# Patient Record
Sex: MALE | Race: WHITE | NOT HISPANIC OR LATINO
[De-identification: names, ages, dates, MRNs, and addresses within clinical notes are randomized per-mention and may not be internally consistent; named-entity substitution may affect disease eponyms.]

---

## 2016-12-02 RX ORDER — INSULIN ASPART 100 [IU]/ML
0 INJECTION, SOLUTION SUBCUTANEOUS
Qty: 30 | Refills: 0 | COMMUNITY
Start: 2016-12-02

## 2017-01-13 ENCOUNTER — TRANSCRIPTION ENCOUNTER (OUTPATIENT)
Age: 77
End: 2017-01-13

## 2017-01-13 ENCOUNTER — INPATIENT (INPATIENT)
Facility: HOSPITAL | Age: 77
LOS: 1 days | Discharge: HOME | DRG: 193 | End: 2017-01-15
Attending: HOSPITALIST | Admitting: HOSPITALIST
Payer: MEDICARE

## 2017-01-13 VITALS
RESPIRATION RATE: 18 BRPM | OXYGEN SATURATION: 96 % | SYSTOLIC BLOOD PRESSURE: 118 MMHG | DIASTOLIC BLOOD PRESSURE: 78 MMHG | HEART RATE: 130 BPM

## 2017-01-13 DIAGNOSIS — I10 ESSENTIAL (PRIMARY) HYPERTENSION: ICD-10-CM

## 2017-01-13 DIAGNOSIS — I48.91 UNSPECIFIED ATRIAL FIBRILLATION: ICD-10-CM

## 2017-01-13 DIAGNOSIS — E11.9 TYPE 2 DIABETES MELLITUS WITHOUT COMPLICATIONS: ICD-10-CM

## 2017-01-13 DIAGNOSIS — J18.9 PNEUMONIA, UNSPECIFIED ORGANISM: ICD-10-CM

## 2017-01-13 DIAGNOSIS — Z41.8 ENCOUNTER FOR OTHER PROCEDURES FOR PURPOSES OTHER THAN REMEDYING HEALTH STATE: ICD-10-CM

## 2017-01-13 DIAGNOSIS — R56.9 UNSPECIFIED CONVULSIONS: ICD-10-CM

## 2017-01-13 LAB
ALBUMIN SERPL ELPH-MCNC: 4 G/DL — SIGNIFICANT CHANGE UP (ref 3.3–5)
ALP SERPL-CCNC: 65 U/L — SIGNIFICANT CHANGE UP (ref 40–120)
ALT FLD-CCNC: 15 U/L RC — SIGNIFICANT CHANGE UP (ref 10–45)
ANION GAP SERPL CALC-SCNC: 16 MMOL/L — SIGNIFICANT CHANGE UP (ref 5–17)
APPEARANCE UR: CLEAR — SIGNIFICANT CHANGE UP
APTT BLD: 26.4 SEC — LOW (ref 27.5–37.4)
AST SERPL-CCNC: 18 U/L — SIGNIFICANT CHANGE UP (ref 10–40)
BASOPHILS # BLD AUTO: 0 K/UL — SIGNIFICANT CHANGE UP (ref 0–0.2)
BASOPHILS NFR BLD AUTO: 0.1 % — SIGNIFICANT CHANGE UP (ref 0–2)
BILIRUB SERPL-MCNC: 1.9 MG/DL — HIGH (ref 0.2–1.2)
BILIRUB UR-MCNC: NEGATIVE — SIGNIFICANT CHANGE UP
BUN SERPL-MCNC: 12 MG/DL — SIGNIFICANT CHANGE UP (ref 7–23)
CALCIUM SERPL-MCNC: 9.4 MG/DL — SIGNIFICANT CHANGE UP (ref 8.4–10.5)
CHLORIDE SERPL-SCNC: 102 MMOL/L — SIGNIFICANT CHANGE UP (ref 96–108)
CO2 SERPL-SCNC: 23 MMOL/L — SIGNIFICANT CHANGE UP (ref 22–31)
COLOR SPEC: YELLOW — SIGNIFICANT CHANGE UP
CREAT SERPL-MCNC: 0.83 MG/DL — SIGNIFICANT CHANGE UP (ref 0.5–1.3)
DIFF PNL FLD: ABNORMAL
EOSINOPHIL # BLD AUTO: 0.1 K/UL — SIGNIFICANT CHANGE UP (ref 0–0.5)
EOSINOPHIL NFR BLD AUTO: 0.4 % — SIGNIFICANT CHANGE UP (ref 0–6)
GAS PNL BLDV: SIGNIFICANT CHANGE UP
GLUCOSE SERPL-MCNC: 121 MG/DL — HIGH (ref 70–99)
GLUCOSE UR QL: NEGATIVE — SIGNIFICANT CHANGE UP
HCT VFR BLD CALC: 46.1 % — SIGNIFICANT CHANGE UP (ref 39–50)
HGB BLD-MCNC: 16.4 G/DL — SIGNIFICANT CHANGE UP (ref 13–17)
INR BLD: 1.19 RATIO — HIGH (ref 0.88–1.16)
KETONES UR-MCNC: NEGATIVE — SIGNIFICANT CHANGE UP
LEUKOCYTE ESTERASE UR-ACNC: NEGATIVE — SIGNIFICANT CHANGE UP
LYMPHOCYTES # BLD AUTO: 1.9 K/UL — SIGNIFICANT CHANGE UP (ref 1–3.3)
LYMPHOCYTES # BLD AUTO: 11.4 % — LOW (ref 13–44)
MCHC RBC-ENTMCNC: 32.4 PG — SIGNIFICANT CHANGE UP (ref 27–34)
MCHC RBC-ENTMCNC: 35.6 GM/DL — SIGNIFICANT CHANGE UP (ref 32–36)
MCV RBC AUTO: 91.2 FL — SIGNIFICANT CHANGE UP (ref 80–100)
MONOCYTES # BLD AUTO: 1.4 K/UL — HIGH (ref 0–0.9)
MONOCYTES NFR BLD AUTO: 8.4 % — SIGNIFICANT CHANGE UP (ref 2–14)
NEUTROPHILS # BLD AUTO: 13.2 K/UL — HIGH (ref 1.8–7.4)
NEUTROPHILS NFR BLD AUTO: 79.8 % — HIGH (ref 43–77)
NITRITE UR-MCNC: NEGATIVE — SIGNIFICANT CHANGE UP
NT-PROBNP SERPL-SCNC: 468 PG/ML — HIGH (ref 0–300)
PH UR: 6 — SIGNIFICANT CHANGE UP (ref 4.8–8)
PLATELET # BLD AUTO: 188 K/UL — SIGNIFICANT CHANGE UP (ref 150–400)
POTASSIUM SERPL-MCNC: 4.3 MMOL/L — SIGNIFICANT CHANGE UP (ref 3.5–5.3)
POTASSIUM SERPL-SCNC: 4.3 MMOL/L — SIGNIFICANT CHANGE UP (ref 3.5–5.3)
PROT SERPL-MCNC: 7.3 G/DL — SIGNIFICANT CHANGE UP (ref 6–8.3)
PROT UR-MCNC: 300 MG/DL
PROTHROM AB SERPL-ACNC: 12.9 SEC — SIGNIFICANT CHANGE UP (ref 10–13.1)
RAPID RVP RESULT: SIGNIFICANT CHANGE UP
RBC # BLD: 5.06 M/UL — SIGNIFICANT CHANGE UP (ref 4.2–5.8)
RBC # FLD: 13.2 % — SIGNIFICANT CHANGE UP (ref 10.3–14.5)
RBC CASTS # UR COMP ASSIST: SIGNIFICANT CHANGE UP /HPF (ref 0–2)
SODIUM SERPL-SCNC: 141 MMOL/L — SIGNIFICANT CHANGE UP (ref 135–145)
SP GR SPEC: 1.02 — SIGNIFICANT CHANGE UP (ref 1.01–1.02)
TROPONIN T SERPL-MCNC: <0.01 NG/ML — SIGNIFICANT CHANGE UP (ref 0–0.06)
UROBILINOGEN FLD QL: NEGATIVE — SIGNIFICANT CHANGE UP
WBC # BLD: 16.6 K/UL — HIGH (ref 3.8–10.5)
WBC # FLD AUTO: 16.6 K/UL — HIGH (ref 3.8–10.5)
WBC UR QL: SIGNIFICANT CHANGE UP /HPF (ref 0–5)

## 2017-01-13 PROCEDURE — 71010: CPT | Mod: 26

## 2017-01-13 PROCEDURE — 99291 CRITICAL CARE FIRST HOUR: CPT | Mod: GC

## 2017-01-13 PROCEDURE — 99223 1ST HOSP IP/OBS HIGH 75: CPT | Mod: GC

## 2017-01-13 RX ORDER — TAMSULOSIN HYDROCHLORIDE 0.4 MG/1
0.4 CAPSULE ORAL AT BEDTIME
Qty: 0 | Refills: 0 | Status: DISCONTINUED | OUTPATIENT
Start: 2017-01-13 | End: 2017-01-15

## 2017-01-13 RX ORDER — INSULIN LISPRO 100/ML
VIAL (ML) SUBCUTANEOUS AT BEDTIME
Qty: 0 | Refills: 0 | Status: DISCONTINUED | OUTPATIENT
Start: 2017-01-13 | End: 2017-01-15

## 2017-01-13 RX ORDER — METOPROLOL TARTRATE 50 MG
25 TABLET ORAL
Qty: 0 | Refills: 0 | Status: DISCONTINUED | OUTPATIENT
Start: 2017-01-13 | End: 2017-01-14

## 2017-01-13 RX ORDER — INSULIN LISPRO 100/ML
VIAL (ML) SUBCUTANEOUS
Qty: 0 | Refills: 0 | Status: DISCONTINUED | OUTPATIENT
Start: 2017-01-13 | End: 2017-01-15

## 2017-01-13 RX ORDER — LEVETIRACETAM 250 MG/1
750 TABLET, FILM COATED ORAL
Qty: 0 | Refills: 0 | Status: DISCONTINUED | OUTPATIENT
Start: 2017-01-13 | End: 2017-01-15

## 2017-01-13 RX ORDER — INSULIN GLARGINE 100 [IU]/ML
20 INJECTION, SOLUTION SUBCUTANEOUS AT BEDTIME
Qty: 0 | Refills: 0 | Status: DISCONTINUED | OUTPATIENT
Start: 2017-01-13 | End: 2017-01-15

## 2017-01-13 RX ORDER — RIVAROXABAN 15 MG-20MG
20 KIT ORAL DAILY
Qty: 0 | Refills: 0 | Status: DISCONTINUED | OUTPATIENT
Start: 2017-01-13 | End: 2017-01-15

## 2017-01-13 RX ORDER — DEXTROSE 50 % IN WATER 50 %
1 SYRINGE (ML) INTRAVENOUS ONCE
Qty: 0 | Refills: 0 | Status: DISCONTINUED | OUTPATIENT
Start: 2017-01-13 | End: 2017-01-15

## 2017-01-13 RX ORDER — SODIUM CHLORIDE 9 MG/ML
500 INJECTION INTRAMUSCULAR; INTRAVENOUS; SUBCUTANEOUS ONCE
Qty: 0 | Refills: 0 | Status: COMPLETED | OUTPATIENT
Start: 2017-01-13 | End: 2017-01-13

## 2017-01-13 RX ORDER — DEXTROSE 50 % IN WATER 50 %
12.5 SYRINGE (ML) INTRAVENOUS ONCE
Qty: 0 | Refills: 0 | Status: DISCONTINUED | OUTPATIENT
Start: 2017-01-13 | End: 2017-01-15

## 2017-01-13 RX ORDER — AMLODIPINE BESYLATE 2.5 MG/1
2.5 TABLET ORAL DAILY
Qty: 0 | Refills: 0 | Status: DISCONTINUED | OUTPATIENT
Start: 2017-01-13 | End: 2017-01-15

## 2017-01-13 RX ORDER — GLUCAGON INJECTION, SOLUTION 0.5 MG/.1ML
1 INJECTION, SOLUTION SUBCUTANEOUS ONCE
Qty: 0 | Refills: 0 | Status: DISCONTINUED | OUTPATIENT
Start: 2017-01-13 | End: 2017-01-15

## 2017-01-13 RX ORDER — CEFTRIAXONE 500 MG/1
1 INJECTION, POWDER, FOR SOLUTION INTRAMUSCULAR; INTRAVENOUS EVERY 24 HOURS
Qty: 0 | Refills: 0 | Status: COMPLETED | OUTPATIENT
Start: 2017-01-13 | End: 2017-01-13

## 2017-01-13 RX ORDER — SODIUM CHLORIDE 9 MG/ML
1000 INJECTION, SOLUTION INTRAVENOUS
Qty: 0 | Refills: 0 | Status: DISCONTINUED | OUTPATIENT
Start: 2017-01-13 | End: 2017-01-15

## 2017-01-13 RX ORDER — METOPROLOL TARTRATE 50 MG
5 TABLET ORAL ONCE
Qty: 0 | Refills: 0 | Status: COMPLETED | OUTPATIENT
Start: 2017-01-13 | End: 2017-01-13

## 2017-01-13 RX ORDER — DEXTROSE 50 % IN WATER 50 %
25 SYRINGE (ML) INTRAVENOUS ONCE
Qty: 0 | Refills: 0 | Status: DISCONTINUED | OUTPATIENT
Start: 2017-01-13 | End: 2017-01-15

## 2017-01-13 RX ORDER — ACETAMINOPHEN 500 MG
1000 TABLET ORAL ONCE
Qty: 0 | Refills: 0 | Status: COMPLETED | OUTPATIENT
Start: 2017-01-13 | End: 2017-01-13

## 2017-01-13 RX ORDER — AZITHROMYCIN 500 MG/1
500 TABLET, FILM COATED ORAL EVERY 24 HOURS
Qty: 0 | Refills: 0 | Status: COMPLETED | OUTPATIENT
Start: 2017-01-13 | End: 2017-01-13

## 2017-01-13 RX ORDER — AZITHROMYCIN 500 MG/1
500 TABLET, FILM COATED ORAL ONCE
Qty: 0 | Refills: 0 | Status: COMPLETED | OUTPATIENT
Start: 2017-01-13 | End: 2017-01-13

## 2017-01-13 RX ORDER — CEFTRIAXONE 500 MG/1
1 INJECTION, POWDER, FOR SOLUTION INTRAMUSCULAR; INTRAVENOUS ONCE
Qty: 0 | Refills: 0 | Status: COMPLETED | OUTPATIENT
Start: 2017-01-13 | End: 2017-01-13

## 2017-01-13 RX ADMIN — Medication 400 MILLIGRAM(S): at 15:25

## 2017-01-13 RX ADMIN — SODIUM CHLORIDE 1000 MILLILITER(S): 9 INJECTION INTRAMUSCULAR; INTRAVENOUS; SUBCUTANEOUS at 15:22

## 2017-01-13 RX ADMIN — Medication 5 MILLIGRAM(S): at 15:22

## 2017-01-13 RX ADMIN — INSULIN GLARGINE 20 UNIT(S): 100 INJECTION, SOLUTION SUBCUTANEOUS at 23:34

## 2017-01-13 RX ADMIN — CEFTRIAXONE 100 GRAM(S): 500 INJECTION, POWDER, FOR SOLUTION INTRAMUSCULAR; INTRAVENOUS at 18:55

## 2017-01-13 RX ADMIN — TAMSULOSIN HYDROCHLORIDE 0.4 MILLIGRAM(S): 0.4 CAPSULE ORAL at 22:19

## 2017-01-13 RX ADMIN — CEFTRIAXONE 100 GRAM(S): 500 INJECTION, POWDER, FOR SOLUTION INTRAMUSCULAR; INTRAVENOUS at 22:53

## 2017-01-13 RX ADMIN — AZITHROMYCIN 250 MILLIGRAM(S): 500 TABLET, FILM COATED ORAL at 23:34

## 2017-01-13 RX ADMIN — AZITHROMYCIN 250 MILLIGRAM(S): 500 TABLET, FILM COATED ORAL at 17:49

## 2017-01-13 RX ADMIN — Medication 25 MILLIGRAM(S): at 22:19

## 2017-01-13 NOTE — ED PROVIDER NOTE - CRITICAL CARE PROVIDED
consult w/ pt's family directly relating to pts condition/interpretation of diagnostic studies/consultation with other physicians/additional history taking/documentation/direct patient care (not related to procedure)

## 2017-01-13 NOTE — ED PROVIDER NOTE - OBJECTIVE STATEMENT
75 y/o M hx of afib on xarelto, ich s/p craniotomy on keprra p/w nonproductive cough x few days & sob. As per wife she noticed that he seemed more lethargic today and went to urgent care. At urgent care, pt found to be in afib with rvr and sent to ed. pt deneis any fever, cp, ab pain, n/v, urinary sx. Wife states she noticed blood in stool in the past but not recently. 75 y/o M hx of afib on xarelto, ich s/p craniotomy on keprra p/w nonproductive cough x few days & sob. As per wife she noticed that he seemed more lethargic today and went to urgent care. At urgent care, pt found to be in afib with rvr and sent to ed. pt deneis any fever, cp, ab pain, n/v, urinary sx. Wife states she noticed blood in stool in the past but not recently.Rest of ros is otherwise neg 75 y/o M hx of afib on xarelto, ich s/p craniotomy on keprra p/w nonproductive cough x few days & sob. As per wife she noticed that he seemed more lethargic today and went to urgent care. At urgent care, pt found to be in afib with rvr and sent to ed. pt deneis any fever, cp, ab pain, n/v, urinary sx. Wife states she noticed blood in stool in the past but not recently. Rest of ros is otherwise neg

## 2017-01-13 NOTE — ED ADULT NURSE NOTE - OBJECTIVE STATEMENT
Pt is an ambulatory 76 yr old male a/oX 3 sent from urgent care for SOb worse on exertion and an abnormal EKG.  As per patient he has had 2 loose stools with blood.  PERRL wnl, stark with equal strength.  Sinus tachycardia on cm at 125 bpm.  No Recent travel, fevers or chills, pt has productive cough.  No recent stay in hospital.  Abdomen NT ND with BS+4Q.  Ptt is on lopressor and missed dose today.  Skin wdi.  Periphera pulses +2bl

## 2017-01-13 NOTE — H&P ADULT. - PROBLEM SELECTOR PLAN 4
Patient unsure about nightly lantus dose - confirm with family in AM  - fingersticks pre-meal and at bedtime + SSI for now c/w lantus 20u (home dose 30u) and pre-meal SSI  - fingersticks pre-meal and at bedtime  - check A1c in AM

## 2017-01-13 NOTE — ED PROVIDER NOTE - MEDICAL DECISION MAKING DETAILS
77 y/o M hx of afib on xarelto, ich s/p craniotomy on Keppra p/w nonproductive cough x few days & sob p/w afib with rvr . PT  febrile on arrival with hr 130s-140s . Pt given metoprolol 5mg iv with response now hr 80s. Plan to obtain labs to r.o infectious etiology  and admit

## 2017-01-13 NOTE — ED PROVIDER NOTE - CARE PLAN
Principal Discharge DX:	Atrial fibrillation with RVR Principal Discharge DX:	Atrial fibrillation with RVR  Secondary Diagnosis:	CAP (community acquired pneumonia)

## 2017-01-13 NOTE — ED PROVIDER NOTE - ATTENDING CONTRIBUTION TO CARE
Dr. Ryan (Attending Physician)  Pt. with ho AFib on xarelto pw gen weakness, fatigue and cough, tachycardic to 130s in AFib with RVR. Lung with faint crackles at bases.  Likely pna vs. viral syndrome. Will give IV fluids. Giving metoprolol 5 mg IVP. Likely admission. check RVP.

## 2017-01-13 NOTE — H&P ADULT. - PMH
Atrial fibrillation    BPH (benign prostatic hyperplasia)    Diabetes mellitus    Hypertension    Seizure

## 2017-01-13 NOTE — ED PROVIDER NOTE - PROGRESS NOTE DETAILS
Attending MD Roman: received pt in s/o from Dr. Ryan, CXR by my read with possible right lower lung field opacity, likely CAP. Given CAP in setting of presenting afib w/RVR, will admit for IV abx, ongoing rate control

## 2017-01-13 NOTE — H&P ADULT. - PROBLEM SELECTOR PLAN 2
Initial AFib with RVR likely prompted by pneumonia, currently rate-controlled  - c/w metoprolol for rate control  - c/w xarelto for anticoagulation

## 2017-01-13 NOTE — H&P ADULT. - HISTORY OF PRESENT ILLNESS
76M w/ PMH Afib (on Xarelto), CAD, HTN, ICH s/p craniotomy, presenting with productive cough and atrial fibrillation with RVR.  Patient states that he has been coughing for about 2 weeks, occasionally productive of brown sputum.  States that yesterday his coughing became worse, he was having multiple coughing fits, and "couldn't even walk."  Denies any dyspnea or chest pain.  He said that he took his pulse and noticed that it was "fast."  He then went to urgent care for evaluation and was found to be in AFib with RVR and was sent to the ED.  Denies any fevers, chills, nausea, vomiting, abdominal pain, dysuria.     In the ED, Tmax 100.2, , /78, RR 20, 100% on NC.   He was found to be in AFib with RVR and was given metoprolol 5mg IV x1 with good response.   CXR was done concerning for PNA. He also received azithromycin 500mg IV x1, ceftriaxone 1g IV x1, normal saline 500cc. 76M w/ PMH Afib (on Xarelto), CAD, HTN, h/o seizures 2/2 SDH (2015), presenting with productive cough and atrial fibrillation with RVR.  Patient states that he has been coughing for about 2 weeks, occasionally productive of brown sputum.  States that yesterday his coughing became worse, he was having multiple coughing fits, and "couldn't even walk."  Denies any dyspnea or chest pain.  He said that he took his pulse and noticed that it was "fast."  He then went to urgent care for evaluation and was found to be in AFib with RVR and was sent to the ED.  Denies any fevers, chills, nausea, vomiting, abdominal pain, dysuria.     In the ED, Tmax 100.2, , /78, RR 20, 100% on NC.   He was found to be in AFib with RVR and was given metoprolol 5mg IV x1 with good response.   CXR was done concerning for PNA. He also received azithromycin 500mg IV x1, ceftriaxone 1g IV x1, normal saline 500cc. 76M w/ PMH Afib (on Xarelto), CAD, HTN, T2DM, h/o seizures 2/2 SDH (2015), presenting with productive cough and atrial fibrillation with RVR.  Patient states that he has been coughing for about 2 weeks, occasionally productive of brown sputum.  States that yesterday his coughing became worse, he was having multiple coughing fits, and "couldn't even walk."  Denies any dyspnea or chest pain.  He said that he took his pulse and noticed that it was "fast."  He then went to urgent care for evaluation and was found to be in AFib with RVR and was sent to the ED.  Denies any fevers, chills, nausea, vomiting, abdominal pain, dysuria.     In the ED, Tmax 100.2, , /78, RR 20, 100% on NC.   He was found to be in AFib with RVR and was given metoprolol 5mg IV x1 with good response.   CXR was done concerning for PNA. He also received azithromycin 500mg IV x1, ceftriaxone 1g IV x1, normal saline 500cc.

## 2017-01-13 NOTE — H&P ADULT. - PROBLEM SELECTOR PLAN 1
Productive cough, low-grade fever, haziness apparent on CXR, likely CAP given no recent hospitalizations.   - check urine legionella, if negative can d/c azithromycin  - c/w ceftriaxone  - f/u blood cultures x 2  - breathing well on room air, supplemental oxygen if needed Productive cough, leukocytosis, low-grade fever, haziness apparent on CXR, likely CAP given no recent hospitalizations.   - check urine legionella, if negative can d/c azithromycin  - c/w ceftriaxone  - f/u blood cultures x 2  - breathing well on room air, supplemental oxygen if needed

## 2017-01-13 NOTE — H&P ADULT. - NEGATIVE CARDIOVASCULAR SYMPTOMS
no orthopnea/no peripheral edema/no chest pain/no dyspnea on exertion/no palpitations/no claudication/no paroxysmal nocturnal dyspnea

## 2017-01-13 NOTE — H&P ADULT. - ASSESSMENT
76M w/ PMH Afib (on Xarelto), CAD, HTN, h/o seizures 2/2 SDH (2015), presenting with productive cough, found to be in Afib with RVR, likely due to pneumonia. 76M w/ PMH Afib (on Xarelto), CAD, HTN, T2DM, h/o seizures 2/2 SDH (2015), presenting with productive cough, found to be in Afib with RVR, likely due to pneumonia.

## 2017-01-13 NOTE — H&P ADULT. - RS GEN PE MLT RESP DETAILS PC
clear to auscultation bilaterally/mild crackles in lower lobes bilaterally/no wheezes/respirations non-labored

## 2017-01-14 LAB
ALBUMIN SERPL ELPH-MCNC: 3.6 G/DL — SIGNIFICANT CHANGE UP (ref 3.3–5)
ALP SERPL-CCNC: 74 U/L — SIGNIFICANT CHANGE UP (ref 40–120)
ALT FLD-CCNC: 15 U/L — SIGNIFICANT CHANGE UP (ref 10–45)
ANION GAP SERPL CALC-SCNC: 14 MMOL/L — SIGNIFICANT CHANGE UP (ref 5–17)
AST SERPL-CCNC: 23 U/L — SIGNIFICANT CHANGE UP (ref 10–40)
BASOPHILS # BLD AUTO: 0.02 K/UL — SIGNIFICANT CHANGE UP (ref 0–0.2)
BASOPHILS NFR BLD AUTO: 0.1 % — SIGNIFICANT CHANGE UP (ref 0–2)
BILIRUB SERPL-MCNC: 1.5 MG/DL — HIGH (ref 0.2–1.2)
BUN SERPL-MCNC: 12 MG/DL — SIGNIFICANT CHANGE UP (ref 7–23)
CALCIUM SERPL-MCNC: 9.2 MG/DL — SIGNIFICANT CHANGE UP (ref 8.4–10.5)
CHLORIDE SERPL-SCNC: 100 MMOL/L — SIGNIFICANT CHANGE UP (ref 96–108)
CO2 SERPL-SCNC: 24 MMOL/L — SIGNIFICANT CHANGE UP (ref 22–31)
CREAT SERPL-MCNC: 0.77 MG/DL — SIGNIFICANT CHANGE UP (ref 0.5–1.3)
CULTURE RESULTS: NO GROWTH — SIGNIFICANT CHANGE UP
EOSINOPHIL # BLD AUTO: 0.23 K/UL — SIGNIFICANT CHANGE UP (ref 0–0.5)
EOSINOPHIL NFR BLD AUTO: 1.7 % — SIGNIFICANT CHANGE UP (ref 0–6)
GLUCOSE SERPL-MCNC: 183 MG/DL — HIGH (ref 70–99)
HBA1C BLD-MCNC: 7.4 % — HIGH (ref 4–5.6)
HCT VFR BLD CALC: 44.2 % — SIGNIFICANT CHANGE UP (ref 39–50)
HGB BLD-MCNC: 15.3 G/DL — SIGNIFICANT CHANGE UP (ref 13–17)
IMM GRANULOCYTES NFR BLD AUTO: 0.1 % — SIGNIFICANT CHANGE UP (ref 0–1.5)
LEGIONELLA AG UR QL: NEGATIVE — SIGNIFICANT CHANGE UP
LYMPHOCYTES # BLD AUTO: 1.9 K/UL — SIGNIFICANT CHANGE UP (ref 1–3.3)
LYMPHOCYTES # BLD AUTO: 14.2 % — SIGNIFICANT CHANGE UP (ref 13–44)
MAGNESIUM SERPL-MCNC: 1.7 MG/DL — SIGNIFICANT CHANGE UP (ref 1.6–2.6)
MCHC RBC-ENTMCNC: 31 PG — SIGNIFICANT CHANGE UP (ref 27–34)
MCHC RBC-ENTMCNC: 34.6 GM/DL — SIGNIFICANT CHANGE UP (ref 32–36)
MCV RBC AUTO: 89.7 FL — SIGNIFICANT CHANGE UP (ref 80–100)
MONOCYTES # BLD AUTO: 1.3 K/UL — HIGH (ref 0–0.9)
MONOCYTES NFR BLD AUTO: 9.7 % — SIGNIFICANT CHANGE UP (ref 2–14)
NEUTROPHILS # BLD AUTO: 9.87 K/UL — HIGH (ref 1.8–7.4)
NEUTROPHILS NFR BLD AUTO: 74.2 % — SIGNIFICANT CHANGE UP (ref 43–77)
PHOSPHATE SERPL-MCNC: 1.9 MG/DL — LOW (ref 2.5–4.5)
PLATELET # BLD AUTO: 174 K/UL — SIGNIFICANT CHANGE UP (ref 150–400)
POTASSIUM SERPL-MCNC: 4.1 MMOL/L — SIGNIFICANT CHANGE UP (ref 3.5–5.3)
POTASSIUM SERPL-SCNC: 4.1 MMOL/L — SIGNIFICANT CHANGE UP (ref 3.5–5.3)
PROT SERPL-MCNC: 6.8 G/DL — SIGNIFICANT CHANGE UP (ref 6–8.3)
RBC # BLD: 4.93 M/UL — SIGNIFICANT CHANGE UP (ref 4.2–5.8)
RBC # FLD: 13.8 % — SIGNIFICANT CHANGE UP (ref 10.3–14.5)
SODIUM SERPL-SCNC: 138 MMOL/L — SIGNIFICANT CHANGE UP (ref 135–145)
SPECIMEN SOURCE: SIGNIFICANT CHANGE UP
WBC # BLD: 13.34 K/UL — HIGH (ref 3.8–10.5)
WBC # FLD AUTO: 13.34 K/UL — HIGH (ref 3.8–10.5)

## 2017-01-14 PROCEDURE — 99233 SBSQ HOSP IP/OBS HIGH 50: CPT

## 2017-01-14 PROCEDURE — 70450 CT HEAD/BRAIN W/O DYE: CPT | Mod: 26

## 2017-01-14 PROCEDURE — 71250 CT THORAX DX C-: CPT | Mod: 26

## 2017-01-14 RX ORDER — METOPROLOL TARTRATE 50 MG
50 TABLET ORAL
Qty: 0 | Refills: 0 | Status: DISCONTINUED | OUTPATIENT
Start: 2017-01-14 | End: 2017-01-15

## 2017-01-14 RX ORDER — SODIUM,POTASSIUM PHOSPHATES 278-250MG
1 POWDER IN PACKET (EA) ORAL
Qty: 0 | Refills: 0 | Status: COMPLETED | OUTPATIENT
Start: 2017-01-14 | End: 2017-01-15

## 2017-01-14 RX ORDER — HALOPERIDOL DECANOATE 100 MG/ML
1 INJECTION INTRAMUSCULAR ONCE
Qty: 0 | Refills: 0 | Status: COMPLETED | OUTPATIENT
Start: 2017-01-14 | End: 2017-01-14

## 2017-01-14 RX ADMIN — LEVETIRACETAM 750 MILLIGRAM(S): 250 TABLET, FILM COATED ORAL at 17:07

## 2017-01-14 RX ADMIN — HALOPERIDOL DECANOATE 1 MILLIGRAM(S): 100 INJECTION INTRAMUSCULAR at 22:11

## 2017-01-14 RX ADMIN — Medication 25 MILLIGRAM(S): at 05:03

## 2017-01-14 RX ADMIN — Medication 50 MILLIGRAM(S): at 17:07

## 2017-01-14 RX ADMIN — RIVAROXABAN 20 MILLIGRAM(S): KIT at 12:43

## 2017-01-14 RX ADMIN — INSULIN GLARGINE 20 UNIT(S): 100 INJECTION, SOLUTION SUBCUTANEOUS at 22:14

## 2017-01-14 RX ADMIN — AMLODIPINE BESYLATE 2.5 MILLIGRAM(S): 2.5 TABLET ORAL at 05:03

## 2017-01-14 RX ADMIN — Medication 3: at 12:42

## 2017-01-14 RX ADMIN — Medication 1: at 09:04

## 2017-01-14 RX ADMIN — Medication 1 TABLET(S): at 17:06

## 2017-01-14 RX ADMIN — Medication 2: at 17:06

## 2017-01-14 RX ADMIN — LEVETIRACETAM 750 MILLIGRAM(S): 250 TABLET, FILM COATED ORAL at 05:03

## 2017-01-14 NOTE — PROVIDER CONTACT NOTE (OTHER) - ASSESSMENT
pt restless, attempting to get out of bed, anxious; on enhanced supervision pt restless, attempting to get out of bed, anxious, VSS - BP elevated (see flowsheets), pt denies chest pain, denies palpitations, asymptomatic; pt on enhanced supervision

## 2017-01-15 ENCOUNTER — TRANSCRIPTION ENCOUNTER (OUTPATIENT)
Age: 77
End: 2017-01-15

## 2017-01-15 VITALS
TEMPERATURE: 98 F | SYSTOLIC BLOOD PRESSURE: 141 MMHG | HEART RATE: 64 BPM | RESPIRATION RATE: 18 BRPM | DIASTOLIC BLOOD PRESSURE: 70 MMHG | OXYGEN SATURATION: 93 %

## 2017-01-15 LAB
ANION GAP SERPL CALC-SCNC: 12 MMOL/L — SIGNIFICANT CHANGE UP (ref 5–17)
BUN SERPL-MCNC: 18 MG/DL — SIGNIFICANT CHANGE UP (ref 7–23)
CALCIUM SERPL-MCNC: 9.3 MG/DL — SIGNIFICANT CHANGE UP (ref 8.4–10.5)
CHLORIDE SERPL-SCNC: 102 MMOL/L — SIGNIFICANT CHANGE UP (ref 96–108)
CO2 SERPL-SCNC: 27 MMOL/L — SIGNIFICANT CHANGE UP (ref 22–31)
CREAT SERPL-MCNC: 0.85 MG/DL — SIGNIFICANT CHANGE UP (ref 0.5–1.3)
GLUCOSE SERPL-MCNC: 209 MG/DL — HIGH (ref 70–99)
HCT VFR BLD CALC: 48.2 % — SIGNIFICANT CHANGE UP (ref 39–50)
HGB BLD-MCNC: 16.9 G/DL — SIGNIFICANT CHANGE UP (ref 13–17)
MAGNESIUM SERPL-MCNC: 2 MG/DL — SIGNIFICANT CHANGE UP (ref 1.6–2.6)
MCHC RBC-ENTMCNC: 32.2 PG — SIGNIFICANT CHANGE UP (ref 27–34)
MCHC RBC-ENTMCNC: 35.1 GM/DL — SIGNIFICANT CHANGE UP (ref 32–36)
MCV RBC AUTO: 91.8 FL — SIGNIFICANT CHANGE UP (ref 80–100)
PHOSPHATE SERPL-MCNC: 3.2 MG/DL — SIGNIFICANT CHANGE UP (ref 2.5–4.5)
PLATELET # BLD AUTO: 187 K/UL — SIGNIFICANT CHANGE UP (ref 150–400)
POTASSIUM SERPL-MCNC: 4 MMOL/L — SIGNIFICANT CHANGE UP (ref 3.5–5.3)
POTASSIUM SERPL-SCNC: 4 MMOL/L — SIGNIFICANT CHANGE UP (ref 3.5–5.3)
RBC # BLD: 5.25 M/UL — SIGNIFICANT CHANGE UP (ref 4.2–5.8)
RBC # FLD: 13.1 % — SIGNIFICANT CHANGE UP (ref 10.3–14.5)
SODIUM SERPL-SCNC: 141 MMOL/L — SIGNIFICANT CHANGE UP (ref 135–145)
WBC # BLD: 12.4 K/UL — HIGH (ref 3.8–10.5)
WBC # FLD AUTO: 12.4 K/UL — HIGH (ref 3.8–10.5)

## 2017-01-15 PROCEDURE — 80048 BASIC METABOLIC PNL TOTAL CA: CPT

## 2017-01-15 PROCEDURE — 87581 M.PNEUMON DNA AMP PROBE: CPT

## 2017-01-15 PROCEDURE — 71250 CT THORAX DX C-: CPT

## 2017-01-15 PROCEDURE — 85027 COMPLETE CBC AUTOMATED: CPT

## 2017-01-15 PROCEDURE — 96375 TX/PRO/DX INJ NEW DRUG ADDON: CPT

## 2017-01-15 PROCEDURE — 87486 CHLMYD PNEUM DNA AMP PROBE: CPT

## 2017-01-15 PROCEDURE — 82803 BLOOD GASES ANY COMBINATION: CPT

## 2017-01-15 PROCEDURE — 85730 THROMBOPLASTIN TIME PARTIAL: CPT

## 2017-01-15 PROCEDURE — 96374 THER/PROPH/DIAG INJ IV PUSH: CPT

## 2017-01-15 PROCEDURE — 87040 BLOOD CULTURE FOR BACTERIA: CPT

## 2017-01-15 PROCEDURE — 99239 HOSP IP/OBS DSCHRG MGMT >30: CPT

## 2017-01-15 PROCEDURE — 82330 ASSAY OF CALCIUM: CPT

## 2017-01-15 PROCEDURE — 99291 CRITICAL CARE FIRST HOUR: CPT | Mod: 25

## 2017-01-15 PROCEDURE — 80053 COMPREHEN METABOLIC PANEL: CPT

## 2017-01-15 PROCEDURE — 82435 ASSAY OF BLOOD CHLORIDE: CPT

## 2017-01-15 PROCEDURE — 87449 NOS EACH ORGANISM AG IA: CPT

## 2017-01-15 PROCEDURE — 85610 PROTHROMBIN TIME: CPT

## 2017-01-15 PROCEDURE — 83880 ASSAY OF NATRIURETIC PEPTIDE: CPT

## 2017-01-15 PROCEDURE — 84295 ASSAY OF SERUM SODIUM: CPT

## 2017-01-15 PROCEDURE — 84484 ASSAY OF TROPONIN QUANT: CPT

## 2017-01-15 PROCEDURE — 83605 ASSAY OF LACTIC ACID: CPT

## 2017-01-15 PROCEDURE — 81001 URINALYSIS AUTO W/SCOPE: CPT

## 2017-01-15 PROCEDURE — 83735 ASSAY OF MAGNESIUM: CPT

## 2017-01-15 PROCEDURE — 87798 DETECT AGENT NOS DNA AMP: CPT

## 2017-01-15 PROCEDURE — 84100 ASSAY OF PHOSPHORUS: CPT

## 2017-01-15 PROCEDURE — 87086 URINE CULTURE/COLONY COUNT: CPT

## 2017-01-15 PROCEDURE — 84132 ASSAY OF SERUM POTASSIUM: CPT

## 2017-01-15 PROCEDURE — 70450 CT HEAD/BRAIN W/O DYE: CPT

## 2017-01-15 PROCEDURE — 83036 HEMOGLOBIN GLYCOSYLATED A1C: CPT

## 2017-01-15 PROCEDURE — 85014 HEMATOCRIT: CPT

## 2017-01-15 PROCEDURE — 71045 X-RAY EXAM CHEST 1 VIEW: CPT

## 2017-01-15 PROCEDURE — 87633 RESP VIRUS 12-25 TARGETS: CPT

## 2017-01-15 PROCEDURE — 82947 ASSAY GLUCOSE BLOOD QUANT: CPT

## 2017-01-15 RX ORDER — METOPROLOL TARTRATE 50 MG
1 TABLET ORAL
Qty: 0 | Refills: 0 | COMMUNITY
Start: 2017-01-15

## 2017-01-15 RX ORDER — FLUTICASONE PROPIONATE AND SALMETEROL 50; 250 UG/1; UG/1
1 POWDER ORAL; RESPIRATORY (INHALATION)
Qty: 0 | Refills: 0 | Status: DISCONTINUED | OUTPATIENT
Start: 2017-01-15 | End: 2017-01-15

## 2017-01-15 RX ORDER — FLUTICASONE PROPIONATE AND SALMETEROL 50; 250 UG/1; UG/1
1 POWDER ORAL; RESPIRATORY (INHALATION)
Qty: 1 | Refills: 0 | OUTPATIENT
Start: 2017-01-15 | End: 2017-02-14

## 2017-01-15 RX ORDER — FLUTICASONE PROPIONATE AND SALMETEROL 50; 250 UG/1; UG/1
1 POWDER ORAL; RESPIRATORY (INHALATION)
Qty: 1 | Refills: 0 | OUTPATIENT
Start: 2017-01-15

## 2017-01-15 RX ADMIN — Medication 1 TABLET(S): at 12:13

## 2017-01-15 RX ADMIN — Medication 50 MILLIGRAM(S): at 05:41

## 2017-01-15 RX ADMIN — LEVETIRACETAM 750 MILLIGRAM(S): 250 TABLET, FILM COATED ORAL at 05:39

## 2017-01-15 RX ADMIN — RIVAROXABAN 20 MILLIGRAM(S): KIT at 12:13

## 2017-01-15 RX ADMIN — Medication 3: at 12:13

## 2017-01-15 RX ADMIN — Medication 1 TABLET(S): at 05:39

## 2017-01-15 RX ADMIN — AMLODIPINE BESYLATE 2.5 MILLIGRAM(S): 2.5 TABLET ORAL at 05:39

## 2017-01-15 NOTE — DISCHARGE NOTE ADULT - HOSPITAL COURSE
76yoM hx afib on xarelto, HTN, dementia secondary to SDH a/w afib with RVR secondary to CAP overnight with delirium secondary to dementia  1. CAP - continue with z pack and advair, CT chest no acute findings  2. afib with RVR now rate controlled with increased dose of metoprolol 50mg bid and xarelto  3. dementia with metabolic encephalopathy due to hospitalization - wife at bedside - would like to take him home, CT head no acute findings Addendum 1/18/17  76yoM hx afib on xarelto, HTN, dementia secondary to SDH a/w afib with RVR secondary to viral pneumonia overnight with delirium secondary to dementia  1. Viral pneumonia- continue with z pack and advair, CT chest no acute findings  2. afib with RVR now rate controlled with increased dose of metoprolol 50mg bid and xarelto  3. dementia with metabolic encephalopathy due to hospitalization and viral pneumonia - wife at bedside - would like to take him home, CT head no acute findings

## 2017-01-15 NOTE — DISCHARGE NOTE ADULT - PATIENT PORTAL LINK FT
“You can access the Follow[employer] Patient Portal, offered by [facility], by registering with the following website: [email or web address]”

## 2017-01-15 NOTE — DISCHARGE NOTE ADULT - MEDICATION SUMMARY - MEDICATIONS TO TAKE
I will START or STAY ON the medications listed below when I get home from the hospital:    Flomax 0.4 mg oral capsule  -- 1 cap(s) by mouth once a day  -- Indication: For enlarged prostate    Xarelto 20 mg oral tablet  -- 1 tab(s) by mouth once a day (in the evening)  -- Indication: For Atrial fibrillation    Keppra 750 mg oral tablet  -- 1 tab(s) by mouth 2 times a day  -- Indication: For Seizure    NovoLOG 100 units/mL subcutaneous solution  --  subcutaneous SSI premeal  -- Indication: For diabetes    Lantus 100 units/mL subcutaneous solution  -- 30 unit(s) subcutaneous once a day (at bedtime)  -- Indication: For diabetes    metoprolol tartrate 50 mg oral tablet  -- 1 tab(s) by mouth 2 times a day  -- Indication: For Hypertension    amLODIPine 2.5 mg oral tablet  -- 1 tab(s) by mouth once a day  -- Indication: For Hypertension    Zithromax Z-Migel 250 mg oral tablet  -- 1 tab(s) by mouth once a day x 5 days  -- Indication: For bronchitis

## 2017-01-15 NOTE — DISCHARGE NOTE ADULT - CARE PROVIDER_API CALL
Gadiel Christine), Cardiovascular Disease; Internal Medicine  [address] [place] [address]  Phone: [phone]  Fax: [phone]

## 2017-01-15 NOTE — DISCHARGE NOTE ADULT - CARE PLAN
Principal Discharge DX:	Atrial fibrillation with RVR  Goal:	increase your dose of metoprolol to 50mg bid  Instructions for follow-up, activity and diet:	follow up with your cardiologist next week  Secondary Diagnosis:	Hypertension  Secondary Diagnosis:	CAP (community acquired pneumonia)  Goal:	complete a zpack  Secondary Diagnosis:	Seizure

## 2017-01-15 NOTE — DISCHARGE NOTE ADULT - PLAN OF CARE
increase your dose of metoprolol to 50mg bid follow up with your cardiologist next week complete a zpack

## 2017-01-15 NOTE — DISCHARGE NOTE ADULT - MEDICATION SUMMARY - MEDICATIONS TO CHANGE
I will SWITCH the dose or number of times a day I take the medications listed below when I get home from the hospital:    Lopressor  -- 25 milligram(s) by mouth once a day

## 2017-01-15 NOTE — DISCHARGE NOTE ADULT - CARE PROVIDERS DIRECT ADDRESSES
[email or web address]

## 2017-01-16 ENCOUNTER — APPOINTMENT (OUTPATIENT)
Dept: CARDIOLOGY | Facility: CLINIC | Age: 77
End: 2017-01-16

## 2017-01-16 VITALS
WEIGHT: 192 LBS | SYSTOLIC BLOOD PRESSURE: 124 MMHG | BODY MASS INDEX: 31.99 KG/M2 | OXYGEN SATURATION: 95 % | HEIGHT: 65 IN | DIASTOLIC BLOOD PRESSURE: 76 MMHG | HEART RATE: 66 BPM

## 2017-01-18 LAB
CULTURE RESULTS: SIGNIFICANT CHANGE UP
CULTURE RESULTS: SIGNIFICANT CHANGE UP
SPECIMEN SOURCE: SIGNIFICANT CHANGE UP
SPECIMEN SOURCE: SIGNIFICANT CHANGE UP

## 2017-02-03 ENCOUNTER — APPOINTMENT (OUTPATIENT)
Dept: GASTROENTEROLOGY | Facility: CLINIC | Age: 77
End: 2017-02-03

## 2017-02-03 VITALS
SYSTOLIC BLOOD PRESSURE: 120 MMHG | DIASTOLIC BLOOD PRESSURE: 70 MMHG | OXYGEN SATURATION: 98 % | HEART RATE: 68 BPM | TEMPERATURE: 98 F | HEIGHT: 65 IN | WEIGHT: 193 LBS | BODY MASS INDEX: 32.15 KG/M2

## 2017-02-03 DIAGNOSIS — I48.0 PAROXYSMAL ATRIAL FIBRILLATION: ICD-10-CM

## 2017-02-03 DIAGNOSIS — Z79.01 LONG TERM (CURRENT) USE OF ANTICOAGULANTS: ICD-10-CM

## 2017-02-03 DIAGNOSIS — I62.01 NONTRAUMATIC ACUTE SUBDURAL HEMORRHAGE: ICD-10-CM

## 2017-02-03 DIAGNOSIS — K58.9 IRRITABLE BOWEL SYNDROME W/OUT DIARRHEA: ICD-10-CM

## 2017-02-03 DIAGNOSIS — Z78.9 OTHER SPECIFIED HEALTH STATUS: ICD-10-CM

## 2017-02-03 DIAGNOSIS — Z80.8 FAMILY HISTORY OF MALIGNANT NEOPLASM OF OTHER ORGANS OR SYSTEMS: ICD-10-CM

## 2017-02-03 DIAGNOSIS — G40.909 EPILEPSY, UNSPECIFIED, NOT INTRACTABLE, W/OUT STATUS EPILEPTICUS: ICD-10-CM

## 2017-02-03 RX ORDER — BIFIDOBACTERIUM LONGUM 10MM CELL
4 CAPSULE ORAL
Qty: 30 | Refills: 3 | Status: ACTIVE | OUTPATIENT
Start: 2017-02-03

## 2017-02-14 ENCOUNTER — APPOINTMENT (OUTPATIENT)
Dept: CARDIOLOGY | Facility: CLINIC | Age: 77
End: 2017-02-14

## 2017-02-14 ENCOUNTER — NON-APPOINTMENT (OUTPATIENT)
Age: 77
End: 2017-02-14

## 2017-02-14 VITALS — SYSTOLIC BLOOD PRESSURE: 126 MMHG | DIASTOLIC BLOOD PRESSURE: 60 MMHG

## 2017-02-14 VITALS
HEART RATE: 62 BPM | BODY MASS INDEX: 32.82 KG/M2 | WEIGHT: 197 LBS | HEIGHT: 65 IN | DIASTOLIC BLOOD PRESSURE: 106 MMHG | OXYGEN SATURATION: 96 % | SYSTOLIC BLOOD PRESSURE: 154 MMHG

## 2017-03-10 ENCOUNTER — APPOINTMENT (OUTPATIENT)
Dept: NEUROLOGY | Facility: CLINIC | Age: 77
End: 2017-03-10

## 2017-03-28 ENCOUNTER — MEDICATION RENEWAL (OUTPATIENT)
Age: 77
End: 2017-03-28

## 2017-03-30 ENCOUNTER — APPOINTMENT (OUTPATIENT)
Dept: UROLOGY | Facility: CLINIC | Age: 77
End: 2017-03-30

## 2017-03-31 LAB
APPEARANCE: CLEAR
BACTERIA: NEGATIVE
BILIRUBIN URINE: NEGATIVE
BLOOD URINE: ABNORMAL
COLOR: YELLOW
GLUCOSE QUALITATIVE U: 250 MG/DL
HYALINE CASTS: 2 /LPF
KETONES URINE: NEGATIVE
LEUKOCYTE ESTERASE URINE: NEGATIVE
MICROSCOPIC-UA: NORMAL
NITRITE URINE: NEGATIVE
PH URINE: 5.5
PROTEIN URINE: 300 MG/DL
RED BLOOD CELLS URINE: 4 /HPF
SPECIFIC GRAVITY URINE: 1.03
SQUAMOUS EPITHELIAL CELLS: 0 /HPF
UROBILINOGEN URINE: NORMAL MG/DL
WHITE BLOOD CELLS URINE: 1 /HPF

## 2017-05-19 ENCOUNTER — NON-APPOINTMENT (OUTPATIENT)
Age: 77
End: 2017-05-19

## 2017-05-19 ENCOUNTER — APPOINTMENT (OUTPATIENT)
Dept: CARDIOLOGY | Facility: CLINIC | Age: 77
End: 2017-05-19

## 2017-05-19 VITALS
OXYGEN SATURATION: 95 % | HEIGHT: 65 IN | WEIGHT: 199 LBS | DIASTOLIC BLOOD PRESSURE: 73 MMHG | HEART RATE: 65 BPM | TEMPERATURE: 98.1 F | SYSTOLIC BLOOD PRESSURE: 113 MMHG | BODY MASS INDEX: 33.15 KG/M2

## 2017-05-19 DIAGNOSIS — R05 COUGH: ICD-10-CM

## 2017-05-19 DIAGNOSIS — R19.7 DIARRHEA, UNSPECIFIED: ICD-10-CM

## 2017-05-19 DIAGNOSIS — N40.0 BENIGN PROSTATIC HYPERPLASIA WITHOUT LOWER URINARY TRACT SYMPMS: ICD-10-CM

## 2017-05-22 PROBLEM — R19.7 DIARRHEA, UNSPECIFIED TYPE: Status: RESOLVED | Noted: 2017-02-03 | Resolved: 2017-05-22

## 2017-06-12 DIAGNOSIS — S42.202D UNSPECIFIED FRACTURE OF UPPER END OF LEFT HUMERUS, SUBSEQUENT ENCOUNTER FOR FRACTURE WITH ROUTINE HEALING: ICD-10-CM

## 2017-07-05 ENCOUNTER — APPOINTMENT (OUTPATIENT)
Dept: INTERNAL MEDICINE | Facility: CLINIC | Age: 77
End: 2017-07-05

## 2017-07-05 VITALS — WEIGHT: 200 LBS | BODY MASS INDEX: 32.14 KG/M2 | HEIGHT: 66 IN

## 2017-07-05 VITALS — HEART RATE: 66 BPM | DIASTOLIC BLOOD PRESSURE: 70 MMHG | SYSTOLIC BLOOD PRESSURE: 120 MMHG | RESPIRATION RATE: 14 BRPM

## 2017-07-05 DIAGNOSIS — Z56.0 UNEMPLOYMENT, UNSPECIFIED: ICD-10-CM

## 2017-07-05 RX ORDER — CHOLECALCIFEROL (VITAMIN D3) 50 MCG
50 MCG TABLET ORAL
Qty: 90 | Refills: 0 | Status: ACTIVE | COMMUNITY
Start: 2017-07-05

## 2017-07-05 RX ORDER — ASCORBIC ACID 500 MG
500 TABLET ORAL DAILY
Refills: 0 | Status: ACTIVE | COMMUNITY
Start: 2017-07-05

## 2017-07-05 SDOH — ECONOMIC STABILITY - INCOME SECURITY: UNEMPLOYMENT, UNSPECIFIED: Z56.0

## 2017-07-22 LAB
ALBUMIN SERPL ELPH-MCNC: 4 G/DL
ALP BLD-CCNC: 58 U/L
ALT SERPL-CCNC: 14 U/L
ANION GAP SERPL CALC-SCNC: 16 MMOL/L
AST SERPL-CCNC: 25 U/L
BILIRUB SERPL-MCNC: 1.1 MG/DL
BUN SERPL-MCNC: 15 MG/DL
CALCIUM SERPL-MCNC: 9.5 MG/DL
CHLORIDE SERPL-SCNC: 102 MMOL/L
CHOLEST SERPL-MCNC: 141 MG/DL
CHOLEST/HDLC SERPL: 5 RATIO
CO2 SERPL-SCNC: 21 MMOL/L
CREAT SERPL-MCNC: 0.83 MG/DL
GLUCOSE SERPL-MCNC: 99 MG/DL
HBA1C MFR BLD HPLC: 7.3 %
HDLC SERPL-MCNC: 28 MG/DL
LDLC SERPL CALC-MCNC: 90 MG/DL
POTASSIUM SERPL-SCNC: 4.6 MMOL/L
PROT SERPL-MCNC: 7.5 G/DL
SODIUM SERPL-SCNC: 139 MMOL/L
TRIGL SERPL-MCNC: 115 MG/DL
TSH SERPL-ACNC: 2.9 UIU/ML

## 2017-08-23 ENCOUNTER — APPOINTMENT (OUTPATIENT)
Dept: CARDIOLOGY | Facility: CLINIC | Age: 77
End: 2017-08-23
Payer: MEDICARE

## 2017-08-23 ENCOUNTER — NON-APPOINTMENT (OUTPATIENT)
Age: 77
End: 2017-08-23

## 2017-08-23 VITALS
HEART RATE: 60 BPM | BODY MASS INDEX: 32.14 KG/M2 | SYSTOLIC BLOOD PRESSURE: 137 MMHG | TEMPERATURE: 97.9 F | WEIGHT: 200 LBS | DIASTOLIC BLOOD PRESSURE: 76 MMHG | OXYGEN SATURATION: 96 % | HEIGHT: 66 IN

## 2017-08-23 VITALS — SYSTOLIC BLOOD PRESSURE: 124 MMHG | HEART RATE: 60 BPM | DIASTOLIC BLOOD PRESSURE: 70 MMHG

## 2017-08-23 DIAGNOSIS — E10.9 TYPE 1 DIABETES MELLITUS W/OUT COMPLICATIONS: ICD-10-CM

## 2017-08-23 PROCEDURE — 99214 OFFICE O/P EST MOD 30 MIN: CPT

## 2017-08-23 PROCEDURE — 93000 ELECTROCARDIOGRAM COMPLETE: CPT

## 2017-09-22 ENCOUNTER — APPOINTMENT (OUTPATIENT)
Dept: RADIOLOGY | Facility: HOSPITAL | Age: 77
End: 2017-09-22
Payer: MEDICARE

## 2017-09-22 ENCOUNTER — APPOINTMENT (OUTPATIENT)
Dept: SPEECH THERAPY | Facility: HOSPITAL | Age: 77
End: 2017-09-22
Payer: MEDICARE

## 2017-09-22 ENCOUNTER — OUTPATIENT (OUTPATIENT)
Dept: OUTPATIENT SERVICES | Facility: HOSPITAL | Age: 77
LOS: 1 days | Discharge: HOME | End: 2017-09-22

## 2017-09-22 ENCOUNTER — OUTPATIENT (OUTPATIENT)
Dept: OUTPATIENT SERVICES | Facility: HOSPITAL | Age: 77
LOS: 1 days | End: 2017-09-22

## 2017-09-22 DIAGNOSIS — R05 COUGH: ICD-10-CM

## 2017-09-22 DIAGNOSIS — R13.10 DYSPHAGIA, UNSPECIFIED: ICD-10-CM

## 2017-09-22 DIAGNOSIS — J34.2 DEVIATED NASAL SEPTUM: ICD-10-CM

## 2017-09-22 PROCEDURE — 74230 X-RAY XM SWLNG FUNCJ C+: CPT | Mod: 26

## 2017-09-22 PROCEDURE — 74220 X-RAY XM ESOPHAGUS 1CNTRST: CPT | Mod: 26

## 2017-11-03 ENCOUNTER — APPOINTMENT (OUTPATIENT)
Dept: SPEECH THERAPY | Facility: CLINIC | Age: 77
End: 2017-11-03

## 2017-11-07 ENCOUNTER — LABORATORY RESULT (OUTPATIENT)
Age: 77
End: 2017-11-07

## 2017-11-08 ENCOUNTER — APPOINTMENT (OUTPATIENT)
Dept: INTERNAL MEDICINE | Facility: CLINIC | Age: 77
End: 2017-11-08
Payer: MEDICARE

## 2017-11-08 VITALS — WEIGHT: 196 LBS | BODY MASS INDEX: 31.5 KG/M2 | HEIGHT: 66 IN

## 2017-11-08 DIAGNOSIS — I48.91 UNSPECIFIED ATRIAL FIBRILLATION: ICD-10-CM

## 2017-11-08 PROCEDURE — 36415 COLL VENOUS BLD VENIPUNCTURE: CPT

## 2017-11-08 PROCEDURE — 99214 OFFICE O/P EST MOD 30 MIN: CPT | Mod: 25

## 2017-11-08 PROCEDURE — G0439: CPT

## 2017-11-08 PROCEDURE — 99497 ADVNCD CARE PLAN 30 MIN: CPT | Mod: 33

## 2017-11-10 DIAGNOSIS — R13.12 DYSPHAGIA, OROPHARYNGEAL PHASE: ICD-10-CM

## 2017-11-13 ENCOUNTER — APPOINTMENT (OUTPATIENT)
Dept: SPEECH THERAPY | Facility: CLINIC | Age: 77
End: 2017-11-13

## 2017-11-15 DIAGNOSIS — R49.0 DYSPHONIA: ICD-10-CM

## 2017-11-20 ENCOUNTER — APPOINTMENT (OUTPATIENT)
Dept: SPEECH THERAPY | Facility: CLINIC | Age: 77
End: 2017-11-20

## 2017-11-27 ENCOUNTER — APPOINTMENT (OUTPATIENT)
Dept: SPEECH THERAPY | Facility: CLINIC | Age: 77
End: 2017-11-27

## 2017-12-04 ENCOUNTER — APPOINTMENT (OUTPATIENT)
Dept: SPEECH THERAPY | Facility: CLINIC | Age: 77
End: 2017-12-04

## 2017-12-11 ENCOUNTER — APPOINTMENT (OUTPATIENT)
Dept: SPEECH THERAPY | Facility: CLINIC | Age: 77
End: 2017-12-11

## 2017-12-18 ENCOUNTER — APPOINTMENT (OUTPATIENT)
Dept: SPEECH THERAPY | Facility: CLINIC | Age: 77
End: 2017-12-18

## 2017-12-20 ENCOUNTER — NON-APPOINTMENT (OUTPATIENT)
Age: 77
End: 2017-12-20

## 2017-12-20 ENCOUNTER — APPOINTMENT (OUTPATIENT)
Dept: CARDIOLOGY | Facility: CLINIC | Age: 77
End: 2017-12-20
Payer: MEDICARE

## 2017-12-20 VITALS
HEART RATE: 61 BPM | WEIGHT: 196 LBS | TEMPERATURE: 98.1 F | SYSTOLIC BLOOD PRESSURE: 133 MMHG | DIASTOLIC BLOOD PRESSURE: 77 MMHG | HEIGHT: 66 IN | BODY MASS INDEX: 31.5 KG/M2 | OXYGEN SATURATION: 95 %

## 2017-12-20 PROCEDURE — 99214 OFFICE O/P EST MOD 30 MIN: CPT

## 2017-12-20 PROCEDURE — 93000 ELECTROCARDIOGRAM COMPLETE: CPT

## 2017-12-21 LAB
25(OH)D3 SERPL-MCNC: 42.6 NG/ML
ALBUMIN SERPL ELPH-MCNC: 4.3 G/DL
ALP BLD-CCNC: 66 U/L
ALT SERPL-CCNC: 17 U/L
ANION GAP SERPL CALC-SCNC: 15 MMOL/L
APPEARANCE: CLEAR
AST SERPL-CCNC: 21 U/L
BASOPHILS # BLD AUTO: 0.02 K/UL
BASOPHILS NFR BLD AUTO: 0.2 %
BILIRUB SERPL-MCNC: 1 MG/DL
BILIRUBIN URINE: NEGATIVE
BLOOD URINE: NEGATIVE
BUN SERPL-MCNC: 20 MG/DL
CALCIUM SERPL-MCNC: 9.6 MG/DL
CHLORIDE SERPL-SCNC: 104 MMOL/L
CHOLEST SERPL-MCNC: 140 MG/DL
CHOLEST/HDLC SERPL: 4.4 RATIO
CO2 SERPL-SCNC: 24 MMOL/L
COLOR: YELLOW
CREAT SERPL-MCNC: 0.99 MG/DL
EOSINOPHIL # BLD AUTO: 0.22 K/UL
EOSINOPHIL NFR BLD AUTO: 2.3 %
FOLATE SERPL-MCNC: 7.6 NG/ML
GLUCOSE QUALITATIVE U: NEGATIVE MG/DL
GLUCOSE SERPL-MCNC: 83 MG/DL
HBA1C MFR BLD HPLC: 6.9 %
HCT VFR BLD CALC: 46.5 %
HDLC SERPL-MCNC: 32 MG/DL
HGB BLD-MCNC: 16.2 G/DL
IMM GRANULOCYTES NFR BLD AUTO: 0.3 %
KETONES URINE: NEGATIVE
LDLC SERPL CALC-MCNC: 93 MG/DL
LEUKOCYTE ESTERASE URINE: NEGATIVE
LYMPHOCYTES # BLD AUTO: 2.62 K/UL
LYMPHOCYTES NFR BLD AUTO: 27.2 %
MAGNESIUM SERPL-MCNC: 2.1 MG/DL
MAN DIFF?: NORMAL
MCHC RBC-ENTMCNC: 30.9 PG
MCHC RBC-ENTMCNC: 34.8 GM/DL
MCV RBC AUTO: 88.7 FL
MONOCYTES # BLD AUTO: 0.92 K/UL
MONOCYTES NFR BLD AUTO: 9.6 %
NEUTROPHILS # BLD AUTO: 5.81 K/UL
NEUTROPHILS NFR BLD AUTO: 60.4 %
NITRITE URINE: NEGATIVE
PH URINE: 5
PLATELET # BLD AUTO: 252 K/UL
POTASSIUM SERPL-SCNC: 4.7 MMOL/L
PROT SERPL-MCNC: 7.8 G/DL
PROTEIN URINE: 300 MG/DL
PSA SERPL-MCNC: 11.7 NG/ML
RBC # BLD: 5.24 M/UL
RBC # FLD: 14.4 %
SODIUM SERPL-SCNC: 143 MMOL/L
SPECIFIC GRAVITY URINE: 1.02
T4 FREE SERPL-MCNC: 1.5 NG/DL
T4 SERPL-MCNC: 7.6 UG/DL
TRIGL SERPL-MCNC: 76 MG/DL
TSH SERPL-ACNC: 3.04 UIU/ML
UROBILINOGEN URINE: NEGATIVE MG/DL
VIT B12 SERPL-MCNC: 968 PG/ML
WBC # FLD AUTO: 9.62 K/UL

## 2017-12-29 ENCOUNTER — APPOINTMENT (OUTPATIENT)
Dept: SPEECH THERAPY | Facility: CLINIC | Age: 77
End: 2017-12-29

## 2018-01-03 ENCOUNTER — TRANSCRIPTION ENCOUNTER (OUTPATIENT)
Age: 78
End: 2018-01-03

## 2018-01-05 ENCOUNTER — APPOINTMENT (OUTPATIENT)
Dept: SPEECH THERAPY | Facility: CLINIC | Age: 78
End: 2018-01-05

## 2018-01-10 ENCOUNTER — APPOINTMENT (OUTPATIENT)
Dept: SPEECH THERAPY | Facility: CLINIC | Age: 78
End: 2018-01-10

## 2018-01-18 ENCOUNTER — APPOINTMENT (OUTPATIENT)
Dept: SPEECH THERAPY | Facility: CLINIC | Age: 78
End: 2018-01-18

## 2018-02-01 ENCOUNTER — APPOINTMENT (OUTPATIENT)
Dept: SPEECH THERAPY | Facility: CLINIC | Age: 78
End: 2018-02-01

## 2018-02-09 ENCOUNTER — APPOINTMENT (OUTPATIENT)
Dept: SPEECH THERAPY | Facility: CLINIC | Age: 78
End: 2018-02-09

## 2018-02-15 ENCOUNTER — APPOINTMENT (OUTPATIENT)
Dept: SPEECH THERAPY | Facility: CLINIC | Age: 78
End: 2018-02-15

## 2018-02-22 ENCOUNTER — APPOINTMENT (OUTPATIENT)
Dept: SPEECH THERAPY | Facility: CLINIC | Age: 78
End: 2018-02-22

## 2018-03-08 ENCOUNTER — APPOINTMENT (OUTPATIENT)
Dept: SPEECH THERAPY | Facility: CLINIC | Age: 78
End: 2018-03-08

## 2018-03-09 ENCOUNTER — APPOINTMENT (OUTPATIENT)
Dept: RADIOLOGY | Facility: HOSPITAL | Age: 78
End: 2018-03-09
Payer: MEDICARE

## 2018-03-09 ENCOUNTER — OUTPATIENT (OUTPATIENT)
Dept: OUTPATIENT SERVICES | Facility: HOSPITAL | Age: 78
LOS: 1 days | End: 2018-03-09

## 2018-03-09 ENCOUNTER — APPOINTMENT (OUTPATIENT)
Dept: SPEECH THERAPY | Facility: HOSPITAL | Age: 78
End: 2018-03-09
Payer: MEDICARE

## 2018-03-09 DIAGNOSIS — R13.10 DYSPHAGIA, UNSPECIFIED: ICD-10-CM

## 2018-03-09 PROCEDURE — 74230 X-RAY XM SWLNG FUNCJ C+: CPT | Mod: 26

## 2018-03-22 ENCOUNTER — APPOINTMENT (OUTPATIENT)
Dept: SPEECH THERAPY | Facility: CLINIC | Age: 78
End: 2018-03-22

## 2018-03-27 ENCOUNTER — APPOINTMENT (OUTPATIENT)
Dept: UROLOGY | Facility: CLINIC | Age: 78
End: 2018-03-27
Payer: MEDICARE

## 2018-03-27 DIAGNOSIS — R35.0 FREQUENCY OF MICTURITION: ICD-10-CM

## 2018-03-27 DIAGNOSIS — R97.20 ELEVATED PROSTATE, SPECIFIC ANTIGEN [PSA]: ICD-10-CM

## 2018-03-27 DIAGNOSIS — Z00.00 ENCOUNTER FOR GENERAL ADULT MEDICAL EXAMINATION W/OUT ABNORMAL FINDINGS: ICD-10-CM

## 2018-03-27 PROCEDURE — 99214 OFFICE O/P EST MOD 30 MIN: CPT

## 2018-03-28 LAB
APPEARANCE: ABNORMAL
BACTERIA: NEGATIVE
BILIRUBIN URINE: NEGATIVE
BLOOD URINE: NEGATIVE
COLOR: YELLOW
GLUCOSE QUALITATIVE U: NEGATIVE MG/DL
HYALINE CASTS: 1 /LPF
KETONES URINE: NEGATIVE
LEUKOCYTE ESTERASE URINE: NEGATIVE
MICROSCOPIC-UA: NORMAL
NITRITE URINE: NEGATIVE
PH URINE: 5.5
PROTEIN URINE: 300 MG/DL
RED BLOOD CELLS URINE: 2 /HPF
SPECIFIC GRAVITY URINE: 1.02
SQUAMOUS EPITHELIAL CELLS: 0 /HPF
URINE COMMENTS: NORMAL
UROBILINOGEN URINE: NEGATIVE MG/DL
WHITE BLOOD CELLS URINE: 1 /HPF

## 2018-03-29 LAB — CORE LAB FLUID CYTOLOGY: NORMAL

## 2018-04-04 ENCOUNTER — RX RENEWAL (OUTPATIENT)
Age: 78
End: 2018-04-04

## 2018-04-05 ENCOUNTER — APPOINTMENT (OUTPATIENT)
Dept: SPEECH THERAPY | Facility: CLINIC | Age: 78
End: 2018-04-05

## 2018-04-08 RX ORDER — TAMSULOSIN HYDROCHLORIDE 0.4 MG/1
0.4 CAPSULE ORAL
Qty: 180 | Refills: 3 | Status: ACTIVE | COMMUNITY
Start: 2018-04-08 | End: 1900-01-01

## 2018-04-12 ENCOUNTER — OUTPATIENT (OUTPATIENT)
Dept: OUTPATIENT SERVICES | Facility: HOSPITAL | Age: 78
LOS: 1 days | Discharge: HOME | End: 2018-04-12

## 2018-04-12 ENCOUNTER — APPOINTMENT (OUTPATIENT)
Dept: SPEECH THERAPY | Facility: CLINIC | Age: 78
End: 2018-04-12

## 2018-04-20 ENCOUNTER — NON-APPOINTMENT (OUTPATIENT)
Age: 78
End: 2018-04-20

## 2018-04-20 ENCOUNTER — APPOINTMENT (OUTPATIENT)
Dept: CARDIOLOGY | Facility: CLINIC | Age: 78
End: 2018-04-20
Payer: MEDICARE

## 2018-04-20 ENCOUNTER — APPOINTMENT (OUTPATIENT)
Dept: SPEECH THERAPY | Facility: CLINIC | Age: 78
End: 2018-04-20

## 2018-04-20 VITALS
SYSTOLIC BLOOD PRESSURE: 142 MMHG | OXYGEN SATURATION: 94 % | WEIGHT: 191 LBS | DIASTOLIC BLOOD PRESSURE: 73 MMHG | HEART RATE: 61 BPM | BODY MASS INDEX: 30.83 KG/M2

## 2018-04-20 VITALS — HEART RATE: 60 BPM | SYSTOLIC BLOOD PRESSURE: 138 MMHG | DIASTOLIC BLOOD PRESSURE: 80 MMHG

## 2018-04-20 PROCEDURE — 99214 OFFICE O/P EST MOD 30 MIN: CPT

## 2018-04-20 PROCEDURE — 36415 COLL VENOUS BLD VENIPUNCTURE: CPT

## 2018-04-20 PROCEDURE — 93000 ELECTROCARDIOGRAM COMPLETE: CPT

## 2018-04-24 LAB
25(OH)D3 SERPL-MCNC: 26.4 NG/ML
ALBUMIN SERPL ELPH-MCNC: 3.4 G/DL
ALP BLD-CCNC: 60 U/L
ALT SERPL-CCNC: 18 U/L
ANION GAP SERPL CALC-SCNC: 18 MMOL/L
AST SERPL-CCNC: 25 U/L
BASOPHILS # BLD AUTO: 0.01 K/UL
BASOPHILS NFR BLD AUTO: 0.1 %
BILIRUB SERPL-MCNC: 0.6 MG/DL
BUN SERPL-MCNC: 15 MG/DL
CALCIUM SERPL-MCNC: 9.5 MG/DL
CHLORIDE SERPL-SCNC: 101 MMOL/L
CHOLEST SERPL-MCNC: 117 MG/DL
CHOLEST/HDLC SERPL: 4.2 RATIO
CO2 SERPL-SCNC: 22 MMOL/L
CREAT SERPL-MCNC: 0.73 MG/DL
EOSINOPHIL # BLD AUTO: 0.15 K/UL
EOSINOPHIL NFR BLD AUTO: 1.6 %
GLUCOSE SERPL-MCNC: 182 MG/DL
HBA1C MFR BLD HPLC: 7.7 %
HCT VFR BLD CALC: 45.3 %
HDLC SERPL-MCNC: 28 MG/DL
HGB BLD-MCNC: 16 G/DL
IMM GRANULOCYTES NFR BLD AUTO: 0.1 %
LDLC SERPL CALC-MCNC: 67 MG/DL
LYMPHOCYTES # BLD AUTO: 2.21 K/UL
LYMPHOCYTES NFR BLD AUTO: 23.8 %
MAN DIFF?: NORMAL
MCHC RBC-ENTMCNC: 31.3 PG
MCHC RBC-ENTMCNC: 35.3 GM/DL
MCV RBC AUTO: 88.5 FL
MONOCYTES # BLD AUTO: 0.81 K/UL
MONOCYTES NFR BLD AUTO: 8.7 %
NEUTROPHILS # BLD AUTO: 6.11 K/UL
NEUTROPHILS NFR BLD AUTO: 65.7 %
PLATELET # BLD AUTO: 202 K/UL
POTASSIUM SERPL-SCNC: 4.6 MMOL/L
PROT SERPL-MCNC: 7.1 G/DL
RBC # BLD: 5.12 M/UL
RBC # FLD: 13.7 %
SODIUM SERPL-SCNC: 141 MMOL/L
TRIGL SERPL-MCNC: 111 MG/DL
VIT B12 SERPL-MCNC: 758 PG/ML
WBC # FLD AUTO: 9.3 K/UL

## 2018-04-25 ENCOUNTER — APPOINTMENT (OUTPATIENT)
Dept: SPEECH THERAPY | Facility: CLINIC | Age: 78
End: 2018-04-25

## 2018-05-02 ENCOUNTER — APPOINTMENT (OUTPATIENT)
Dept: SPEECH THERAPY | Facility: CLINIC | Age: 78
End: 2018-05-02

## 2018-05-07 DIAGNOSIS — R13.12 DYSPHAGIA, OROPHARYNGEAL PHASE: ICD-10-CM

## 2018-05-21 ENCOUNTER — APPOINTMENT (OUTPATIENT)
Dept: INTERNAL MEDICINE | Facility: CLINIC | Age: 78
End: 2018-05-21
Payer: MEDICARE

## 2018-05-21 VITALS — HEART RATE: 66 BPM | SYSTOLIC BLOOD PRESSURE: 128 MMHG | RESPIRATION RATE: 14 BRPM | DIASTOLIC BLOOD PRESSURE: 76 MMHG

## 2018-05-21 DIAGNOSIS — E55.9 VITAMIN D DEFICIENCY, UNSPECIFIED: ICD-10-CM

## 2018-05-21 DIAGNOSIS — F03.90 UNSPECIFIED DEMENTIA W/OUT BEHAVIORAL DISTURBANCE: ICD-10-CM

## 2018-05-21 DIAGNOSIS — N40.1 BENIGN PROSTATIC HYPERPLASIA WITH LOWER URINARY TRACT SYMPMS: ICD-10-CM

## 2018-05-21 DIAGNOSIS — N13.8 BENIGN PROSTATIC HYPERPLASIA WITH LOWER URINARY TRACT SYMPMS: ICD-10-CM

## 2018-05-21 PROCEDURE — 99214 OFFICE O/P EST MOD 30 MIN: CPT

## 2018-08-20 ENCOUNTER — APPOINTMENT (OUTPATIENT)
Dept: NEUROLOGY | Facility: CLINIC | Age: 78
End: 2018-08-20
Payer: MEDICARE

## 2018-08-20 PROCEDURE — 95819 EEG AWAKE AND ASLEEP: CPT

## 2018-08-24 ENCOUNTER — APPOINTMENT (OUTPATIENT)
Dept: CARDIOLOGY | Facility: CLINIC | Age: 78
End: 2018-08-24
Payer: MEDICARE

## 2018-08-24 ENCOUNTER — NON-APPOINTMENT (OUTPATIENT)
Age: 78
End: 2018-08-24

## 2018-08-24 VITALS
WEIGHT: 191 LBS | BODY MASS INDEX: 30.83 KG/M2 | DIASTOLIC BLOOD PRESSURE: 72 MMHG | HEART RATE: 61 BPM | OXYGEN SATURATION: 97 % | TEMPERATURE: 97.7 F | SYSTOLIC BLOOD PRESSURE: 118 MMHG

## 2018-08-24 DIAGNOSIS — E78.5 HYPERLIPIDEMIA, UNSPECIFIED: ICD-10-CM

## 2018-08-24 DIAGNOSIS — I10 ESSENTIAL (PRIMARY) HYPERTENSION: ICD-10-CM

## 2018-08-24 DIAGNOSIS — I25.10 ATHEROSCLEROTIC HEART DISEASE OF NATIVE CORONARY ARTERY W/OUT ANGINA PECTORIS: ICD-10-CM

## 2018-08-24 DIAGNOSIS — I48.92 UNSPECIFIED ATRIAL FLUTTER: ICD-10-CM

## 2018-08-24 PROCEDURE — 93000 ELECTROCARDIOGRAM COMPLETE: CPT

## 2018-08-24 PROCEDURE — 99214 OFFICE O/P EST MOD 30 MIN: CPT

## 2018-10-10 ENCOUNTER — TRANSCRIPTION ENCOUNTER (OUTPATIENT)
Age: 78
End: 2018-10-10

## 2018-10-10 ENCOUNTER — INPATIENT (INPATIENT)
Facility: HOSPITAL | Age: 78
LOS: 12 days | Discharge: NURSING, REHAB OR LONG-TERM CARE | DRG: 987 | End: 2018-10-23
Attending: SURGERY | Admitting: SURGERY
Payer: MEDICARE

## 2018-10-10 VITALS — SYSTOLIC BLOOD PRESSURE: 78 MMHG | DIASTOLIC BLOOD PRESSURE: 55 MMHG | HEART RATE: 89 BPM

## 2018-10-10 DIAGNOSIS — Z98.890 OTHER SPECIFIED POSTPROCEDURAL STATES: Chronic | ICD-10-CM

## 2018-10-10 LAB
ALBUMIN SERPL ELPH-MCNC: 3 G/DL — LOW (ref 3.3–5)
ALP SERPL-CCNC: 50 U/L — SIGNIFICANT CHANGE UP (ref 40–120)
ALT FLD-CCNC: 11 U/L — SIGNIFICANT CHANGE UP (ref 10–45)
ANION GAP SERPL CALC-SCNC: 13 MMOL/L — SIGNIFICANT CHANGE UP (ref 5–17)
APTT BLD: 32.4 SEC — SIGNIFICANT CHANGE UP (ref 27.5–37.4)
AST SERPL-CCNC: 21 U/L — SIGNIFICANT CHANGE UP (ref 10–40)
BASOPHILS # BLD AUTO: 0 K/UL — SIGNIFICANT CHANGE UP (ref 0–0.2)
BASOPHILS # BLD AUTO: 0.1 K/UL — SIGNIFICANT CHANGE UP (ref 0–0.2)
BASOPHILS NFR BLD AUTO: 0.2 % — SIGNIFICANT CHANGE UP (ref 0–2)
BASOPHILS NFR BLD AUTO: 0.5 % — SIGNIFICANT CHANGE UP (ref 0–2)
BILIRUB SERPL-MCNC: 0.9 MG/DL — SIGNIFICANT CHANGE UP (ref 0.2–1.2)
BLD GP AB SCN SERPL QL: NEGATIVE — SIGNIFICANT CHANGE UP
BUN SERPL-MCNC: 21 MG/DL — SIGNIFICANT CHANGE UP (ref 7–23)
CALCIUM SERPL-MCNC: 8.4 MG/DL — SIGNIFICANT CHANGE UP (ref 8.4–10.5)
CHLORIDE SERPL-SCNC: 107 MMOL/L — SIGNIFICANT CHANGE UP (ref 96–108)
CO2 SERPL-SCNC: 19 MMOL/L — LOW (ref 22–31)
CREAT SERPL-MCNC: 1.02 MG/DL — SIGNIFICANT CHANGE UP (ref 0.5–1.3)
EOSINOPHIL # BLD AUTO: 0.1 K/UL — SIGNIFICANT CHANGE UP (ref 0–0.5)
EOSINOPHIL # BLD AUTO: 0.1 K/UL — SIGNIFICANT CHANGE UP (ref 0–0.5)
EOSINOPHIL NFR BLD AUTO: 0.4 % — SIGNIFICANT CHANGE UP (ref 0–6)
EOSINOPHIL NFR BLD AUTO: 0.7 % — SIGNIFICANT CHANGE UP (ref 0–6)
GAS PNL BLDV: SIGNIFICANT CHANGE UP
GLUCOSE SERPL-MCNC: 242 MG/DL — HIGH (ref 70–99)
HCT VFR BLD CALC: 29.6 % — LOW (ref 39–50)
HCT VFR BLD CALC: 34.1 % — LOW (ref 39–50)
HGB BLD-MCNC: 10.3 G/DL — LOW (ref 13–17)
HGB BLD-MCNC: 12.2 G/DL — LOW (ref 13–17)
INR BLD: 1.75 RATIO — HIGH (ref 0.88–1.16)
LYMPHOCYTES # BLD AUTO: 1.2 K/UL — SIGNIFICANT CHANGE UP (ref 1–3.3)
LYMPHOCYTES # BLD AUTO: 13.1 % — SIGNIFICANT CHANGE UP (ref 13–44)
LYMPHOCYTES # BLD AUTO: 2.5 K/UL — SIGNIFICANT CHANGE UP (ref 1–3.3)
LYMPHOCYTES # BLD AUTO: 8.6 % — LOW (ref 13–44)
MCHC RBC-ENTMCNC: 31.5 PG — SIGNIFICANT CHANGE UP (ref 27–34)
MCHC RBC-ENTMCNC: 32.2 PG — SIGNIFICANT CHANGE UP (ref 27–34)
MCHC RBC-ENTMCNC: 34.8 GM/DL — SIGNIFICANT CHANGE UP (ref 32–36)
MCHC RBC-ENTMCNC: 35.8 GM/DL — SIGNIFICANT CHANGE UP (ref 32–36)
MCV RBC AUTO: 90.1 FL — SIGNIFICANT CHANGE UP (ref 80–100)
MCV RBC AUTO: 90.4 FL — SIGNIFICANT CHANGE UP (ref 80–100)
MONOCYTES # BLD AUTO: 1.2 K/UL — HIGH (ref 0–0.9)
MONOCYTES # BLD AUTO: 1.5 K/UL — HIGH (ref 0–0.9)
MONOCYTES NFR BLD AUTO: 7.8 % — SIGNIFICANT CHANGE UP (ref 2–14)
MONOCYTES NFR BLD AUTO: 8 % — SIGNIFICANT CHANGE UP (ref 2–14)
NEUTROPHILS # BLD AUTO: 11.9 K/UL — HIGH (ref 1.8–7.4)
NEUTROPHILS # BLD AUTO: 14.7 K/UL — HIGH (ref 1.8–7.4)
NEUTROPHILS NFR BLD AUTO: 77.9 % — HIGH (ref 43–77)
NEUTROPHILS NFR BLD AUTO: 82.8 % — HIGH (ref 43–77)
OB PNL STL: NEGATIVE — SIGNIFICANT CHANGE UP
PLATELET # BLD AUTO: 218 K/UL — SIGNIFICANT CHANGE UP (ref 150–400)
PLATELET # BLD AUTO: 234 K/UL — SIGNIFICANT CHANGE UP (ref 150–400)
POTASSIUM SERPL-MCNC: 4.7 MMOL/L — SIGNIFICANT CHANGE UP (ref 3.5–5.3)
POTASSIUM SERPL-SCNC: 4.7 MMOL/L — SIGNIFICANT CHANGE UP (ref 3.5–5.3)
PROT SERPL-MCNC: 5.8 G/DL — LOW (ref 6–8.3)
PROTHROM AB SERPL-ACNC: 19.3 SEC — HIGH (ref 9.8–12.7)
RBC # BLD: 3.27 M/UL — LOW (ref 4.2–5.8)
RBC # BLD: 3.78 M/UL — LOW (ref 4.2–5.8)
RBC # FLD: 12.3 % — SIGNIFICANT CHANGE UP (ref 10.3–14.5)
RBC # FLD: 12.5 % — SIGNIFICANT CHANGE UP (ref 10.3–14.5)
RH IG SCN BLD-IMP: POSITIVE — SIGNIFICANT CHANGE UP
SODIUM SERPL-SCNC: 139 MMOL/L — SIGNIFICANT CHANGE UP (ref 135–145)
TROPONIN T, HIGH SENSITIVITY RESULT: 16 NG/L — SIGNIFICANT CHANGE UP (ref 0–51)
WBC # BLD: 14.4 K/UL — HIGH (ref 3.8–10.5)
WBC # BLD: 18.9 K/UL — HIGH (ref 3.8–10.5)
WBC # FLD AUTO: 14.4 K/UL — HIGH (ref 3.8–10.5)
WBC # FLD AUTO: 18.9 K/UL — HIGH (ref 3.8–10.5)

## 2018-10-10 PROCEDURE — 71275 CT ANGIOGRAPHY CHEST: CPT | Mod: 26

## 2018-10-10 PROCEDURE — 71045 X-RAY EXAM CHEST 1 VIEW: CPT | Mod: 26

## 2018-10-10 PROCEDURE — 74174 CTA ABD&PLVS W/CONTRAST: CPT | Mod: 26

## 2018-10-10 PROCEDURE — 99291 CRITICAL CARE FIRST HOUR: CPT | Mod: GC

## 2018-10-10 PROCEDURE — 93010 ELECTROCARDIOGRAM REPORT: CPT

## 2018-10-10 RX ORDER — SODIUM CHLORIDE 9 MG/ML
500 INJECTION INTRAMUSCULAR; INTRAVENOUS; SUBCUTANEOUS ONCE
Qty: 0 | Refills: 0 | Status: COMPLETED | OUTPATIENT
Start: 2018-10-10 | End: 2018-10-10

## 2018-10-10 RX ADMIN — SODIUM CHLORIDE 500 MILLILITER(S): 9 INJECTION INTRAMUSCULAR; INTRAVENOUS; SUBCUTANEOUS at 21:52

## 2018-10-10 RX ADMIN — SODIUM CHLORIDE 500 MILLILITER(S): 9 INJECTION INTRAMUSCULAR; INTRAVENOUS; SUBCUTANEOUS at 21:45

## 2018-10-10 NOTE — ED ADULT NURSE NOTE - OBJECTIVE STATEMENT
77[facility]. Patient went to urgent care and sent to ED for further evaluation of RLQ ab pain onset this evening around dinner. Patient became hypotensive in urgent care. Patient appears pale. A&Ox4, VS stable in ED (/77). Episode of urinary incontinence this evening. Given 25mg Fentanyl en route to [facility] ED. PMHx afib, BPH, DM, HTN, seizure, b/l craniotomy, ASD closure. Patient denies CP, n/v/d, chills, fever. Patient stool is soft, yellow with scant visible red blood. 77[facility]. Patient went to urgent care and sent to ED for further evaluation of RLQ ab pain onset this evening around dinner. Patient became hypotensive in urgent care. Patient appears pale. A&Ox4, VS stable in ED (/77). Episode of urinary incontinence this evening. Given 25mg Fentanyl en route to [facility] ED. PMHx afib, BPH, DM, HTN, seizure, b/l craniotomy, ASD closure. Patient denies CP, trauma, n/v/d, chills, fever. Patient stool is soft, yellow with scant visible red blood. Patient ab appears distended, tender to palpation. 77[facility]. Patient went to urgent care and sent to ED for further evaluation of RLQ ab pain onset this evening around dinner. Patient became hypotensive in urgent care. Patient appears pale. A&Ox4, VS stable in ED (/77). Episode of urinary incontinence this evening. Given 25mg Fentanyl en route to [facility] ED. PMHx afib, BPH, DM, HTN, seizure, b/l craniotomy, ASD closure. Patient denies CP, trauma, n/v/d, chills, fever. Patient stool is soft, yellow with scant visible red blood. Patient ab appears distended, tender to palpation.    0300 Addendum: Approximately 0130 the PRBC #1 started to infuse over 1 hour. Patient's BP trending down to as low as systolic 66. MD Vela instructed to speed up unit #1 to go in as fast as possible and initiated MTP. Unit #2 started immediately (originally ordered to infuse over 1 hour, but infused as fast as possible (per MD orders). 20g on right AC used for unit #1. Left AC 16g established for unit #2. Left forearm 20g established. MD Espinoza at the bedside inserted arterial line. As unit #1 of PRBCs and arterial line established patients /70. MD Espinoza instructed to complete unit #2 of PRBC, transport patient to CT scan and directly to SICU and hold further transfusion. Patient transported with MTP cooler of blood as instructed by MD Espinoza. RN called SICU for report and patient transported with MD, RNx2, techx2, on cardiac monitor and arterial monitor. 77y male arrived to ED [facility] complaining of ab pain. Patient went to urgent care and sent to ED for further evaluation of RLQ ab pain onset this evening around dinner. Patient became hypotensive in urgent care. Patient appears pale. A&Ox4, VS stable in ED (/77). Episode of urinary incontinence this evening. Given 25mg Fentanyl en route to [facility] ED. PMHx afib, BPH, DM, HTN, seizure, b/l craniotomy, ASD closure. Patient denies CP, trauma, n/v/d, chills, fever. Patient stool is soft, yellow with scant visible red blood. Patient ab appears distended, tender to palpation.    0300 Addendum:   Patient gradually became less oriented with situation and pulled out IVL establish in the right AC. Patient not cooperative about keeping arm straight for fluids administration and tugging on cardiac monitor wires. MD aware.     Approximately 0130 the PRBC #1 started to infuse over 1 hour. Patient's BP trending down to as low as systolic 66. MD Vela instructed to speed up unit #1 to go in as fast as possible and initiated MTP. Unit #2 started immediately (originally ordered to infuse over 1 hour, but infused as fast as possible (per MD orders). 20g on right AC used for unit #1. Left AC 16g established for unit #2. Left forearm 20g established. MD Espinoza at the bedside inserted arterial line. As unit #1 of PRBCs and arterial line established patients /70. MD Espinoza instructed to complete unit #2 of PRBC, transport patient to CT scan and directly to SICU and hold further transfusion. Patient transported with MTP cooler of blood as instructed by MD Espinoza. RN called SICU for report and patient transported with MD, RNx2, techx2, on cardiac monitor and arterial monitor.

## 2018-10-10 NOTE — ED PROVIDER NOTE - OBJECTIVE STATEMENT
77 M h/o afib on xarelto, bilat craniotomy s/p bleed, htn, p/w abdominal pain and hypotension. Pain started suddenly this evening worse in RLQ. Went to urgent care and was noted to be hypotensive so sent to ED. Patient denies nausea/vomiting/diarrhea. No recent melena/brbpr. Patient was pale appearing earlier, wife says is improving. received 500cc and fentanyl with ems and patient starting to feel better.

## 2018-10-10 NOTE — ED ADULT NURSE NOTE - NSIMPLEMENTINTERV_GEN_ALL_ED
Implemented All Fall Risk Interventions:  [place] to call system. Call bell, personal items and telephone within reach. Instruct patient to call for assistance. Room bathroom lighting operational. Non-slip footwear when patient is off stretcher. Physically safe environment: no spills, clutter or unnecessary equipment. Stretcher in lowest position, wheels locked, appropriate side rails in place. Provide visual cue, wrist band, yellow gown, etc. Monitor gait and stability. Monitor for mental status changes and reorient to person, place, and time. Review medications for side effects contributing to fall risk. Reinforce activity limits and safety measures with patient and family.

## 2018-10-10 NOTE — ED PROVIDER NOTE - ATTENDING CONTRIBUTION TO CARE
77y M hx of AFib on Xarelto, fall with ICH, subsequent seizure and craniotomies, DM, HTN here with c/o abd pain since 6pm tonight. Pain was sudden on set, gradually worsening, located in RLQ, non-radiating. No assoc f/c, cp, palp, sob, NVD. Seen at urgent care and sent in for evaluation. BP noted to be low on arrival.   Gen: Pale appearing uncomfortable adult male   HEENT: NCAT PERRL EOMI normal pharynx  Neck: supple  CV: tachy irreg, no murmur  Lung: CTA BL  Abd: +BS soft mild distension RLQ TTP no grr  Ext: wwp, palp pulses, FROMx4, no cce  Neuro: A&Ox3, CN grossly intact, sensation intact, motor 5/5 throughout  AP: 77y M hx of AFib on Xarelto, fall with ICH, subsequent seizure and craniotomies, DM, HTN here with c/o abd pain since 6pm tonight, hypotensive. Concern for aortic process vs perforated viscus (divertic vs appy). Labs, IVF, CT CAP w aorta protocol, EKG. 77y M hx of AFib on Xarelto, fall with ICH, subsequent seizure and craniotomies, DM, HTN here with c/o abd pain since 6pm tonight. Pain was sudden on set, gradually worsening, located in RLQ, non-radiating. No inciting event. No assoc f/c, cp, palp, sob, NVD. Seen at urgent care and sent in for evaluation. BP noted to be low on arrival, mild tachycardia rate . Afebrile rectally.   Gen: Pale appearing uncomfortable adult male   HEENT: NCAT PERRL EOMI normal pharynx  Neck: supple  CV: tachy irreg, no murmur  Lung: CTA BL  Abd: +BS soft mild distension RLQ TTP no grr  SKin: warm dry no ecchymosis   Ext: wwp, palp pulses, FROMx4, no cce  Neuro: Awake alert at baseline, CN grossly intact, sensation intact, motor 5/5 throughout  AP: 77y M hx of AFib on Xarelto, previous fall with ICH, subsequent seizure and BL craniotomies, DM, HTN here with c/o abd pain since 6pm tonight, hypotensive. Concern for aortic process vs perforated viscus (divertic vs appy). Labs, IVF, CT CAP w aorta protocol, EKG.

## 2018-10-10 NOTE — ED PROVIDER NOTE - PROGRESS NOTE DETAILS
Dr. Vela: Pt with multiple episodes of loose yellow stool w bloody mucous. Guaiac sent to lab. Awaiting CT. Spoke with radiology resident re CT. There is fluid around liver and spleen, no obvious perforation. Unclear etiology. Will follow up official read Pt dropped Hgb 2 points in past 2 hours, only received ~500cc of ivf so far. Will order for 2 U PRBC as pt continues to be tachy and "soft" BP. Surgery consulted. Official read still pending. Call back from surgery. Will see patient. Dr. Vela: Pt hypotensive to 60/40. Getting 1sty unit. Advised nurse to put in as fast as possible and obtain 2nd unit. Code fusion activated by phone to blood bank. State that will take ~10 minutes for the full bundle. Dr. Vela: Pt hypotensive to 60/40. Getting 1st unit. Advised nurse to put in as fast as possible and obtain 2nd ordered unit from blood bank also to run as fast as possible. Code fusion activated by phone to blood bank, they state they need no order in [place]. State that will take ~10 minutes for the full bundle to be ready. SICU called by resident to update status. Dr. Vela: Dr Espinoza and surgical resident at bedside. 2nd unit running. MTP arrived at bedside. Dr Espinoza placing fem A line to R groin. Pt with 2 peripheral 20ga IVs and 16Ga in L AC. A line registering  as 2nd unit infusing. Per surgery hold off on further blood products at present, pt to CT for re-scan and directly to SICU.

## 2018-10-11 DIAGNOSIS — Z98.890 OTHER SPECIFIED POSTPROCEDURAL STATES: Chronic | ICD-10-CM

## 2018-10-11 DIAGNOSIS — K66.1 HEMOPERITONEUM: ICD-10-CM

## 2018-10-11 LAB
ALBUMIN SERPL ELPH-MCNC: 2.8 G/DL — LOW (ref 3.3–5)
ALBUMIN SERPL ELPH-MCNC: 2.9 G/DL — LOW (ref 3.3–5)
ALP SERPL-CCNC: 43 U/L — SIGNIFICANT CHANGE UP (ref 40–120)
ALP SERPL-CCNC: 47 U/L — SIGNIFICANT CHANGE UP (ref 40–120)
ALT FLD-CCNC: 10 U/L — SIGNIFICANT CHANGE UP (ref 10–45)
ALT FLD-CCNC: 11 U/L — SIGNIFICANT CHANGE UP (ref 10–45)
ANION GAP SERPL CALC-SCNC: 13 MMOL/L — SIGNIFICANT CHANGE UP (ref 5–17)
ANION GAP SERPL CALC-SCNC: 15 MMOL/L — SIGNIFICANT CHANGE UP (ref 5–17)
ANION GAP SERPL CALC-SCNC: 15 MMOL/L — SIGNIFICANT CHANGE UP (ref 5–17)
APPEARANCE UR: CLEAR — SIGNIFICANT CHANGE UP
APTT BLD: 28.4 SEC — SIGNIFICANT CHANGE UP (ref 27.5–37.4)
APTT BLD: 29 SEC — SIGNIFICANT CHANGE UP (ref 27.5–37.4)
AST SERPL-CCNC: 10 U/L — SIGNIFICANT CHANGE UP (ref 10–40)
AST SERPL-CCNC: 19 U/L — SIGNIFICANT CHANGE UP (ref 10–40)
BASOPHILS # BLD AUTO: 0 K/UL — SIGNIFICANT CHANGE UP (ref 0–0.2)
BASOPHILS NFR BLD AUTO: 0.2 % — SIGNIFICANT CHANGE UP (ref 0–2)
BILIRUB DIRECT SERPL-MCNC: 0.2 MG/DL — SIGNIFICANT CHANGE UP (ref 0–0.2)
BILIRUB INDIRECT FLD-MCNC: 1.1 MG/DL — HIGH (ref 0.2–1)
BILIRUB SERPL-MCNC: 1.3 MG/DL — HIGH (ref 0.2–1.2)
BILIRUB SERPL-MCNC: 1.5 MG/DL — HIGH (ref 0.2–1.2)
BILIRUB UR-MCNC: NEGATIVE — SIGNIFICANT CHANGE UP
BUN SERPL-MCNC: 26 MG/DL — HIGH (ref 7–23)
BUN SERPL-MCNC: 27 MG/DL — HIGH (ref 7–23)
BUN SERPL-MCNC: 31 MG/DL — HIGH (ref 7–23)
CALCIUM SERPL-MCNC: 8.1 MG/DL — LOW (ref 8.4–10.5)
CALCIUM SERPL-MCNC: 8.1 MG/DL — LOW (ref 8.4–10.5)
CALCIUM SERPL-MCNC: 8.9 MG/DL — SIGNIFICANT CHANGE UP (ref 8.4–10.5)
CHLORIDE SERPL-SCNC: 103 MMOL/L — SIGNIFICANT CHANGE UP (ref 96–108)
CHLORIDE SERPL-SCNC: 105 MMOL/L — SIGNIFICANT CHANGE UP (ref 96–108)
CHLORIDE SERPL-SCNC: 107 MMOL/L — SIGNIFICANT CHANGE UP (ref 96–108)
CO2 SERPL-SCNC: 16 MMOL/L — LOW (ref 22–31)
CO2 SERPL-SCNC: 17 MMOL/L — LOW (ref 22–31)
CO2 SERPL-SCNC: 20 MMOL/L — LOW (ref 22–31)
COLOR SPEC: YELLOW — SIGNIFICANT CHANGE UP
CREAT SERPL-MCNC: 1.44 MG/DL — HIGH (ref 0.5–1.3)
CREAT SERPL-MCNC: 1.47 MG/DL — HIGH (ref 0.5–1.3)
CREAT SERPL-MCNC: 1.77 MG/DL — HIGH (ref 0.5–1.3)
DIFF PNL FLD: ABNORMAL
EOSINOPHIL # BLD AUTO: 0 K/UL — SIGNIFICANT CHANGE UP (ref 0–0.5)
EOSINOPHIL NFR BLD AUTO: 0.2 % — SIGNIFICANT CHANGE UP (ref 0–6)
FIBRINOGEN PPP-MCNC: 626 MG/DL — HIGH (ref 310–510)
GAS PNL BLDA: SIGNIFICANT CHANGE UP
GLUCOSE BLDC GLUCOMTR-MCNC: 268 MG/DL — HIGH (ref 70–99)
GLUCOSE SERPL-MCNC: 247 MG/DL — HIGH (ref 70–99)
GLUCOSE SERPL-MCNC: 385 MG/DL — HIGH (ref 70–99)
GLUCOSE SERPL-MCNC: 392 MG/DL — HIGH (ref 70–99)
GLUCOSE UR QL: NEGATIVE — SIGNIFICANT CHANGE UP
HCT VFR BLD CALC: 27.5 % — LOW (ref 39–50)
HCT VFR BLD CALC: 28.9 % — LOW (ref 39–50)
HCT VFR BLD CALC: 29.3 % — LOW (ref 39–50)
HCT VFR BLD CALC: 29.8 % — LOW (ref 39–50)
HCT VFR BLD CALC: 30.6 % — LOW (ref 39–50)
HCT VFR BLD CALC: 32.9 % — LOW (ref 39–50)
HCT VFR BLD CALC: 34 % — LOW (ref 39–50)
HCT VFR BLD CALC: 34.2 % — LOW (ref 39–50)
HGB BLD-MCNC: 10.2 G/DL — LOW (ref 13–17)
HGB BLD-MCNC: 10.3 G/DL — LOW (ref 13–17)
HGB BLD-MCNC: 10.4 G/DL — LOW (ref 13–17)
HGB BLD-MCNC: 10.8 G/DL — LOW (ref 13–17)
HGB BLD-MCNC: 11.8 G/DL — LOW (ref 13–17)
HGB BLD-MCNC: 11.8 G/DL — LOW (ref 13–17)
HGB BLD-MCNC: 12.1 G/DL — LOW (ref 13–17)
HGB BLD-MCNC: 9.8 G/DL — LOW (ref 13–17)
INR BLD: 1.21 RATIO — HIGH (ref 0.88–1.16)
INR BLD: 1.73 RATIO — HIGH (ref 0.88–1.16)
KETONES UR-MCNC: NEGATIVE — SIGNIFICANT CHANGE UP
LEUKOCYTE ESTERASE UR-ACNC: NEGATIVE — SIGNIFICANT CHANGE UP
LYMPHOCYTES # BLD AUTO: 1.8 K/UL — SIGNIFICANT CHANGE UP (ref 1–3.3)
LYMPHOCYTES # BLD AUTO: 9.5 % — LOW (ref 13–44)
MAGNESIUM SERPL-MCNC: 1.6 MG/DL — SIGNIFICANT CHANGE UP (ref 1.6–2.6)
MAGNESIUM SERPL-MCNC: 1.6 MG/DL — SIGNIFICANT CHANGE UP (ref 1.6–2.6)
MAGNESIUM SERPL-MCNC: 2.7 MG/DL — HIGH (ref 1.6–2.6)
MCHC RBC-ENTMCNC: 31.1 PG — SIGNIFICANT CHANGE UP (ref 27–34)
MCHC RBC-ENTMCNC: 31.5 PG — SIGNIFICANT CHANGE UP (ref 27–34)
MCHC RBC-ENTMCNC: 31.6 PG — SIGNIFICANT CHANGE UP (ref 27–34)
MCHC RBC-ENTMCNC: 31.6 PG — SIGNIFICANT CHANGE UP (ref 27–34)
MCHC RBC-ENTMCNC: 32 PG — SIGNIFICANT CHANGE UP (ref 27–34)
MCHC RBC-ENTMCNC: 32.2 PG — SIGNIFICANT CHANGE UP (ref 27–34)
MCHC RBC-ENTMCNC: 34.6 GM/DL — SIGNIFICANT CHANGE UP (ref 32–36)
MCHC RBC-ENTMCNC: 34.8 GM/DL — SIGNIFICANT CHANGE UP (ref 32–36)
MCHC RBC-ENTMCNC: 35.2 GM/DL — SIGNIFICANT CHANGE UP (ref 32–36)
MCHC RBC-ENTMCNC: 35.3 GM/DL — SIGNIFICANT CHANGE UP (ref 32–36)
MCHC RBC-ENTMCNC: 35.4 GM/DL — SIGNIFICANT CHANGE UP (ref 32–36)
MCHC RBC-ENTMCNC: 35.4 GM/DL — SIGNIFICANT CHANGE UP (ref 32–36)
MCHC RBC-ENTMCNC: 35.8 GM/DL — SIGNIFICANT CHANGE UP (ref 32–36)
MCHC RBC-ENTMCNC: 35.8 GM/DL — SIGNIFICANT CHANGE UP (ref 32–36)
MCV RBC AUTO: 89.4 FL — SIGNIFICANT CHANGE UP (ref 80–100)
MCV RBC AUTO: 89.5 FL — SIGNIFICANT CHANGE UP (ref 80–100)
MCV RBC AUTO: 89.7 FL — SIGNIFICANT CHANGE UP (ref 80–100)
MCV RBC AUTO: 89.8 FL — SIGNIFICANT CHANGE UP (ref 80–100)
MCV RBC AUTO: 89.9 FL — SIGNIFICANT CHANGE UP (ref 80–100)
MCV RBC AUTO: 90.3 FL — SIGNIFICANT CHANGE UP (ref 80–100)
MCV RBC AUTO: 90.4 FL — SIGNIFICANT CHANGE UP (ref 80–100)
MCV RBC AUTO: 90.8 FL — SIGNIFICANT CHANGE UP (ref 80–100)
MONOCYTES # BLD AUTO: 1.6 K/UL — HIGH (ref 0–0.9)
MONOCYTES NFR BLD AUTO: 8.4 % — SIGNIFICANT CHANGE UP (ref 2–14)
NEUTROPHILS # BLD AUTO: 15.1 K/UL — HIGH (ref 1.8–7.4)
NEUTROPHILS NFR BLD AUTO: 81.7 % — HIGH (ref 43–77)
NITRITE UR-MCNC: NEGATIVE — SIGNIFICANT CHANGE UP
PH UR: 5.5 — SIGNIFICANT CHANGE UP (ref 5–8)
PHOSPHATE SERPL-MCNC: 4.2 MG/DL — SIGNIFICANT CHANGE UP (ref 2.5–4.5)
PHOSPHATE SERPL-MCNC: 4.6 MG/DL — HIGH (ref 2.5–4.5)
PHOSPHATE SERPL-MCNC: 6 MG/DL — HIGH (ref 2.5–4.5)
PLATELET # BLD AUTO: 136 K/UL — LOW (ref 150–400)
PLATELET # BLD AUTO: 142 K/UL — LOW (ref 150–400)
PLATELET # BLD AUTO: 145 K/UL — LOW (ref 150–400)
PLATELET # BLD AUTO: 167 K/UL — SIGNIFICANT CHANGE UP (ref 150–400)
PLATELET # BLD AUTO: 185 K/UL — SIGNIFICANT CHANGE UP (ref 150–400)
PLATELET # BLD AUTO: 202 K/UL — SIGNIFICANT CHANGE UP (ref 150–400)
PLATELET # BLD AUTO: 210 K/UL — SIGNIFICANT CHANGE UP (ref 150–400)
PLATELET # BLD AUTO: 214 K/UL — SIGNIFICANT CHANGE UP (ref 150–400)
POTASSIUM SERPL-MCNC: 4.9 MMOL/L — SIGNIFICANT CHANGE UP (ref 3.5–5.3)
POTASSIUM SERPL-MCNC: 5.3 MMOL/L — SIGNIFICANT CHANGE UP (ref 3.5–5.3)
POTASSIUM SERPL-MCNC: 5.7 MMOL/L — HIGH (ref 3.5–5.3)
POTASSIUM SERPL-SCNC: 4.9 MMOL/L — SIGNIFICANT CHANGE UP (ref 3.5–5.3)
POTASSIUM SERPL-SCNC: 5.3 MMOL/L — SIGNIFICANT CHANGE UP (ref 3.5–5.3)
POTASSIUM SERPL-SCNC: 5.7 MMOL/L — HIGH (ref 3.5–5.3)
PROT SERPL-MCNC: 5.1 G/DL — LOW (ref 6–8.3)
PROT SERPL-MCNC: 5.3 G/DL — LOW (ref 6–8.3)
PROT UR-MCNC: ABNORMAL
PROTHROM AB SERPL-ACNC: 13.2 SEC — HIGH (ref 9.8–12.7)
PROTHROM AB SERPL-ACNC: 19.1 SEC — HIGH (ref 9.8–12.7)
RBC # BLD: 3.07 M/UL — LOW (ref 4.2–5.8)
RBC # BLD: 3.22 M/UL — LOW (ref 4.2–5.8)
RBC # BLD: 3.28 M/UL — LOW (ref 4.2–5.8)
RBC # BLD: 3.31 M/UL — LOW (ref 4.2–5.8)
RBC # BLD: 3.38 M/UL — LOW (ref 4.2–5.8)
RBC # BLD: 3.65 M/UL — LOW (ref 4.2–5.8)
RBC # BLD: 3.76 M/UL — LOW (ref 4.2–5.8)
RBC # BLD: 3.77 M/UL — LOW (ref 4.2–5.8)
RBC # FLD: 12.2 % — SIGNIFICANT CHANGE UP (ref 10.3–14.5)
RBC # FLD: 12.3 % — SIGNIFICANT CHANGE UP (ref 10.3–14.5)
RBC # FLD: 12.3 % — SIGNIFICANT CHANGE UP (ref 10.3–14.5)
RBC # FLD: 12.4 % — SIGNIFICANT CHANGE UP (ref 10.3–14.5)
RBC # FLD: 12.4 % — SIGNIFICANT CHANGE UP (ref 10.3–14.5)
RBC # FLD: 12.5 % — SIGNIFICANT CHANGE UP (ref 10.3–14.5)
SODIUM SERPL-SCNC: 135 MMOL/L — SIGNIFICANT CHANGE UP (ref 135–145)
SODIUM SERPL-SCNC: 136 MMOL/L — SIGNIFICANT CHANGE UP (ref 135–145)
SODIUM SERPL-SCNC: 140 MMOL/L — SIGNIFICANT CHANGE UP (ref 135–145)
SP GR SPEC: >1.05 (ref 1.01–1.02)
UROBILINOGEN FLD QL: NEGATIVE — SIGNIFICANT CHANGE UP
WBC # BLD: 13 K/UL — HIGH (ref 3.8–10.5)
WBC # BLD: 13 K/UL — HIGH (ref 3.8–10.5)
WBC # BLD: 13.1 K/UL — HIGH (ref 3.8–10.5)
WBC # BLD: 14.3 K/UL — HIGH (ref 3.8–10.5)
WBC # BLD: 14.4 K/UL — HIGH (ref 3.8–10.5)
WBC # BLD: 14.8 K/UL — HIGH (ref 3.8–10.5)
WBC # BLD: 16.1 K/UL — HIGH (ref 3.8–10.5)
WBC # BLD: 18.4 K/UL — HIGH (ref 3.8–10.5)
WBC # FLD AUTO: 13 K/UL — HIGH (ref 3.8–10.5)
WBC # FLD AUTO: 13 K/UL — HIGH (ref 3.8–10.5)
WBC # FLD AUTO: 13.1 K/UL — HIGH (ref 3.8–10.5)
WBC # FLD AUTO: 14.3 K/UL — HIGH (ref 3.8–10.5)
WBC # FLD AUTO: 14.4 K/UL — HIGH (ref 3.8–10.5)
WBC # FLD AUTO: 14.8 K/UL — HIGH (ref 3.8–10.5)
WBC # FLD AUTO: 16.1 K/UL — HIGH (ref 3.8–10.5)
WBC # FLD AUTO: 18.4 K/UL — HIGH (ref 3.8–10.5)

## 2018-10-11 PROCEDURE — 93010 ELECTROCARDIOGRAM REPORT: CPT | Mod: 76

## 2018-10-11 PROCEDURE — 76937 US GUIDE VASCULAR ACCESS: CPT | Mod: 26

## 2018-10-11 PROCEDURE — 71045 X-RAY EXAM CHEST 1 VIEW: CPT | Mod: 26,76

## 2018-10-11 PROCEDURE — 36556 INSERT NON-TUNNEL CV CATH: CPT

## 2018-10-11 PROCEDURE — 74176 CT ABD & PELVIS W/O CONTRAST: CPT | Mod: 26

## 2018-10-11 PROCEDURE — 99223 1ST HOSP IP/OBS HIGH 75: CPT

## 2018-10-11 PROCEDURE — 99292 CRITICAL CARE ADDL 30 MIN: CPT | Mod: 25

## 2018-10-11 PROCEDURE — 36620 INSERTION CATHETER ARTERY: CPT

## 2018-10-11 PROCEDURE — 99291 CRITICAL CARE FIRST HOUR: CPT | Mod: 25

## 2018-10-11 PROCEDURE — 99292 CRITICAL CARE ADDL 30 MIN: CPT

## 2018-10-11 PROCEDURE — 36247 INS CATH ABD/L-EXT ART 3RD: CPT

## 2018-10-11 PROCEDURE — 37243 VASC EMBOLIZE/OCCLUDE ORGAN: CPT

## 2018-10-11 RX ORDER — SODIUM CHLORIDE 9 MG/ML
1000 INJECTION, SOLUTION INTRAVENOUS
Qty: 0 | Refills: 0 | Status: DISCONTINUED | OUTPATIENT
Start: 2018-10-11 | End: 2018-10-12

## 2018-10-11 RX ORDER — CHLORHEXIDINE GLUCONATE 213 G/1000ML
1 SOLUTION TOPICAL
Qty: 0 | Refills: 0 | Status: DISCONTINUED | OUTPATIENT
Start: 2018-10-11 | End: 2018-10-11

## 2018-10-11 RX ORDER — LEVETIRACETAM 250 MG/1
500 TABLET, FILM COATED ORAL EVERY 12 HOURS
Qty: 0 | Refills: 0 | Status: DISCONTINUED | OUTPATIENT
Start: 2018-10-11 | End: 2018-10-15

## 2018-10-11 RX ORDER — CHLORHEXIDINE GLUCONATE 213 G/1000ML
1 SOLUTION TOPICAL
Qty: 0 | Refills: 0 | Status: DISCONTINUED | OUTPATIENT
Start: 2018-10-11 | End: 2018-10-23

## 2018-10-11 RX ORDER — LEVETIRACETAM 250 MG/1
1 TABLET, FILM COATED ORAL
Qty: 0 | Refills: 0 | COMMUNITY

## 2018-10-11 RX ORDER — PROTHROMBIN COMPLEX CONCENTRATE (HUMAN) 25.5; 16.5; 24; 22; 22; 26 [IU]/ML; [IU]/ML; [IU]/ML; [IU]/ML; [IU]/ML; [IU]/ML
5000 POWDER, FOR SOLUTION INTRAVENOUS ONCE
Qty: 0 | Refills: 0 | Status: DISCONTINUED | OUTPATIENT
Start: 2018-10-11 | End: 2018-10-11

## 2018-10-11 RX ORDER — INSULIN LISPRO 100/ML
VIAL (ML) SUBCUTANEOUS EVERY 4 HOURS
Qty: 0 | Refills: 0 | Status: DISCONTINUED | OUTPATIENT
Start: 2018-10-11 | End: 2018-10-16

## 2018-10-11 RX ORDER — DEXMEDETOMIDINE HYDROCHLORIDE IN 0.9% SODIUM CHLORIDE 4 UG/ML
0.2 INJECTION INTRAVENOUS
Qty: 200 | Refills: 0 | Status: DISCONTINUED | OUTPATIENT
Start: 2018-10-11 | End: 2018-10-12

## 2018-10-11 RX ORDER — INSULIN GLARGINE 100 [IU]/ML
30 INJECTION, SOLUTION SUBCUTANEOUS
Qty: 0 | Refills: 0 | COMMUNITY

## 2018-10-11 RX ORDER — HYDROMORPHONE HYDROCHLORIDE 2 MG/ML
0.5 INJECTION INTRAMUSCULAR; INTRAVENOUS; SUBCUTANEOUS
Qty: 0 | Refills: 0 | Status: DISCONTINUED | OUTPATIENT
Start: 2018-10-11 | End: 2018-10-15

## 2018-10-11 RX ORDER — MAGNESIUM SULFATE 500 MG/ML
2 VIAL (ML) INJECTION
Qty: 0 | Refills: 0 | Status: COMPLETED | OUTPATIENT
Start: 2018-10-11 | End: 2018-10-11

## 2018-10-11 RX ORDER — SODIUM CHLORIDE 9 MG/ML
500 INJECTION INTRAMUSCULAR; INTRAVENOUS; SUBCUTANEOUS ONCE
Qty: 0 | Refills: 0 | Status: COMPLETED | OUTPATIENT
Start: 2018-10-11 | End: 2018-10-11

## 2018-10-11 RX ORDER — INSULIN GLARGINE 100 [IU]/ML
40 INJECTION, SOLUTION SUBCUTANEOUS AT BEDTIME
Qty: 0 | Refills: 0 | Status: DISCONTINUED | OUTPATIENT
Start: 2018-10-11 | End: 2018-10-14

## 2018-10-11 RX ORDER — PROTHROMBIN COMPLEX CONCENTRATE (HUMAN) 25.5; 16.5; 24; 22; 22; 26 [IU]/ML; [IU]/ML; [IU]/ML; [IU]/ML; [IU]/ML; [IU]/ML
1500 POWDER, FOR SOLUTION INTRAVENOUS ONCE
Qty: 0 | Refills: 0 | Status: COMPLETED | OUTPATIENT
Start: 2018-10-11 | End: 2018-10-11

## 2018-10-11 RX ORDER — INSULIN ASPART 100 [IU]/ML
0 INJECTION, SOLUTION SUBCUTANEOUS
Qty: 0 | Refills: 0 | COMMUNITY

## 2018-10-11 RX ORDER — CLARITHROMYCIN 500 MG
1 TABLET ORAL
Qty: 0 | Refills: 0 | COMMUNITY

## 2018-10-11 RX ORDER — PHENYLEPHRINE HYDROCHLORIDE 10 MG/ML
0.4 INJECTION INTRAVENOUS
Qty: 40 | Refills: 0 | Status: DISCONTINUED | OUTPATIENT
Start: 2018-10-11 | End: 2018-10-12

## 2018-10-11 RX ADMIN — LEVETIRACETAM 400 MILLIGRAM(S): 250 TABLET, FILM COATED ORAL at 06:52

## 2018-10-11 RX ADMIN — Medication 10: at 06:30

## 2018-10-11 RX ADMIN — Medication 50 GRAM(S): at 09:36

## 2018-10-11 RX ADMIN — CHLORHEXIDINE GLUCONATE 1 APPLICATION(S): 213 SOLUTION TOPICAL at 05:13

## 2018-10-11 RX ADMIN — Medication 6: at 18:51

## 2018-10-11 RX ADMIN — Medication 4: at 14:00

## 2018-10-11 RX ADMIN — Medication 2: at 21:58

## 2018-10-11 RX ADMIN — SODIUM CHLORIDE 100 MILLILITER(S): 9 INJECTION, SOLUTION INTRAVENOUS at 17:04

## 2018-10-11 RX ADMIN — SODIUM CHLORIDE 500 MILLILITER(S): 9 INJECTION INTRAMUSCULAR; INTRAVENOUS; SUBCUTANEOUS at 17:04

## 2018-10-11 RX ADMIN — LEVETIRACETAM 400 MILLIGRAM(S): 250 TABLET, FILM COATED ORAL at 18:20

## 2018-10-11 RX ADMIN — Medication 50 GRAM(S): at 08:52

## 2018-10-11 RX ADMIN — PROTHROMBIN COMPLEX CONCENTRATE (HUMAN) 400 INTERNATIONAL UNIT(S): 25.5; 16.5; 24; 22; 22; 26 POWDER, FOR SOLUTION INTRAVENOUS at 09:43

## 2018-10-11 RX ADMIN — INSULIN GLARGINE 40 UNIT(S): 100 INJECTION, SOLUTION SUBCUTANEOUS at 21:59

## 2018-10-11 RX ADMIN — DEXMEDETOMIDINE HYDROCHLORIDE IN 0.9% SODIUM CHLORIDE 4.31 MICROGRAM(S)/KG/HR: 4 INJECTION INTRAVENOUS at 14:47

## 2018-10-11 NOTE — CONSULT NOTE ADULT - SUBJECTIVE AND OBJECTIVE BOX
SURGICAL ONCOLOGY CONSULT NOTE  --------------------------------------------------------------------------------------------    HPI: 77 year old man with PMH of atrial fibrillation on Xarelto, HTN, DM, BPH, and seizures presented yesterday with abdominal pain and was found to have perihepatic and perisplenic hemorrhagic ascites with likely liver mass on CT scan. Hct was 34 on presentation and dropped to 29. He received 2u pRBCs and was transferred to SICU for close monitoring. He received 2u FFP. This morning he was to interventional radiology for angiography and a lesion in segment 5 (with tumor vascularity) was identified without active extravasation. Two subsegmental vessels supplying segment 5 were embolized and completion angiography demonstrated a devascularized lesion. He received an additional 2u pRBCs while at interventional radiology. He was transferred back to the SICU on phenylephrine. He became hypotensive and tachycardic, and his pressor requirements increased to 1.5. He was given another 2u pRBCs and 2u FFP.       PAST MEDICAL & SURGICAL HISTORY:  BPH  Atrial fibrillation (on Xarelto_  Seizure  Diabetes mellitus  Hypertension  Craniotomy: bilateral, for ICH after fall  Inguinal hernia repair    FAMILY HISTORY:  Family history of glioblastoma (Sibling): brother      ALLERGIES: Aleve (Unknown)      CURRENT MEDICATIONS  MEDICATIONS (STANDING): dexmedetomidine Infusion 0.2 MICROgram(s)/kG/Hr IV Continuous <Continuous>  insulin glargine Injectable (LANTUS) 40 Unit(s) SubCutaneous at bedtime  insulin lispro (HumaLOG) corrective regimen sliding scale   SubCutaneous every 4 hours  lactated ringers. 1000 milliLiter(s) IV Continuous <Continuous>  levETIRAcetam  IVPB 500 milliGRAM(s) IV Intermittent every 12 hours  phenylephrine    Infusion 0.4 MICROgram(s)/kG/Min IV Continuous <Continuous>    MEDICATIONS (PRN):HYDROmorphone  Injectable 0.5 milliGRAM(s) IV Push every 3 hours PRN pain    --------------------------------------------------------------------------------------------    Vitals:   T(C): 37.8 (10-11-18 @ 14:35), Max: 37.8 (10-11-18 @ 14:35)  HR: 117 (10-11-18 @ 18:30) (79 - 134)  BP: 73/52 (10-11-18 @ 17:07) (73/52 - 142/65)  RR: 19 (10-11-18 @ 18:30) (12 - 97)  SpO2: 100% (10-11-18 @ 18:30) (95% - 100%)      10-10 @ 07:01  -  10-11 @ 07:00  --------------------------------------------------------  IN:    IV PiggyBack: 100 mL    lactated ringers.: 350 mL    Plasma: 660 mL  Total IN: 1110 mL    OUT:    Indwelling Catheter - Urethral: 445 mL  Total OUT: 445 mL    Total NET: 665 mL      10-11 @ 07:01  -  10-11 @ 18:37  --------------------------------------------------------  IN:    dexmedetomidine Infusion: 28.6 mL    IV PiggyBack: 100 mL    lactated ringers.: 800 mL    Packed Red Blood Cells: 850 mL    phenylephrine   Infusion: 96.1 mL    Sodium Chloride 0.9% IV Bolus: 500 mL  Total IN: 2374.7 mL    OUT:    Indwelling Catheter - Urethral: 140 mL  Total OUT: 140 mL    Total NET: 2234.7 mL      Height (cm): 177.8 (10-11 @ 02:45)  Weight (kg): 86.2 (10-11 @ 02:45)  BMI (kg/m2): 27.3 (10-11 @ 02:45)  BSA (m2): 2.04 (10-11 @ 02:45)    PHYSICAL EXAM:  General: Intubated, responsive and following commands  HEENT: NC/AT, no asymmetry, no scleral icterus  Cardio: Tachycardic, atrial fibrillation  Resp: Symmetric chest rise  GI/Abd: Slightly firm, moderately distended, nontender, no masses  Ext: Warm, moving spontaneously    --------------------------------------------------------------------------------------------    LABS  CBC (10-11 @ 17:02)                              9.8<L>                         14.3<H>  )----------------(  185        --    % Neutrophils, --    % Lymphocytes, ANC: --                                  27.5<L>  CBC (10-11 @ 15:44)                              10.8<L>                         13.1<H>  )----------------(  167        --    % Neutrophils, --    % Lymphocytes, ANC: --                                  30.6<L>    BMP (10-11 @ 15:44)             140     |  107     |  31<H> 		Ca++ --      Ca 8.9                ---------------------------------( 247<H>		Mg 2.7<H>             4.9     |  20<L>   |  1.77<H>			Ph 6.0<H>  BMP (10-11 @ 05:55)             135     |  103     |  27<H> 		Ca++ --      Ca 8.1<L>             ---------------------------------( 392<H>		Mg 1.6                5.3     |  17<L>   |  1.47<H>			Ph 4.2       LFTs (10-11 @ 15:44)      TPro 5.3<L> / Alb 2.9<L> / TBili 1.5<H> / DBili -- / AST 19 / ALT 11 / AlkPhos 43  LFTs (10-11 @ 03:21)      TPro 5.1<L> / Alb 2.8<L> / TBili 1.3<H> / DBili 0.2 / AST 10 / ALT 10 / AlkPhos 47    Coags (10-11 @ 15:44)  aPTT 28.4 / INR 1.21<H> / PT 13.2<H>  Coags (10-11 @ 03:21)  aPTT 29.0 / INR 1.73<H> / PT 19.1<H>      ABG (10-11 @ 16:28)     7.35 / 37 / 129<H> / 20<L> / -4.8<L> / 100<H>%     Lactate:    ABG (10-11 @ 15:14)     7.26<L> / 48<H> / 99 / 21 / -5.4<L> / 98<H>%     Lactate:      VBG (10-10 @ 21:10)     7.36 / 44 / 39 / 24 / -0.8 / 65<L>%     Lactate: 2.6<H>    --------------------------------------------------------------------------------------------    MICROBIOLOGY  Urinalysis (10-11 @ 03:21):     Color: Yellow / Appearance: Clear / SG: >1.050<!> / pH: 5.5 / Gluc: Negative / Ketones: Negative / Bili: Negative / Urobili: Negative / Protein :100 mg/dL<!> / Nitrites: Negative / Leuk.Est: Negative / RBC: 15<H> / WBC: 3 / Sq Epi:  / Non Sq Epi: 2 / Bacteria Negative         --------------------------------------------------------------------------------------------    IMAGING    CT Angio Abdomen and Pelvis w/ IV Cont (10.10.18 @ 23:13)    IMPRESSION:   No aortic aneurysm, dissection, intramural hematoma, or penetrating ulcer.    High densityperihepatic and perisplenic ascites, concering for   hemoperitoneum, with a 4 cm clot in the right upper quadrant mesentery.   There is a lobulated contour of the mildly of the inferior tip of the   liver and a liver mass cannot be excluded. Evaluation is limited by the   arterial phase of imaging. SURGICAL ONCOLOGY CONSULT NOTE  --------------------------------------------------------------------------------------------    HPI: 77 year old man with PMH of atrial fibrillation on Xarelto, HTN, DM, BPH, and seizures presented yesterday with abdominal pain and was found to have perihepatic and perisplenic hemorrhagic ascites with likely bleeding liver mass on CTA. Hct was 34 on presentation and dropped to 29. He received 2u pRBCs and was transferred to SICU for close monitoring. He received 2u FFP. This morning he went to interventional radiology for angiography and a lesion in segment 5 (with tumor vascularity) was identified. Two subsegmental vessels supplying segment 5 were embolized and completion angiography demonstrated a devascularized lesion. He received an additional 2u pRBCs while at interventional radiology with no increase in Hct (32.9 --> 30.6). He was transferred back to the SICU on phenylephrine. He became hypotensive and tachycardic, and his phenylephrine increased to 1.5. He was given another 2u pRBCs and 2u FFP.       PAST MEDICAL & SURGICAL HISTORY:  BPH  Atrial fibrillation (on Xarelto)  Seizure  Diabetes mellitus  Hypertension  Craniotomy: bilateral, for ICH after fall  Inguinal hernia repair    FAMILY HISTORY:  Family history of glioblastoma (Sibling): brother    ALLERGIES: Aleve (Unknown)    CURRENT MEDICATIONS  MEDICATIONS (STANDING): dexmedetomidine Infusion 0.2 MICROgram(s)/kG/Hr IV Continuous <Continuous>  insulin glargine Injectable (LANTUS) 40 Unit(s) SubCutaneous at bedtime  insulin lispro (HumaLOG) corrective regimen sliding scale   SubCutaneous every 4 hours  lactated ringers. 1000 milliLiter(s) IV Continuous <Continuous>  levETIRAcetam  IVPB 500 milliGRAM(s) IV Intermittent every 12 hours  phenylephrine    Infusion 0.4 MICROgram(s)/kG/Min IV Continuous <Continuous>    MEDICATIONS (PRN):HYDROmorphone  Injectable 0.5 milliGRAM(s) IV Push every 3 hours PRN pain    --------------------------------------------------------------------------------------------    Vitals:   T(C): 37.8 (10-11-18 @ 14:35), Max: 37.8 (10-11-18 @ 14:35)  HR: 117 (10-11-18 @ 18:30) (79 - 134)  BP: 73/52 (10-11-18 @ 17:07) (73/52 - 142/65)  RR: 19 (10-11-18 @ 18:30) (12 - 97)  SpO2: 100% (10-11-18 @ 18:30) (95% - 100%)      10-10 @ 07:01  -  10-11 @ 07:00  --------------------------------------------------------  IN:    IV PiggyBack: 100 mL    lactated ringers.: 350 mL    Plasma: 660 mL  Total IN: 1110 mL    OUT:    Indwelling Catheter - Urethral: 445 mL  Total OUT: 445 mL    Total NET: 665 mL      10-11 @ 07:01  -  10-11 @ 18:37  --------------------------------------------------------  IN:    dexmedetomidine Infusion: 28.6 mL    IV PiggyBack: 100 mL    lactated ringers.: 800 mL    Packed Red Blood Cells: 850 mL    phenylephrine   Infusion: 96.1 mL    Sodium Chloride 0.9% IV Bolus: 500 mL  Total IN: 2374.7 mL    OUT:    Indwelling Catheter - Urethral: 140 mL  Total OUT: 140 mL    Total NET: 2234.7 mL      Height (cm): 177.8 (10-11 @ 02:45)  Weight (kg): 86.2 (10-11 @ 02:45)  BMI (kg/m2): 27.3 (10-11 @ 02:45)  BSA (m2): 2.04 (10-11 @ 02:45)    PHYSICAL EXAM:  General: Intubated, responsive and following commands  HEENT: NC/AT, no asymmetry, no scleral icterus  Cardio: Tachycardic, atrial fibrillation  Resp: Symmetric chest rise  GI/Abd: Slightly firm, moderately distended, nontender, no masses  Ext: Warm, moving spontaneously    --------------------------------------------------------------------------------------------    LABS  CBC (10-11 @ 17:02)                              9.8<L>                         14.3<H>  )----------------(  185        --    % Neutrophils, --    % Lymphocytes, ANC: --                                  27.5<L>  CBC (10-11 @ 15:44)                              10.8<L>                         13.1<H>  )----------------(  167        --    % Neutrophils, --    % Lymphocytes, ANC: --                                  30.6<L>    BMP (10-11 @ 15:44)             140     |  107     |  31<H> 		Ca++ --      Ca 8.9                ---------------------------------( 247<H>		Mg 2.7<H>             4.9     |  20<L>   |  1.77<H>			Ph 6.0<H>  BMP (10-11 @ 05:55)             135     |  103     |  27<H> 		Ca++ --      Ca 8.1<L>             ---------------------------------( 392<H>		Mg 1.6                5.3     |  17<L>   |  1.47<H>			Ph 4.2       LFTs (10-11 @ 15:44)      TPro 5.3<L> / Alb 2.9<L> / TBili 1.5<H> / DBili -- / AST 19 / ALT 11 / AlkPhos 43  LFTs (10-11 @ 03:21)      TPro 5.1<L> / Alb 2.8<L> / TBili 1.3<H> / DBili 0.2 / AST 10 / ALT 10 / AlkPhos 47    Coags (10-11 @ 15:44)  aPTT 28.4 / INR 1.21<H> / PT 13.2<H>  Coags (10-11 @ 03:21)  aPTT 29.0 / INR 1.73<H> / PT 19.1<H>      ABG (10-11 @ 16:28)     7.35 / 37 / 129<H> / 20<L> / -4.8<L> / 100<H>%     Lactate:    ABG (10-11 @ 15:14)     7.26<L> / 48<H> / 99 / 21 / -5.4<L> / 98<H>%     Lactate:      VBG (10-10 @ 21:10)     7.36 / 44 / 39 / 24 / -0.8 / 65<L>%     Lactate: 2.6<H>    --------------------------------------------------------------------------------------------    MICROBIOLOGY  Urinalysis (10-11 @ 03:21):     Color: Yellow / Appearance: Clear / SG: >1.050<!> / pH: 5.5 / Gluc: Negative / Ketones: Negative / Bili: Negative / Urobili: Negative / Protein :100 mg/dL<!> / Nitrites: Negative / Leuk.Est: Negative / RBC: 15<H> / WBC: 3 / Sq Epi:  / Non Sq Epi: 2 / Bacteria Negative         --------------------------------------------------------------------------------------------    IMAGING    CT Angio Abdomen and Pelvis w/ IV Cont (10.10.18 @ 23:13)    IMPRESSION:   No aortic aneurysm, dissection, intramural hematoma, or penetrating ulcer.    High densityperihepatic and perisplenic ascites, concering for   hemoperitoneum, with a 4 cm clot in the right upper quadrant mesentery.   There is a lobulated contour of the mildly of the inferior tip of the   liver and a liver mass cannot be excluded. Evaluation is limited by the   arterial phase of imaging.

## 2018-10-11 NOTE — H&P ADULT - NSHPLABSRESULTS_GEN_ALL_CORE
CBC (10-10 @ 23:42)                              10.3<L>                         14.4<H>  )----------------(  218        82.8<H>% Neutrophils, 8.6<L>% Lymphocytes, ANC: 11.9<H>                              29.6<L>  CBC (10-10 @ 21:10)                              12.2<L>                         18.9<H>  )----------------(  234        77.9<H>% Neutrophils, 13.1  % Lymphocytes, ANC: 14.7<H>                              34.1<L>    Hematocrit: 29.6 (10-10-18 @ 23:42)  Hematocrit: 34.1 (10-10-18 @ 21:10)    BMP (10-10 @ 21:10)             139     |  107     |  21    		Ca++ --      Ca 8.4                ---------------------------------( 242<H>		Mg --                 4.7     |  19<L>   |  1.02  			Ph --        LFTs (10-10 @ 21:10)      TPro 5.8<L> / Alb 3.0<L> / TBili 0.9 / DBili -- / AST 21 / ALT 11 / AlkPhos 50    Coags (10-10 @ 21:10)  aPTT 32.4 / INR 1.75<H> / PT 19.3<H>    VBG (10-10 @ 21:10)     7.36 / 44 / 39 / 24 / -0.8 / 65<L>%     Lactate: 2.6<H>    IMAGING    < from: CT Angio Chest, Abdomen, Pelvis w/ IV Cont (10.10.18 @ 23:13) >    IMPRESSION:   No aortic aneurysm, dissection, intramural hematoma, or penetrating ulcer.    High densityperihepatic and perisplenic ascites, concering for   hemoperitoneum, with a 4 cm clot in the right upper quadrant mesentery.   There is a lobulated contour of the mildly of the inferior tip of the   liver and a liver mass cannot be excluded. Evaluation is limited by the   arterial phase of imaging.    < end of copied text >

## 2018-10-11 NOTE — CONSULT NOTE ADULT - ATTENDING COMMENTS
- I have seen and examined the patient on rounds. Patient's chart, labs, images and reports reviewed.  Pt admitted to SICU with hemoperitoneum secondary to a segment 5 liver mass- has received multiple pRBC and FFP.  Underwent IR guided embolization.  Still remains intubated,  Off vasopressors  HD stable now  Hb stable serially with no pRBC transfusions  Plan:  Would cont to monitor  Appears to have stopped active bleeding  Would reconsult IR if drops Hb. If pt drops Hb and is HD unstable would recommend operative intervention  Discussed with SICU team and   -Thank you for allowing me to take care of your patient. I will follow the patient with you.  Regards  Christos Singleton MD  Division of Surgical Oncology  [address] [place] [id number]  Ph:[phone] - I have seen and examined the patient on rounds. Patient's chart, labs, images and reports reviewed.  Pt admitted to SICU with hemoperitoneum secondary to a segment 5 liver mass- has received multiple pRBC and FFP.  Underwent IR guided embolization.  Still remains intubated,  Off vasopressors  HD stable now  Hb stable serially with no pRBC transfusions  Plan:  Would cont to monitor  Appears to have stopped active bleeding  Would reconsult IR if drops Hb. If pt drops Hb and is HD unstable would recommend operative intervention  Discussed with SICU team and   Check tumor markers. Will need formal liver protocol imaging to better evaluate the liver mass.  -Thank you for allowing me to take care of your patient. I will follow the patient with you.  Regards  Christos Singleton MD  Division of Surgical Oncology  [address] [place] [id number]  Ph:[phone]

## 2018-10-11 NOTE — CONSULT NOTE ADULT - SUBJECTIVE AND OBJECTIVE BOX
SICU Consult  Consulting surgical team: SICU  Consulting attending: Dr. Espinoza    HPI:  Patient is a 77y old  Male who presents with a chief complaint of abdominal pain.  Patient's symptoms started acutely at 6pm while he was sitting, eating dinner.  He felt weak at that time, and he went to urgent care.  He was noted to be hypotensive at urgent care and was referred to the ER.  On presentation, the patient continued to complain of abdominal pain, and was noted to be hypotensive.  He became progressively tachycardic.  Patient's labs showed a downtrending hct and a CTA was done which was consistent with hemoperitoneum, without obvious source.  Surgery consulted for evaluation of hemoperitoneum.      Past medical history is significant for afib, patient is on xeralto. (11 Oct 2018 01:58)      PAST MEDICAL HISTORY:  BPH (benign prostatic hyperplasia)  Atrial fibrillation  Seizure  Diabetes mellitus  Hypothyroidism  Hypertension      PAST SURGICAL HISTORY:  H/O craniotomy  S/P inguinal hernia repair  ASD (atrial septal defect)  No significant past surgical history      MEDICATIONS:      ALLERGIES:  Aleve (Unknown)      VITALS & I/Os:  Vital Signs Last 24 Hrs  T(C): 36.6 (10 Oct 2018 21:43), Max: 36.6 (10 Oct 2018 21:43)  T(F): 97.9 (10 Oct 2018 21:43), Max: 97.9 (10 Oct 2018 21:43)  HR: 126 (11 Oct 2018 02:27) (79 - 126)  BP: 114/72 (11 Oct 2018 02:27) (78/55 - 114/77)  BP(mean): --  RR: 18 (10 Oct 2018 21:43) (18 - 18)  SpO2: 98% (11 Oct 2018 02:27) (95% - 98%)    I&O's Summary      PHYSICAL EXAM:  General: No acute distress  Respiratory: Nonlabored  Cardiovascular: RRR  Abdominal: Soft, nondistended, nontender. No rebound or guarding. No organomegaly, no palpable mass.  Extremities: Warm    LABS:                        10.3   14.4  )-----------( 218      ( 10 Oct 2018 23:42 )             29.6     10-10    139  |  107  |  21  ----------------------------<  242<H>  4.7   |  19<L>  |  1.02    Ca    8.4      10 Oct 2018 21:10    TPro  5.8<L>  /  Alb  3.0<L>  /  TBili  0.9  /  DBili  x   /  AST  21  /  ALT  11  /  AlkPhos  50  10-10    Lactate:  10-10 @ 21:10  2.6    PT/INR - ( 10 Oct 2018 21:10 )   PT: 19.3 sec;   INR: 1.75 ratio         PTT - ( 10 Oct 2018 21:10 )  PTT:32.4 sec              IMAGING: SICU Consult  Consulting surgical team: SICU  Consulting attending: Dr. Espinoza    77M Hx afib on xarelto, seizure    who presents with a chief complaint of abdominal pain.  Patient's symptoms started acutely at 6pm while he was sitting, eating dinner.  He felt weak at that time, and he went to urgent care.  He was noted to be hypotensive at urgent care and was referred to the ER.  On presentation, the patient continued to complain of abdominal pain, and was noted to be hypotensive.  He became progressively tachycardic.  Patient's labs showed a downtrending hct and a CTA was done which was consistent with hemoperitoneum, without obvious source.  Surgery consulted for evaluation of hemoperitoneum.        PAST MEDICAL HISTORY:  BPH (benign prostatic hyperplasia)  Atrial fibrillation  Seizure  Diabetes mellitus  Hypothyroidism  Hypertension      PAST SURGICAL HISTORY:  H/O craniotomy  S/P inguinal hernia repair  ASD (atrial septal defect)  No significant past surgical history      MEDICATIONS:      ALLERGIES:  Aleve (Unknown)      VITALS & I/Os:  Vital Signs Last 24 Hrs  T(C): 36.6 (10 Oct 2018 21:43), Max: 36.6 (10 Oct 2018 21:43)  T(F): 97.9 (10 Oct 2018 21:43), Max: 97.9 (10 Oct 2018 21:43)  HR: 126 (11 Oct 2018 02:27) (79 - 126)  BP: 114/72 (11 Oct 2018 02:27) (78/55 - 114/77)  BP(mean): --  RR: 18 (10 Oct 2018 21:43) (18 - 18)  SpO2: 98% (11 Oct 2018 02:27) (95% - 98%)    I&O's Summary      PHYSICAL EXAM:  General: No acute distress  Respiratory: Nonlabored  Cardiovascular: RRR  Abdominal: Soft, nondistended, nontender. No rebound or guarding. No organomegaly, no palpable mass.  Extremities: Warm    LABS:                        10.3   14.4  )-----------( 218      ( 10 Oct 2018 23:42 )             29.6     10-10    139  |  107  |  21  ----------------------------<  242<H>  4.7   |  19<L>  |  1.02    Ca    8.4      10 Oct 2018 21:10    TPro  5.8<L>  /  Alb  3.0<L>  /  TBili  0.9  /  DBili  x   /  AST  21  /  ALT  11  /  AlkPhos  50  10-10    Lactate:  10-10 @ 21:10  2.6    PT/INR - ( 10 Oct 2018 21:10 )   PT: 19.3 sec;   INR: 1.75 ratio         PTT - ( 10 Oct 2018 21:10 )  PTT:32.4 sec              IMAGING: SICU Consult  Consulting surgical team: SICU  Consulting attending: Dr. Espinoza    77M Hx afib on xarelto, seizure, who presented in hemorrhagic shock. He developed RLQ abdominal pain shortly after dinner around 6pm. He was taken to the urgent care center by his wife and was directed to the hospital. In the ER he was hypotensive and tachycardic. CTA revealed hemoperitoneum with the source possibly from the liver. He was transported back to the trauma bay where he received a right femoral A-line for close blood pressure monitoring. The patient went back to CT before going to the SICU for a non-contrast CT to r/o RP bleed. He arrived in the SICU after 2 units RBC's.       PAST MEDICAL HISTORY:  BPH (benign prostatic hyperplasia)  Atrial fibrillation  Seizure  Diabetes mellitus  Hypothyroidism  Hypertension      PAST SURGICAL HISTORY:  H/O craniotomy  S/P inguinal hernia repair  ASD (atrial septal defect)  No significant past surgical history    ALLERGIES:  Aleve (Unknown)      VITALS & I/Os:  Vital Signs Last 24 Hrs  T(C): 36.6 (10 Oct 2018 21:43), Max: 36.6 (10 Oct 2018 21:43)  T(F): 97.9 (10 Oct 2018 21:43), Max: 97.9 (10 Oct 2018 21:43)  HR: 126 (11 Oct 2018 02:27) (79 - 126)  BP: 114/72 (11 Oct 2018 02:27) (78/55 - 114/77)  BP(mean): --  RR: 18 (10 Oct 2018 21:43) (18 - 18)  SpO2: 98% (11 Oct 2018 02:27) (95% - 98%)    I&O's Summary      PHYSICAL EXAM:  General: Not in distress, agitated, pulling at lines.  Respiratory: Nonlabored  Cardiovascular: RRR  Abdominal: Soft, distended, nontender  Extremities: Warm    LABS:                        10.3   14.4  )-----------( 218      ( 10 Oct 2018 23:42 )             29.6     10-10    139  |  107  |  21  ----------------------------<  242<H>  4.7   |  19<L>  |  1.02    Ca    8.4      10 Oct 2018 21:10    TPro  5.8<L>  /  Alb  3.0<L>  /  TBili  0.9  /  DBili  x   /  AST  21  /  ALT  11  /  AlkPhos  50  10-10    Lactate:  10-10 @ 21:10  2.6    PT/INR - ( 10 Oct 2018 21:10 )   PT: 19.3 sec;   INR: 1.75 ratio         PTT - ( 10 Oct 2018 21:10 )  PTT:32.4 sec    IMAGING:    < from: CT Angio Abdomen and Pelvis w/ IV Cont (10.10.18 @ 23:13) >    IMPRESSION:   No aortic aneurysm, dissection, intramural hematoma, or penetrating ulcer.    High densityperihepatic and perisplenic ascites, concering for   hemoperitoneum, with a 4 cm clot in the right upper quadrant mesentery.   There is a lobulated contour of the mildly of the inferior tip of the   liver and a liver mass cannot be excluded. Evaluation is limited by the   arterial phase of imaging.    These findings were discussed with Dr. CORREA at 10/11/2018 12:42 AM by   Dr. Jaeger with read back confirmation.    < end of copied text >

## 2018-10-11 NOTE — PROGRESS NOTE ADULT - SUBJECTIVE AND OBJECTIVE BOX
Patient seen and examined this morning.  Patient is hypotensive and tachycardic this morning. Patient can still be bleeding and 2 units PRBCs ordered. Goal SBP > 90 mmHg and MAP > 65 mmHg.  Patient has a history of Xarelto and still coagulopathic. Will give Kcentra 1500 units.   CT yesterday demonstrated  possible bleeding from inferior liver. His abdomen is also getting more distended. The patient is in hemorrhagic shock. IR is to take the patient and possible embolization. I have not been able to reach his family but this is a life saving procedure and benefit outweighs the risk.   Patient also with leukocytosis likely reactive to the hemorrhage.   Acute kidney injury secondary to acute hemorrhage and hypotension. He is oliguric at this time. Will monitor closely as he receives blood products.  Respiratory distress on bipap 10/5. CXR with decreased lung volumes and tachypneic.     We discussed the case with Dr. Monsivais who will embolize him.  CC time: 60 minutes

## 2018-10-11 NOTE — CHART NOTE - NSCHARTNOTEFT_GEN_A_CORE
combined metabolic acidosis and respiratory alkalosis on ABG  lactic acidosis and base deficit slowly improving on serial ABG    has recurrence of hypotension and persistent stable tachycardia, but no oliguria    This suggests slow ongoing bleeding from liver but no hemorrhagic shock.  Will transfuse 2u FFP for hypotension and continue to observe since he took last dose of Xarelto approximately 24 hours ago.

## 2018-10-11 NOTE — H&P ADULT - PSH
ASD (atrial septal defect)  closure  H/O craniotomy  bilateral, for ICH after fall  S/P inguinal hernia repair

## 2018-10-11 NOTE — PROCEDURE NOTE - NSPROCDETAILS_GEN_ALL_CORE
positive blood return obtained via catheter/connected to a pressurized flush line/sutured in place/hemostasis with direct pressure, dressing applied/location identified, draped/prepped, sterile technique used, needle inserted/introduced/Seldinger technique/all materials/supplies accounted for at end of procedure

## 2018-10-11 NOTE — PROGRESS NOTE ADULT - SUBJECTIVE AND OBJECTIVE BOX
Interventional Radiology Brief- Operative Note    Procedure: Visceral Angiogram and Embolization    Operators: Jose MOSS, Breanna MOSS, Yodit MOSS    Anesthesia (type): GA    Contrast: Please refer to dictated report.     EBL: Minimal    Findings/Follow up Plan of Care: Selective Celiac, Common Hepatic, Proper Hepatic, Right hepatic and Right Segment 5 arteriography was performed. Arteriography    Specimens Removed:    Implants:    Complications:    Condition/Disposition:    Please call Interventional Radiology x [phone] with any questions, concerns, or issues. Interventional Radiology Brief- Operative Note    Procedure: Visceral Angiogram and Embolization    Operators: Jose MOSS, Breanna MOSS, Yodit MOSS    Anesthesia (type): GA    Contrast: Please refer to dictated report.     EBL: Minimal    Findings/Follow up Plan of Care:   Selective Celiac, Common Hepatic, Proper Hepatic, Right hepatic and Right Segment 5 arteriography was performed. Arteriography demonstrates no foci of active extravasation and filling of a lesion in segment 5, i.e tumor vascularity. Embolization of this mass was performed using particles via two distinct subsegmental vessels supplying segment 5. Completion angiography demonstrates a markedly devascularized lesion. Through the case the patient continued to be on pressors and was given PRBC's.  These findings were communicated by  with the SICU Attending.     For more details please refer to full dictates report.     Complications: None    Condition/Disposition: Stable. SICU    Please call Interventional Radiology x [phone] with any questions, concerns, or issues.      Liam Carlos MD  PGY-5, Interventional Radiology

## 2018-10-11 NOTE — PROGRESS NOTE ADULT - SUBJECTIVE AND OBJECTIVE BOX
Interventional Radiology Pre-Procedure Note    This is a 77y Male presenting with acute abdominal pain, progressed to hemorrhagic shock with hemoperitoneum of unclear source.     Procedure: Mesenteric angiography.     Diagnosis/Indication: Hemorrhagic shock and hemoperitoneum of unclear source.     PAST MEDICAL & SURGICAL HISTORY:  BPH (benign prostatic hyperplasia)  Atrial fibrillation  Seizure  Diabetes mellitus  Hypertension  H/O craniotomy: bilateral, for ICH after fall  S/P inguinal hernia repair  ASD (atrial septal defect): closure       Allergies: Aleve (Unknown)      LABS:  CBC Full  -  ( 11 Oct 2018 05:55 )  WBC Count : 14.8 K/uL  Hemoglobin : 11.8 g/dL  Hematocrit : 32.9 %  Platelet Count - Automated : 202 K/uL  Mean Cell Volume : 89.9 fl  Mean Cell Hemoglobin : 32.2 pg  Mean Cell Hemoglobin Concentration : 35.8 gm/dL  Auto Neutrophil # : x  Auto Lymphocyte # : x  Auto Monocyte # : x  Auto Eosinophil # : x  Auto Basophil # : x  Auto Neutrophil % : x  Auto Lymphocyte % : x  Auto Monocyte % : x  Auto Eosinophil % : x  Auto Basophil % : x    10-11    135  |  103  |  27<H>  ----------------------------<  392<H>  5.3   |  17<L>  |  1.47<H>    Ca    8.1<L>      11 Oct 2018 05:55  Phos  4.2     10-11  Mg     1.6     10-11    TPro  5.1<L>  /  Alb  2.8<L>  /  TBili  1.3<H>  /  DBili  0.2  /  AST  10  /  ALT  10  /  AlkPhos  47  10-11    PT/INR - ( 11 Oct 2018 03:21 )   PT: 19.1 sec;   INR: 1.73 ratio         PTT - ( 11 Oct 2018 03:21 )  PTT:29.0 sec    Procedure/ risks/ benefits/ alternatives were explained, informed consent obtained from patient, verbalizes understanding. Interventional Radiology Pre-Procedure Note    This is a 77y Male with hx of AFib on Xeralto presenting with acute abdominal pain, progressed to hemorrhagic shock with hemoperitoneum of unclear source. S/p 3u RBC and 2u Plasma.     Procedure: Mesenteric angiography.     Diagnosis/Indication: Hemorrhagic shock and hemoperitoneum of unclear source.     PAST MEDICAL & SURGICAL HISTORY:  BPH (benign prostatic hyperplasia)  Atrial fibrillation  Seizure  Diabetes mellitus  Hypertension  H/O craniotomy: bilateral, for ICH after fall  S/P inguinal hernia repair  ASD (atrial septal defect): closure       Allergies: Aleve (Unknown)      LABS:  CBC Full  -  ( 11 Oct 2018 05:55 )  WBC Count : 14.8 K/uL  Hemoglobin : 11.8 g/dL  Hematocrit : 32.9 %  Platelet Count - Automated : 202 K/uL  Mean Cell Volume : 89.9 fl  Mean Cell Hemoglobin : 32.2 pg  Mean Cell Hemoglobin Concentration : 35.8 gm/dL  Auto Neutrophil # : x  Auto Lymphocyte # : x  Auto Monocyte # : x  Auto Eosinophil # : x  Auto Basophil # : x  Auto Neutrophil % : x  Auto Lymphocyte % : x  Auto Monocyte % : x  Auto Eosinophil % : x  Auto Basophil % : x    10-11    135  |  103  |  27<H>  ----------------------------<  392<H>  5.3   |  17<L>  |  1.47<H>    Ca    8.1<L>      11 Oct 2018 05:55  Phos  4.2     10-11  Mg     1.6     10-11    TPro  5.1<L>  /  Alb  2.8<L>  /  TBili  1.3<H>  /  DBili  0.2  /  AST  10  /  ALT  10  /  AlkPhos  47  10-11    PT/INR - ( 11 Oct 2018 03:21 )   PT: 19.1 sec;   INR: 1.73 ratio         PTT - ( 11 Oct 2018 03:21 )  PTT:29.0 sec    Procedure/ risks/ benefits/ alternatives were explained, informed consent obtained from patient, verbalizes understanding. Interventional Radiology Pre-Procedure Note    This is a 77y Male with hx of AFib on Xeralto presenting with acute abdominal pain, progressed to hemorrhagic shock with hemoperitoneum of unclear source. S/p 3u RBC and 2u Plasma.  Emergent visceral angiogram requested by SICU staff as patient is requiring pressor support despite blood product administration.    Procedure: Mesenteric angiography.     Diagnosis/Indication: Hemorrhagic shock and hemoperitoneum of unclear source.     PAST MEDICAL & SURGICAL HISTORY:  BPH (benign prostatic hyperplasia)  Atrial fibrillation  Seizure  Diabetes mellitus  Hypertension  H/O craniotomy: bilateral, for ICH after fall  S/P inguinal hernia repair  ASD (atrial septal defect): closure       Allergies: Aleve (Unknown)      LABS:  CBC Full  -  ( 11 Oct 2018 05:55 )  WBC Count : 14.8 K/uL  Hemoglobin : 11.8 g/dL  Hematocrit : 32.9 %  Platelet Count - Automated : 202 K/uL  Mean Cell Volume : 89.9 fl  Mean Cell Hemoglobin : 32.2 pg  Mean Cell Hemoglobin Concentration : 35.8 gm/dL  Auto Neutrophil # : x  Auto Lymphocyte # : x  Auto Monocyte # : x  Auto Eosinophil # : x  Auto Basophil # : x  Auto Neutrophil % : x  Auto Lymphocyte % : x  Auto Monocyte % : x  Auto Eosinophil % : x  Auto Basophil % : x    10-11    135  |  103  |  27<H>  ----------------------------<  392<H>  5.3   |  17<L>  |  1.47<H>    Ca    8.1<L>      11 Oct 2018 05:55  Phos  4.2     10-11  Mg     1.6     10-11    TPro  5.1<L>  /  Alb  2.8<L>  /  TBili  1.3<H>  /  DBili  0.2  /  AST  10  /  ALT  10  /  AlkPhos  47  10-11    PT/INR - ( 11 Oct 2018 03:21 )   PT: 19.1 sec;   INR: 1.73 ratio         PTT - ( 11 Oct 2018 03:21 )  PTT:29.0 sec    A/P Emergent visceral angiogram. The full procedure including risks/benefits were discussed with the patients wife by telephone. This included but was not limited to a discussion of the risks of bleeding, vascular injury, or possible contrast related nephrotoxicity. Pt was unable to provide consent. Consent obtained from the patient's wife, verbalizes understanding.

## 2018-10-11 NOTE — CONSULT NOTE ADULT - ASSESSMENT
77 year old man with PMH of atrial fibrillation on Xarelto, HTN, DM, BPH, and seizures admitted with hemoperitoneum secondary to bleeding liver mass in segment 5, now s/p embolization of two subsegmental vessels. Remains on phenylephrine in SICU requiring transfusions.    Plan:    - Will discuss plan with attending, Dr. Singleton

## 2018-10-11 NOTE — H&P ADULT - HISTORY OF PRESENT ILLNESS
Patient is a 77y old  Male who presents with a chief complaint of abdominal pain.  Patient's symptoms started acutely at 6pm while he was sitting, eating dinner.  He felt weak at that time, and he went to urgent care.  He was noted to be hypotensive at urgent care and was referred to the ER.  On presentation, the patient continued to complain of abdominal pain, and was noted to be hypotensive.  He became progressively tachycardic.  Patient's labs showed a downtrending hct and a CTA was done which was consistent with hemoperitoneum, without obvious source.  Surgery consulted for evaluation of hemoperitoneum.      Past medical history is significant for afib, patient is on xeralto.

## 2018-10-11 NOTE — PROGRESS NOTE ADULT - SUBJECTIVE AND OBJECTIVE BOX
SURGICAL POST-PROCEDURE CHECK NOTE:    Procedure: Visceral Angiogram and Embolization    Subjective: Resting comfortably in bed. Pain controlled. No N/V, CP, or SOB.    Vital Signs Last 24 Hrs  T(C): 37.8 (11 Oct 2018 14:35), Max: 37.8 (11 Oct 2018 14:35)  T(F): 100 (11 Oct 2018 14:35), Max: 100 (11 Oct 2018 14:35)  HR: 119 (11 Oct 2018 17:30) (79 - 134)  BP: 73/52 (11 Oct 2018 17:07) (73/52 - 142/65)  BP(mean): 58 (11 Oct 2018 17:07) (55 - 93)  RR: 18 (11 Oct 2018 17:30) (12 - 97)  SpO2: 99% (11 Oct 2018 17:30) (95% - 100%)  I&O's Summary    10 Oct 2018 07:01  -  11 Oct 2018 07:00  --------------------------------------------------------  IN: 1110 mL / OUT: 445 mL / NET: 665 mL    11 Oct 2018 07:01  -  11 Oct 2018 17:55  --------------------------------------------------------  IN: 1928.8 mL / OUT: 140 mL / NET: 1788.8 mL                            9.8    14.3  )-----------( 185      ( 11 Oct 2018 17:02 )             27.5     10-11    140  |  107  |  31<H>  ----------------------------<  247<H>  4.9   |  20<L>  |  1.77<H>    Ca    8.9      11 Oct 2018 15:44  Phos  6.0     10-11  Mg     2.7     10-11    TPro  5.3<L>  /  Alb  2.9<L>  /  TBili  1.5<H>  /  DBili  x   /  AST  19  /  ALT  11  /  AlkPhos  43  10-11   PT/INR - ( 11 Oct 2018 15:44 )   PT: 13.2 sec;   INR: 1.21 ratio         PTT - ( 11 Oct 2018 15:44 )  PTT:28.4 sec    PHYSICAL EXAM:  Gen: NAD, well-developed  Neuro: AAOX3, PERRL, CNII-XII grossly intact  CV: nml S1/S2, RRR  Pulm: CTABL  GI: abd soft, nontender, nondistended, bsx4 quadrants  Ext: 2+ pulses throughout SURGICAL POST-PROCEDURE CHECK NOTE:    Procedure: Visceral Angiogram and Embolization    Subjective: Resting in bed in SICU. Intubated. Pain seems controlled per patient response.    Vital Signs Last 24 Hrs  T(C): 37.8 (11 Oct 2018 14:35), Max: 37.8 (11 Oct 2018 14:35)  T(F): 100 (11 Oct 2018 14:35), Max: 100 (11 Oct 2018 14:35)  HR: 119 (11 Oct 2018 17:30) (79 - 134)  BP: 73/52 (11 Oct 2018 17:07) (73/52 - 142/65)  BP(mean): 58 (11 Oct 2018 17:07) (55 - 93)  RR: 18 (11 Oct 2018 17:30) (12 - 97)  SpO2: 99% (11 Oct 2018 17:30) (95% - 100%)  I&O's Summary    10 Oct 2018 07:01  -  11 Oct 2018 07:00  --------------------------------------------------------  IN: 1110 mL / OUT: 445 mL / NET: 665 mL    11 Oct 2018 07:01  -  11 Oct 2018 17:55  --------------------------------------------------------  IN: 1928.8 mL / OUT: 140 mL / NET: 1788.8 mL                            9.8    14.3  )-----------( 185      ( 11 Oct 2018 17:02 )             27.5     10-11    140  |  107  |  31<H>  ----------------------------<  247<H>  4.9   |  20<L>  |  1.77<H>    Ca    8.9      11 Oct 2018 15:44  Phos  6.0     10-11  Mg     2.7     10-11    TPro  5.3<L>  /  Alb  2.9<L>  /  TBili  1.5<H>  /  DBili  x   /  AST  19  /  ALT  11  /  AlkPhos  43  10-11   PT/INR - ( 11 Oct 2018 15:44 )   PT: 13.2 sec;   INR: 1.21 ratio         PTT - ( 11 Oct 2018 15:44 )  PTT:28.4 sec    PHYSICAL EXAM:  Gen: NAD, well-developed  Neuro: AAOX3, PERRL, CNII-XII grossly intact  CV: nml S1/S2, RRR  Pulm: CTABL  GI: abd slightly firm from distention, nontender, trace BS  Groin: left groin access site with dressing c/d/i  Ext: b/l palpable radial and DP pulses

## 2018-10-11 NOTE — H&P ADULT - NSHPPHYSICALEXAM_GEN_ALL_CORE
T(C): 36.6 (10-10-18 @ 21:43), Max: 36.6 (10-10-18 @ 21:43)  HR: 79 (10-10-18 @ 21:43) (79 - 89)  BP: 114/77 (10-10-18 @ 21:43) (78/55 - 114/77)  RR: 18 (10-10-18 @ 21:43) (18 - 18)  SpO2: 95% (10-10-18 @ 21:43) (95% - 95%)  CAPILLARY BLOOD GLUCOSE    General: Lying in bed, appears uncomfortable and slightly agitated  Neuro: A+O, no focal deficits  HEENT: NC/AT, EOMI  Cardio: tachycardic  Resp: Good effort, CTA b/l  Thorax: No chest wall tenderness  GI/Abd: Soft, distended, no focal tenderness, no rebound/guarding, no ecchymosis  Vascular: All 4 extremities warm  Musculoskeletal: All 4 extremities moving spontaneously

## 2018-10-11 NOTE — PROGRESS NOTE ADULT - ASSESSMENT
77M Hx afib on xarelto p/w spontaneous intra-abdominal hemorrhage of unclear source, possibly liver. Now s/p visceral angiogram and embolization on 10/11.    Plan:  -   -   -   - 77M Hx afib on xarelto p/w spontaneous intra-abdominal hemorrhage of unclear source, possibly liver. Now s/p visceral angiogram and embolization on 10/11.    Plan:  - Pain control: dilaudid 0.5mg IV push q3h PRN  - Ween to extubate  - Ween pressors as tolerated  - NPO w/ IVF  - Monitor I&Os  - Follow care plan per SICU team 77M Hx afib on xarelto p/w spontaneous intra-abdominal hemorrhage of unclear source, possibly liver. Now s/p visceral angiogram and embolization on 10/11.    Plan:  - Pain control: dilaudid 0.5mg IV push q3h PRN  - Wean to extubate  - Wean pressors as tolerated  - transfuse as needed  - NPO w/ IVF  - Monitor I&Os  - f/u HPB consult  - Follow care plan per SICU team

## 2018-10-11 NOTE — CONSULT NOTE ADULT - ASSESSMENT
77M Hx afib on xarelto p/w spontaneous intra-abdominal hemorrhage of unclear source, possibly liver.    Neuro:  Patient has Hx CVA and has frequent sun-downing episodes per the wife  Patient agitated and pulling at lines  Enhanced supervision  Haldol if QTc <500  Pain control PRN    Respiratory  Satting well on room air  F/u am CXR    Cv:  Continue blood pressure monitoring through fem line  Patient produced urine upon placing johnston.   Strict I's O's 77M Hx afib on xarelto p/w spontaneous intra-abdominal hemorrhage of unclear source, possibly liver.    Neuro:  Patient has Hx CVA and has frequent sun-downing episodes per the wife  Patient agitated and pulling at lines  Enhanced supervision  Haldol if QTc <500  Pain control PRN    Respiratory  Satting well on room air  F/u am CXR    Cv:  Continue blood pressure monitoring through fem line  Patient produced urine upon placing johnston.   Strict I's O's    GI  NPO    Renal  LR at 75 cc/hr  Strict I's O's    Heme  Holding DVT ppx in setting of hemorrhage    ID    Endo 77M Hx afib on xarelto p/w spontaneous intra-abdominal hemorrhage of unclear source, possibly liver.    Neuro:  Patient has Hx CVA and has frequent sun-downing episodes per the wife  Patient agitated and pulling at lines  Enhanced supervision  Haldol if QTc <500  Pain control PRN  Keppra for Hx seizures    Respiratory  Satting well on room air  F/u am CXR    Cv:  Continue blood pressure monitoring through fem line  Patient produced urine upon placing johnston.   Strict I's O's    GI  NPO    Renal  LR at 75 cc/hr  Strict I's O's    Heme  Holding DVT ppx in setting of hemorrhage  Q1 ABG with lactate until lactate clears  q4 CBC    ID  Afebrile, off Abx    Endo  ISS    DISPO:  SICU

## 2018-10-11 NOTE — PROGRESS NOTE ADULT - SUBJECTIVE AND OBJECTIVE BOX
SICU Consult  Consulting surgical team: SICU  Consulting attending: Dr. Espinoza    HPI:  77M Hx afib on xarelto, seizure    who presents with a chief complaint of abdominal pain.  Patient's symptoms started acutely at 6pm while he was sitting, eating dinner.  He felt weak at that time, and he went to urgent care.  He was noted to be hypotensive at urgent care and was referred to the ER.  On presentation, the patient continued to complain of abdominal pain, and was noted to be hypotensive.  He became progressively tachycardic.  Patient's labs showed a downtrending hct and a CTA was done which was consistent with hemoperitoneum, without obvious source.  Surgery consulted for evaluation of hemoperitoneum.            PAST MEDICAL HISTORY:  BPH (benign prostatic hyperplasia)  Atrial fibrillation  Seizure  Diabetes mellitus  Hypothyroidism  Hypertension      PAST SURGICAL HISTORY:  H/O craniotomy  S/P inguinal hernia repair  ASD (atrial septal defect)  No significant past surgical history      MEDICATIONS:      ALLERGIES:  Aleve (Unknown)      VITALS & I/Os:  Vital Signs Last 24 Hrs  T(C): 36.6 (10 Oct 2018 21:43), Max: 36.6 (10 Oct 2018 21:43)  T(F): 97.9 (10 Oct 2018 21:43), Max: 97.9 (10 Oct 2018 21:43)  HR: 126 (11 Oct 2018 02:27) (79 - 126)  BP: 114/72 (11 Oct 2018 02:27) (78/55 - 114/77)  BP(mean): --  RR: 18 (10 Oct 2018 21:43) (18 - 18)  SpO2: 98% (11 Oct 2018 02:27) (95% - 98%)    I&O's Summary      PHYSICAL EXAM:  General: No acute distress  Respiratory: Nonlabored  Cardiovascular: RRR  Abdominal: Soft, nondistended, nontender. No rebound or guarding. No organomegaly, no palpable mass.  Extremities: Warm    LABS:                        10.3   14.4  )-----------( 218      ( 10 Oct 2018 23:42 )             29.6     10-10    139  |  107  |  21  ----------------------------<  242<H>  4.7   |  19<L>  |  1.02    Ca    8.4      10 Oct 2018 21:10    TPro  5.8<L>  /  Alb  3.0<L>  /  TBili  0.9  /  DBili  x   /  AST  21  /  ALT  11  /  AlkPhos  50  10-10    Lactate:  10-10 @ 21:10  2.6    PT/INR - ( 10 Oct 2018 21:10 )   PT: 19.3 sec;   INR: 1.75 ratio         PTT - ( 10 Oct 2018 21:10 )  PTT:32.4 sec              IMAGING:

## 2018-10-11 NOTE — CONSULT NOTE ADULT - ATTENDING COMMENTS
hemorrhagic shock with hemoperitoneum  -activated MTP  -placed right femoral arterial line in ER for invasive hemodynamic monitoring  -CT w/o contrast confirmed that segment 6 liver hyperdensity on CTA is a blush  -hypotension improved with 2u RBC transfusion  -will admit to SICU for MTP    on Xarelto 20mg daily (last dose on Wednesday morning)  -will hold anticoagulation    unclear etiology of liver hemorrhage      Critical Care Time - 90 minutes

## 2018-10-11 NOTE — H&P ADULT - ASSESSMENT
ASSESSMENT: Patient is a 77y old m with spontaneous hemoperitoneum, unclear etiology    PLAN:   - admit to SICU for monitoring and rescusitation  - transfuse PRN  - reverse eliquis with kcentra, ffp  - hold vte ppx  - trend hct  - keep npo for possible laparotomy if patient does not respond  - Patient and plan discussed with Attending, Dr. NANCY Espinoza.     Lucero Miramontes MD PGY4  Red Team Surgery, #[id number] ASSESSMENT: Patient is a 77y old m with spontaneous hemoperitoneum, unclear etiology    PLAN:   - admit to SICU for monitoring and rescusitation  - transfuse PRN  - reverse eliquis with kcentra, ffp  - hold vte ppx  - trend hct  - poss rpt cta +/- IR embolization  - keep npo for possible laparotomy if patient does not respond  - Patient and plan discussed with Attending, Dr. NANCY Espinoza.     Lucero Miramontes MD PGY4  Red Team Surgery, #[id number]

## 2018-10-11 NOTE — PROGRESS NOTE ADULT - SUBJECTIVE AND OBJECTIVE BOX
an emergency right femoral vein introducer was placed by myself under sterile conditions for hypotension secondary to hemorrhage  not including the time spent for placement of the introducer 45 minutes of critical care management was applied as follows    Patient noted this afternoon from time of 5 pm to 7 pm to be hypotensive.  underwent embolization of hemorrhage from tumor in the liver by interventional radiology  immediately transfused 2 units of PRBC and 2 units of FFP with marginal response  has gotten 6 units of blood total 4 units of FFP total   platelet count is over 100.      I consulted with interventional radiology if continues to bleed will continue to embolize bleeding sites to liver although will ultimately risk hepatic dysfunction    At this time at 10 pm my impression is that the patient is responding to the above therapies.  Able to wean Phenylephrine off and lactate stable at 2.5    urine output is acceptable although elevated creatine reflects DONY from hemorrhagic ATN    will continue ventilator support for post procedural respiratory failure.  the gas exchange is acceptable.

## 2018-10-12 LAB
ALBUMIN SERPL ELPH-MCNC: 3.1 G/DL — LOW (ref 3.3–5)
ALP SERPL-CCNC: 48 U/L — SIGNIFICANT CHANGE UP (ref 40–120)
ALT FLD-CCNC: 34 U/L — SIGNIFICANT CHANGE UP (ref 10–45)
ANION GAP SERPL CALC-SCNC: 13 MMOL/L — SIGNIFICANT CHANGE UP (ref 5–17)
APTT BLD: 28.6 SEC — SIGNIFICANT CHANGE UP (ref 27.5–37.4)
AST SERPL-CCNC: 89 U/L — HIGH (ref 10–40)
BILIRUB SERPL-MCNC: 0.8 MG/DL — SIGNIFICANT CHANGE UP (ref 0.2–1.2)
BUN SERPL-MCNC: 32 MG/DL — HIGH (ref 7–23)
CA-I BLD-SCNC: 1.13 MMOL/L — SIGNIFICANT CHANGE UP (ref 1.12–1.3)
CALCIUM SERPL-MCNC: 8.2 MG/DL — LOW (ref 8.4–10.5)
CHLORIDE SERPL-SCNC: 109 MMOL/L — HIGH (ref 96–108)
CO2 SERPL-SCNC: 19 MMOL/L — LOW (ref 22–31)
CREAT SERPL-MCNC: 1.64 MG/DL — HIGH (ref 0.5–1.3)
GAS PNL BLDA: SIGNIFICANT CHANGE UP
GLUCOSE BLDC GLUCOMTR-MCNC: 151 MG/DL — HIGH (ref 70–99)
GLUCOSE BLDC GLUCOMTR-MCNC: 165 MG/DL — HIGH (ref 70–99)
GLUCOSE BLDC GLUCOMTR-MCNC: 173 MG/DL — HIGH (ref 70–99)
GLUCOSE BLDC GLUCOMTR-MCNC: 188 MG/DL — HIGH (ref 70–99)
GLUCOSE BLDC GLUCOMTR-MCNC: 194 MG/DL — HIGH (ref 70–99)
GLUCOSE SERPL-MCNC: 169 MG/DL — HIGH (ref 70–99)
HBA1C BLD-MCNC: 6.5 % — HIGH (ref 4–5.6)
HBA1C BLD-MCNC: 6.7 % — HIGH (ref 4–5.6)
HCT VFR BLD CALC: 29.3 % — LOW (ref 39–50)
HCT VFR BLD CALC: 29.6 % — LOW (ref 39–50)
HGB BLD-MCNC: 10.2 G/DL — LOW (ref 13–17)
HGB BLD-MCNC: 10.4 G/DL — LOW (ref 13–17)
INR BLD: 1.18 RATIO — HIGH (ref 0.88–1.16)
MAGNESIUM SERPL-MCNC: 2.5 MG/DL — SIGNIFICANT CHANGE UP (ref 1.6–2.6)
MCHC RBC-ENTMCNC: 31.2 PG — SIGNIFICANT CHANGE UP (ref 27–34)
MCHC RBC-ENTMCNC: 31.4 PG — SIGNIFICANT CHANGE UP (ref 27–34)
MCHC RBC-ENTMCNC: 34.8 GM/DL — SIGNIFICANT CHANGE UP (ref 32–36)
MCHC RBC-ENTMCNC: 35.1 GM/DL — SIGNIFICANT CHANGE UP (ref 32–36)
MCV RBC AUTO: 89.4 FL — SIGNIFICANT CHANGE UP (ref 80–100)
MCV RBC AUTO: 89.5 FL — SIGNIFICANT CHANGE UP (ref 80–100)
PHOSPHATE SERPL-MCNC: 4.3 MG/DL — SIGNIFICANT CHANGE UP (ref 2.5–4.5)
PLATELET # BLD AUTO: 175 K/UL — SIGNIFICANT CHANGE UP (ref 150–400)
PLATELET # BLD AUTO: 179 K/UL — SIGNIFICANT CHANGE UP (ref 150–400)
POTASSIUM SERPL-MCNC: 4.7 MMOL/L — SIGNIFICANT CHANGE UP (ref 3.5–5.3)
POTASSIUM SERPL-SCNC: 4.7 MMOL/L — SIGNIFICANT CHANGE UP (ref 3.5–5.3)
PROT SERPL-MCNC: 5.4 G/DL — LOW (ref 6–8.3)
PROTHROM AB SERPL-ACNC: 12.8 SEC — HIGH (ref 9.8–12.7)
RBC # BLD: 3.28 M/UL — LOW (ref 4.2–5.8)
RBC # BLD: 3.3 M/UL — LOW (ref 4.2–5.8)
RBC # FLD: 12.6 % — SIGNIFICANT CHANGE UP (ref 10.3–14.5)
RBC # FLD: 13 % — SIGNIFICANT CHANGE UP (ref 10.3–14.5)
SODIUM SERPL-SCNC: 141 MMOL/L — SIGNIFICANT CHANGE UP (ref 135–145)
WBC # BLD: 13.8 K/UL — HIGH (ref 3.8–10.5)
WBC # BLD: 15.1 K/UL — HIGH (ref 3.8–10.5)
WBC # FLD AUTO: 13.8 K/UL — HIGH (ref 3.8–10.5)
WBC # FLD AUTO: 15.1 K/UL — HIGH (ref 3.8–10.5)

## 2018-10-12 PROCEDURE — 99232 SBSQ HOSP IP/OBS MODERATE 35: CPT

## 2018-10-12 PROCEDURE — 71045 X-RAY EXAM CHEST 1 VIEW: CPT | Mod: 26

## 2018-10-12 RX ORDER — DEXMEDETOMIDINE HYDROCHLORIDE IN 0.9% SODIUM CHLORIDE 4 UG/ML
0.2 INJECTION INTRAVENOUS
Qty: 200 | Refills: 0 | Status: DISCONTINUED | OUTPATIENT
Start: 2018-10-12 | End: 2018-10-15

## 2018-10-12 RX ORDER — METOPROLOL TARTRATE 50 MG
5 TABLET ORAL EVERY 6 HOURS
Qty: 0 | Refills: 0 | Status: DISCONTINUED | OUTPATIENT
Start: 2018-10-12 | End: 2018-10-13

## 2018-10-12 RX ORDER — FUROSEMIDE 40 MG
20 TABLET ORAL ONCE
Qty: 0 | Refills: 0 | Status: COMPLETED | OUTPATIENT
Start: 2018-10-12 | End: 2018-10-12

## 2018-10-12 RX ORDER — FUROSEMIDE 40 MG
20 TABLET ORAL ONCE
Qty: 0 | Refills: 0 | Status: COMPLETED | OUTPATIENT
Start: 2018-10-12 | End: 2018-10-13

## 2018-10-12 RX ORDER — SODIUM CHLORIDE 9 MG/ML
1000 INJECTION, SOLUTION INTRAVENOUS
Qty: 0 | Refills: 0 | Status: DISCONTINUED | OUTPATIENT
Start: 2018-10-12 | End: 2018-10-12

## 2018-10-12 RX ORDER — IPRATROPIUM/ALBUTEROL SULFATE 18-103MCG
3 AEROSOL WITH ADAPTER (GRAM) INHALATION EVERY 6 HOURS
Qty: 0 | Refills: 0 | Status: DISCONTINUED | OUTPATIENT
Start: 2018-10-12 | End: 2018-10-17

## 2018-10-12 RX ORDER — CHLORHEXIDINE GLUCONATE 213 G/1000ML
15 SOLUTION TOPICAL
Qty: 0 | Refills: 0 | Status: DISCONTINUED | OUTPATIENT
Start: 2018-10-12 | End: 2018-10-12

## 2018-10-12 RX ORDER — SODIUM CHLORIDE 9 MG/ML
1000 INJECTION, SOLUTION INTRAVENOUS
Qty: 0 | Refills: 0 | Status: DISCONTINUED | OUTPATIENT
Start: 2018-10-12 | End: 2018-10-13

## 2018-10-12 RX ORDER — FUROSEMIDE 40 MG
40 TABLET ORAL ONCE
Qty: 0 | Refills: 0 | Status: COMPLETED | OUTPATIENT
Start: 2018-10-12 | End: 2018-10-12

## 2018-10-12 RX ADMIN — Medication 2: at 21:34

## 2018-10-12 RX ADMIN — Medication 40 MILLIGRAM(S): at 22:18

## 2018-10-12 RX ADMIN — CHLORHEXIDINE GLUCONATE 15 MILLILITER(S): 213 SOLUTION TOPICAL at 05:06

## 2018-10-12 RX ADMIN — LEVETIRACETAM 400 MILLIGRAM(S): 250 TABLET, FILM COATED ORAL at 18:39

## 2018-10-12 RX ADMIN — SODIUM CHLORIDE 100 MILLILITER(S): 9 INJECTION, SOLUTION INTRAVENOUS at 05:06

## 2018-10-12 RX ADMIN — Medication 2: at 03:08

## 2018-10-12 RX ADMIN — HYDROMORPHONE HYDROCHLORIDE 0.5 MILLIGRAM(S): 2 INJECTION INTRAMUSCULAR; INTRAVENOUS; SUBCUTANEOUS at 15:44

## 2018-10-12 RX ADMIN — Medication 2: at 15:48

## 2018-10-12 RX ADMIN — Medication 2: at 05:20

## 2018-10-12 RX ADMIN — Medication 5 MILLIGRAM(S): at 18:39

## 2018-10-12 RX ADMIN — INSULIN GLARGINE 40 UNIT(S): 100 INJECTION, SOLUTION SUBCUTANEOUS at 22:19

## 2018-10-12 RX ADMIN — LEVETIRACETAM 400 MILLIGRAM(S): 250 TABLET, FILM COATED ORAL at 05:05

## 2018-10-12 RX ADMIN — HYDROMORPHONE HYDROCHLORIDE 0.5 MILLIGRAM(S): 2 INJECTION INTRAMUSCULAR; INTRAVENOUS; SUBCUTANEOUS at 13:51

## 2018-10-12 RX ADMIN — PHENYLEPHRINE HYDROCHLORIDE 12.93 MICROGRAM(S)/KG/MIN: 10 INJECTION INTRAVENOUS at 05:06

## 2018-10-12 RX ADMIN — Medication 20 MILLIGRAM(S): at 09:52

## 2018-10-12 RX ADMIN — CHLORHEXIDINE GLUCONATE 1 APPLICATION(S): 213 SOLUTION TOPICAL at 05:05

## 2018-10-12 RX ADMIN — DEXMEDETOMIDINE HYDROCHLORIDE IN 0.9% SODIUM CHLORIDE 4.31 MICROGRAM(S)/KG/HR: 4 INJECTION INTRAVENOUS at 05:06

## 2018-10-12 RX ADMIN — Medication 2: at 10:50

## 2018-10-12 NOTE — PROGRESS NOTE ADULT - ASSESSMENT
77 year old man with PMH of atrial fibrillation on Xarelto, HTN, DM, BPH, and seizures admitted with hemoperitoneum secondary to bleeding liver mass in segment 5, now s/p embolization of two subsegmental vessels.    Neuro: Hx of CVA. Pain control  - Dilaudid PRN  - Precedex  - Keppra    Resp: On PRVC - rate 14, , PEEP 5, FiO2 40  - AM CXR    CV: Weaned off phenylephrine  - Hemodynamic monitoring    GI: Hemorrhagic liver mass s/p IR embolization  - NPO    : Cr 1.77 (1.02 on admission)  - LR at 100 cc/hr  - Strict I's O's    Heme: Acute hemorrhage  - Holding DVT ppx in setting of hemorrhage  - q4 hr CBC    ID: No active issues  - Culture if febrile    Endo: Hx of diabetes  - Monitor fingerstick glucose  - ISS    Dispo: SICU

## 2018-10-12 NOTE — AIRWAY PLACEMENT NOTE ADULT - POST AIRWAY PLACEMENT ASSESSMENT:
chest excursion noted/CXR pending/breath sounds bilateral/breath sounds equal/positive end tidal CO2 noted/skin color improved

## 2018-10-12 NOTE — PROGRESS NOTE ADULT - SUBJECTIVE AND OBJECTIVE BOX
ANESTHESIA POSTOP CHECK    77y Male POSTOP DAY 1     Vital Signs Last 24 Hrs  T(C): 36.5 (12 Oct 2018 07:00), Max: 37.8 (11 Oct 2018 14:35)  T(F): 97.7 (12 Oct 2018 07:00), Max: 100 (11 Oct 2018 14:35)  HR: 124 (12 Oct 2018 10:42) (57 - 124)  BP: 133/62 (11 Oct 2018 19:30) (73/52 - 142/65)  BP(mean): 89 (11 Oct 2018 19:30) (58 - 93)  RR: 24 (12 Oct 2018 10:00) (12 - 31)  SpO2: 98% (12 Oct 2018 10:42) (95% - 100%)  I&O's Summary    11 Oct 2018 07:01  -  12 Oct 2018 07:00  --------------------------------------------------------  IN: 4509.5 mL / OUT: 615 mL / NET: 3894.5 mL    12 Oct 2018 07:01  -  12 Oct 2018 10:45  --------------------------------------------------------  IN: 200 mL / OUT: 125 mL / NET: 75 mL        [x] NO APPARENT ANESTHESIA COMPLICATIONS

## 2018-10-12 NOTE — PROGRESS NOTE ADULT - SUBJECTIVE AND OBJECTIVE BOX
Interventional Radiology Follow- Up Note      77y Male with hx of ruptured liver mass on CT scan s/p Visceral Angiogram and Embolization of liver mass on 10/11  in Interventional Radiology with Dr. Monsivais.     Pt was tachy cardic and hypotensive after procedure in SICU 2 units of ffp and 2 units of PRBC received now off of phenylephrine.     Vitals: T(F): 97.7 (10-12-18 @ 07:00), Max: 100 (10-11-18 @ 14:35)  HR: 64 (10-12-18 @ 08:55) (57 - 128)  BP: 133/62 (10-11-18 @ 19:30) (73/52 - 142/65)  RR: 20 (10-12-18 @ 08:00) (12 - 31)  SpO2: 98% (10-12-18 @ 08:55) (95% - 100%)  Wt(kg): --    LABS:                        10.2   15.1  )-----------( 175      ( 12 Oct 2018 02:19 )             29.3     10-12    141  |  109<H>  |  32<H>  ----------------------------<  169<H>  4.7   |  19<L>  |  1.64<H>    Ca    8.2<L>      12 Oct 2018 02:19  Phos  4.3     10-12  Mg     2.5     10-12    TPro  5.4<L>  /  Alb  3.1<L>  /  TBili  0.8  /  DBili  x   /  AST  89<H>  /  ALT  34  /  AlkPhos  48  10-12    PT/INR - ( 12 Oct 2018 02:19 )   PT: 12.8 sec;   INR: 1.18 ratio         PTT - ( 12 Oct 2018 02:19 )  PTT:28.6 sec      PHYSICAL EXAM:  General: intubated.   Right groin: site with introduced cathter in place. Dressing is c/d/i.  LE : RLE DP 2+. LLE DP and PT and RLE PT pulses assessed with doppler. warm to touch.     Impression: 77y Male ruptured liver mass s/p Visceral Angiogram and Embolization  Plan:  -care per SICU team  -trend vitals, labs  -will discuss with IR attending     Please call IR at extension [phone] or [phone] with any questions, concerns, or issues regarding above.

## 2018-10-12 NOTE — PROGRESS NOTE ADULT - ATTENDING COMMENTS
Patient seen and examined.  Patient with ruptured liver mass s/p IR embolization of segment 5.  Neurologically he is awake and following commands.  Acute respiratory failure- patient with RSBI 50 and weaned to extubation. Shortly he became tachypneic and was placed on Bipap 12/5 and FIO2 50%  -CXR with pulmonary edema- lasix 20 mg given now for goal diuresis -1 to 2 liters.  Acute blood loss anemia is stable and will continue q12 CBC   Goal Hb>7  Hyperglycemia - IDDM- continue insulin sliding scale.  Will send HbA1c    CC time: 41 minutes

## 2018-10-12 NOTE — PROGRESS NOTE ADULT - SUBJECTIVE AND OBJECTIVE BOX
24 HOUR EVENTS:  - Underwent embolization of two subsegmental vessels supplying segment 5  - Right femoral vein introducer placed  - Patient was weaned of phenylephrine  - Has now received 6u pRBCs and 4u FFP    HISTORY OF PRESENT ILLNESS  77 year old man with PMH of atrial fibrillation on Xarelto, HTN, DM, BPH, and seizures presented yesterday with abdominal pain and was found to have perihepatic and perisplenic hemorrhagic ascites with likely bleeding liver mass on CTA. Hct was 34 on presentation and dropped to 29. He received 2u pRBCs and was transferred to SICU for close monitoring. He received 2u FFP. This morning he went to interventional radiology for angiography and a lesion in segment 5 (with tumor vascularity) was identified. Two subsegmental vessels supplying segment 5 were embolized and completion angiography demonstrated a devascularized lesion. He received an additional 2u pRBCs while at interventional radiology with no increase in Hct (32.9 --> 30.6). He was transferred back to the SICU on phenylephrine. He became hypotensive and tachycardic, and his phenylephrine increased to 1.5. He was given another 2u pRBCs and 2u FFP.       NEURO  RASS: -1  Exam: Intubated, awake, responsive and following commands  Meds: dexmedetomidine Infusion 0.2 MICROgram(s)/kG/Hr IV Continuous <Continuous>  HYDROmorphone  Injectable 0.5 milliGRAM(s) IV Push every 3 hours PRN pain  levETIRAcetam  IVPB 500 milliGRAM(s) IV Intermittent every 12 hours    [x] Adequacy of sedation and pain control has been assessed and adjusted      RESPIRATORY  RR: 15 (10-12-18 @ 02:00) (12 - 97)  SpO2: 98% (10-12-18 @ 02:00) (95% - 100%)  Exam: Symmetrical chest rise  Mechanical Ventilation: Mode: AC/ CMV (Assist Control/ Continuous Mandatory Ventilation), RR (machine): 14, RR (patient): 14, TV (machine): 500, FiO2: 40, PEEP: 5, ITime: 1, MAP: 8, PIP: 18  ABG - ( 11 Oct 2018 21:22 )  pH: 7.38  /  pCO2: 37    /  pO2: 87    / HCO3: 21    / Base Excess: -2.9  /  SaO2: 98          CARDIOVASCULAR  HR: 63 (10-12-18 @ 02:00) (61 - 134)  BP: 133/62 (10-11-18 @ 19:30) (73/52 - 142/65)  BP(mean): 89 (10-11-18 @ 19:30) (55 - 93)  ABP: 117/41 (10-12-18 @ 02:00) (78/39 - 194/69)  ABP(mean): 64 (10-12-18 @ 02:00) (50 - 110)  VBG - ( 10 Oct 2018 21:10 )  pH: 7.36  /  pCO2: 44    /  pO2: 39    / HCO3: 24    / Base Excess: -0.8  /  SaO2: 65     Lactate: 2.6        Exam:  Cardiac Rhythm: Atrial fibrillation   Perfusion     [X]Adequate   [ ]Inadequate  Mentation   [X]Normal       [ ]Reduced  Extremities  [X]Warm         [ ]Cool  Volume Status [ ]Hypervolemic [X]Euvolemic [ ]Hypovolemic  Meds: phenylephrine    Infusion 0.4 MICROgram(s)/kG/Min IV Continuous <Continuous>      GI/NUTRITION  Exam: Slightly tense, moderately distended, nontender, no masses  Diet: NPO    GENITOURINARY  I&O's Detail    10-10 @ 07:01  -  10-11 @ 07:00  --------------------------------------------------------  IN:    IV PiggyBack: 100 mL    lactated ringers.: 350 mL    Plasma: 660 mL  Total IN: 1110 mL    OUT:    Indwelling Catheter - Urethral: 445 mL  Total OUT: 445 mL    Total NET: 665 mL      10-11 @ 07:01  -  10-12 @ 02:01  --------------------------------------------------------  IN:    dexmedetomidine Infusion: 61 mL    IV PiggyBack: 200 mL    lactated ringers.: 1500 mL    Packed Red Blood Cells: 850 mL    phenylephrine   Infusion: 204.6 mL    Plasma: 600 mL    Sodium Chloride 0.9% IV Bolus: 500 mL  Total IN: 3915.6 mL    OUT:    Indwelling Catheter - Urethral: 420 mL  Total OUT: 420 mL    Total NET: 3495.6 mL      Weight (kg): 86.2 (10-11 @ 02:45)  10-11    140  |  107  |  31<H>  ----------------------------<  247<H>  4.9   |  20<L>  |  1.77<H>    Ca    8.9      11 Oct 2018 15:44  Phos  6.0     10-11  Mg     2.7     10-11    TPro  5.3<L>  /  Alb  2.9<L>  /  TBili  1.5<H>  /  DBili  x   /  AST  19  /  ALT  11  /  AlkPhos  43  10-11    [X] Lares catheter, indication: N/A  Meds: lactated ringers. 1000 milliLiter(s) IV Continuous <Continuous>      HEMATOLOGIC  Meds:   [x] VTE Prophylaxis                        10.4   14.4  )-----------( 145      ( 11 Oct 2018 21:28 )             29.3     PT/INR - ( 11 Oct 2018 15:44 )   PT: 13.2 sec;   INR: 1.21 ratio         PTT - ( 11 Oct 2018 15:44 )  PTT:28.4 sec  Transfusion     [ ] PRBC   [ ] Platelets   [ ] FFP   [ ] Cryoprecipitate      INFECTIOUS DISEASES  T(C): 36.9 (10-11-18 @ 23:00), Max: 37.8 (10-11-18 @ 14:35)  Wt(kg): --  WBC Count: 14.4 K/uL (10-11 @ 21:28)      ENDOCRINE  Capillary Blood Glucose 268    Meds: insulin glargine Injectable (LANTUS) 40 Unit(s) SubCutaneous at bedtime  insulin lispro (HumaLOG) corrective regimen sliding scale   SubCutaneous every 4 hours        ACCESS DEVICES:  [X] Peripheral IV  [ ] Central Venous Line	[ ] R	[ ] L	[ ] IJ	[ ] Fem	[ ] SC	Placed:   [X] Arterial Line		[X] R	[ ] L	[X] Fem	[ ] Rad	[ ] Ax	Placed:   [ ] PICC:					[ ] Mediport  [X] Urinary Catheter, Date Placed:   [ ] Necessity of urinary, arterial, and venous catheters discussed    OTHER MEDICATIONS:  chlorhexidine 4% Liquid 1 Application(s) Topical <User Schedule>      CODE STATUS: Full code.

## 2018-10-12 NOTE — PROGRESS NOTE ADULT - SUBJECTIVE AND OBJECTIVE BOX
Mr. Kingsley is currently intubated. He is awake, and alert, and is able to respond to questions. He denies any abdominal pain.    ICU Vital Signs Last 24 Hrs  T(C): 36.5 (12 Oct 2018 07:00), Max: 37.8 (11 Oct 2018 14:35)  T(F): 97.7 (12 Oct 2018 07:00), Max: 100 (11 Oct 2018 14:35)  HR: 108 (12 Oct 2018 10:00) (57 - 123)  BP: 133/62 (11 Oct 2018 19:30) (73/52 - 142/65)  BP(mean): 89 (11 Oct 2018 19:30) (58 - 93)  ABP: 167/65 (12 Oct 2018 10:00) (78/39 - 194/69)  ABP(mean): 101 (12 Oct 2018 10:00) (50 - 110)  RR: 24 (12 Oct 2018 10:00) (12 - 31)  SpO2: 96% (12 Oct 2018 10:00) (95% - 100%)                            10.2   15.1  )-----------( 175      ( 12 Oct 2018 02:19 )             29.3     10-12    141  |  109<H>  |  32<H>  ----------------------------<  169<H>  4.7   |  19<L>  |  1.64<H>        77 year old man with hemoperitoneum from segment 5 liver mass  1. Appreciate SICU care - will try to extubate today  2. Surg Onc consult noted

## 2018-10-13 LAB
AFP-TM SERPL-MCNC: 0.8 NG/ML — SIGNIFICANT CHANGE UP
ALBUMIN SERPL ELPH-MCNC: 3.2 G/DL — LOW (ref 3.3–5)
ALP SERPL-CCNC: 88 U/L — SIGNIFICANT CHANGE UP (ref 40–120)
ALT FLD-CCNC: 81 U/L — HIGH (ref 10–45)
ANION GAP SERPL CALC-SCNC: 12 MMOL/L — SIGNIFICANT CHANGE UP (ref 5–17)
APTT BLD: 27.5 SEC — SIGNIFICANT CHANGE UP (ref 27.5–37.4)
AST SERPL-CCNC: 85 U/L — HIGH (ref 10–40)
BILIRUB SERPL-MCNC: 1.1 MG/DL — SIGNIFICANT CHANGE UP (ref 0.2–1.2)
BUN SERPL-MCNC: 28 MG/DL — HIGH (ref 7–23)
CA-I BLD-SCNC: 1.08 MMOL/L — LOW (ref 1.12–1.3)
CALCIUM SERPL-MCNC: 8.3 MG/DL — LOW (ref 8.4–10.5)
CANCER AG19-9 SERPL-ACNC: 61.7 U/ML — HIGH
CHLORIDE SERPL-SCNC: 108 MMOL/L — SIGNIFICANT CHANGE UP (ref 96–108)
CO2 SERPL-SCNC: 22 MMOL/L — SIGNIFICANT CHANGE UP (ref 22–31)
CREAT SERPL-MCNC: 1.17 MG/DL — SIGNIFICANT CHANGE UP (ref 0.5–1.3)
GAS PNL BLDA: SIGNIFICANT CHANGE UP
GLUCOSE BLDC GLUCOMTR-MCNC: 136 MG/DL — HIGH (ref 70–99)
GLUCOSE BLDC GLUCOMTR-MCNC: 136 MG/DL — HIGH (ref 70–99)
GLUCOSE BLDC GLUCOMTR-MCNC: 143 MG/DL — HIGH (ref 70–99)
GLUCOSE BLDC GLUCOMTR-MCNC: 148 MG/DL — HIGH (ref 70–99)
GLUCOSE BLDC GLUCOMTR-MCNC: 176 MG/DL — HIGH (ref 70–99)
GLUCOSE SERPL-MCNC: 148 MG/DL — HIGH (ref 70–99)
HAV IGM SER-ACNC: SIGNIFICANT CHANGE UP
HBV CORE IGM SER-ACNC: SIGNIFICANT CHANGE UP
HBV SURFACE AG SER-ACNC: SIGNIFICANT CHANGE UP
HCT VFR BLD CALC: 26.3 % — LOW (ref 39–50)
HCT VFR BLD CALC: 30 % — LOW (ref 39–50)
HCV AB S/CO SERPL IA: 0.27 S/CO — SIGNIFICANT CHANGE UP
HCV AB SERPL-IMP: SIGNIFICANT CHANGE UP
HGB BLD-MCNC: 10.4 G/DL — LOW (ref 13–17)
HGB BLD-MCNC: 9.4 G/DL — LOW (ref 13–17)
INR BLD: 1.26 RATIO — HIGH (ref 0.88–1.16)
MAGNESIUM SERPL-MCNC: 2.2 MG/DL — SIGNIFICANT CHANGE UP (ref 1.6–2.6)
MCHC RBC-ENTMCNC: 31.2 PG — SIGNIFICANT CHANGE UP (ref 27–34)
MCHC RBC-ENTMCNC: 32.2 PG — SIGNIFICANT CHANGE UP (ref 27–34)
MCHC RBC-ENTMCNC: 34.7 GM/DL — SIGNIFICANT CHANGE UP (ref 32–36)
MCHC RBC-ENTMCNC: 35.5 GM/DL — SIGNIFICANT CHANGE UP (ref 32–36)
MCV RBC AUTO: 90.1 FL — SIGNIFICANT CHANGE UP (ref 80–100)
MCV RBC AUTO: 90.5 FL — SIGNIFICANT CHANGE UP (ref 80–100)
PHOSPHATE SERPL-MCNC: 3.1 MG/DL — SIGNIFICANT CHANGE UP (ref 2.5–4.5)
PLATELET # BLD AUTO: 175 K/UL — SIGNIFICANT CHANGE UP (ref 150–400)
PLATELET # BLD AUTO: 214 K/UL — SIGNIFICANT CHANGE UP (ref 150–400)
POTASSIUM SERPL-MCNC: 4.5 MMOL/L — SIGNIFICANT CHANGE UP (ref 3.5–5.3)
POTASSIUM SERPL-SCNC: 4.5 MMOL/L — SIGNIFICANT CHANGE UP (ref 3.5–5.3)
PROT SERPL-MCNC: 6.5 G/DL — SIGNIFICANT CHANGE UP (ref 6–8.3)
PROTHROM AB SERPL-ACNC: 13.7 SEC — HIGH (ref 9.8–12.7)
RBC # BLD: 2.91 M/UL — LOW (ref 4.2–5.8)
RBC # BLD: 3.34 M/UL — LOW (ref 4.2–5.8)
RBC # FLD: 12.4 % — SIGNIFICANT CHANGE UP (ref 10.3–14.5)
RBC # FLD: 13.1 % — SIGNIFICANT CHANGE UP (ref 10.3–14.5)
SODIUM SERPL-SCNC: 142 MMOL/L — SIGNIFICANT CHANGE UP (ref 135–145)
WBC # BLD: 11.8 K/UL — HIGH (ref 3.8–10.5)
WBC # BLD: 16.5 K/UL — HIGH (ref 3.8–10.5)
WBC # FLD AUTO: 11.8 K/UL — HIGH (ref 3.8–10.5)
WBC # FLD AUTO: 16.5 K/UL — HIGH (ref 3.8–10.5)

## 2018-10-13 PROCEDURE — 71045 X-RAY EXAM CHEST 1 VIEW: CPT | Mod: 26,76

## 2018-10-13 RX ORDER — PANTOPRAZOLE SODIUM 20 MG/1
40 TABLET, DELAYED RELEASE ORAL DAILY
Qty: 0 | Refills: 0 | Status: DISCONTINUED | OUTPATIENT
Start: 2018-10-13 | End: 2018-10-15

## 2018-10-13 RX ORDER — CALCIUM GLUCONATE 100 MG/ML
2 VIAL (ML) INTRAVENOUS ONCE
Qty: 0 | Refills: 0 | Status: COMPLETED | OUTPATIENT
Start: 2018-10-13 | End: 2018-10-13

## 2018-10-13 RX ORDER — PHENYLEPHRINE HYDROCHLORIDE 10 MG/ML
0.5 INJECTION INTRAVENOUS
Qty: 40 | Refills: 0 | Status: DISCONTINUED | OUTPATIENT
Start: 2018-10-13 | End: 2018-10-13

## 2018-10-13 RX ORDER — CHLORHEXIDINE GLUCONATE 213 G/1000ML
1 SOLUTION TOPICAL
Qty: 0 | Refills: 0 | Status: DISCONTINUED | OUTPATIENT
Start: 2018-10-13 | End: 2018-10-15

## 2018-10-13 RX ORDER — ENOXAPARIN SODIUM 100 MG/ML
40 INJECTION SUBCUTANEOUS DAILY
Qty: 0 | Refills: 0 | Status: DISCONTINUED | OUTPATIENT
Start: 2018-10-13 | End: 2018-10-23

## 2018-10-13 RX ADMIN — Medication 20 MILLIGRAM(S): at 00:31

## 2018-10-13 RX ADMIN — Medication 3 MILLILITER(S): at 11:29

## 2018-10-13 RX ADMIN — Medication 3 MILLILITER(S): at 17:59

## 2018-10-13 RX ADMIN — Medication 3 MILLILITER(S): at 05:32

## 2018-10-13 RX ADMIN — ENOXAPARIN SODIUM 40 MILLIGRAM(S): 100 INJECTION SUBCUTANEOUS at 14:08

## 2018-10-13 RX ADMIN — CHLORHEXIDINE GLUCONATE 1 APPLICATION(S): 213 SOLUTION TOPICAL at 06:06

## 2018-10-13 RX ADMIN — PANTOPRAZOLE SODIUM 40 MILLIGRAM(S): 20 TABLET, DELAYED RELEASE ORAL at 14:08

## 2018-10-13 RX ADMIN — LEVETIRACETAM 400 MILLIGRAM(S): 250 TABLET, FILM COATED ORAL at 06:06

## 2018-10-13 RX ADMIN — LEVETIRACETAM 400 MILLIGRAM(S): 250 TABLET, FILM COATED ORAL at 18:51

## 2018-10-13 RX ADMIN — Medication 2: at 22:24

## 2018-10-13 RX ADMIN — PHENYLEPHRINE HYDROCHLORIDE 16.16 MICROGRAM(S)/KG/MIN: 10 INJECTION INTRAVENOUS at 06:07

## 2018-10-13 RX ADMIN — Medication 5 MILLIGRAM(S): at 00:25

## 2018-10-13 RX ADMIN — DEXMEDETOMIDINE HYDROCHLORIDE IN 0.9% SODIUM CHLORIDE 4.31 MICROGRAM(S)/KG/HR: 4 INJECTION INTRAVENOUS at 23:45

## 2018-10-13 RX ADMIN — INSULIN GLARGINE 40 UNIT(S): 100 INJECTION, SOLUTION SUBCUTANEOUS at 22:24

## 2018-10-13 RX ADMIN — Medication 200 GRAM(S): at 06:06

## 2018-10-13 RX ADMIN — DEXMEDETOMIDINE HYDROCHLORIDE IN 0.9% SODIUM CHLORIDE 4.31 MICROGRAM(S)/KG/HR: 4 INJECTION INTRAVENOUS at 00:14

## 2018-10-13 RX ADMIN — Medication 3 MILLILITER(S): at 00:44

## 2018-10-13 RX ADMIN — CHLORHEXIDINE GLUCONATE 1 APPLICATION(S): 213 SOLUTION TOPICAL at 14:09

## 2018-10-13 NOTE — PROGRESS NOTE ADULT - ATTENDING COMMENTS
Patient was seen and examined and agree with above.  Patient was reintubated overnight for respiratory distress.  He became hypotensive overnight and was started on Jon.  1) acute respiratory failure hypoxic- oxygenation is appropriate  CXR with mild pulmonary congestion  Given that he just got off Jon will hold on diuresis until later today if he remains hemodynamically stable.   Will wean as tolerated.   2) acute blood loss anemia is stable.  3) Perfusion improved and lactate is normal.   4) Will start lovenox 40 mg for chemical prophylaxis  5) Hyperglycemia with Dm type 2- continue ISS with goal BG < 180  -will give tube feeds today and continue lantus 40 units with closing of fingersticks  6) hypocalcemia- repleted and will recheck.     CC time: 38 minutes Patient was seen and examined and agree with above.  Patient was reintubated overnight for respiratory distress.  He became hypotensive overnight and was started on Jon.  1) acute respiratory failure hypoxic- oxygenation is appropriate and will decrease PEEP from 8 to 5.   CXR with mild pulmonary congestion  Given that he just got off Jon will hold on diuresis until later today if he remains hemodynamically stable.   Will wean as tolerated.   2) acute blood loss anemia is stable.  3) Perfusion improved and lactate is normal.   4) Will start lovenox 40 mg for chemical prophylaxis  5) Hyperglycemia with Dm type 2- continue ISS with goal BG < 180  -will give tube feeds today and continue lantus 40 units with closing of fingersticks  6) hypocalcemia- repleted and will recheck.     CC time: 38 minutes

## 2018-10-13 NOTE — PROGRESS NOTE ADULT - SUBJECTIVE AND OBJECTIVE BOX
Mr. Kingsley was required re-intubation yesterday secondary to increased work of breathing.     ICU Vital Signs Last 24 Hrs  T(C): 37.5 (13 Oct 2018 07:00), Max: 37.5 (13 Oct 2018 07:00)  T(F): 99.5 (13 Oct 2018 07:00), Max: 99.5 (13 Oct 2018 07:00)  HR: 64 (13 Oct 2018 09:00) (62 - 128)  BP: --  BP(mean): --  ABP: 114/36 (13 Oct 2018 09:00) (114/36 - 191/78)  ABP(mean): 58 (13 Oct 2018 09:00) (58 - 119)  RR: 20 (13 Oct 2018 09:00) (14 - 29)  SpO2: 98% (13 Oct 2018 09:00) (90% - 100%)    general: comfortable, intubated, on Precedex  Abd: soft, not distended, no ecchymosis                          10.4   16.5  )-----------( 214      ( 13 Oct 2018 02:59 )             30.0     10-13    142  |  108  |  28<H>  ----------------------------<  148<H>  4.5   |  22  |  1.17      77 year old man with bleeding segment 5 liver lesion s/p IR embolization.  1. Appreciate SICU care - spontaneous breathing trials and attempts to extubate appreciated  2. Follow up AFP, Ca 19-9, and hepatitis panel.

## 2018-10-13 NOTE — PROGRESS NOTE ADULT - SUBJECTIVE AND OBJECTIVE BOX
SICU PROGRESS NOTE  ================================================  Overnight events: Patient extubated to BiPAP yesterday morning, weaned to NC during the day. Given IV lasix with good urine output. However, he was noted to have increased WOB with accessory muscle use and paradoxical respirations. BiPAP was attempted but the patient refused to wear the mask or nasal canula. Saturations decreased to mid 80s off supplemental oxygen. The patient was reintubated for airway protection around 2330. He then became borderline hypotensive and was restarted on phenylephrine drip.    HPI:  77 year old man with PMH of atrial fibrillation on Xarelto, HTN, DM, BPH, and seizures presented yesterday with abdominal pain and was found to have perihepatic and perisplenic hemorrhagic ascites with likely bleeding liver mass on CTA. Hct was 34 on presentation and dropped to 29. He received 2u pRBCs and was transferred to SICU for close monitoring. He received 2u FFP. This morning he went to interventional radiology for angiography and a lesion in segment 5 (with tumor vascularity) was identified. Two subsegmental vessels supplying segment 5 were embolized and completion angiography demonstrated a devascularized lesion. He received an additional 2u pRBCs while at interventional radiology with no increase in Hct (32.9 --> 30.6). He was transferred back to the SICU on phenylephrine. He became hypotensive and tachycardic, and his phenylephrine increased to 1.5. He was given another 2u pRBCs and 2u FFP.     Objective:  Vital Signs  ICU Vital Signs Last 24 Hrs  T(C): 36.8 (12 Oct 2018 23:00), Max: 36.9 (12 Oct 2018 03:00)  T(F): 98.3 (12 Oct 2018 23:00), Max: 98.4 (12 Oct 2018 03:00)  HR: 63 (13 Oct 2018 01:00) (57 - 128)  BP: --  BP(mean): --  ABP: 134/48 (13 Oct 2018 01:00) (105/40 - 191/78)  ABP(mean): 74 (13 Oct 2018 01:00) (60 - 119)  RR: 25 (13 Oct 2018 01:00) (14 - 29)  SpO2: 100% (13 Oct 2018 01:00) (90% - 100%)    I&O's Detail    11 Oct 2018 07:01  -  12 Oct 2018 07:00  --------------------------------------------------------  IN:    dexmedetomidine Infusion: 93.4 mL    IV PiggyBack: 200 mL    lactated ringers.: 2000 mL    Packed Red Blood Cells: 850 mL    phenylephrine   Infusion: 266.1 mL    Plasma: 600 mL    Sodium Chloride 0.9% IV Bolus: 500 mL  Total IN: 4509.5 mL    OUT:    Indwelling Catheter - Urethral: 615 mL  Total OUT: 615 mL    Total NET: 3894.5 mL      12 Oct 2018 07:01  -  13 Oct 2018 01:45  --------------------------------------------------------  IN:    dexmedetomidine Infusion: 118.6 mL    lactated ringers.: 300 mL    lactated ringers.: 80 mL  Total IN: 498.6 mL    OUT:    Indwelling Catheter - Urethral: 3100 mL  Total OUT: 3100 mL    Total NET: -2601.4 mL    Diet: Diet, NPO (10-11-18 @ 03:14)    MEDICATIONS  (STANDING):  ALBUTerol/ipratropium for Nebulization 3 milliLiter(s) Nebulizer every 6 hours  chlorhexidine 4% Liquid 1 Application(s) Topical <User Schedule>  dexmedetomidine Infusion 0.2 MICROgram(s)/kG/Hr (4.31 mL/Hr) IV Continuous <Continuous>  insulin glargine Injectable (LANTUS) 40 Unit(s) SubCutaneous at bedtime  insulin lispro (HumaLOG) corrective regimen sliding scale   SubCutaneous every 4 hours  lactated ringers. 1000 milliLiter(s) (10 mL/Hr) IV Continuous <Continuous>  levETIRAcetam  IVPB 500 milliGRAM(s) IV Intermittent every 12 hours  metoprolol tartrate Injectable 5 milliGRAM(s) IV Push every 6 hours    MEDICATIONS  (PRN):  HYDROmorphone  Injectable 0.5 milliGRAM(s) IV Push every 3 hours PRN pain      PHYSICAL EXAM:  GEN: NAD, resting quietly, intubated, sedated  NEURO: CN II-XII grossly intact, no focal deficits  PULM: symmetric chest rise bilaterally, no increased WOB  CV: regular rate, peripheral pulses intact  ABD: soft, NTND  EXTR: no cyanosis or edema, moving all extremities    LABS  CBC (10-12 @ 19:23)                              10.4<L>                         13.8<H>  )----------------(  179        --    % Neutrophils, --    % Lymphocytes, ANC: --                                  29.6<L>              CBC (10-12 @ 02:19)                              10.2<L>                         15.1<H>  )----------------(  175        --    % Neutrophils, --    % Lymphocytes, ANC: --                                  29.3<L>                BMP (10-12 @ 02:19)             141     |  109<H>  |  32<H> 		Ca++ --      Ca 8.2<L>             ---------------------------------( 169<H>		Mg 2.5                4.7     |  19<L>   |  1.64<H>			Ph 4.3     BMP (10-12 @ 02:15)             --      |  --      |  --    		Ca++ 1.13    Ca --                 ---------------------------------( --    		Mg --                 --      |  --      |  --    			Ph --        LFTs (10-12 @ 02:19)      TPro 5.4<L> / Alb 3.1<L> / TBili 0.8 / DBili -- / AST 89<H> / ALT 34 / AlkPhos 48  LFTs (10-11 @ 15:44)      TPro 5.3<L> / Alb 2.9<L> / TBili 1.5<H> / DBili -- / AST 19 / ALT 11 / AlkPhos 43    Coags (10-12 @ 02:19)  aPTT 28.6 / INR 1.18<H> / PT 12.8<H>  Coags (10-11 @ 15:44)  aPTT 28.4 / INR 1.21<H> / PT 13.2<H>    ABG (10-12 @ 19:19)     7.43 / 35 / 108 / 23 / -.5 / 98<H>%     Lactate:     ABG (10-12 @ 12:35)     7.42 / 35 / 113<H> / 22 / -1.1 / 98<H>%     Lactate:       CULTURES  none    IMAGING:   CXR pending

## 2018-10-13 NOTE — PROGRESS NOTE ADULT - SUBJECTIVE AND OBJECTIVE BOX
SURGICAL ONCOLOGY- NOTE FOR 10/1218  Patient seen and examined.     MEDICATIONS  (STANDING):  ALBUTerol/ipratropium for Nebulization 3 milliLiter(s) Nebulizer every 6 hours  chlorhexidine 4% Liquid 1 Application(s) Topical <User Schedule>  chlorhexidine 4% Liquid 1 Application(s) Topical <User Schedule>  dexmedetomidine Infusion 0.2 MICROgram(s)/kG/Hr (4.31 mL/Hr) IV Continuous <Continuous>  enoxaparin Injectable 40 milliGRAM(s) SubCutaneous daily  insulin glargine Injectable (LANTUS) 40 Unit(s) SubCutaneous at bedtime  insulin lispro (HumaLOG) corrective regimen sliding scale   SubCutaneous every 4 hours  levETIRAcetam  IVPB 500 milliGRAM(s) IV Intermittent every 12 hours  pantoprazole  Injectable 40 milliGRAM(s) IV Push daily    MEDICATIONS  (PRN):  HYDROmorphone  Injectable 0.5 milliGRAM(s) IV Push every 3 hours PRN pain      Vital Signs Last 24 Hrs  T(C): 37.5 (13 Oct 2018 07:00), Max: 37.5 (13 Oct 2018 07:00)  T(F): 99.5 (13 Oct 2018 07:00), Max: 99.5 (13 Oct 2018 07:00)  HR: 64 (13 Oct 2018 11:33) (61 - 128)  BP: --  BP(mean): --  RR: 20 (13 Oct 2018 09:00) (14 - 29)  SpO2: 99% (13 Oct 2018 11:33) (90% - 100%)    I&O's Detail    12 Oct 2018 07:01  -  13 Oct 2018 07:00  --------------------------------------------------------  IN:    dexmedetomidine Infusion: 265.4 mL    lactated ringers.: 300 mL    lactated ringers.: 120 mL    phenylephrine   Infusion: 32.3 mL    Solution: 100 mL    Solution: 100 mL  Total IN: 917.7 mL    OUT:    Indwelling Catheter - Urethral: 3765 mL  Total OUT: 3765 mL    Total NET: -2847.3 mL      13 Oct 2018 07:01  -  13 Oct 2018 12:55  --------------------------------------------------------  IN:    dexmedetomidine Infusion: 32.4 mL  Total IN: 32.4 mL    OUT:    Indwelling Catheter - Urethral: 285 mL  Total OUT: 285 mL    Total NET: -252.6 mL          Daily     Daily Weight in k.3 (13 Oct 2018 00:38)    LABS:                        10.4   16.5  )-----------( 214      ( 13 Oct 2018 02:59 )             30.0     10-13    142  |  108  |  28<H>  ----------------------------<  148<H>  4.5   |  22  |  1.17    Ca    8.3<L>      13 Oct 2018 02:59  Phos  3.1     10-13  Mg     2.2     10-13    TPro  6.5  /  Alb  3.2<L>  /  TBili  1.1  /  DBili  x   /  AST  85<H>  /  ALT  81<H>  /  AlkPhos  88  10-13    PT/INR - ( 13 Oct 2018 02:59 )   PT: 13.7 sec;   INR: 1.26 ratio         PTT - ( 13 Oct 2018 02:59 )  PTT:27.5 sec      Chief complaint: bleeding lover mass  PHYSICAL EXAM:still on vent, off pressors  Gen:   Abd: soft, non distended no guarding or rebound    77yMale s/p IR embolization of bleeding liver mass  Plan:   - Cont to monitor with serial Hb  will follow pt with you  Regards  Christos Singleton MD  Division of Surgical Oncology  [address] [place] [id number]  Ph:[phone]

## 2018-10-14 LAB
ALBUMIN SERPL ELPH-MCNC: 2.9 G/DL — LOW (ref 3.3–5)
ALP SERPL-CCNC: 73 U/L — SIGNIFICANT CHANGE UP (ref 40–120)
ALT FLD-CCNC: 48 U/L — HIGH (ref 10–45)
ANION GAP SERPL CALC-SCNC: 12 MMOL/L — SIGNIFICANT CHANGE UP (ref 5–17)
APTT BLD: 30.7 SEC — SIGNIFICANT CHANGE UP (ref 27.5–37.4)
AST SERPL-CCNC: 33 U/L — SIGNIFICANT CHANGE UP (ref 10–40)
BILIRUB DIRECT SERPL-MCNC: 0.3 MG/DL — HIGH (ref 0–0.2)
BILIRUB INDIRECT FLD-MCNC: 0.7 MG/DL — SIGNIFICANT CHANGE UP (ref 0.2–1)
BILIRUB SERPL-MCNC: 1 MG/DL — SIGNIFICANT CHANGE UP (ref 0.2–1.2)
BLD GP AB SCN SERPL QL: NEGATIVE — SIGNIFICANT CHANGE UP
BUN SERPL-MCNC: 41 MG/DL — HIGH (ref 7–23)
CA-I BLD-SCNC: 1.12 MMOL/L — SIGNIFICANT CHANGE UP (ref 1.12–1.3)
CALCIUM SERPL-MCNC: 8.3 MG/DL — LOW (ref 8.4–10.5)
CHLORIDE SERPL-SCNC: 108 MMOL/L — SIGNIFICANT CHANGE UP (ref 96–108)
CO2 SERPL-SCNC: 23 MMOL/L — SIGNIFICANT CHANGE UP (ref 22–31)
CREAT SERPL-MCNC: 1.47 MG/DL — HIGH (ref 0.5–1.3)
GAS PNL BLDA: SIGNIFICANT CHANGE UP
GAS PNL BLDA: SIGNIFICANT CHANGE UP
GLUCOSE BLDC GLUCOMTR-MCNC: 123 MG/DL — HIGH (ref 70–99)
GLUCOSE BLDC GLUCOMTR-MCNC: 154 MG/DL — HIGH (ref 70–99)
GLUCOSE BLDC GLUCOMTR-MCNC: 159 MG/DL — HIGH (ref 70–99)
GLUCOSE BLDC GLUCOMTR-MCNC: 164 MG/DL — HIGH (ref 70–99)
GLUCOSE BLDC GLUCOMTR-MCNC: 169 MG/DL — HIGH (ref 70–99)
GLUCOSE BLDC GLUCOMTR-MCNC: 210 MG/DL — HIGH (ref 70–99)
GLUCOSE SERPL-MCNC: 154 MG/DL — HIGH (ref 70–99)
HCT VFR BLD CALC: 25.9 % — LOW (ref 39–50)
HCT VFR BLD CALC: 26.9 % — LOW (ref 39–50)
HGB BLD-MCNC: 9.1 G/DL — LOW (ref 13–17)
HGB BLD-MCNC: 9.3 G/DL — LOW (ref 13–17)
INR BLD: 1.42 RATIO — HIGH (ref 0.88–1.16)
MAGNESIUM SERPL-MCNC: 2.4 MG/DL — SIGNIFICANT CHANGE UP (ref 1.6–2.6)
MCHC RBC-ENTMCNC: 31.8 PG — SIGNIFICANT CHANGE UP (ref 27–34)
MCHC RBC-ENTMCNC: 31.9 PG — SIGNIFICANT CHANGE UP (ref 27–34)
MCHC RBC-ENTMCNC: 34.5 GM/DL — SIGNIFICANT CHANGE UP (ref 32–36)
MCHC RBC-ENTMCNC: 35 GM/DL — SIGNIFICANT CHANGE UP (ref 32–36)
MCV RBC AUTO: 91.3 FL — SIGNIFICANT CHANGE UP (ref 80–100)
MCV RBC AUTO: 92.1 FL — SIGNIFICANT CHANGE UP (ref 80–100)
PHOSPHATE SERPL-MCNC: 3.3 MG/DL — SIGNIFICANT CHANGE UP (ref 2.5–4.5)
PLATELET # BLD AUTO: 157 K/UL — SIGNIFICANT CHANGE UP (ref 150–400)
PLATELET # BLD AUTO: 198 K/UL — SIGNIFICANT CHANGE UP (ref 150–400)
POTASSIUM SERPL-MCNC: 4.3 MMOL/L — SIGNIFICANT CHANGE UP (ref 3.5–5.3)
POTASSIUM SERPL-SCNC: 4.3 MMOL/L — SIGNIFICANT CHANGE UP (ref 3.5–5.3)
PROT SERPL-MCNC: 5.6 G/DL — LOW (ref 6–8.3)
PROTHROM AB SERPL-ACNC: 15.4 SEC — HIGH (ref 9.8–12.7)
RBC # BLD: 2.84 M/UL — LOW (ref 4.2–5.8)
RBC # BLD: 2.92 M/UL — LOW (ref 4.2–5.8)
RBC # FLD: 12.6 % — SIGNIFICANT CHANGE UP (ref 10.3–14.5)
RBC # FLD: 12.9 % — SIGNIFICANT CHANGE UP (ref 10.3–14.5)
RH IG SCN BLD-IMP: POSITIVE — SIGNIFICANT CHANGE UP
SODIUM SERPL-SCNC: 143 MMOL/L — SIGNIFICANT CHANGE UP (ref 135–145)
WBC # BLD: 11.3 K/UL — HIGH (ref 3.8–10.5)
WBC # BLD: 11.7 K/UL — HIGH (ref 3.8–10.5)
WBC # FLD AUTO: 11.3 K/UL — HIGH (ref 3.8–10.5)
WBC # FLD AUTO: 11.7 K/UL — HIGH (ref 3.8–10.5)

## 2018-10-14 PROCEDURE — 71045 X-RAY EXAM CHEST 1 VIEW: CPT | Mod: 26

## 2018-10-14 PROCEDURE — 99291 CRITICAL CARE FIRST HOUR: CPT

## 2018-10-14 RX ORDER — SENNA PLUS 8.6 MG/1
5 TABLET ORAL AT BEDTIME
Qty: 0 | Refills: 0 | Status: DISCONTINUED | OUTPATIENT
Start: 2018-10-14 | End: 2018-10-16

## 2018-10-14 RX ORDER — DOCUSATE SODIUM 100 MG
100 CAPSULE ORAL THREE TIMES A DAY
Qty: 0 | Refills: 0 | Status: DISCONTINUED | OUTPATIENT
Start: 2018-10-14 | End: 2018-10-16

## 2018-10-14 RX ORDER — DOCUSATE SODIUM 100 MG
100 CAPSULE ORAL THREE TIMES A DAY
Qty: 0 | Refills: 0 | Status: DISCONTINUED | OUTPATIENT
Start: 2018-10-14 | End: 2018-10-14

## 2018-10-14 RX ORDER — SODIUM CHLORIDE 9 MG/ML
1000 INJECTION, SOLUTION INTRAVENOUS
Qty: 0 | Refills: 0 | Status: DISCONTINUED | OUTPATIENT
Start: 2018-10-14 | End: 2018-10-15

## 2018-10-14 RX ORDER — INSULIN GLARGINE 100 [IU]/ML
20 INJECTION, SOLUTION SUBCUTANEOUS AT BEDTIME
Qty: 0 | Refills: 0 | Status: DISCONTINUED | OUTPATIENT
Start: 2018-10-14 | End: 2018-10-16

## 2018-10-14 RX ADMIN — SODIUM CHLORIDE 75 MILLILITER(S): 9 INJECTION, SOLUTION INTRAVENOUS at 20:01

## 2018-10-14 RX ADMIN — HYDROMORPHONE HYDROCHLORIDE 0.5 MILLIGRAM(S): 2 INJECTION INTRAMUSCULAR; INTRAVENOUS; SUBCUTANEOUS at 20:15

## 2018-10-14 RX ADMIN — LEVETIRACETAM 400 MILLIGRAM(S): 250 TABLET, FILM COATED ORAL at 18:15

## 2018-10-14 RX ADMIN — CHLORHEXIDINE GLUCONATE 1 APPLICATION(S): 213 SOLUTION TOPICAL at 12:12

## 2018-10-14 RX ADMIN — HYDROMORPHONE HYDROCHLORIDE 0.5 MILLIGRAM(S): 2 INJECTION INTRAMUSCULAR; INTRAVENOUS; SUBCUTANEOUS at 20:00

## 2018-10-14 RX ADMIN — Medication 2: at 14:38

## 2018-10-14 RX ADMIN — DEXMEDETOMIDINE HYDROCHLORIDE IN 0.9% SODIUM CHLORIDE 4.31 MICROGRAM(S)/KG/HR: 4 INJECTION INTRAVENOUS at 20:00

## 2018-10-14 RX ADMIN — Medication 4: at 10:18

## 2018-10-14 RX ADMIN — DEXMEDETOMIDINE HYDROCHLORIDE IN 0.9% SODIUM CHLORIDE 4.31 MICROGRAM(S)/KG/HR: 4 INJECTION INTRAVENOUS at 05:47

## 2018-10-14 RX ADMIN — ENOXAPARIN SODIUM 40 MILLIGRAM(S): 100 INJECTION SUBCUTANEOUS at 12:12

## 2018-10-14 RX ADMIN — PANTOPRAZOLE SODIUM 40 MILLIGRAM(S): 20 TABLET, DELAYED RELEASE ORAL at 12:11

## 2018-10-14 RX ADMIN — INSULIN GLARGINE 20 UNIT(S): 100 INJECTION, SOLUTION SUBCUTANEOUS at 22:03

## 2018-10-14 RX ADMIN — Medication 3 MILLILITER(S): at 17:50

## 2018-10-14 RX ADMIN — SENNA PLUS 5 MILLILITER(S): 8.6 TABLET ORAL at 21:59

## 2018-10-14 RX ADMIN — CHLORHEXIDINE GLUCONATE 1 APPLICATION(S): 213 SOLUTION TOPICAL at 05:47

## 2018-10-14 RX ADMIN — Medication 2: at 05:47

## 2018-10-14 RX ADMIN — Medication 2: at 22:03

## 2018-10-14 RX ADMIN — Medication 100 MILLIGRAM(S): at 18:15

## 2018-10-14 RX ADMIN — LEVETIRACETAM 400 MILLIGRAM(S): 250 TABLET, FILM COATED ORAL at 05:47

## 2018-10-14 RX ADMIN — Medication 3 MILLILITER(S): at 00:28

## 2018-10-14 RX ADMIN — Medication 3 MILLILITER(S): at 12:41

## 2018-10-14 RX ADMIN — Medication 2: at 02:44

## 2018-10-14 RX ADMIN — Medication 100 MILLIGRAM(S): at 21:59

## 2018-10-14 RX ADMIN — Medication 3 MILLILITER(S): at 05:32

## 2018-10-14 NOTE — PROGRESS NOTE ADULT - ASSESSMENT
Assessment and plan     77M s/p 10/11 IR embolization of bleeding liver mass.     - Pt is still intubated and sedated.   -Hemodynamically stable.   -Continue to trend H/H.

## 2018-10-14 NOTE — PROGRESS NOTE ADULT - SUBJECTIVE AND OBJECTIVE BOX
SICU PROGRESS NOTE  ================================================  Overnight events: TF's started through K-O tube, IVF's stopped. Started protonix and lovenox. Updated patient's cardiologist Dr. Marx.     HPI:  77 year old man with PMH of atrial fibrillation on Xarelto, HTN, DM, BPH, and seizures presented yesterday with abdominal pain and was found to have perihepatic and perisplenic hemorrhagic ascites with likely bleeding liver mass on CTA. Hct was 34 on presentation and dropped to 29. He received 2u pRBCs and was transferred to SICU for close monitoring. He received 2u FFP. This morning he went to interventional radiology for angiography and a lesion in segment 5 (with tumor vascularity) was identified. Two subsegmental vessels supplying segment 5 were embolized and completion angiography demonstrated a devascularized lesion. He received an additional 2u pRBCs while at interventional radiology with no increase in Hct (32.9 --> 30.6). He was transferred back to the SICU on phenylephrine. He became hypotensive and tachycardic, and his phenylephrine increased to 1.5. He was given another 2u pRBCs and 2u FFP.     Vital Signs Last 24 Hrs  T(C): 37.9 (13 Oct 2018 23:00), Max: 38.4 (13 Oct 2018 22:00)  T(F): 100.3 (13 Oct 2018 23:00), Max: 101.1 (13 Oct 2018 22:00)  HR: 63 (14 Oct 2018 00:29) (61 - 85)  BP: --  BP(mean): --  RR: 19 (14 Oct 2018 00:00) (16 - 25)  SpO2: 100% (14 Oct 2018 00:29) (97% - 100%)    Physical Exam:  GEN: NAD, resting quietly, intubated, sedated  NEURO: CN II-XII grossly intact, no focal deficits  PULM: symmetric chest rise bilaterally, no increased WOB  CV: regular rate, peripheral pulses intact  ABD: soft, NTND  EXTR: no cyanosis or edema, moving all extremities        LABS:                        9.4    11.8  )-----------( 175      ( 13 Oct 2018 18:10 )             26.3     10-13    142  |  108  |  28<H>  ----------------------------<  148<H>  4.5   |  22  |  1.17    Ca    8.3<L>      13 Oct 2018 02:59  Phos  3.1     10-13  Mg     2.2     10-13    TPro  6.5  /  Alb  3.2<L>  /  TBili  1.1  /  DBili  x   /  AST  85<H>  /  ALT  81<H>  /  AlkPhos  88  10-13    PT/INR - ( 13 Oct 2018 02:59 )   PT: 13.7 sec;   INR: 1.26 ratio         PTT - ( 13 Oct 2018 02:59 )  PTT:27.5 sec      INs and OUTs:    10-12-18 @ 07:01  -  10-13-18 @ 07:00  --------------------------------------------------------  IN: 917.7 mL / OUT: 3765 mL / NET: -2847.3 mL    10-13-18 @ 07:01  -  10-14-18 @ 01:07  --------------------------------------------------------  IN: 360.8 mL / OUT: 720 mL / NET: -359.2 mL

## 2018-10-14 NOTE — PROGRESS NOTE ADULT - SUBJECTIVE AND OBJECTIVE BOX
progress note Red team surgery     Subjective; Pt was seen and examined. pt is still intubated. No acute events overnight. H/H stable.     Objective:    Vital Signs Last 24 Hrs  T(C): 35.8 (14 Oct 2018 07:00), Max: 38.4 (13 Oct 2018 22:00)  T(F): 96.4 (14 Oct 2018 07:00), Max: 101.1 (13 Oct 2018 22:00)  HR: 63 (14 Oct 2018 10:00) (61 - 85)  BP: --  BP(mean): --  RR: 21 (14 Oct 2018 10:00) (16 - 25)  SpO2: 100% (14 Oct 2018 10:00) (97% - 100%)    I&O's Detail    13 Oct 2018 07:01  -  14 Oct 2018 07:00  --------------------------------------------------------  IN:    dexmedetomidine Infusion: 356.4 mL    Glucerna: 360 mL    Solution: 100 mL  Total IN: 816.4 mL    OUT:    Indwelling Catheter - Urethral: 960 mL  Total OUT: 960 mL    Total NET: -143.6 mL      14 Oct 2018 07:01  -  14 Oct 2018 10:45  --------------------------------------------------------  IN:    dexmedetomidine Infusion: 10.8 mL    Glucerna: 60 mL  Total IN: 70.8 mL    OUT:    Indwelling Catheter - Urethral: 75 mL  Total OUT: 75 mL    Total NET: -4.2 mL          MEDICATIONS  (STANDING):  ALBUTerol/ipratropium for Nebulization 3 milliLiter(s) Nebulizer every 6 hours  chlorhexidine 4% Liquid 1 Application(s) Topical <User Schedule>  chlorhexidine 4% Liquid 1 Application(s) Topical <User Schedule>  dexmedetomidine Infusion 0.2 MICROgram(s)/kG/Hr (4.31 mL/Hr) IV Continuous <Continuous>  enoxaparin Injectable 40 milliGRAM(s) SubCutaneous daily  insulin glargine Injectable (LANTUS) 40 Unit(s) SubCutaneous at bedtime  insulin lispro (HumaLOG) corrective regimen sliding scale   SubCutaneous every 4 hours  levETIRAcetam  IVPB 500 milliGRAM(s) IV Intermittent every 12 hours  pantoprazole  Injectable 40 milliGRAM(s) IV Push daily    MEDICATIONS  (PRN):  HYDROmorphone  Injectable 0.5 milliGRAM(s) IV Push every 3 hours PRN pain      LABS:                        9.1    11.3  )-----------( 157      ( 14 Oct 2018 02:44 )             25.9     10-14    143  |  108  |  41<H>  ----------------------------<  154<H>  4.3   |  23  |  1.47<H>    Ca    8.3<L>      14 Oct 2018 02:44  Phos  3.3     10-14  Mg     2.4     10-14    TPro  5.6<L>  /  Alb  2.9<L>  /  TBili  1.0  /  DBili  0.3<H>  /  AST  33  /  ALT  48<H>  /  AlkPhos  73  10-14    PT/INR - ( 14 Oct 2018 02:44 )   PT: 15.4 sec;   INR: 1.42 ratio         PTT - ( 14 Oct 2018 02:44 )  PTT:30.7 sec    LIVER FUNCTIONS - ( 14 Oct 2018 02:44 )  Alb: 2.9 g/dL / Pro: 5.6 g/dL / ALK PHOS: 73 U/L / ALT: 48 U/L / AST: 33 U/L / GGT: x             Physical exam   Gen: Intubated, sedated.   Lungs: Mechanical.   Ab: soft, nontender to deep palpation, distended.

## 2018-10-14 NOTE — PROGRESS NOTE ADULT - SUBJECTIVE AND OBJECTIVE BOX
Mr. Kingsley remains intubated and on Precedex. He is not on any vasopressors.    ICU Vital Signs Last 24 Hrs  T(C): 35.8 (14 Oct 2018 07:00), Max: 38.4 (13 Oct 2018 22:00)  T(F): 96.4 (14 Oct 2018 07:00), Max: 101.1 (13 Oct 2018 22:00)  HR: 63 (14 Oct 2018 09:19) (61 - 85)  BP: --  BP(mean): --  ABP: 131/44 (14 Oct 2018 09:00) (103/32 - 149/49)  ABP(mean): 68 (14 Oct 2018 09:00) (52 - 78)  RR: 20 (14 Oct 2018 09:00) (16 - 25)  SpO2: 100% (14 Oct 2018 09:19) (97% - 100%)      General: intubated, on precedex  Abd: Soft, minimal distension, not tender  Lines: ETT at 24 at the teeth, left femoral arterial line, PIV                          9.1    11.3  )-----------( 157      ( 14 Oct 2018 02:44 )             25.9     10-14    143  |  108  |  41<H>  ----------------------------<  154<H>  4.3   |  23  |  1.47<H>      ABG - ( 14 Oct 2018 02:42 )  pH, Arterial: 7.50  pH, Blood: x     /  pCO2: 32    /  pO2: 99    / HCO3: 25    / Base Excess: 2.2   /  SaO2: 98        77 year old man s/p IR embolization for bleeding segment 5 liver lesion  1. Appreciate SICU care - will attempt spontaneous breathing trial and potential extubation today if tolerates  2. Continue to monitor H/H

## 2018-10-14 NOTE — PROGRESS NOTE ADULT - SUBJECTIVE AND OBJECTIVE BOX
Surgery Progress Note     Subjective/24hour Events:   Patient seen and examined with Dr. Escobar.   No acute events overnight.   H/h stable.   Remained afebrile, hemodynamically stable.     Vital Signs:  Vital Signs Last 24 Hrs  T(C): 35.8 (14 Oct 2018 07:00), Max: 38.4 (13 Oct 2018 22:00)  T(F): 96.4 (14 Oct 2018 07:00), Max: 101.1 (13 Oct 2018 22:00)  HR: 69 (14 Oct 2018 08:00) (61 - 85)  BP: --  BP(mean): --  RR: 19 (14 Oct 2018 08:00) (16 - 25)  SpO2: 100% (14 Oct 2018 08:00) (97% - 100%)    CAPILLARY BLOOD GLUCOSE      POCT Blood Glucose.: 159 mg/dL (14 Oct 2018 05:41)  POCT Blood Glucose.: 164 mg/dL (14 Oct 2018 02:38)  POCT Blood Glucose.: 176 mg/dL (13 Oct 2018 22:16)  POCT Blood Glucose.: 136 mg/dL (13 Oct 2018 17:53)  POCT Blood Glucose.: 143 mg/dL (13 Oct 2018 14:02)  POCT Blood Glucose.: 136 mg/dL (13 Oct 2018 10:24)      I&O's Detail    13 Oct 2018 07:01  -  14 Oct 2018 07:00  --------------------------------------------------------  IN:    dexmedetomidine Infusion: 356.4 mL    Glucerna: 360 mL    Solution: 100 mL  Total IN: 816.4 mL    OUT:    Indwelling Catheter - Urethral: 960 mL  Total OUT: 960 mL    Total NET: -143.6 mL      14 Oct 2018 07:01  -  14 Oct 2018 08:27  --------------------------------------------------------  IN:    dexmedetomidine Infusion: 10.8 mL    Glucerna: 60 mL  Total IN: 70.8 mL    OUT:    Indwelling Catheter - Urethral: 75 mL  Total OUT: 75 mL    Total NET: -4.2 mL          MEDICATIONS  (STANDING):  ALBUTerol/ipratropium for Nebulization 3 milliLiter(s) Nebulizer every 6 hours  chlorhexidine 4% Liquid 1 Application(s) Topical <User Schedule>  chlorhexidine 4% Liquid 1 Application(s) Topical <User Schedule>  dexmedetomidine Infusion 0.2 MICROgram(s)/kG/Hr (4.31 mL/Hr) IV Continuous <Continuous>  enoxaparin Injectable 40 milliGRAM(s) SubCutaneous daily  insulin glargine Injectable (LANTUS) 40 Unit(s) SubCutaneous at bedtime  insulin lispro (HumaLOG) corrective regimen sliding scale   SubCutaneous every 4 hours  levETIRAcetam  IVPB 500 milliGRAM(s) IV Intermittent every 12 hours  pantoprazole  Injectable 40 milliGRAM(s) IV Push daily    MEDICATIONS  (PRN):  HYDROmorphone  Injectable 0.5 milliGRAM(s) IV Push every 3 hours PRN pain      Physical Exam:  Gen: Intubated, sedated.   Lungs: Mechanical.   Ab: Large, soft, nontender to deep palpation, distended.    Labs:    10-14    143  |  108  |  41<H>  ----------------------------<  154<H>  4.3   |  23  |  1.47<H>    Ca    8.3<L>      14 Oct 2018 02:44  Phos  3.3     10-14  Mg     2.4     10-14    TPro  5.6<L>  /  Alb  2.9<L>  /  TBili  1.0  /  DBili  0.3<H>  /  AST  33  /  ALT  48<H>  /  AlkPhos  73  10-14    LIVER FUNCTIONS - ( 14 Oct 2018 02:44 )  Alb: 2.9 g/dL / Pro: 5.6 g/dL / ALK PHOS: 73 U/L / ALT: 48 U/L / AST: 33 U/L / GGT: x                                 9.1    11.3  )-----------( 157      ( 14 Oct 2018 02:44 )             25.9     PT/INR - ( 14 Oct 2018 02:44 )   PT: 15.4 sec;   INR: 1.42 ratio         PTT - ( 14 Oct 2018 02:44 )  PTT:30.7 sec    Imaging:  < from: Xray Chest 1 View- PORTABLE-Urgent (10.13.18 @ 11:44) >  Impression: There is a new nasogastric tube with its tip in good   position, within the stomach. There is an endotracheal tube with its tip   above the yoselin.      < end of copied text >

## 2018-10-14 NOTE — PROGRESS NOTE ADULT - ASSESSMENT
77 year old man with PMH of atrial fibrillation on Xarelto, HTN, DM, BPH, and seizures admitted with hemoperitoneum secondary to bleeding liver mass in segment 5, now s/p embolization of two subsegmental vessels.    Neuro: Hx of CVA. Pain control  - Dilaudid PRN  - Precedex  - Keppra    Resp: On PRVC - rate 14, , PEEP 5, FiO2 40  - AM CXR    CV: Weaned off phenylephrine  - Hemodynamic monitoring    GI: Hemorrhagic liver mass s/p IR embolization  - C/w TF's    : Cr 1.77 (1.02 on admission)  - IV locked  - Strict I's O's    Heme: Acute hemorrhage  - Restarted Lovenox as H/H has been stable  - q4 hr CBC    ID: No active issues  - Culture if febrile    Endo: Hx of diabetes  - Monitor fingerstick glucose  - ISS    Dispo: SICU

## 2018-10-14 NOTE — PROGRESS NOTE ADULT - ASSESSMENT
77M s/p 10/11 IR embolization of bleeding liver mass.     Plan:   -Remains intubated, sedated.   -Hemodynamically stable.   -Continue to trend H/h.     Blue Team General Surgery [phone]

## 2018-10-14 NOTE — PROGRESS NOTE ADULT - ATTENDING COMMENTS
post procedural acute respiratory failure  patient has intra-abdominal blood which is likely leading to abdominal distention and atelectasis   patient also has poor baseline functional status with history of head trauma which is also contributory  some labor on spontaneous breathing trial so will maintain intubation especially given recent failure at extubation  40 minutes of critical care management applied

## 2018-10-15 LAB
ALBUMIN SERPL ELPH-MCNC: 2.6 G/DL — LOW (ref 3.3–5)
ALP SERPL-CCNC: 68 U/L — SIGNIFICANT CHANGE UP (ref 40–120)
ALT FLD-CCNC: 34 U/L — SIGNIFICANT CHANGE UP (ref 10–45)
ANION GAP SERPL CALC-SCNC: 11 MMOL/L — SIGNIFICANT CHANGE UP (ref 5–17)
APTT BLD: 32.8 SEC — SIGNIFICANT CHANGE UP (ref 27.5–37.4)
AST SERPL-CCNC: 18 U/L — SIGNIFICANT CHANGE UP (ref 10–40)
BILIRUB DIRECT SERPL-MCNC: 0.4 MG/DL — HIGH (ref 0–0.2)
BILIRUB INDIRECT FLD-MCNC: 0.9 MG/DL — SIGNIFICANT CHANGE UP (ref 0.2–1)
BILIRUB SERPL-MCNC: 1.3 MG/DL — HIGH (ref 0.2–1.2)
BUN SERPL-MCNC: 47 MG/DL — HIGH (ref 7–23)
CALCIUM SERPL-MCNC: 8.3 MG/DL — LOW (ref 8.4–10.5)
CHLORIDE SERPL-SCNC: 110 MMOL/L — HIGH (ref 96–108)
CO2 SERPL-SCNC: 23 MMOL/L — SIGNIFICANT CHANGE UP (ref 22–31)
CREAT SERPL-MCNC: 1.33 MG/DL — HIGH (ref 0.5–1.3)
GAS PNL BLDA: SIGNIFICANT CHANGE UP
GLUCOSE BLDC GLUCOMTR-MCNC: 132 MG/DL — HIGH (ref 70–99)
GLUCOSE BLDC GLUCOMTR-MCNC: 142 MG/DL — HIGH (ref 70–99)
GLUCOSE BLDC GLUCOMTR-MCNC: 145 MG/DL — HIGH (ref 70–99)
GLUCOSE BLDC GLUCOMTR-MCNC: 188 MG/DL — HIGH (ref 70–99)
GLUCOSE BLDC GLUCOMTR-MCNC: 212 MG/DL — HIGH (ref 70–99)
GLUCOSE BLDC GLUCOMTR-MCNC: 235 MG/DL — HIGH (ref 70–99)
GLUCOSE SERPL-MCNC: 214 MG/DL — HIGH (ref 70–99)
HCT VFR BLD CALC: 26.9 % — LOW (ref 39–50)
HGB BLD-MCNC: 9 G/DL — LOW (ref 13–17)
INR BLD: 1.36 RATIO — HIGH (ref 0.88–1.16)
MAGNESIUM SERPL-MCNC: 2.6 MG/DL — SIGNIFICANT CHANGE UP (ref 1.6–2.6)
MCHC RBC-ENTMCNC: 31.1 PG — SIGNIFICANT CHANGE UP (ref 27–34)
MCHC RBC-ENTMCNC: 33.5 GM/DL — SIGNIFICANT CHANGE UP (ref 32–36)
MCV RBC AUTO: 92.6 FL — SIGNIFICANT CHANGE UP (ref 80–100)
PHOSPHATE SERPL-MCNC: 3.6 MG/DL — SIGNIFICANT CHANGE UP (ref 2.5–4.5)
PLATELET # BLD AUTO: 214 K/UL — SIGNIFICANT CHANGE UP (ref 150–400)
POTASSIUM SERPL-MCNC: 4.5 MMOL/L — SIGNIFICANT CHANGE UP (ref 3.5–5.3)
POTASSIUM SERPL-SCNC: 4.5 MMOL/L — SIGNIFICANT CHANGE UP (ref 3.5–5.3)
PROT SERPL-MCNC: 5.8 G/DL — LOW (ref 6–8.3)
PROTHROM AB SERPL-ACNC: 14.9 SEC — HIGH (ref 9.8–12.7)
RBC # BLD: 2.91 M/UL — LOW (ref 4.2–5.8)
RBC # FLD: 12.9 % — SIGNIFICANT CHANGE UP (ref 10.3–14.5)
SODIUM SERPL-SCNC: 144 MMOL/L — SIGNIFICANT CHANGE UP (ref 135–145)
WBC # BLD: 12.5 K/UL — HIGH (ref 3.8–10.5)
WBC # FLD AUTO: 12.5 K/UL — HIGH (ref 3.8–10.5)

## 2018-10-15 PROCEDURE — 74018 RADEX ABDOMEN 1 VIEW: CPT | Mod: 26

## 2018-10-15 PROCEDURE — 99291 CRITICAL CARE FIRST HOUR: CPT

## 2018-10-15 PROCEDURE — 71045 X-RAY EXAM CHEST 1 VIEW: CPT | Mod: 26

## 2018-10-15 PROCEDURE — 99232 SBSQ HOSP IP/OBS MODERATE 35: CPT

## 2018-10-15 RX ORDER — LEVETIRACETAM 250 MG/1
500 TABLET, FILM COATED ORAL EVERY 12 HOURS
Qty: 0 | Refills: 0 | Status: DISCONTINUED | OUTPATIENT
Start: 2018-10-15 | End: 2018-10-16

## 2018-10-15 RX ORDER — METOPROLOL TARTRATE 50 MG
25 TABLET ORAL EVERY 12 HOURS
Qty: 0 | Refills: 0 | Status: DISCONTINUED | OUTPATIENT
Start: 2018-10-15 | End: 2018-10-15

## 2018-10-15 RX ORDER — CHLORHEXIDINE GLUCONATE 213 G/1000ML
15 SOLUTION TOPICAL
Qty: 0 | Refills: 0 | Status: DISCONTINUED | OUTPATIENT
Start: 2018-10-15 | End: 2018-10-15

## 2018-10-15 RX ORDER — OXYCODONE HYDROCHLORIDE 5 MG/1
2.5 TABLET ORAL EVERY 6 HOURS
Qty: 0 | Refills: 0 | Status: DISCONTINUED | OUTPATIENT
Start: 2018-10-15 | End: 2018-10-16

## 2018-10-15 RX ORDER — SODIUM CHLORIDE 9 MG/ML
1000 INJECTION, SOLUTION INTRAVENOUS
Qty: 0 | Refills: 0 | Status: DISCONTINUED | OUTPATIENT
Start: 2018-10-15 | End: 2018-10-16

## 2018-10-15 RX ORDER — CALCIUM GLUCONATE 100 MG/ML
2 VIAL (ML) INTRAVENOUS ONCE
Qty: 0 | Refills: 0 | Status: COMPLETED | OUTPATIENT
Start: 2018-10-15 | End: 2018-10-15

## 2018-10-15 RX ORDER — ACETAMINOPHEN 500 MG
975 TABLET ORAL EVERY 6 HOURS
Qty: 0 | Refills: 0 | Status: DISCONTINUED | OUTPATIENT
Start: 2018-10-15 | End: 2018-10-16

## 2018-10-15 RX ORDER — POLYETHYLENE GLYCOL 3350 17 G/17G
17 POWDER, FOR SOLUTION ORAL DAILY
Qty: 0 | Refills: 0 | Status: DISCONTINUED | OUTPATIENT
Start: 2018-10-15 | End: 2018-10-16

## 2018-10-15 RX ORDER — METOPROLOL TARTRATE 50 MG
50 TABLET ORAL EVERY 12 HOURS
Qty: 0 | Refills: 0 | Status: DISCONTINUED | OUTPATIENT
Start: 2018-10-15 | End: 2018-10-15

## 2018-10-15 RX ORDER — OXYCODONE HYDROCHLORIDE 5 MG/1
2.5 TABLET ORAL EVERY 6 HOURS
Qty: 0 | Refills: 0 | Status: DISCONTINUED | OUTPATIENT
Start: 2018-10-15 | End: 2018-10-15

## 2018-10-15 RX ORDER — METOPROLOL TARTRATE 50 MG
50 TABLET ORAL ONCE
Qty: 0 | Refills: 0 | Status: COMPLETED | OUTPATIENT
Start: 2018-10-15 | End: 2018-10-15

## 2018-10-15 RX ORDER — CALCIUM GLUCONATE 100 MG/ML
1 VIAL (ML) INTRAVENOUS ONCE
Qty: 0 | Refills: 0 | Status: COMPLETED | OUTPATIENT
Start: 2018-10-15 | End: 2018-10-15

## 2018-10-15 RX ORDER — METOPROLOL TARTRATE 50 MG
50 TABLET ORAL EVERY 12 HOURS
Qty: 0 | Refills: 0 | Status: DISCONTINUED | OUTPATIENT
Start: 2018-10-16 | End: 2018-10-16

## 2018-10-15 RX ORDER — FUROSEMIDE 40 MG
20 TABLET ORAL ONCE
Qty: 0 | Refills: 0 | Status: COMPLETED | OUTPATIENT
Start: 2018-10-15 | End: 2018-10-15

## 2018-10-15 RX ORDER — METOPROLOL TARTRATE 50 MG
5 TABLET ORAL ONCE
Qty: 0 | Refills: 0 | Status: COMPLETED | OUTPATIENT
Start: 2018-10-15 | End: 2018-10-15

## 2018-10-15 RX ORDER — LACTULOSE 10 G/15ML
20 SOLUTION ORAL EVERY 12 HOURS
Qty: 0 | Refills: 0 | Status: DISCONTINUED | OUTPATIENT
Start: 2018-10-15 | End: 2018-10-16

## 2018-10-15 RX ORDER — METOPROLOL TARTRATE 50 MG
2.5 TABLET ORAL ONCE
Qty: 0 | Refills: 0 | Status: COMPLETED | OUTPATIENT
Start: 2018-10-15 | End: 2018-10-15

## 2018-10-15 RX ADMIN — LEVETIRACETAM 400 MILLIGRAM(S): 250 TABLET, FILM COATED ORAL at 05:18

## 2018-10-15 RX ADMIN — OXYCODONE HYDROCHLORIDE 2.5 MILLIGRAM(S): 5 TABLET ORAL at 17:03

## 2018-10-15 RX ADMIN — Medication 2.5 MILLIGRAM(S): at 13:04

## 2018-10-15 RX ADMIN — DEXMEDETOMIDINE HYDROCHLORIDE IN 0.9% SODIUM CHLORIDE 4.31 MICROGRAM(S)/KG/HR: 4 INJECTION INTRAVENOUS at 05:19

## 2018-10-15 RX ADMIN — HYDROMORPHONE HYDROCHLORIDE 0.5 MILLIGRAM(S): 2 INJECTION INTRAMUSCULAR; INTRAVENOUS; SUBCUTANEOUS at 03:58

## 2018-10-15 RX ADMIN — HYDROMORPHONE HYDROCHLORIDE 0.5 MILLIGRAM(S): 2 INJECTION INTRAMUSCULAR; INTRAVENOUS; SUBCUTANEOUS at 03:43

## 2018-10-15 RX ADMIN — SENNA PLUS 5 MILLILITER(S): 8.6 TABLET ORAL at 22:12

## 2018-10-15 RX ADMIN — LACTULOSE 20 GRAM(S): 10 SOLUTION ORAL at 23:57

## 2018-10-15 RX ADMIN — LACTULOSE 20 GRAM(S): 10 SOLUTION ORAL at 12:09

## 2018-10-15 RX ADMIN — Medication 4: at 06:20

## 2018-10-15 RX ADMIN — INSULIN GLARGINE 20 UNIT(S): 100 INJECTION, SOLUTION SUBCUTANEOUS at 22:12

## 2018-10-15 RX ADMIN — Medication 100 MILLIGRAM(S): at 22:12

## 2018-10-15 RX ADMIN — Medication 3 MILLILITER(S): at 10:44

## 2018-10-15 RX ADMIN — ENOXAPARIN SODIUM 40 MILLIGRAM(S): 100 INJECTION SUBCUTANEOUS at 11:51

## 2018-10-15 RX ADMIN — SODIUM CHLORIDE 75 MILLILITER(S): 9 INJECTION, SOLUTION INTRAVENOUS at 05:18

## 2018-10-15 RX ADMIN — POLYETHYLENE GLYCOL 3350 17 GRAM(S): 17 POWDER, FOR SOLUTION ORAL at 11:53

## 2018-10-15 RX ADMIN — Medication 3 MILLILITER(S): at 23:51

## 2018-10-15 RX ADMIN — Medication 200 GRAM(S): at 16:14

## 2018-10-15 RX ADMIN — Medication 100 MILLIGRAM(S): at 14:29

## 2018-10-15 RX ADMIN — Medication 2: at 10:11

## 2018-10-15 RX ADMIN — Medication 5 MILLIGRAM(S): at 14:19

## 2018-10-15 RX ADMIN — Medication 200 GRAM(S): at 05:43

## 2018-10-15 RX ADMIN — Medication 3 MILLILITER(S): at 05:36

## 2018-10-15 RX ADMIN — Medication 50 MILLIGRAM(S): at 16:13

## 2018-10-15 RX ADMIN — CHLORHEXIDINE GLUCONATE 15 MILLILITER(S): 213 SOLUTION TOPICAL at 05:43

## 2018-10-15 RX ADMIN — CHLORHEXIDINE GLUCONATE 1 APPLICATION(S): 213 SOLUTION TOPICAL at 05:19

## 2018-10-15 RX ADMIN — OXYCODONE HYDROCHLORIDE 2.5 MILLIGRAM(S): 5 TABLET ORAL at 17:18

## 2018-10-15 RX ADMIN — Medication 3 MILLILITER(S): at 00:32

## 2018-10-15 RX ADMIN — Medication 3 MILLILITER(S): at 18:10

## 2018-10-15 RX ADMIN — Medication 4: at 02:34

## 2018-10-15 RX ADMIN — Medication 100 MILLIGRAM(S): at 05:19

## 2018-10-15 RX ADMIN — Medication 20 MILLIGRAM(S): at 08:20

## 2018-10-15 RX ADMIN — LEVETIRACETAM 500 MILLIGRAM(S): 250 TABLET, FILM COATED ORAL at 17:34

## 2018-10-15 NOTE — PHYSICAL THERAPY INITIAL EVALUATION ADULT - GENERAL OBSERVATIONS, REHAB EVAL
Pt received supine in bed, A&Ox2, +tele, +R fem a-line, +FC, +Venti mask, agreeable to session, leoncio 30 min.

## 2018-10-15 NOTE — PHYSICAL THERAPY INITIAL EVALUATION ADULT - PERTINENT HX OF CURRENT PROBLEM, REHAB EVAL
77 y.o. M PMHx of atrial fibrillation on Xarelto, HTN, DM type II, seizures, BPH, and ICH after falling s/p bilateral craniotomies who presented on 10/11/18 with severe abdominal pain after eating dinner. Patient initially went to an urgent care, where he was noted to be hypotensive, so he was directed to the ED. Labs significant for a downtrending HCT so a CTA was obtained, which demonstrated hemoperitoneum with blood near the liver and spleen but no active extravasation.

## 2018-10-15 NOTE — PROGRESS NOTE ADULT - SUBJECTIVE AND OBJECTIVE BOX
Red Team Surgery Progress Note     Subjective/24hour Events: Abd distended and tube feeds held. Started on maintenance fluids. Started on bowel regimen. Made NPO and Lantus decreased by half. Patient passed SBT, but not extubated due to abd distension that could cause respiratory distress.    Vital Signs:  Vital Signs Last 24 Hrs  T(C): 37.1 (15 Oct 2018 03:00), Max: 37.5 (14 Oct 2018 23:00)  T(F): 98.7 (15 Oct 2018 03:00), Max: 99.5 (14 Oct 2018 23:00)  HR: 62 (15 Oct 2018 05:36) (62 - 86)  BP: 123/57 (14 Oct 2018 19:00) (123/57 - 123/57)  BP(mean): 82 (14 Oct 2018 19:00) (82 - 82)  RR: 18 (15 Oct 2018 05:30) (16 - 23)  SpO2: 98% (15 Oct 2018 05:36) (98% - 100%)    CAPILLARY BLOOD GLUCOSE      POCT Blood Glucose.: 212 mg/dL (15 Oct 2018 05:46)  POCT Blood Glucose.: 235 mg/dL (15 Oct 2018 02:14)  POCT Blood Glucose.: 154 mg/dL (14 Oct 2018 21:51)  POCT Blood Glucose.: 123 mg/dL (14 Oct 2018 18:12)  POCT Blood Glucose.: 169 mg/dL (14 Oct 2018 14:21)  POCT Blood Glucose.: 210 mg/dL (14 Oct 2018 09:49)      I&O's Detail    13 Oct 2018 07:01  -  14 Oct 2018 07:00  --------------------------------------------------------  IN:    dexmedetomidine Infusion: 356.4 mL    Glucerna: 360 mL    Solution: 100 mL  Total IN: 816.4 mL    OUT:    Indwelling Catheter - Urethral: 960 mL  Total OUT: 960 mL    Total NET: -143.6 mL      14 Oct 2018 07:01  -  15 Oct 2018 05:54  --------------------------------------------------------  IN:    dexmedetomidine Infusion: 235.4 mL    dextrose 5% + sodium chloride 0.45%.: 1275 mL    Glucerna: 120 mL  Total IN: 1630.4 mL    OUT:    Indwelling Catheter - Urethral: 1070 mL  Total OUT: 1070 mL    Total NET: 560.4 mL            MEDICATIONS  (STANDING):  ALBUTerol/ipratropium for Nebulization 3 milliLiter(s) Nebulizer every 6 hours  chlorhexidine 0.12% Liquid 15 milliLiter(s) Oral Mucosa <User Schedule>  chlorhexidine 4% Liquid 1 Application(s) Topical <User Schedule>  dexmedetomidine Infusion 0.2 MICROgram(s)/kG/Hr (4.31 mL/Hr) IV Continuous <Continuous>  dextrose 5% + sodium chloride 0.45%. 1000 milliLiter(s) (75 mL/Hr) IV Continuous <Continuous>  docusate sodium Liquid 100 milliGRAM(s) Oral three times a day  enoxaparin Injectable 40 milliGRAM(s) SubCutaneous daily  insulin glargine Injectable (LANTUS) 20 Unit(s) SubCutaneous at bedtime  insulin lispro (HumaLOG) corrective regimen sliding scale   SubCutaneous every 4 hours  levETIRAcetam  IVPB 500 milliGRAM(s) IV Intermittent every 12 hours  pantoprazole  Injectable 40 milliGRAM(s) IV Push daily  polyethylene glycol 3350 17 Gram(s) Oral daily  senna Syrup 5 milliLiter(s) Oral at bedtime    MEDICATIONS  (PRN):  HYDROmorphone  Injectable 0.5 milliGRAM(s) IV Push every 3 hours PRN pain        Physical Exam:  Gen: NAD  LS: nml respiratory effort  Card: pulse regularly present  GI: abd soft, nontender  Ext: warm      Labs:    10-15    144  |  110<H>  |  47<H>  ----------------------------<  214<H>  4.5   |  23  |  1.33<H>    Ca    8.3<L>      15 Oct 2018 02:21  Phos  3.6     10-15  Mg     2.6     10-15    TPro  5.8<L>  /  Alb  2.6<L>  /  TBili  1.3<H>  /  DBili  0.4<H>  /  AST  18  /  ALT  34  /  AlkPhos  68  10-15    LIVER FUNCTIONS - ( 15 Oct 2018 02:21 )  Alb: 2.6 g/dL / Pro: 5.8 g/dL / ALK PHOS: 68 U/L / ALT: 34 U/L / AST: 18 U/L / GGT: x                                 9.0    12.5  )-----------( 214      ( 15 Oct 2018 02:21 )             26.9     PT/INR - ( 15 Oct 2018 02:21 )   PT: 14.9 sec;   INR: 1.36 ratio         PTT - ( 15 Oct 2018 02:21 )  PTT:32.8 sec          Imaging:

## 2018-10-15 NOTE — DIETITIAN INITIAL EVALUATION ADULT. - OTHER INFO
Pt seen for LOS in SICU. No food allergies noted per chart. Noted Pt c DM, was on Lantus and Novolog PTA. Noted per Social Work note, Pt resides in assisted living facility.

## 2018-10-15 NOTE — PHYSICAL THERAPY INITIAL EVALUATION ADULT - PRECAUTIONS/LIMITATIONS, REHAB EVAL
fall precautions/Pt was transfused blood products, given KCentra, and brought to SICU for a hemorrhage watch. He continued to be hypotensive despite the fluid resuscitation and blood products so he was taken to IR, where a liver lesion with hypervascularity was noted in segment 5 so it was embolized with particles. Pt returned to SICU intubated and on vasopressor support. He was extubated the next day on 10/12/18 but was in respiratory distress a few hours later so he was reintubated.

## 2018-10-15 NOTE — PHYSICAL THERAPY INITIAL EVALUATION ADULT - LIVES WITH, PROFILE
spouse/As per chart, pt resides with spouse at [facility], independent in ADL's and ambulation prior to admission.

## 2018-10-15 NOTE — PROGRESS NOTE ADULT - SUBJECTIVE AND OBJECTIVE BOX
HISTORY:  77 year old male with a past medical history of HISTORY:  77 year old male with a past medical history of atrial fibrillation on Xarelto, HTN, DM type II, seizures, BPH, and ICH after falling s/p bilateral craniotomies who presented on 10/11/18 with severe abdominal pain after eating dinner. Patient initially went to an urgent care, where he was noted to be hypotensive, so he was directed to the ED. Labs significant for a downtrending HCT so a CTA was obtained, which demonstrated hemoperitoneum with blood near the liver and spleen but no active extravasation. Patient was transfused blood products, given KCentra, and brought to SICU for a hemorrhage watch. He continued to be hypotensive despite the fluid resuscitation and blood products so he was taken to IR, where a liver lesion with hypervascularity was noted in segment 5 so it was embolized with particles. Patient returned to SICU intubated and on vasopressor support. He was extubated the next day on 10/12/18 but was in respiratory distress a few hours later so he was reintubated. Patient is currently still intubated but has been hemodynamically stable off vasopressor support with no evidence of further bleeding.    24 HOUR EVENTS:  - Abdomen notably distended so tube feeds held. Started on D5 1/2NS at 75 mL/hr while NPO. Started on bowel regimen with Colace, senna, and Miralax.  - Patient made NPO so Lantus decreased by half from 40 units at bedtime to 20 units at bedtime.  - Patient passed SBT but not extubated due to his abdominal distention that can potentially cause respiratory distress.    SUBJECTIVE/ROS:  [ ] A ten-point review of systems was otherwise negative except as noted.  [x] Due to altered mental status/intubation, subjective information were not able to be obtained from the patient. History was obtained, to the extent possible, from review of the chart and collateral sources of information.    NEURO  Exam: sedated, easily arousable, intermittently agitated, follows all commands  Meds:  - dexmedetomidine Infusion 0.2 MICROgram(s)/kG/Hr IV Continuous <Continuous>  - HYDROmorphone  Injectable 0.5 milliGRAM(s) IV Push every 3 hours PRN pain  - levETIRAcetam  IVPB 500 milliGRAM(s) IV Intermittent every 12 hours  [x] Adequacy of sedation and pain control has been assessed and adjusted    RESPIRATORY  RR: 18 (10-15-18 @ 04:00) (16 - 23)  SpO2: 100% (10-15-18 @ 04:23) (99% - 100%)  Exam: decreased bibasilar breath sounds  Mechanical Ventilation: Mode: AC/ CMV (Assist Control/ Continuous Mandatory Ventilation), RR (machine): 16, RR (patient): 18, TV (machine): 450, FiO2: 40, PEEP: 5, ITime: 1, MAP: 10, PIP: 22  [x] Extubation Readiness Assessed  ABG - ( 15 Oct 2018 02:22 )  pH: 7.47  /  pCO2: 35    /  pO2: 117   / HCO3: 26    / Base Excess: 2.3   /  SaO2: 99    /    Lactate: 0.9  Meds: ALBUTerol/ipratropium for Nebulization 3 milliLiter(s) Nebulizer every 6 hours    CARDIOVASCULAR  HR: 65 (10-15-18 @ 04:23) (63 - 86)  BP: 123/57 (10-14-18 @ 19:00) (123/57 - 123/57)  BP(mean): 82 (10-14-18 @ 19:00) (82 - 82)  ABP: 119/45 (10-15-18 @ 04:00) (114/41 - 162/55)  ABP(mean): 67 (10-15-18 @ 04:00) (62 - 90)  Exam: irregularly irregular  Cardiac Rhythm: atrial fibrillation  Perfusion    [x]Adequate    [ ]Inadequate  Mentation   [ ]Normal       [x]Reduced  Extremities  [x]Warm         [ ]Cool  Volume Status [ ]Hypervolemic [x]Euvolemic [ ]Hypovolemic  Meds: none    GI/NUTRITION  Exam: soft, nontender, distended  Diet: NPO w/  John NG feeding tube to gravity.  Meds:  - docusate sodium Liquid 100 milliGRAM(s) Oral three times a day  - pantoprazole  Injectable 40 milliGRAM(s) IV Push daily  - polyethylene glycol 3350 17 Gram(s) Oral daily  - senna Syrup 5 milliLiter(s) Oral at bedtime    GENITOURINARY      144  |  110<H>  |  47<H>  ----------------------------<  214<H>  4.5   |  23  |  1.33<H>    Ca    8.3<L>      15 Oct 2018 02:21  Phos  3.6  Mg     2.6  TPro  5.8<L>  /  Alb  2.6<L>  /  TBili  1.3<H>  /  DBili  0.4<H>  /  AST  18  /  ALT  34  /  AlkPhos  68    [x] Lares catheter, indication: urine output monitoring in the critically ill  Meds: dextrose 5% + sodium chloride 0.45% infuse at 75 mL/hr    HEMATOLOGIC  Meds: enoxaparin Injectable 40 milliGRAM(s) SubCutaneous daily  [x] VTE Prophylaxis                        9.0    12.5  )-----------( 214      ( 15 Oct 2018 02:21 )             26.9     PT/INR - ( 15 Oct 2018 02:21 )   PT: 14.9 sec;   INR: 1.36 ratio    PTT - ( 15 Oct 2018 02:21 )  PTT:32.8 sec    INFECTIOUS DISEASES  T(C): 37.1 (10-15-18 @ 03:00), Max: 37.5 (10-14-18 @ 23:00)  WBC Count:  - 12.5 K/uL (10-15 @ 02:21)  - 11.7 K/uL (10-14 @ 19:44)  Recent Cultures: none  Meds: none    ENDOCRINE  Capillary Blood Glucose:  - POCT Blood Glucose.: 235 mg/dL (15 Oct 2018 02:14)  - POCT Blood Glucose.: 154 mg/dL (14 Oct 2018 21:51)  - POCT Blood Glucose.: 123 mg/dL (14 Oct 2018 18:12)  - POCT Blood Glucose.: 169 mg/dL (14 Oct 2018 14:21)  - POCT Blood Glucose.: 210 mg/dL (14 Oct 2018 09:49)  - POCT Blood Glucose.: 159 mg/dL (14 Oct 2018 05:41)  Meds:  - insulin glargine Injectable (LANTUS) 20 Unit(s) SubCutaneous at bedtime  - insulin lispro (HumaLOG) corrective regimen sliding scale   SubCutaneous every 4 hours    ACCESS DEVICES:  [x] Peripheral IV  [ ] Central Venous Line	[ ] R	[ ] L	[ ] IJ	[ ] Fem	[ ] SC	Placed:   [x] Arterial Line		[x] R	[ ] L	[x] Fem	[ ] Rad	[ ] Ax	Placed: 10/11/2018  [ ] PICC:					[ ] Mediport  [x] Urinary Catheter, Date Placed:   [x] Necessity of urinary, arterial, and venous catheters discussed    OTHER MEDICATIONS: chlorhexidine 4% Liquid 1 Application(s) Topical <User Schedule>    CODE STATUS: Full code.    IMAGING:

## 2018-10-15 NOTE — PROGRESS NOTE ADULT - ATTENDING COMMENTS
Mr. Kingsley remains intubated.     ICU Vital Signs Last 24 Hrs  T(C): 37.1 (15 Oct 2018 03:00), Max: 37.5 (14 Oct 2018 23:00)  T(F): 98.7 (15 Oct 2018 03:00), Max: 99.5 (14 Oct 2018 23:00)  HR: 77 (15 Oct 2018 07:00) (62 - 86)  BP: 123/57 (14 Oct 2018 19:00) (123/57 - 123/57)  BP(mean): 82 (14 Oct 2018 19:00) (82 - 82)  ABP: 128/46 (15 Oct 2018 07:00) (116/41 - 162/55)  ABP(mean): 71 (15 Oct 2018 07:00) (62 - 93)  RR: 18 (15 Oct 2018 07:00) (16 - 23)  SpO2: 100% (15 Oct 2018 07:00) (98% - 100%)                          9.0    12.5  )-----------( 214      ( 15 Oct 2018 02:21 )             26.9   10-15    144  |  110<H>  |  47<H>  ----------------------------<  214<H>  4.5   |  23  |  1.33<H>    ABG - ( 15 Oct 2018 02:22 )  pH, Arterial: 7.47  pH, Blood: x     /  pCO2: 35    /  pO2: 117   / HCO3: 26    / Base Excess: 2.3   /  SaO2: 99        general: intubated and comfortable  abd: soft, minimal distension    77 year old man s/p IR embolization of segment 5 liver lesion for hemoperitoneum  1. Appreciate SICU care - spontaneous breathing trials, and will attempt to extubate  2. H/H stable - continue to monitor with daily CBCs

## 2018-10-15 NOTE — DIETITIAN INITIAL EVALUATION ADULT. - PERTINENT MEDS FT
o.45% NaCl at 20ml/hr, Lactulose, Keppra, Miralax, Colace, Senna, Correctional sliding scale insulin

## 2018-10-15 NOTE — PROGRESS NOTE ADULT - ATTENDING COMMENTS
77 year old male presenting with hemoperitoneum and hemorrhagic shock s/p IR embolization of liver lesion in segment 5 complicated by acute respiratory failure requiring intubation.  More awake  Good gas exchange, passed SBT s/p extubated  HCT stable, will continue to trend  Diuresis, CXR b/l effusion atelectasis  Very gentle hydration while NPO  Work up for hepatic mass in progress  Surg oncology consult  Mobilization

## 2018-10-15 NOTE — PROGRESS NOTE ADULT - SUBJECTIVE AND OBJECTIVE BOX
SURGICAL ONCOLOGY  Patient seen and examined.     MEDICATIONS  (STANDING):  ALBUTerol/ipratropium for Nebulization 3 milliLiter(s) Nebulizer every 6 hours  calcium gluconate IVPB 2 Gram(s) IV Intermittent once  chlorhexidine 4% Liquid 1 Application(s) Topical <User Schedule>  docusate sodium Liquid 100 milliGRAM(s) Oral three times a day  enoxaparin Injectable 40 milliGRAM(s) SubCutaneous daily  insulin glargine Injectable (LANTUS) 20 Unit(s) SubCutaneous at bedtime  insulin lispro (HumaLOG) corrective regimen sliding scale   SubCutaneous every 4 hours  lactulose Syrup 20 Gram(s) Oral every 12 hours  levETIRAcetam  Solution 500 milliGRAM(s) Oral every 12 hours  metoprolol tartrate 25 milliGRAM(s) Oral every 12 hours  polyethylene glycol 3350 17 Gram(s) Oral daily  senna Syrup 5 milliLiter(s) Oral at bedtime  sodium chloride 0.45%. 1000 milliLiter(s) (20 mL/Hr) IV Continuous <Continuous>    MEDICATIONS  (PRN):  acetaminophen    Suspension .. 975 milliGRAM(s) Oral every 6 hours PRN Mild Pain (1 - 3)  oxyCODONE    Solution 2.5 milliGRAM(s) Oral every 6 hours PRN Moderate Pain (4 - 6)      Vital Signs Last 24 Hrs  T(C): 36.6 (15 Oct 2018 11:00), Max: 37.5 (14 Oct 2018 23:00)  T(F): 97.8 (15 Oct 2018 11:00), Max: 99.5 (14 Oct 2018 23:00)  HR: 118 (15 Oct 2018 14:00) (62 - 131)  BP: 123/57 (14 Oct 2018 19:00) (123/57 - 123/57)  BP(mean): 82 (14 Oct 2018 19:00) (82 - 82)  RR: 26 (15 Oct 2018 14:00) (16 - 28)  SpO2: 97% (15 Oct 2018 14:00) (95% - 100%)    I&O's Detail    14 Oct 2018 07:01  -  15 Oct 2018 07:00  --------------------------------------------------------  IN:    dexmedetomidine Infusion: 261.2 mL    dextrose 5% + sodium chloride 0.45%.: 1350 mL    Glucerna: 120 mL    Solution: 100 mL    Solution: 100 mL  Total IN: 1931.2 mL    OUT:    Indwelling Catheter - Urethral: 1190 mL  Total OUT: 1190 mL    Total NET: 741.2 mL      15 Oct 2018 07:01  -  15 Oct 2018 14:47  --------------------------------------------------------  IN:    dexmedetomidine Infusion: 25.8 mL    dextrose 5% + sodium chloride 0.45%.: 150 mL    sodium chloride 0.45%.: 100 mL  Total IN: 275.8 mL    OUT:    Indwelling Catheter - Urethral: 1425 mL  Total OUT: 1425 mL    Total NET: -1149.2 mL          Daily     Daily Weight in k.2 (15 Oct 2018 12:30)    LABS:                        9.0    12.5  )-----------( 214      ( 15 Oct 2018 02:21 )             26.9     10-15    144  |  110<H>  |  47<H>  ----------------------------<  214<H>  4.5   |  23  |  1.33<H>    Ca    8.3<L>      15 Oct 2018 02:21  Phos  3.6     10-15  Mg     2.6     10-15    TPro  5.8<L>  /  Alb  2.6<L>  /  TBili  1.3<H>  /  DBili  0.4<H>  /  AST  18  /  ALT  34  /  AlkPhos  68  10-15    PT/INR - ( 15 Oct 2018 02:21 )   PT: 14.9 sec;   INR: 1.36 ratio         PTT - ( 15 Oct 2018 02:21 )  PTT:32.8 sec      Chief complaint: abd pain- now still on vent  PHYSICAL EXAM:  Gen:   Abd: soft NT ND    77yMale s/p IR embolization of segment 5 liver mass  Plan:   - Hb stable  - SICU care- vent support, wean to extubate  - MRI liver mass protocol when medically stable to evaluate liver mass- also can be obtained as outpt.  -Thank you for allowing me to take care of your patient. I will follow the patient with you.  Regards  Christos Singleton MD  Division of Surgical Oncology  [address] [place] [id number]  Ph:[phone]

## 2018-10-15 NOTE — DIETITIAN INITIAL EVALUATION ADULT. - FACTORS AFF FOOD INTAKE
noted Pt previously was receiving Glucerna 1.2 (total received 480ml- 576cal, 29 Gm Prot), was held due to abdominal distention, now on bowel regimen, no BM as of yet as per RN

## 2018-10-15 NOTE — PROGRESS NOTE ADULT - ASSESSMENT
ASSESSMENT:  77 year old male presenting with hemoperitoneum and hemorrhagic shock s/p IR embolization of liver lesion in segment 5 complicated by acute respiratory failure requiring intubation.    PLAN:    Neuro: acute abdominal pain, intubated, seizures  - Monitor mental status.  - Precedex for sedation while intubated.  - Home Keppra for seizures.    Resp: acute respiratory failure  - Monitor pulse oximeter and ABGs.  - Wean mechanical ventilator with goal to extubate.  - No history of reactive airway disease so discontinue Duoneb.    CV: atrial fibrillation, hemorrhagic shock (resolved)  - Monitor vital signs.    GI: hemoperitoneum, embolized segment 5 liver lesion, distended abdomen  - NPO. Has  John NG feeding tube to gravity.  - Bowel regimen with Colace, senna, and Miralax.  - Protonix for stress ulcer prophylaxis.  - Follow up liver lesion work-up. Appreciate oncology recommendations.    Renal: DONY on CKD stage 2 (baseline Cr 1.0) from likely hemorrhagic ATN  - Monitor I&Os.  - Monitor electrolytes and replete as necessary.  - D5 1/2NS at 75 mL/hr while NPO.    Heme: hemorrhagic shock (resolved)  - Monitor CBC and coags.  - Lovenox for VTE prophylaxis.    ID: no acute issues  - Monitor for clinical evidence of active infection.    Endo: DM type II  - Monitor blood glucose q4hrs. Lantus 20 units at bedtime with moderate ISS q4hrs for glycemic control.    Disposition:  - Full code.  - Will remain in SICU for ventilatory management.    Amy Quevedo PA-C     [id number]

## 2018-10-15 NOTE — AIRWAY REMOVAL NOTE  ADULT & PEDS - ARTIFICAL AIRWAY REMOVAL COMMENTS
written order for extubation verified. The patient was identified by full name and birth date compared to the identification band.  Present during the procedure was ZIYAD Hardy
Written order for extubation verified. The patient was identified by full name and birth date compared to the identification band. Present during the procedure was Wyatt Taylor RN

## 2018-10-15 NOTE — PHYSICAL THERAPY INITIAL EVALUATION ADULT - BALANCE TRAINING, PT EVAL
GOAL: Pt will demonstrate improved static/dynamic sitting balance to good, in order to improve stability, decrease fall risk and increase independence with transfers & ADLs within 4 weeks.

## 2018-10-15 NOTE — PHYSICAL THERAPY INITIAL EVALUATION ADULT - ADDITIONAL COMMENTS
10/10 CTA CHEST, ABDOMEN, & PELVIS: No aortic aneurysm, dissection, intramural hematoma, or penetrating ulcer. High density perihepatic and perisplenic ascites, concerning for hemoperitoneum, with a 4 cm clot in the right upper quadrant mesentery. There is a lobulated contour of the mildly of the inferior tip of the liver and a liver mass cannot be excluded. Evaluation is limited by the arterial phase of imaging.

## 2018-10-15 NOTE — DIETITIAN INITIAL EVALUATION ADULT. - NS AS NUTRI INTERV ENTERAL NUTRITION
As medically feasible, if plan to continue enteral nutrition, would continue c Glucerna 1.2 c a goal rate of 60ml/hr (1728cal, 86 Gm Prot, 1440ml; 23cal/kg and ~1.1 Gm Prot/kg based on IBW of 75.5kg). As medically feasible, if plan to continue enteral nutrition, would continue c Glucerna 1.2 c a goal rate of 60ml/hr (1728cal, 86 Gm Prot, 1440ml; 23cal/kg and ~1.1 Gm Prot/kg based on IBW of 75.5kg). RD remains available as feasible to obtain further nutrition history.

## 2018-10-15 NOTE — DIETITIAN INITIAL EVALUATION ADULT. - NS AS NUTRI INTERV MEALS SNACK
If and when medically feasible, if PO diet initiated, advance as tolerated to consistent CHO, DASH diet.

## 2018-10-15 NOTE — PHYSICAL THERAPY INITIAL EVALUATION ADULT - CRITERIA FOR SKILLED THERAPEUTIC INTERVENTIONS
impairments found/functional limitations in following categories/predicted duration of therapy intervention/therapy frequency/rehab potential/anticipated equipment needs at discharge/anticipated discharge recommendation/risk reduction/prevention

## 2018-10-15 NOTE — DIETITIAN INITIAL EVALUATION ADULT. - ENERGY NEEDS
ht:5'10", wt: 190 pounds, BMI: 27.2kg/m2, IBW:166 pounds +/- 10%     Noted wt in house 190-200 pounds, would continue to monitor.  Would adjust estimated nutrient needs as needed based on course of care and further nutrition history.    Pt admitted c spontaneous hemoperitoneum and "hemorrhagic shock s/p IR embolization of liver lesion in segment 5 complicated by acute respiratory failure requiring intubation," as per SICU team.

## 2018-10-15 NOTE — PROGRESS NOTE ADULT - ASSESSMENT
Stable hemodynamics without evidence of ongoing bleed. Mild dip in H/H this morning, will recheck 4-6 hours apart. Off pressors. Wean to extubate. Repeat CTA if he has evidence of rebleed.

## 2018-10-15 NOTE — PROGRESS NOTE ADULT - ASSESSMENT
77M presented 10/10 with abd pain, hypotension, and tachycardia. Patient had downtrending H/H and CT abd showed hemoperitoneum. Now s/p IR embolization of segment 5 bleeding liver mass on 10/11.     Plan:  - NPO  - Bowel regimen :senna, miralax, colace  - Trend H/H  - F/u PT recs  - DVT ppx: Lovenox  - Follow care plan per SICU team 77M presented 10/10 with abd pain, hypotension, and tachycardia. Patient had downtrending H/H and CT abd showed hemoperitoneum. Now s/p IR embolization of segment 5 bleeding liver mass on 10/11.     Plan:  - NPO  - Bowel regimen: senna, miralax, colace  - Extubate when abd distension decreases  - Trend H/H  - F/u PT recs  - DVT ppx: Lovenox  - Follow care plan per SICU team

## 2018-10-16 LAB
ANION GAP SERPL CALC-SCNC: 14 MMOL/L — SIGNIFICANT CHANGE UP (ref 5–17)
APTT BLD: 28.1 SEC — SIGNIFICANT CHANGE UP (ref 27.5–37.4)
BUN SERPL-MCNC: 37 MG/DL — HIGH (ref 7–23)
CALCIUM SERPL-MCNC: 9 MG/DL — SIGNIFICANT CHANGE UP (ref 8.4–10.5)
CHLORIDE SERPL-SCNC: 112 MMOL/L — HIGH (ref 96–108)
CO2 SERPL-SCNC: 22 MMOL/L — SIGNIFICANT CHANGE UP (ref 22–31)
CREAT SERPL-MCNC: 1.16 MG/DL — SIGNIFICANT CHANGE UP (ref 0.5–1.3)
GAS PNL BLDA: SIGNIFICANT CHANGE UP
GAS PNL BLDA: SIGNIFICANT CHANGE UP
GLUCOSE BLDC GLUCOMTR-MCNC: 114 MG/DL — HIGH (ref 70–99)
GLUCOSE BLDC GLUCOMTR-MCNC: 147 MG/DL — HIGH (ref 70–99)
GLUCOSE BLDC GLUCOMTR-MCNC: 172 MG/DL — HIGH (ref 70–99)
GLUCOSE BLDC GLUCOMTR-MCNC: 174 MG/DL — HIGH (ref 70–99)
GLUCOSE BLDC GLUCOMTR-MCNC: 200 MG/DL — HIGH (ref 70–99)
GLUCOSE SERPL-MCNC: 123 MG/DL — HIGH (ref 70–99)
HCT VFR BLD CALC: 28.7 % — LOW (ref 39–50)
HGB BLD-MCNC: 9.8 G/DL — LOW (ref 13–17)
INR BLD: 1.46 RATIO — HIGH (ref 0.88–1.16)
MAGNESIUM SERPL-MCNC: 2.3 MG/DL — SIGNIFICANT CHANGE UP (ref 1.6–2.6)
MCHC RBC-ENTMCNC: 31.8 PG — SIGNIFICANT CHANGE UP (ref 27–34)
MCHC RBC-ENTMCNC: 34 GM/DL — SIGNIFICANT CHANGE UP (ref 32–36)
MCV RBC AUTO: 93.4 FL — SIGNIFICANT CHANGE UP (ref 80–100)
PHOSPHATE SERPL-MCNC: 3 MG/DL — SIGNIFICANT CHANGE UP (ref 2.5–4.5)
PLATELET # BLD AUTO: 291 K/UL — SIGNIFICANT CHANGE UP (ref 150–400)
POTASSIUM SERPL-MCNC: 4 MMOL/L — SIGNIFICANT CHANGE UP (ref 3.5–5.3)
POTASSIUM SERPL-SCNC: 4 MMOL/L — SIGNIFICANT CHANGE UP (ref 3.5–5.3)
PROTHROM AB SERPL-ACNC: 15.9 SEC — HIGH (ref 9.8–12.7)
RBC # BLD: 3.08 M/UL — LOW (ref 4.2–5.8)
RBC # FLD: 12.8 % — SIGNIFICANT CHANGE UP (ref 10.3–14.5)
SODIUM SERPL-SCNC: 148 MMOL/L — HIGH (ref 135–145)
WBC # BLD: 12.1 K/UL — HIGH (ref 3.8–10.5)
WBC # FLD AUTO: 12.1 K/UL — HIGH (ref 3.8–10.5)

## 2018-10-16 PROCEDURE — 71045 X-RAY EXAM CHEST 1 VIEW: CPT | Mod: 26

## 2018-10-16 PROCEDURE — 99291 CRITICAL CARE FIRST HOUR: CPT

## 2018-10-16 PROCEDURE — 99232 SBSQ HOSP IP/OBS MODERATE 35: CPT

## 2018-10-16 RX ORDER — INSULIN GLARGINE 100 [IU]/ML
10 INJECTION, SOLUTION SUBCUTANEOUS AT BEDTIME
Qty: 0 | Refills: 0 | Status: DISCONTINUED | OUTPATIENT
Start: 2018-10-16 | End: 2018-10-17

## 2018-10-16 RX ORDER — ACETAMINOPHEN 500 MG
975 TABLET ORAL EVERY 6 HOURS
Qty: 0 | Refills: 0 | Status: DISCONTINUED | OUTPATIENT
Start: 2018-10-16 | End: 2018-10-23

## 2018-10-16 RX ORDER — DOXAZOSIN MESYLATE 4 MG
2 TABLET ORAL ONCE
Qty: 0 | Refills: 0 | Status: COMPLETED | OUTPATIENT
Start: 2018-10-16 | End: 2018-10-16

## 2018-10-16 RX ORDER — TAMSULOSIN HYDROCHLORIDE 0.4 MG/1
0.4 CAPSULE ORAL AT BEDTIME
Qty: 0 | Refills: 0 | Status: DISCONTINUED | OUTPATIENT
Start: 2018-10-16 | End: 2018-10-23

## 2018-10-16 RX ORDER — INSULIN LISPRO 100/ML
VIAL (ML) SUBCUTANEOUS EVERY 6 HOURS
Qty: 0 | Refills: 0 | Status: DISCONTINUED | OUTPATIENT
Start: 2018-10-16 | End: 2018-10-17

## 2018-10-16 RX ORDER — LEVETIRACETAM 250 MG/1
500 TABLET, FILM COATED ORAL EVERY 12 HOURS
Qty: 0 | Refills: 0 | Status: DISCONTINUED | OUTPATIENT
Start: 2018-10-16 | End: 2018-10-18

## 2018-10-16 RX ORDER — AMLODIPINE BESYLATE 2.5 MG/1
2.5 TABLET ORAL EVERY 24 HOURS
Qty: 0 | Refills: 0 | Status: DISCONTINUED | OUTPATIENT
Start: 2018-10-16 | End: 2018-10-16

## 2018-10-16 RX ORDER — DOXAZOSIN MESYLATE 4 MG
2 TABLET ORAL AT BEDTIME
Qty: 0 | Refills: 0 | Status: DISCONTINUED | OUTPATIENT
Start: 2018-10-16 | End: 2018-10-16

## 2018-10-16 RX ORDER — LACTULOSE 10 G/15ML
20 SOLUTION ORAL EVERY 12 HOURS
Qty: 0 | Refills: 0 | Status: DISCONTINUED | OUTPATIENT
Start: 2018-10-16 | End: 2018-10-17

## 2018-10-16 RX ORDER — DOCUSATE SODIUM 100 MG
100 CAPSULE ORAL THREE TIMES A DAY
Qty: 0 | Refills: 0 | Status: DISCONTINUED | OUTPATIENT
Start: 2018-10-16 | End: 2018-10-22

## 2018-10-16 RX ORDER — TAMSULOSIN HYDROCHLORIDE 0.4 MG/1
0.4 CAPSULE ORAL ONCE
Qty: 0 | Refills: 0 | Status: DISCONTINUED | OUTPATIENT
Start: 2018-10-16 | End: 2018-10-16

## 2018-10-16 RX ORDER — METOPROLOL TARTRATE 50 MG
50 TABLET ORAL EVERY 12 HOURS
Qty: 0 | Refills: 0 | Status: DISCONTINUED | OUTPATIENT
Start: 2018-10-16 | End: 2018-10-18

## 2018-10-16 RX ORDER — POLYETHYLENE GLYCOL 3350 17 G/17G
17 POWDER, FOR SOLUTION ORAL DAILY
Qty: 0 | Refills: 0 | Status: DISCONTINUED | OUTPATIENT
Start: 2018-10-16 | End: 2018-10-23

## 2018-10-16 RX ORDER — AMLODIPINE BESYLATE 2.5 MG/1
2.5 TABLET ORAL EVERY 24 HOURS
Qty: 0 | Refills: 0 | Status: DISCONTINUED | OUTPATIENT
Start: 2018-10-16 | End: 2018-10-17

## 2018-10-16 RX ORDER — SENNA PLUS 8.6 MG/1
2 TABLET ORAL AT BEDTIME
Qty: 0 | Refills: 0 | Status: DISCONTINUED | OUTPATIENT
Start: 2018-10-16 | End: 2018-10-23

## 2018-10-16 RX ORDER — TAMSULOSIN HYDROCHLORIDE 0.4 MG/1
0.4 CAPSULE ORAL AT BEDTIME
Qty: 0 | Refills: 0 | Status: DISCONTINUED | OUTPATIENT
Start: 2018-10-16 | End: 2018-10-16

## 2018-10-16 RX ORDER — MIRTAZAPINE 45 MG/1
15 TABLET, ORALLY DISINTEGRATING ORAL
Qty: 0 | Refills: 0 | Status: DISCONTINUED | OUTPATIENT
Start: 2018-10-16 | End: 2018-10-17

## 2018-10-16 RX ADMIN — LEVETIRACETAM 500 MILLIGRAM(S): 250 TABLET, FILM COATED ORAL at 05:08

## 2018-10-16 RX ADMIN — CHLORHEXIDINE GLUCONATE 1 APPLICATION(S): 213 SOLUTION TOPICAL at 05:08

## 2018-10-16 RX ADMIN — Medication 100 MILLIGRAM(S): at 05:08

## 2018-10-16 RX ADMIN — Medication 3 MILLILITER(S): at 17:27

## 2018-10-16 RX ADMIN — LACTULOSE 20 GRAM(S): 10 SOLUTION ORAL at 12:48

## 2018-10-16 RX ADMIN — LEVETIRACETAM 500 MILLIGRAM(S): 250 TABLET, FILM COATED ORAL at 18:05

## 2018-10-16 RX ADMIN — MIRTAZAPINE 15 MILLIGRAM(S): 45 TABLET, ORALLY DISINTEGRATING ORAL at 23:13

## 2018-10-16 RX ADMIN — Medication 50 MILLIGRAM(S): at 18:05

## 2018-10-16 RX ADMIN — Medication 3 MILLILITER(S): at 06:16

## 2018-10-16 RX ADMIN — INSULIN GLARGINE 10 UNIT(S): 100 INJECTION, SOLUTION SUBCUTANEOUS at 23:14

## 2018-10-16 RX ADMIN — ENOXAPARIN SODIUM 40 MILLIGRAM(S): 100 INJECTION SUBCUTANEOUS at 12:49

## 2018-10-16 RX ADMIN — Medication 2 MILLIGRAM(S): at 14:28

## 2018-10-16 RX ADMIN — Medication 50 MILLIGRAM(S): at 05:08

## 2018-10-16 RX ADMIN — POLYETHYLENE GLYCOL 3350 17 GRAM(S): 17 POWDER, FOR SOLUTION ORAL at 12:48

## 2018-10-16 RX ADMIN — Medication 2: at 18:04

## 2018-10-16 RX ADMIN — Medication 2: at 12:57

## 2018-10-16 RX ADMIN — Medication 3 MILLILITER(S): at 13:13

## 2018-10-16 RX ADMIN — Medication 2: at 23:13

## 2018-10-16 RX ADMIN — Medication 100 MILLIGRAM(S): at 14:28

## 2018-10-16 RX ADMIN — AMLODIPINE BESYLATE 2.5 MILLIGRAM(S): 2.5 TABLET ORAL at 09:02

## 2018-10-16 NOTE — PROGRESS NOTE ADULT - SUBJECTIVE AND OBJECTIVE BOX
77 year old man with PMH of atrial fibrillation on Xarelto, HTN, DM, BPH, and seizures presented yesterday with abdominal pain and was found to have perihepatic and perisplenic hemorrhagic ascites with likely bleeding liver mass on CTA. Hct was 34 on presentation and dropped to 29. He received 2u pRBCs and was transferred to SICU for close monitoring. He received 2u FFP. This morning he went to interventional radiology for angiography and a lesion in segment 5 (with tumor vascularity) was identified. Two subsegmental vessels supplying segment 5 were embolized and completion angiography demonstrated a devascularized lesion. He received an additional 2u pRBCs while at interventional radiology with no increase in Hct (32.9 --> 30.6). He was transferred back to the SICU on phenylephrine. He became hypotensive and tachycardic, and his phenylephrine increased to 1.5. He was given another 2u pRBCs and 2u FFP.       24 HOUR EVENTS: Pt extubated this am to NC.  HFNC attempted as pt tachypneic with secretions, however patient refusing.  Placed on humidified facemask. Metoprolol increased to 50mg q 12hrs with additional pushes of 7.5mg total.  Given 20mg IV Lasix in am, fluids changed to 1/2NS @ 20mL/hr.  Tube feeds held after extubation and abdominal x-ray obtained to evaluate for ileus.  Pt given lactulose and rectal exam performed with no stool in the vault.  Overnight pt had BM x 2.  Pt tachypneic with increased secretions at beginning of night shift, changed back to HFNC with aggressive chest PT and deep suctioning.  Respiratory status improved with above interventions.     SUBJECTIVE/ROS:  [x ]A ten-point review of systems was otherwise negative except as noted.  [ ] Due to altered mental status/intubation, subjective information were not able to be obtained from the patient. History was obtained, to the extent possible, from review of the chart and collateral sources of information.      NEURO  RASS:     GCS:     [facility] ICU:  Exam: Awake, following commands.   Meds: acetaminophen    Suspension .. 975 milliGRAM(s) Oral every 6 hours PRN Mild Pain (1 - 3)  levETIRAcetam  Solution 500 milliGRAM(s) Oral every 12 hours  oxyCODONE    Solution 2.5 milliGRAM(s) Oral every 6 hours PRN Moderate Pain (4 - 6)    [x] Adequacy of sedation and pain control has been assessed and adjusted      RESPIRATORY  RR: 24 (10-16-18 @ 02:00) (18 - 34)  SpO2: 96% (10-16-18 @ 02:00) (92% - 100%)  Wt(kg): --  Exam: Tachypneic, coarse upper airway sounds.    Mechanical Ventilation: Mode: off, RR (patient): 24, FiO2: 30  ABG - ( 15 Oct 2018 10:56 )  pH: 7.50  /  pCO2: 31    /  pO2: 114   / HCO3: 24    / Base Excess: 1.9   /  SaO2: 99      Lactate: x        [ ] Extubation Readiness Assessed  Meds: ALBUTerol/ipratropium for Nebulization 3 milliLiter(s) Nebulizer every 6 hours        CARDIOVASCULAR  HR: 105 (10-16-18 @ 02:00) (62 - 131)  BP: 174/93 (10-15-18 @ 20:00) (174/93 - 174/93)  BP(mean): 124 (10-15-18 @ 20:00) (124 - 124)  ABP: 150/53 (10-16-18 @ 02:00) (119/45 - 197/73)  ABP(mean): 83 (10-16-18 @ 02:00) (66 - 115)  Wt(kg): --  CVP(cm H2O): --      Exam:  Cardiac Rhythm:  Perfusion     [x ]Adequate   [ ]Inadequate  Mentation   [x ]Normal       [ ]Reduced  Extremities  [x ]Warm         [ ]Cool  Volume Status [x ]Hypervolemic [ ]Euvolemic [ ]Hypovolemic  Meds: metoprolol tartrate 50 milliGRAM(s) Oral every 12 hours      GI/NUTRITION  Exam: Obese, soft. Non-tender.  +bowel sounds.   Diet: NPO   Meds: docusate sodium Liquid 100 milliGRAM(s) Oral three times a day  lactulose Syrup 20 Gram(s) Oral every 12 hours  polyethylene glycol 3350 17 Gram(s) Oral daily  senna Syrup 5 milliLiter(s) Oral at bedtime      GENITOURINARY  I&O's Detail    10-14 @ 07:01  -  10-15 @ 07:00  --------------------------------------------------------  IN:    dexmedetomidine Infusion: 261.2 mL    dextrose 5% + sodium chloride 0.45%.: 1350 mL    Glucerna: 120 mL    Solution: 100 mL    Solution: 100 mL  Total IN: 1931.2 mL    OUT:    Indwelling Catheter - Urethral: 1190 mL  Total OUT: 1190 mL    Total NET: 741.2 mL      10-15 @ 07:01  -  10-16 @ 02:06  --------------------------------------------------------  IN:    dexmedetomidine Infusion: 25.8 mL    dextrose 5% + sodium chloride 0.45%.: 150 mL    Enteral Tube Flush: 100 mL    sodium chloride 0.45%.: 320 mL    Solution: 100 mL  Total IN: 695.8 mL    OUT:    Indwelling Catheter - Urethral: 2550 mL  Total OUT: 2550 mL    Total NET: -1854.2 mL      10-15    144  |  110<H>  |  47<H>  ----------------------------<  214<H>  4.5   |  23  |  1.33<H>    Ca    8.3<L>      15 Oct 2018 02:21  Phos  3.6     10-15  Mg     2.6     10-15    TPro  5.8<L>  /  Alb  2.6<L>  /  TBili  1.3<H>  /  DBili  0.4<H>  /  AST  18  /  ALT  34  /  AlkPhos  68  10-15    [x ] Lares catheter, indication: strict I's and O's   Meds: sodium chloride 0.45%. 1000 milliLiter(s) IV Continuous <Continuous>        HEMATOLOGIC  Meds: enoxaparin Injectable 40 milliGRAM(s) SubCutaneous daily    [x] VTE Prophylaxis                        9.0    12.5  )-----------( 214      ( 15 Oct 2018 02:21 )             26.9     PT/INR - ( 15 Oct 2018 02:21 )   PT: 14.9 sec;   INR: 1.36 ratio         PTT - ( 15 Oct 2018 02:21 )  PTT:32.8 sec  Transfusion     [ ] PRBC   [ ] Platelets   [ ] FFP   [ ] Cryoprecipitate      INFECTIOUS DISEASES  T(C): 36.5 (10-16-18 @ 00:00), Max: 37.1 (10-15-18 @ 03:00)  Wt(kg): --  WBC Count: 12.5 K/uL (10-15 @ 02:21)    Recent Cultures:    Meds:       ENDOCRINE  Capillary Blood Glucose    Meds: insulin glargine Injectable (LANTUS) 20 Unit(s) SubCutaneous at bedtime  insulin lispro (HumaLOG) corrective regimen sliding scale   SubCutaneous every 4 hours      ACCESS DEVICES:  [x ] Peripheral IV  [ ] Central Venous Line	[ ] R	[ ] L	[ ] IJ	[ ] Fem	[ ] SC	Placed:   [x ] Arterial Line		[x ] R	[ ] L	[x ] Fem	[ ] Rad	[ ] Ax	Placed:   [ ] PICC:					[ ] Mediport  [x ] Urinary Catheter, Date Placed:   [x ] Necessity of urinary, arterial, and venous catheters discussed    OTHER MEDICATIONS:  chlorhexidine 4% Liquid 1 Application(s) Topical <User Schedule>      CODE STATUS:     IMAGING: 77 year old man with PMH of atrial fibrillation on Xarelto, HTN, DM, BPH, and seizures presented yesterday with abdominal pain and was found to have perihepatic and perisplenic hemorrhagic ascites with likely bleeding liver mass on CTA. Hct was 34 on presentation and dropped to 29. He received 2u pRBCs and was transferred to SICU for close monitoring. He received 2u FFP. This morning he went to interventional radiology for angiography and a lesion in segment 5 (with tumor vascularity) was identified. Two subsegmental vessels supplying segment 5 were embolized and completion angiography demonstrated a devascularized lesion. He received an additional 2u pRBCs while at interventional radiology with no increase in Hct (32.9 --> 30.6). He was transferred back to the SICU on phenylephrine. He became hypotensive and tachycardic, and his phenylephrine increased to 1.5. He was given another 2u pRBCs and 2u FFP.       24 HOUR EVENTS: Pt extubated this am to NC.  HFNC attempted as pt tachypneic with secretions, however patient refusing.  Placed on humidified facemask. Metoprolol increased to 50mg q 12hrs with additional pushes of 7.5mg total.  Given 20mg IV Lasix in am, fluids changed to 1/2NS @ 20mL/hr.  Tube feeds held after extubation and abdominal x-ray obtained to evaluate for ileus.  Pt given lactulose and rectal exam performed with no stool in the vault.  Overnight pt had BM x 2.  Pt tachypneic with increased secretions at beginning of night shift, changed back to HFNC with aggressive chest PT and deep suctioning.  Respiratory status improved with above interventions.     SUBJECTIVE/ROS:  [x ]A ten-point review of systems was otherwise negative except as noted.  [ ] Due to altered mental status/intubation, subjective information were not able to be obtained from the patient. History was obtained, to the extent possible, from review of the chart and collateral sources of information.      NEURO  RASS:     GCS:     [facility] ICU:  Exam: Awake, following commands.   Meds: acetaminophen    Suspension .. 975 milliGRAM(s) Oral every 6 hours PRN Mild Pain (1 - 3)  levETIRAcetam  Solution 500 milliGRAM(s) Oral every 12 hours  oxyCODONE    Solution 2.5 milliGRAM(s) Oral every 6 hours PRN Moderate Pain (4 - 6)    [x] Adequacy of sedation and pain control has been assessed and adjusted      RESPIRATORY  RR: 24 (10-16-18 @ 02:00) (18 - 34)  SpO2: 96% (10-16-18 @ 02:00) (92% - 100%)  Wt(kg): --  Exam: Tachypneic, coarse upper airway sounds.    Mechanical Ventilation: Mode: off, RR (patient): 24, FiO2: 30  ABG - ( 15 Oct 2018 10:56 )  pH: 7.50  /  pCO2: 31    /  pO2: 114   / HCO3: 24    / Base Excess: 1.9   /  SaO2: 99      Lactate: x        [ ] Extubation Readiness Assessed  Meds: ALBUTerol/ipratropium for Nebulization 3 milliLiter(s) Nebulizer every 6 hours        CARDIOVASCULAR  HR: 105 (10-16-18 @ 02:00) (62 - 131)  BP: 174/93 (10-15-18 @ 20:00) (174/93 - 174/93)  BP(mean): 124 (10-15-18 @ 20:00) (124 - 124)  ABP: 150/53 (10-16-18 @ 02:00) (119/45 - 197/73)  ABP(mean): 83 (10-16-18 @ 02:00) (66 - 115)  Wt(kg): --  CVP(cm H2O): --      Exam: Irregularly irregular, tachycardic. +S1, +S2.   Cardiac Rhythm: A.fib   Perfusion     [x ]Adequate   [ ]Inadequate  Mentation   [x ]Normal       [ ]Reduced  Extremities  [x ]Warm         [ ]Cool  Volume Status [x ]Hypervolemic [ ]Euvolemic [ ]Hypovolemic  Meds: metoprolol tartrate 50 milliGRAM(s) Oral every 12 hours      GI/NUTRITION  Exam: Obese, soft. Non-tender.  +bowel sounds.   Diet: NPO   Meds: docusate sodium Liquid 100 milliGRAM(s) Oral three times a day  lactulose Syrup 20 Gram(s) Oral every 12 hours  polyethylene glycol 3350 17 Gram(s) Oral daily  senna Syrup 5 milliLiter(s) Oral at bedtime      GENITOURINARY  I&O's Detail    10-14 @ 07:01  -  10-15 @ 07:00  --------------------------------------------------------  IN:    dexmedetomidine Infusion: 261.2 mL    dextrose 5% + sodium chloride 0.45%.: 1350 mL    Glucerna: 120 mL    Solution: 100 mL    Solution: 100 mL  Total IN: 1931.2 mL    OUT:    Indwelling Catheter - Urethral: 1190 mL  Total OUT: 1190 mL    Total NET: 741.2 mL      10-15 @ 07:01  -  10-16 @ 02:06  --------------------------------------------------------  IN:    dexmedetomidine Infusion: 25.8 mL    dextrose 5% + sodium chloride 0.45%.: 150 mL    Enteral Tube Flush: 100 mL    sodium chloride 0.45%.: 320 mL    Solution: 100 mL  Total IN: 695.8 mL    OUT:    Indwelling Catheter - Urethral: 2550 mL  Total OUT: 2550 mL    Total NET: -1854.2 mL    10-16    148<H>  |  112<H>  |  37<H>  ----------------------------<  123<H>  4.0   |  22  |  1.16    Ca    9.0      16 Oct 2018 03:17  Phos  3.0     10-16  Mg     2.3     10-16    TPro  5.8<L>  /  Alb  2.6<L>  /  TBili  1.3<H>  /  DBili  0.4<H>  /  AST  18  /  ALT  34  /  AlkPhos  68  10-15      [x ] Lares catheter, indication: strict I's and O's   Meds: sodium chloride 0.45%. 1000 milliLiter(s) IV Continuous <Continuous>        HEMATOLOGIC  Meds: enoxaparin Injectable 40 milliGRAM(s) SubCutaneous daily    [x] VTE Prophylaxis                        9.8    12.1  )-----------( 291      ( 16 Oct 2018 03:17 )             28.7     PT/INR - ( 16 Oct 2018 03:17 )   PT: 15.9 sec;   INR: 1.46 ratio         PTT - ( 16 Oct 2018 03:17 )  PTT:28.1 sec      Transfusion     [ ] PRBC   [ ] Platelets   [ ] FFP   [ ] Cryoprecipitate      INFECTIOUS DISEASES  T(C): 36.5 (10-16-18 @ 00:00), Max: 37.1 (10-15-18 @ 03:00)  Wt(kg): --  WBC Count: 12.1 K/uL (10-16 )    Recent Cultures:    Meds:       ENDOCRINE  Capillary Blood Glucose    Meds: insulin glargine Injectable (LANTUS) 20 Unit(s) SubCutaneous at bedtime  insulin lispro (HumaLOG) corrective regimen sliding scale   SubCutaneous every 4 hours      ACCESS DEVICES:  [x ] Peripheral IV  [ ] Central Venous Line	[ ] R	[ ] L	[ ] IJ	[ ] Fem	[ ] SC	Placed:   [x ] Arterial Line		[x ] R	[ ] L	[x ] Fem	[ ] Rad	[ ] Ax	Placed:   [ ] PICC:					[ ] Mediport  [x ] Urinary Catheter, Date Placed:   [x ] Necessity of urinary, arterial, and venous catheters discussed    OTHER MEDICATIONS:  chlorhexidine 4% Liquid 1 Application(s) Topical <User Schedule>      CODE STATUS:     IMAGING: 77 year old man with PMH of atrial fibrillation on Xarelto, HTN, DM, BPH, and seizures presented yesterday with abdominal pain and was found to have perihepatic and perisplenic hemorrhagic ascites with likely bleeding liver mass on CTA. Hct was 34 on presentation and dropped to 29. He received 2u pRBCs and was transferred to SICU for close monitoring. He received 2u FFP. This morning he went to interventional radiology for angiography and a lesion in segment 5 (with tumor vascularity) was identified. Two subsegmental vessels supplying segment 5 were embolized and completion angiography demonstrated a devascularized lesion. He received an additional 2u pRBCs while at interventional radiology with no increase in Hct (32.9 --> 30.6). He was transferred back to the SICU on phenylephrine. He became hypotensive and tachycardic, and his phenylephrine increased to 1.5. He was given another 2u pRBCs and 2u FFP.       24 HOUR EVENTS: Pt extubated this am to NC.  HFNC attempted as pt tachypneic with secretions, however patient refusing.  Placed on humidified facemask. Metoprolol increased to 50mg q 12hrs with additional pushes of 7.5mg total.  Given 20mg IV Lasix in am, fluids changed to 1/2NS @ 20mL/hr.  Tube feeds held after extubation and abdominal x-ray obtained to evaluate for ileus.  Pt given lactulose and rectal exam performed with no stool in the vault.  Overnight pt had BM x 2.  Pt tachypneic with increased secretions at beginning of night shift, changed back to HFNC with aggressive chest PT and deep suctioning.  Respiratory status improved with above interventions.     SUBJECTIVE/ROS:  [x ]A ten-point review of systems was otherwise negative except as noted.  [ ] Due to altered mental status/intubation, subjective information were not able to be obtained from the patient. History was obtained, to the extent possible, from review of the chart and collateral sources of information.      NEURO  RASS:     GCS:     CAM ICU:  Exam: Awake, alert, following commands. A&O x 3.   Meds: acetaminophen    Suspension .. 975 milliGRAM(s) Oral every 6 hours PRN Mild Pain (1 - 3)  levETIRAcetam  Solution 500 milliGRAM(s) Oral every 12 hours  oxyCODONE    Solution 2.5 milliGRAM(s) Oral every 6 hours PRN Moderate Pain (4 - 6)    [x] Adequacy of sedation and pain control has been assessed and adjusted      RESPIRATORY  RR: 24 (10-16-18 @ 02:00) (18 - 34)  SpO2: 96% (10-16-18 @ 02:00) (92% - 100%)  Wt(kg): --  Exam: Mildly tachypneic, coarse breath sounds B/L improved from earlier exam.   Mechanical Ventilation: Mode: off, RR (patient): 24, FiO2: 30  ABG - ( 16 Oct 2018 03:07 )  pH, Arterial: 7.48  pH, Blood: x     /  pCO2: 35    /  pO2: 77    / HCO3: 26    / Base Excess: 2.9   /  SaO2: 97      Lactate: x        [ ] Extubation Readiness Assessed  Meds: ALBUTerol/ipratropium for Nebulization 3 milliLiter(s) Nebulizer every 6 hours        CARDIOVASCULAR  HR: 105 (10-16-18 @ 02:00) (62 - 131)  BP: 174/93 (10-15-18 @ 20:00) (174/93 - 174/93)  BP(mean): 124 (10-15-18 @ 20:00) (124 - 124)  ABP: 150/53 (10-16-18 @ 02:00) (119/45 - 197/73)  ABP(mean): 83 (10-16-18 @ 02:00) (66 - 115)  Wt(kg): --  CVP(cm H2O): --      Exam: Irregularly irregular, tachycardic. +S1, +S2.   Cardiac Rhythm: A.fib   Perfusion     [x ]Adequate   [ ]Inadequate  Mentation   [x ]Normal       [ ]Reduced  Extremities  [x ]Warm         [ ]Cool  Volume Status [x ]Hypervolemic [ ]Euvolemic [ ]Hypovolemic  Meds: metoprolol tartrate 50 milliGRAM(s) Oral every 12 hours      GI/NUTRITION  Exam: Obese, soft. Non-tender.  +bowel sounds.   Diet: NPO   Meds: docusate sodium Liquid 100 milliGRAM(s) Oral three times a day  lactulose Syrup 20 Gram(s) Oral every 12 hours  polyethylene glycol 3350 17 Gram(s) Oral daily  senna Syrup 5 milliLiter(s) Oral at bedtime      GENITOURINARY  I&O's Detail    10-14 @ 07:01  -  10-15 @ 07:00  --------------------------------------------------------  IN:    dexmedetomidine Infusion: 261.2 mL    dextrose 5% + sodium chloride 0.45%.: 1350 mL    Glucerna: 120 mL    Solution: 100 mL    Solution: 100 mL  Total IN: 1931.2 mL    OUT:    Indwelling Catheter - Urethral: 1190 mL  Total OUT: 1190 mL    Total NET: 741.2 mL      10-15 @ 07:01  -  10-16 @ 02:06  --------------------------------------------------------  IN:    dexmedetomidine Infusion: 25.8 mL    dextrose 5% + sodium chloride 0.45%.: 150 mL    Enteral Tube Flush: 100 mL    sodium chloride 0.45%.: 320 mL    Solution: 100 mL  Total IN: 695.8 mL    OUT:    Indwelling Catheter - Urethral: 2550 mL  Total OUT: 2550 mL    Total NET: -1854.2 mL    10-16    148<H>  |  112<H>  |  37<H>  ----------------------------<  123<H>  4.0   |  22  |  1.16    Ca    9.0      16 Oct 2018 03:17  Phos  3.0     10-16  Mg     2.3     10-16    TPro  5.8<L>  /  Alb  2.6<L>  /  TBili  1.3<H>  /  DBili  0.4<H>  /  AST  18  /  ALT  34  /  AlkPhos  68  10-15      [x ] Lares catheter, indication: strict I's and O's   Meds: sodium chloride 0.45%. 1000 milliLiter(s) IV Continuous <Continuous>        HEMATOLOGIC  Meds: enoxaparin Injectable 40 milliGRAM(s) SubCutaneous daily    [x] VTE Prophylaxis                        9.8    12.1  )-----------( 291      ( 16 Oct 2018 03:17 )             28.7     PT/INR - ( 16 Oct 2018 03:17 )   PT: 15.9 sec;   INR: 1.46 ratio         PTT - ( 16 Oct 2018 03:17 )  PTT:28.1 sec      Transfusion     [ ] PRBC   [ ] Platelets   [ ] FFP   [ ] Cryoprecipitate      INFECTIOUS DISEASES  T(C): 36.5 (10-16-18 @ 00:00), Max: 37.1 (10-15-18 @ 03:00)  Wt(kg): --  WBC Count: 12.1 K/uL (10-16 )    Recent Cultures:    Meds:       ENDOCRINE  Capillary Blood Glucose    Meds: insulin glargine Injectable (LANTUS) 20 Unit(s) SubCutaneous at bedtime  insulin lispro (HumaLOG) corrective regimen sliding scale   SubCutaneous every 4 hours      ACCESS DEVICES:  [x ] Peripheral IV  [ ] Central Venous Line	[ ] R	[ ] L	[ ] IJ	[ ] Fem	[ ] SC	Placed:   [x ] Arterial Line		[x ] R	[ ] L	[x ] Fem	[ ] Rad	[ ] Ax	Placed:   [ ] PICC:					[ ] Mediport  [x ] Urinary Catheter, Date Placed:   [x ] Necessity of urinary, arterial, and venous catheters discussed    OTHER MEDICATIONS:  chlorhexidine 4% Liquid 1 Application(s) Topical <User Schedule>      CODE STATUS:     IMAGING:

## 2018-10-16 NOTE — PROGRESS NOTE ADULT - ASSESSMENT
77M s/p IR embolization of liver mass.     -On duonebs for respiratory support.   -H/h stable.   -MRI liver mass protocol when feasible to further evaluate liver mass.     Blue Team General Surgery [phone]

## 2018-10-16 NOTE — PROGRESS NOTE ADULT - ASSESSMENT
ASSESSMENT:  77 year old male presenting with hemoperitoneum and hemorrhagic shock s/p IR embolization of liver lesion in segment 5 complicated by acute respiratory failure requiring reintubation on 10/13.  Pt now extubated on 10/15, on HFNC.      PLAN:    Neuro: acute abdominal pain, h/o seizures  - Monitor mental status.  - Home Keppra for seizures.  - Tylenol prn  - Oxycodone 2.5mg prn, limit narcotics     Resp: Extubated on 10/15, now on HFNC .  - HFNC, wean settings as tolerated  - Aggressive chest PT and pulmonary toilet  - Duonebs q 6hrs     CV: atrial fibrillation, hemorrhagic shock (resolved)  - Monitor vital signs.  - Metoprolol 50mg q 12hrs     GI: hemoperitoneum, embolized segment 5 liver lesion, distended abdomen but now with bowel function   - Restart tube feeds   - Bowel regimen with Colace, senna, and Miralax.  - Protonix for stress ulcer prophylaxis.  - Follow up liver lesion work-up. Appreciate oncology recommendations.    Renal: DONY on CKD stage 2 (baseline Cr 1.0) from likely hemorrhagic ATN  - Monitor I&Os.  - Monitor electrolytes and replete as necessary.  - D5 1/2NS at 75 mL/hr, IV lock once at goal tube feeds     Heme: hemorrhagic shock (resolved)  - Monitor CBC and coags daily.  - Lovenox for VTE prophylaxis.    ID: no acute issues  - Monitor for clinical evidence of active infection.    Endo: DM type II  - Monitor blood glucose q4hrs. Lantus 20 units at bedtime with moderate ISS q4hrs for glycemic control.    Disposition:  - Full code.  - Will remain in SICU for aggressive pulmonary toilet    Fern Bennett PA-C #[id number]

## 2018-10-16 NOTE — PROGRESS NOTE ADULT - SUBJECTIVE AND OBJECTIVE BOX
Red Team Surgery Progress Note     Subjective/24hour Events: Extubated yesterday. On HFNC overnight due to tachypnea and secretions. Patient had BMx2 overnight. Pain controlled. Tolerating diet. No N/V. No CP or SOB.    Vital Signs:  Vital Signs Last 24 Hrs  T(C): 37.1 (16 Oct 2018 07:00), Max: 37.1 (15 Oct 2018 15:00)  T(F): 98.7 (16 Oct 2018 07:00), Max: 98.7 (15 Oct 2018 15:00)  HR: 91 (16 Oct 2018 09:12) (85 - 131)  BP: 174/93 (15 Oct 2018 20:00) (174/93 - 174/93)  BP(mean): 124 (15 Oct 2018 20:00) (124 - 124)  RR: 24 (16 Oct 2018 09:12) (21 - 34)  SpO2: 99% (16 Oct 2018 09:12) (92% - 99%)    CAPILLARY BLOOD GLUCOSE      POCT Blood Glucose.: 114 mg/dL (16 Oct 2018 04:59)  POCT Blood Glucose.: 147 mg/dL (16 Oct 2018 01:09)  POCT Blood Glucose.: 142 mg/dL (15 Oct 2018 21:46)  POCT Blood Glucose.: 132 mg/dL (15 Oct 2018 17:21)  POCT Blood Glucose.: 145 mg/dL (15 Oct 2018 14:28)      I&O's Detail    15 Oct 2018 07:01  -  16 Oct 2018 07:00  --------------------------------------------------------  IN:    dexmedetomidine Infusion: 25.8 mL    dextrose 5% + sodium chloride 0.45%.: 150 mL    Enteral Tube Flush: 200 mL    sodium chloride 0.45%.: 440 mL    Solution: 100 mL  Total IN: 915.8 mL    OUT:    Indwelling Catheter - Urethral: 3245 mL  Total OUT: 3245 mL    Total NET: -2329.2 mL            MEDICATIONS  (STANDING):  ALBUTerol/ipratropium for Nebulization 3 milliLiter(s) Nebulizer every 6 hours  amLODIPine   Tablet 2.5 milliGRAM(s) Oral every 24 hours  chlorhexidine 4% Liquid 1 Application(s) Topical <User Schedule>  docusate sodium Liquid 100 milliGRAM(s) Oral three times a day  enoxaparin Injectable 40 milliGRAM(s) SubCutaneous daily  insulin glargine Injectable (LANTUS) 20 Unit(s) SubCutaneous at bedtime  insulin lispro (HumaLOG) corrective regimen sliding scale   SubCutaneous every 6 hours  lactulose Syrup 20 Gram(s) Oral every 12 hours  levETIRAcetam  Solution 500 milliGRAM(s) Oral every 12 hours  metolazone 10 milliGRAM(s) Oral once  metoprolol tartrate 50 milliGRAM(s) Oral every 12 hours  polyethylene glycol 3350 17 Gram(s) Oral daily  senna Syrup 5 milliLiter(s) Oral at bedtime  sodium chloride 0.45%. 1000 milliLiter(s) (20 mL/Hr) IV Continuous <Continuous>  tamsulosin 0.4 milliGRAM(s) Oral once  tamsulosin 0.4 milliGRAM(s) Oral at bedtime    MEDICATIONS  (PRN):  acetaminophen    Suspension .. 975 milliGRAM(s) Oral every 6 hours PRN Mild Pain (1 - 3)        Physical Exam:  Gen: NAD  LS: nml respiratory effort  Card: pulse regularly present  GI: abd soft, nontender, bloated  Ext: warm      Labs:    10-16    148<H>  |  112<H>  |  37<H>  ----------------------------<  123<H>  4.0   |  22  |  1.16    Ca    9.0      16 Oct 2018 03:17  Phos  3.0     10-16  Mg     2.3     10-16    TPro  5.8<L>  /  Alb  2.6<L>  /  TBili  1.3<H>  /  DBili  0.4<H>  /  AST  18  /  ALT  34  /  AlkPhos  68  10-15    LIVER FUNCTIONS - ( 15 Oct 2018 02:21 )  Alb: 2.6 g/dL / Pro: 5.8 g/dL / ALK PHOS: 68 U/L / ALT: 34 U/L / AST: 18 U/L / GGT: x                                 9.8    12.1  )-----------( 291      ( 16 Oct 2018 03:17 )             28.7     PT/INR - ( 16 Oct 2018 03:17 )   PT: 15.9 sec;   INR: 1.46 ratio         PTT - ( 16 Oct 2018 03:17 )  PTT:28.1 sec          Imaging:  EXAM:  XR CHEST PORTABLE ROUTINE 1V                          PROCEDURE DATE:  10/16/2018      INTERPRETATION:  A single chest x-ray was obtained on October 16, 2018.    Indication: Respiratory failure.    Impression:    Poor inspiratory effort. The heart is slightly enlarged. Bibasilar   atelectasis and or pneumonia. Suboptimal study the head of the patient   obscuring the upper part of the chest.

## 2018-10-16 NOTE — PROGRESS NOTE ADULT - ATTENDING COMMENTS
Acute hypoxemic resp failure, titrate HFNC, CXR basal atelectasis  Mild drop in HCT, continue to monitor  Delirious, unable to sleep last night, will benefit from small dose Mirtazapine  Start NGT feed , failed swallow eval  Continue adj Lantus/ISS, wean of Insulin drip

## 2018-10-16 NOTE — PROGRESS NOTE ADULT - ASSESSMENT
77M presented 10/10 with abd pain, hypotension, and tachycardia. Patient had downtrending H/H and CT abd showed hemoperitoneum. Now s/p IR embolization of segment 5 bleeding liver mass on 10/11.     Plan:  - NPO w/ tube feeds  - Bowel regimen: senna, miralax, colace  - Trend H/H  - DVT ppx: Lovenox  - Follow care plan per SICU team 77M presented 10/10 with abd pain, hypotension, and tachycardia. Patient had downtrending H/H and CT abd showed hemoperitoneum. Now s/p IR embolization of segment 5 bleeding liver mass on 10/11.     Plan:  - NPO w/ tube feeds  - Bowel regimen: senna, miralax, colace  - Trend H/H  - DVT ppx: Lovenox  - appreciate HPB team f/u   - Follow care plan per SICU team

## 2018-10-16 NOTE — PROGRESS NOTE ADULT - SUBJECTIVE AND OBJECTIVE BOX
SURGICAL ONCOLOGY  Patient seen and examined.     MEDICATIONS  (STANDING):  ALBUTerol/ipratropium for Nebulization 3 milliLiter(s) Nebulizer every 6 hours  amLODIPine   Tablet 2.5 milliGRAM(s) Oral every 24 hours  chlorhexidine 4% Liquid 1 Application(s) Topical <User Schedule>  docusate sodium 100 milliGRAM(s) Oral three times a day  enoxaparin Injectable 40 milliGRAM(s) SubCutaneous daily  insulin glargine Injectable (LANTUS) 10 Unit(s) SubCutaneous at bedtime  insulin lispro (HumaLOG) corrective regimen sliding scale   SubCutaneous every 6 hours  lactulose Syrup 20 Gram(s) Oral every 12 hours  levETIRAcetam 500 milliGRAM(s) Oral every 12 hours  metoprolol tartrate 50 milliGRAM(s) Oral every 12 hours  mirtazapine 15 milliGRAM(s) Oral <User Schedule>  polyethylene glycol 3350 17 Gram(s) Oral daily  senna 2 Tablet(s) Oral at bedtime  tamsulosin 0.4 milliGRAM(s) Oral at bedtime    MEDICATIONS  (PRN):  acetaminophen   Tablet .. 975 milliGRAM(s) Oral every 6 hours PRN Mild Pain (1 - 3)      Vital Signs Last 24 Hrs  T(C): 37 (16 Oct 2018 20:00), Max: 37.1 (16 Oct 2018 07:00)  T(F): 98.6 (16 Oct 2018 20:00), Max: 98.7 (16 Oct 2018 07:00)  HR: 88 (16 Oct 2018 22:00) (75 - 910)  BP: 128/60 (16 Oct 2018 20:00) (116/57 - 128/60)  BP(mean): 84 (16 Oct 2018 20:00) (82 - 84)  RR: 26 (16 Oct 2018 22:00) (19 - 39)  SpO2: 97% (16 Oct 2018 22:00) (92% - 100%)    I&O's Detail    15 Oct 2018 07:01  -  16 Oct 2018 07:00  --------------------------------------------------------  IN:    dexmedetomidine Infusion: 25.8 mL    dextrose 5% + sodium chloride 0.45%.: 150 mL    Enteral Tube Flush: 200 mL    sodium chloride 0.45%: 440 mL    Solution: 100 mL  Total IN: 915.8 mL    OUT:    Indwelling Catheter - Urethral: 3245 mL  Total OUT: 3245 mL    Total NET: -2329.2 mL      16 Oct 2018 07:01  -  16 Oct 2018 22:47  --------------------------------------------------------  IN:    Enteral Tube Flush: 30 mL    Glucerna: 160 mL    Oral Fluid: 200 mL    sodium chloride 0.45%: 60 mL  Total IN: 450 mL    OUT:    Indwelling Catheter - Urethral: 1380 mL  Total OUT: 1380 mL    Total NET: -930 mL          Daily     Daily Weight in k.2 (16 Oct 2018 00:33)    LABS:                        9.8    12.1  )-----------( 291      ( 16 Oct 2018 03:17 )             28.7     10-16    148<H>  |  112<H>  |  37<H>  ----------------------------<  123<H>  4.0   |  22  |  1.16    Ca    9.0      16 Oct 2018 03:17  Phos  3.0     10-16  Mg     2.3     10-16    TPro  5.8<L>  /  Alb  2.6<L>  /  TBili  1.3<H>  /  DBili  0.4<H>  /  AST  18  /  ALT  34  /  AlkPhos  68  10-15    PT/INR - ( 16 Oct 2018 03:17 )   PT: 15.9 sec;   INR: 1.46 ratio         PTT - ( 16 Oct 2018 03:17 )  PTT:28.1 sec      Chief complaint: resp secretions  PHYSICAL EXAM:  Gen:   Abd: soft NT ND    77yMale s/p IR embolization of bleeding liver mass  Plan:   - Currently no need for any intervention regarding known liver mass  - Once medically stable and ready for discharge, MRI liver mass protocol to better evaluate the liver mass. MRI can also be performed as an outpt  - Care per primary team and SICU  - Plan discussed in detail michael pt son- who is a physician from [facility], and pt wife.  - I have discussed the diagnosis with the patient's family in detail. Patient's son and wife expressed verbal understanding and willingness to proceed with proposed plan. All questions answered  - I will sign off at this point, please recall with new concerns and/or questions. Thank you for allowing me to take care of your patient as always.    Regards  Christos Singleton MD  Division of Surgical Oncology  [address] [place] [address]  Ph:[phone]

## 2018-10-16 NOTE — PROGRESS NOTE ADULT - SUBJECTIVE AND OBJECTIVE BOX
Surgery Progress Note     Subjective/24hour Events:   Patient seen and examined.   Extubated overnight, on facemask.   Pain controlled.     Vital Signs:  Vital Signs Last 24 Hrs  T(C): 37.1 (16 Oct 2018 07:00), Max: 37.1 (15 Oct 2018 15:00)  T(F): 98.7 (16 Oct 2018 07:00), Max: 98.7 (15 Oct 2018 15:00)  HR: 91 (16 Oct 2018 09:12) (85 - 131)  BP: 174/93 (15 Oct 2018 20:00) (174/93 - 174/93)  BP(mean): 124 (15 Oct 2018 20:00) (124 - 124)  RR: 24 (16 Oct 2018 09:12) (21 - 34)  SpO2: 99% (16 Oct 2018 09:12) (92% - 99%)    CAPILLARY BLOOD GLUCOSE      POCT Blood Glucose.: 114 mg/dL (16 Oct 2018 04:59)  POCT Blood Glucose.: 147 mg/dL (16 Oct 2018 01:09)  POCT Blood Glucose.: 142 mg/dL (15 Oct 2018 21:46)  POCT Blood Glucose.: 132 mg/dL (15 Oct 2018 17:21)  POCT Blood Glucose.: 145 mg/dL (15 Oct 2018 14:28)      I&O's Detail    15 Oct 2018 07:01  -  16 Oct 2018 07:00  --------------------------------------------------------  IN:    dexmedetomidine Infusion: 25.8 mL    dextrose 5% + sodium chloride 0.45%.: 150 mL    Enteral Tube Flush: 200 mL    sodium chloride 0.45%.: 440 mL    Solution: 100 mL  Total IN: 915.8 mL    OUT:    Indwelling Catheter - Urethral: 3245 mL  Total OUT: 3245 mL    Total NET: -2329.2 mL          MEDICATIONS  (STANDING):  ALBUTerol/ipratropium for Nebulization 3 milliLiter(s) Nebulizer every 6 hours  amLODIPine   Tablet 2.5 milliGRAM(s) Oral every 24 hours  chlorhexidine 4% Liquid 1 Application(s) Topical <User Schedule>  docusate sodium Liquid 100 milliGRAM(s) Oral three times a day  enoxaparin Injectable 40 milliGRAM(s) SubCutaneous daily  insulin glargine Injectable (LANTUS) 20 Unit(s) SubCutaneous at bedtime  insulin lispro (HumaLOG) corrective regimen sliding scale   SubCutaneous every 6 hours  lactulose Syrup 20 Gram(s) Oral every 12 hours  levETIRAcetam  Solution 500 milliGRAM(s) Oral every 12 hours  metoprolol tartrate 50 milliGRAM(s) Oral every 12 hours  polyethylene glycol 3350 17 Gram(s) Oral daily  senna Syrup 5 milliLiter(s) Oral at bedtime  sodium chloride 0.45%. 1000 milliLiter(s) (20 mL/Hr) IV Continuous <Continuous>    MEDICATIONS  (PRN):  acetaminophen    Suspension .. 975 milliGRAM(s) Oral every 6 hours PRN Mild Pain (1 - 3)      Physical Exam:  Gen: Sitting in chair, awake, on facemask, incomprehensible speech.   Ab: Soft, nontender, nondistended.   Ext: Moves all 4 spontaneously.     Labs:    10-16    148<H>  |  112<H>  |  37<H>  ----------------------------<  123<H>  4.0   |  22  |  1.16    Ca    9.0      16 Oct 2018 03:17  Phos  3.0     10-16  Mg     2.3     10-16    TPro  5.8<L>  /  Alb  2.6<L>  /  TBili  1.3<H>  /  DBili  0.4<H>  /  AST  18  /  ALT  34  /  AlkPhos  68  10-15    LIVER FUNCTIONS - ( 15 Oct 2018 02:21 )  Alb: 2.6 g/dL / Pro: 5.8 g/dL / ALK PHOS: 68 U/L / ALT: 34 U/L / AST: 18 U/L / GGT: x                                 9.8    12.1  )-----------( 291      ( 16 Oct 2018 03:17 )             28.7     PT/INR - ( 16 Oct 2018 03:17 )   PT: 15.9 sec;   INR: 1.46 ratio         PTT - ( 16 Oct 2018 03:17 )  PTT:28.1 sec    Imaging:  < from: Xray Chest 1 View- PORTABLE-Routine (10.16.18 @ 07:09) >    INTERPRETATION:  A single chest x-ray was obtained on October 16, 2018.    Indication: Respiratory failure.    Impression:    Poor inspiratory effort. The heart is slightly enlarged. Bibasilar   atelectasis and or pneumonia. Suboptimal study the head of the patient   obscuring the upper part of the chest.    < end of copied text >

## 2018-10-17 LAB
ALBUMIN SERPL ELPH-MCNC: 2.6 G/DL — LOW (ref 3.3–5)
ALP SERPL-CCNC: 83 U/L — SIGNIFICANT CHANGE UP (ref 40–120)
ALT FLD-CCNC: 19 U/L — SIGNIFICANT CHANGE UP (ref 10–45)
ANION GAP SERPL CALC-SCNC: 15 MMOL/L — SIGNIFICANT CHANGE UP (ref 5–17)
ANION GAP SERPL CALC-SCNC: 16 MMOL/L — SIGNIFICANT CHANGE UP (ref 5–17)
APPEARANCE UR: ABNORMAL
APTT BLD: 32.5 SEC — SIGNIFICANT CHANGE UP (ref 27.5–37.4)
AST SERPL-CCNC: 19 U/L — SIGNIFICANT CHANGE UP (ref 10–40)
BACTERIA # UR AUTO: ABNORMAL
BILIRUB DIRECT SERPL-MCNC: 0.9 MG/DL — HIGH (ref 0–0.2)
BILIRUB INDIRECT FLD-MCNC: 1.6 MG/DL — HIGH (ref 0.2–1)
BILIRUB SERPL-MCNC: 2.5 MG/DL — HIGH (ref 0.2–1.2)
BILIRUB UR-MCNC: NEGATIVE — SIGNIFICANT CHANGE UP
BUN SERPL-MCNC: 38 MG/DL — HIGH (ref 7–23)
BUN SERPL-MCNC: 40 MG/DL — HIGH (ref 7–23)
CALCIUM SERPL-MCNC: 8.5 MG/DL — SIGNIFICANT CHANGE UP (ref 8.4–10.5)
CALCIUM SERPL-MCNC: 9.2 MG/DL — SIGNIFICANT CHANGE UP (ref 8.4–10.5)
CHLORIDE SERPL-SCNC: 110 MMOL/L — HIGH (ref 96–108)
CHLORIDE SERPL-SCNC: 113 MMOL/L — HIGH (ref 96–108)
CO2 SERPL-SCNC: 23 MMOL/L — SIGNIFICANT CHANGE UP (ref 22–31)
CO2 SERPL-SCNC: 23 MMOL/L — SIGNIFICANT CHANGE UP (ref 22–31)
COLOR SPEC: YELLOW — SIGNIFICANT CHANGE UP
CREAT SERPL-MCNC: 1.11 MG/DL — SIGNIFICANT CHANGE UP (ref 0.5–1.3)
CREAT SERPL-MCNC: 1.12 MG/DL — SIGNIFICANT CHANGE UP (ref 0.5–1.3)
DIFF PNL FLD: ABNORMAL
EPI CELLS # UR: 1 /HPF — SIGNIFICANT CHANGE UP
GLUCOSE BLDC GLUCOMTR-MCNC: 175 MG/DL — HIGH (ref 70–99)
GLUCOSE BLDC GLUCOMTR-MCNC: 226 MG/DL — HIGH (ref 70–99)
GLUCOSE BLDC GLUCOMTR-MCNC: 236 MG/DL — HIGH (ref 70–99)
GLUCOSE BLDC GLUCOMTR-MCNC: 238 MG/DL — HIGH (ref 70–99)
GLUCOSE SERPL-MCNC: 169 MG/DL — HIGH (ref 70–99)
GLUCOSE SERPL-MCNC: 245 MG/DL — HIGH (ref 70–99)
GLUCOSE UR QL: NEGATIVE — SIGNIFICANT CHANGE UP
GRAN CASTS # UR COMP ASSIST: 2 — SIGNIFICANT CHANGE UP
HCT VFR BLD CALC: 28.9 % — LOW (ref 39–50)
HGB BLD-MCNC: 9.5 G/DL — LOW (ref 13–17)
HYALINE CASTS # UR AUTO: 12 /LPF — HIGH (ref 0–2)
INR BLD: 1.42 RATIO — HIGH (ref 0.88–1.16)
KETONES UR-MCNC: NEGATIVE — SIGNIFICANT CHANGE UP
LEUKOCYTE ESTERASE UR-ACNC: ABNORMAL
MAGNESIUM SERPL-MCNC: 2.1 MG/DL — SIGNIFICANT CHANGE UP (ref 1.6–2.6)
MAGNESIUM SERPL-MCNC: 2.2 MG/DL — SIGNIFICANT CHANGE UP (ref 1.6–2.6)
MCHC RBC-ENTMCNC: 30.6 PG — SIGNIFICANT CHANGE UP (ref 27–34)
MCHC RBC-ENTMCNC: 32.8 GM/DL — SIGNIFICANT CHANGE UP (ref 32–36)
MCV RBC AUTO: 93.3 FL — SIGNIFICANT CHANGE UP (ref 80–100)
NITRITE UR-MCNC: NEGATIVE — SIGNIFICANT CHANGE UP
PH UR: 5.5 — SIGNIFICANT CHANGE UP (ref 5–8)
PHOSPHATE SERPL-MCNC: 3.5 MG/DL — SIGNIFICANT CHANGE UP (ref 2.5–4.5)
PHOSPHATE SERPL-MCNC: 3.9 MG/DL — SIGNIFICANT CHANGE UP (ref 2.5–4.5)
PLATELET # BLD AUTO: 324 K/UL — SIGNIFICANT CHANGE UP (ref 150–400)
POTASSIUM SERPL-MCNC: 3.7 MMOL/L — SIGNIFICANT CHANGE UP (ref 3.5–5.3)
POTASSIUM SERPL-MCNC: 3.8 MMOL/L — SIGNIFICANT CHANGE UP (ref 3.5–5.3)
POTASSIUM SERPL-SCNC: 3.7 MMOL/L — SIGNIFICANT CHANGE UP (ref 3.5–5.3)
POTASSIUM SERPL-SCNC: 3.8 MMOL/L — SIGNIFICANT CHANGE UP (ref 3.5–5.3)
PROT SERPL-MCNC: 6.1 G/DL — SIGNIFICANT CHANGE UP (ref 6–8.3)
PROT UR-MCNC: ABNORMAL
PROTHROM AB SERPL-ACNC: 15.6 SEC — HIGH (ref 9.8–12.7)
RBC # BLD: 3.1 M/UL — LOW (ref 4.2–5.8)
RBC # FLD: 12.7 % — SIGNIFICANT CHANGE UP (ref 10.3–14.5)
RBC CASTS # UR COMP ASSIST: 265 /HPF — HIGH (ref 0–4)
SODIUM SERPL-SCNC: 148 MMOL/L — HIGH (ref 135–145)
SODIUM SERPL-SCNC: 152 MMOL/L — HIGH (ref 135–145)
SP GR SPEC: 1.02 — SIGNIFICANT CHANGE UP (ref 1.01–1.02)
UROBILINOGEN FLD QL: NEGATIVE — SIGNIFICANT CHANGE UP
WBC # BLD: 10.6 K/UL — HIGH (ref 3.8–10.5)
WBC # FLD AUTO: 10.6 K/UL — HIGH (ref 3.8–10.5)
WBC UR QL: 99 /HPF — HIGH (ref 0–5)

## 2018-10-17 PROCEDURE — 71045 X-RAY EXAM CHEST 1 VIEW: CPT | Mod: 26

## 2018-10-17 PROCEDURE — 99291 CRITICAL CARE FIRST HOUR: CPT

## 2018-10-17 RX ORDER — INSULIN LISPRO 100/ML
VIAL (ML) SUBCUTANEOUS AT BEDTIME
Qty: 0 | Refills: 0 | Status: DISCONTINUED | OUTPATIENT
Start: 2018-10-17 | End: 2018-10-23

## 2018-10-17 RX ORDER — INSULIN GLARGINE 100 [IU]/ML
20 INJECTION, SOLUTION SUBCUTANEOUS AT BEDTIME
Qty: 0 | Refills: 0 | Status: DISCONTINUED | OUTPATIENT
Start: 2018-10-17 | End: 2018-10-17

## 2018-10-17 RX ORDER — LACTULOSE 10 G/15ML
20 SOLUTION ORAL EVERY 12 HOURS
Qty: 0 | Refills: 0 | Status: COMPLETED | OUTPATIENT
Start: 2018-10-17 | End: 2018-10-18

## 2018-10-17 RX ORDER — INSULIN LISPRO 100/ML
VIAL (ML) SUBCUTANEOUS
Qty: 0 | Refills: 0 | Status: DISCONTINUED | OUTPATIENT
Start: 2018-10-17 | End: 2018-10-23

## 2018-10-17 RX ORDER — METOPROLOL TARTRATE 50 MG
5 TABLET ORAL ONCE
Qty: 0 | Refills: 0 | Status: COMPLETED | OUTPATIENT
Start: 2018-10-17 | End: 2018-10-17

## 2018-10-17 RX ORDER — HALOPERIDOL DECANOATE 100 MG/ML
1 INJECTION INTRAMUSCULAR EVERY 12 HOURS
Qty: 0 | Refills: 0 | Status: DISCONTINUED | OUTPATIENT
Start: 2018-10-17 | End: 2018-10-18

## 2018-10-17 RX ORDER — POTASSIUM CHLORIDE 20 MEQ
20 PACKET (EA) ORAL ONCE
Qty: 0 | Refills: 0 | Status: COMPLETED | OUTPATIENT
Start: 2018-10-17 | End: 2018-10-17

## 2018-10-17 RX ORDER — SODIUM CHLORIDE 9 MG/ML
1000 INJECTION INTRAMUSCULAR; INTRAVENOUS; SUBCUTANEOUS
Qty: 0 | Refills: 0 | Status: DISCONTINUED | OUTPATIENT
Start: 2018-10-17 | End: 2018-10-19

## 2018-10-17 RX ORDER — INSULIN GLARGINE 100 [IU]/ML
12 INJECTION, SOLUTION SUBCUTANEOUS AT BEDTIME
Qty: 0 | Refills: 0 | Status: DISCONTINUED | OUTPATIENT
Start: 2018-10-17 | End: 2018-10-18

## 2018-10-17 RX ORDER — CEFTRIAXONE 500 MG/1
INJECTION, POWDER, FOR SOLUTION INTRAMUSCULAR; INTRAVENOUS
Qty: 0 | Refills: 0 | Status: COMPLETED | OUTPATIENT
Start: 2018-10-17 | End: 2018-10-23

## 2018-10-17 RX ORDER — LEVETIRACETAM 250 MG/1
500 TABLET, FILM COATED ORAL ONCE
Qty: 0 | Refills: 0 | Status: COMPLETED | OUTPATIENT
Start: 2018-10-17 | End: 2018-10-17

## 2018-10-17 RX ORDER — CEFTRIAXONE 500 MG/1
1 INJECTION, POWDER, FOR SOLUTION INTRAMUSCULAR; INTRAVENOUS ONCE
Qty: 0 | Refills: 0 | Status: COMPLETED | OUTPATIENT
Start: 2018-10-17 | End: 2018-10-17

## 2018-10-17 RX ORDER — HALOPERIDOL DECANOATE 100 MG/ML
1 INJECTION INTRAMUSCULAR EVERY 12 HOURS
Qty: 0 | Refills: 0 | Status: DISCONTINUED | OUTPATIENT
Start: 2018-10-17 | End: 2018-10-17

## 2018-10-17 RX ORDER — LEVALBUTEROL 1.25 MG/.5ML
0.63 SOLUTION, CONCENTRATE RESPIRATORY (INHALATION) EVERY 6 HOURS
Qty: 0 | Refills: 0 | Status: DISCONTINUED | OUTPATIENT
Start: 2018-10-17 | End: 2018-10-19

## 2018-10-17 RX ORDER — SODIUM CHLORIDE 9 MG/ML
1000 INJECTION, SOLUTION INTRAVENOUS
Qty: 0 | Refills: 0 | Status: DISCONTINUED | OUTPATIENT
Start: 2018-10-17 | End: 2018-10-17

## 2018-10-17 RX ORDER — CEFTRIAXONE 500 MG/1
1 INJECTION, POWDER, FOR SOLUTION INTRAMUSCULAR; INTRAVENOUS EVERY 24 HOURS
Qty: 0 | Refills: 0 | Status: COMPLETED | OUTPATIENT
Start: 2018-10-18 | End: 2018-10-22

## 2018-10-17 RX ADMIN — LEVETIRACETAM 500 MILLIGRAM(S): 250 TABLET, FILM COATED ORAL at 18:06

## 2018-10-17 RX ADMIN — Medication 4: at 18:05

## 2018-10-17 RX ADMIN — LEVETIRACETAM 400 MILLIGRAM(S): 250 TABLET, FILM COATED ORAL at 05:40

## 2018-10-17 RX ADMIN — SODIUM CHLORIDE 100 MILLILITER(S): 9 INJECTION INTRAMUSCULAR; INTRAVENOUS; SUBCUTANEOUS at 11:00

## 2018-10-17 RX ADMIN — Medication 3 MILLILITER(S): at 00:26

## 2018-10-17 RX ADMIN — SODIUM CHLORIDE 50 MILLILITER(S): 9 INJECTION, SOLUTION INTRAVENOUS at 05:39

## 2018-10-17 RX ADMIN — Medication 4: at 12:34

## 2018-10-17 RX ADMIN — Medication 2: at 05:39

## 2018-10-17 RX ADMIN — Medication 50 MILLIGRAM(S): at 05:46

## 2018-10-17 RX ADMIN — TAMSULOSIN HYDROCHLORIDE 0.4 MILLIGRAM(S): 0.4 CAPSULE ORAL at 22:31

## 2018-10-17 RX ADMIN — LACTULOSE 20 GRAM(S): 10 SOLUTION ORAL at 18:07

## 2018-10-17 RX ADMIN — Medication 3 MILLILITER(S): at 06:13

## 2018-10-17 RX ADMIN — Medication 50 MILLIGRAM(S): at 18:06

## 2018-10-17 RX ADMIN — Medication 100 MILLIGRAM(S): at 22:31

## 2018-10-17 RX ADMIN — Medication 100 MILLIGRAM(S): at 15:00

## 2018-10-17 RX ADMIN — HALOPERIDOL DECANOATE 1 MILLIGRAM(S): 100 INJECTION INTRAMUSCULAR at 18:06

## 2018-10-17 RX ADMIN — Medication 20 MILLIEQUIVALENT(S): at 05:57

## 2018-10-17 RX ADMIN — POLYETHYLENE GLYCOL 3350 17 GRAM(S): 17 POWDER, FOR SOLUTION ORAL at 12:09

## 2018-10-17 RX ADMIN — ENOXAPARIN SODIUM 40 MILLIGRAM(S): 100 INJECTION SUBCUTANEOUS at 12:09

## 2018-10-17 RX ADMIN — INSULIN GLARGINE 12 UNIT(S): 100 INJECTION, SOLUTION SUBCUTANEOUS at 22:31

## 2018-10-17 RX ADMIN — CEFTRIAXONE 100 GRAM(S): 500 INJECTION, POWDER, FOR SOLUTION INTRAMUSCULAR; INTRAVENOUS at 15:00

## 2018-10-17 RX ADMIN — Medication 3 MILLILITER(S): at 12:03

## 2018-10-17 RX ADMIN — SENNA PLUS 2 TABLET(S): 8.6 TABLET ORAL at 22:31

## 2018-10-17 NOTE — PROGRESS NOTE ADULT - ASSESSMENT
77M presented 10/10 with abd pain, hypotension, and tachycardia. Patient had downtrending H/H and CT abd showed hemoperitoneum. Now s/p IR embolization of segment 5 bleeding liver mass on 10/11.     Plan:  - NPO w/ tube feeds  - Bowel regimen: senna, miralax, colace  - Trend H/H  - DVT ppx: Lovenox  - appreciate HPB team f/u   - Follow care plan per SICU team

## 2018-10-17 NOTE — PROVIDER CONTACT NOTE (OTHER) - ASSESSMENT
Pt confused. Eats small amounts of meals, mechanical soft. diet. Lantus dose increased from 10-12. Previous

## 2018-10-17 NOTE — PROGRESS NOTE ADULT - SUBJECTIVE AND OBJECTIVE BOX
77 year old man with PMH of atrial fibrillation on Xarelto, HTN, DM, BPH, and seizures presented yesterday with abdominal pain and was found to have perihepatic and perisplenic hemorrhagic ascites with likely bleeding liver mass on CTA. Hct was 34 on presentation and dropped to 29. He received 2u pRBCs and was transferred to SICU for close monitoring. He received 2u FFP. This morning he went to interventional radiology for angiography and a lesion in segment 5 (with tumor vascularity) was identified. Two subsegmental vessels supplying segment 5 were embolized and completion angiography demonstrated a devascularized lesion. He received an additional 2u pRBCs while at interventional radiology with no increase in Hct (32.9 --> 30.6). He was transferred back to the SICU on phenylephrine. He became hypotensive and tachycardic, and his phenylephrine increased to 1.5. He was given another 2u pRBCs and 2u FFP.       24 HOUR EVENTS: Patient remained on HFNC throughout the morning but was removing 2/2 delirium. Patient unable to follow simple commands and failed bedside dysphagia screening. TF continued through K-O until patient self-D/C'd tube in afternoon. Pt the NPO with IVF until repeat dysphagia screening this am. Patient started on remeron last night. Satting well on NC. Patient received diuresis with metozalone due to high NaCl.       Vital Signs Last 24 Hrs  T(C): 37 (17 Oct 2018 00:00), Max: 37.1 (16 Oct 2018 07:00)  T(F): 98.6 (17 Oct 2018 00:00), Max: 98.7 (16 Oct 2018 07:00)  HR: 86 (17 Oct 2018 00:00) (75 - 910)  BP: 128/60 (16 Oct 2018 20:00) (116/57 - 128/60)  BP(mean): 84 (16 Oct 2018 20:00) (82 - 84)  RR: 22 (17 Oct 2018 00:00) (19 - 39)  SpO2: 98% (17 Oct 2018 00:00) (93% - 100%)    Physical Exam:  General Appearance: Appears well, NAD  Respiratory: No labored breathing  CV: Pulse regularly present  Abdomen: Obese, soft. Non-tender.  +bowel sounds.       LABS:                        9.8    12.1  )-----------( 291      ( 16 Oct 2018 03:17 )             28.7     10-16    148<H>  |  112<H>  |  37<H>  ----------------------------<  123<H>  4.0   |  22  |  1.16    Ca    9.0      16 Oct 2018 03:17  Phos  3.0     10-16  Mg     2.3     10-16    TPro  5.8<L>  /  Alb  2.6<L>  /  TBili  1.3<H>  /  DBili  0.4<H>  /  AST  18  /  ALT  34  /  AlkPhos  68  10-15    PT/INR - ( 16 Oct 2018 03:17 )   PT: 15.9 sec;   INR: 1.46 ratio         PTT - ( 16 Oct 2018 03:17 )  PTT:28.1 sec      INs and OUTs:    10-15-18 @ 07:01  -  10-16-18 @ 07:00  --------------------------------------------------------  IN: 915.8 mL / OUT: 3245 mL / NET: -2329.2 mL    10-16-18 @ 07:01  -  10-17-18 @ 01:35  --------------------------------------------------------  IN: 450 mL / OUT: 1380 mL / NET: -930 mL HISTORY:  77 year old man with PMH of atrial fibrillation on Xarelto, HTN, DM, BPH, and seizures presented yesterday with abdominal pain and was found to have perihepatic and perisplenic hemorrhagic ascites with likely bleeding liver mass on CTA. Hct was 34 on presentation and dropped to 29. He received 2u pRBCs and was transferred to SICU for close monitoring. He received 2u FFP. This morning he went to interventional radiology for angiography and a lesion in segment 5 (with tumor vascularity) was identified. Two subsegmental vessels supplying segment 5 were embolized and completion angiography demonstrated a devascularized lesion. He received an additional 2u pRBCs while at interventional radiology with no increase in Hct (32.9 --> 30.6). He was transferred back to the SICU on phenylephrine. He became hypotensive and tachycardic, and his phenylephrine increased to 1.5. He was given another 2u pRBCs and 2u FFP.     24 HOUR EVENTS:   - Remains delirious with intermittent periods of agitation. Discontinued all narcotics and started on Remeron 15 mg at bedtime daily. Patient did not sleep despite the Remeron.  - Weaned successfully from high flow nasal cannula to nasal cannula. Continues to have copious thin secretions but with a strong cough.  - Started on amlodipine 2.5 mg daily for HTN.  - Was started on tube feed at goal via Lico John NG feeding tube but patient self-discontinued the NG tube. Able to tolerate medications so made NPO with medications. Adjusted Lantus from 20 units to 10 units at bedtime while NPO.  - Restarted home Flomax for BPH.  - Diuresis with metolazone 10 mg PO x1 dose with good effect.  - Worsening hyponatremia with sodium of 152 so started on D5 at 50 mL/hr.  - Adjusted ISS from q4hrs to q6hrs.    SUBJECTIVE/ROS:  [ ] A ten-point review of systems was otherwise negative except as noted.  [x] Due to altered mental status/intubation, subjective information were not able to be obtained from the patient. History was obtained, to the extent possible, from review of the chart and collateral sources of information.    NEURO  CAM ICU: positive  Exam: awake, alert, oriented to self only, intermittently agitated, occasionally refuses care  Meds:  - acetaminophen   Tablet .. 975 milliGRAM(s) Oral every 6 hours PRN Mild Pain (1 - 3)  - levETIRAcetam 500 milliGRAM(s) Oral every 12 hours  - mirtazapine 15 milliGRAM(s) Oral <User Schedule>  [x] Adequacy of sedation and pain control has been assessed and adjusted    RESPIRATORY  RR: 27 (10-17-18 @ 04:00) (19 - 39)  SpO2: 95% (10-17-18 @ 04:00) (93% - 100%)  Exam: coarse rhonchi in all lung fields  Mechanical Ventilation: no  [N/A] Extubation Readiness Assessed  Meds: ALBUTerol/ipratropium for Nebulization 3 milliLiter(s) Nebulizer every 6 hours    CARDIOVASCULAR  HR: 108 (10-17-18 @ 04:00) (75 - 910)  BP: 128/60 (10-16-18 @ 20:00) (116/57 - 128/60)  BP(mean): 84 (10-16-18 @ 20:00) (82 - 84)  ABP: 157/55 (10-17-18 @ 04:00) (128/47 - 159/53)  ABP(mean): 86 (10-17-18 @ 04:00) (68 - 88)  Exam: irregularly irregular  Cardiac Rhythm: atrial fibrillation  Perfusion    [x]Adequate    [ ]Inadequate  Mentation   [ ]Normal       [x]Reduced  Extremities  [x]Warm         [ ]Cool  Volume Status [x]Hypervolemic [ ]Euvolemic [ ]Hypovolemic  Meds:  - amLODIPine   Tablet 2.5 milliGRAM(s) Oral every 24 hours  - metoprolol tartrate 50 milliGRAM(s) Oral every 12 hours    GI/NUTRITION  Exam: soft, nontender, nondistended  Diet: NPO except medications  Meds:  - docusate sodium 100 milliGRAM(s) Oral three times a day  - lactulose Syrup 20 Gram(s) Oral every 12 hours  - polyethylene glycol 3350 17 Gram(s) Oral daily  - senna 2 Tablet(s) Oral at bedtime    GENITOURINARY    152<H>  |  113<H>  |  40<H>  ----------------------------<  169<H>  3.8   |  23  |  1.11    Ca    9.2      17 Oct 2018 02:42  Phos  3.9  Mg     2.2  TPro  6.1  /  Alb  2.6<L>  /  TBili  2.5<H>  /  DBili  0.9<H>  /  AST  19  /  ALT  19  /  AlkPhos  83    [x] Lares catheter, indication: urinary retention  Meds:  - dextrose 5% infuse at 50 mL/hr  - tamsulosin 0.4 milliGRAM(s) Oral at bedtime    HEMATOLOGIC  Meds: enoxaparin Injectable 40 milliGRAM(s) SubCutaneous daily  [x] VTE Prophylaxis                        9.5    10.6  )-----------( 324      ( 17 Oct 2018 02:42 )             28.9     PT/INR - ( 17 Oct 2018 02:42 )   PT: 15.6 sec;   INR: 1.42 ratio    PTT - ( 17 Oct 2018 02:42 )  PTT:32.5 sec    INFECTIOUS DISEASES  T(C): 37 (10-17-18 @ 00:00), Max: 37.1 (10-16-18 @ 07:00)  WBC Count: 10.6 K/uL (10-17 @ 02:42)  Recent Cultures: none  Meds: none    ENDOCRINE  Capillary Blood Glucose:  - POCT Blood Glucose.: 172 mg/dL (16 Oct 2018 23:11)  - POCT Blood Glucose.: 200 mg/dL (16 Oct 2018 18:02)  - POCT Blood Glucose.: 174 mg/dL (16 Oct 2018 12:52)  Meds:  - insulin glargine Injectable (LANTUS) 10 Unit(s) SubCutaneous at bedtime  - insulin lispro (HumaLOG) corrective regimen sliding scale   SubCutaneous every 6 hours    ACCESS DEVICES:  [x] Peripheral IV  [ ] Central Venous Line	[ ] R	[ ] L	[ ] IJ	[ ] Fem	[ ] SC	Placed:   [x] Arterial Line		[x] R	[ ] L	[x] Fem	[ ] Rad	[ ] Ax	Placed: 10/11/2018  [ ] PICC:					[ ] Mediport  [x] Urinary Catheter, Date Placed: 10/13/2018  [x] Necessity of urinary, arterial, and venous catheters discussed    OTHER MEDICATIONS: chlorhexidine 4% Liquid 1 Application(s) Topical <User Schedule>    CODE STATUS: Full code.    IMAGING:

## 2018-10-17 NOTE — PROVIDER CONTACT NOTE (OTHER) - ACTION/TREATMENT ORDERED:
2 FFP MD ok with MAP 55 continue to monitor
No action required, will get normal lantus dosing tonight.
As per SICU JUAN MIGUEL Quevedo no insulin drip at this time. Administer lantus and recheck BG with morning labs. Will continue to monitor

## 2018-10-17 NOTE — PROGRESS NOTE ADULT - ASSESSMENT
ASSESSMENT:  77 year old male presenting with hemoperitoneum and hemorrhagic shock s/p IR embolization of liver lesion in segment 5 complicated by acute respiratory failure requiring reintubation on 10/13.  Pt now extubated on 10/15, on NC.      PLAN:    Neuro: acute abdominal pain, h/o seizures  - Monitor mental status.  - Home Keppra for seizures.  - Tylenol prn  - Limit narcotics     Resp: Extubated on 10/15, now on NC  - Aggressive chest PT and pulmonary toilet  - Duonebs q 6hrs     CV: atrial fibrillation, hemorrhagic shock (resolved)  - Monitor vital signs.  - Metoprolol 50mg q 12hrs     GI: hemoperitoneum, embolized segment 5 liver lesion, distended abdomen but now with bowel function   - Bowel regimen with Colace, senna, and Miralax on hold this the morning when he is anticipated to pass dysphagia screening  - Protonix for stress ulcer prophylaxis.  - Follow up liver lesion work-up as outpatient. Appreciate oncology recommendations.    Renal: DONY on CKD stage 2 (baseline Cr 1.0) from likely hemorrhagic ATN  - Monitor I&Os.  - Monitor electrolytes and replete as necessary.  - IVL for diuresis    Heme: hemorrhagic shock (resolved)  - Monitor CBC and coags daily.  - Lovenox for VTE prophylaxis.    ID: no acute issues  - Monitor for clinical evidence of active infection.    Endo: DM type II  - Monitor blood glucose q4hrs. Lantus 10 units at bedtime 2/2 NPO with moderate ISS q4hrs for glycemic control.    Disposition:  - Full code.  - Will remain in SICU for aggressive pulmonary toilet ASSESSMENT:  77 year old male presenting with hemoperitoneum and hemorrhagic shock s/p IR embolization of liver lesion in segment 5 complicated by acute respiratory failure requiring reintubation on 10/13. Patient extubated since 10/15.    PLAN:    Neuro: acute abdominal pain, seizures, delirium  - Monitor mental status.  - Home Keppra for seizures.  - Avoiding narcotics. Pain control as needed with Tylenol.  - Remeron for delirium.    Resp: acute respiratory failure (resolved)  - Monitor pulse oximeter.  - Out of bed to chair, incentive spirometry, frequent chest PT, and frequent suctioning to prevent atelectasis.  - Duoneb q6hrs.    CV: atrial fibrillation, hemorrhagic shock (resolved), HTN  - Monitor vital signs.  - Home metoprolol for atrial fibrillation.  - Amlodipine for HTN.    GI: hemoperitoneum, embolized segment 5 liver lesion, distended abdomen with constipation on abdominal x-ray  - NPO except meds. Will reattempt dysphagia screen and advance diet as tolerated.  - Protonix for stress ulcer prophylaxis.  - Bowel regimen with Colace, senna, Miralax, and lactulose.  - Follow up liver lesion work-up as outpatient. Appreciate surgical oncology recommendations.    Renal: DONY from likely hemorrhagic ATN on CKD stage 2 (baseline Cr 1.0, resolved), hypernatremia  - Monitor I&Os.  - Monitor electrolytes and replete as necessary.  - D5 at 50 mL/hr for hypernatremia. Will encourage free water intake if able to tolerate diet.    Heme: hemorrhagic shock (resolved)  - Monitor CBC and coags daily.  - Lovenox for VTE prophylaxis.    ID: no acute issues  - Monitor for clinical evidence of active infection.    Endo: DM type II  - Monitor blood glucose. Lantus 10 units at bedtime while NPO with moderate ISS q6hrs for glycemic control.    Disposition:  - Full code.  - Will remain in SICU for aggressive pulmonary toileting.    Amy Quevedo PA-C    [id number]

## 2018-10-17 NOTE — PROGRESS NOTE ADULT - ATTENDING COMMENTS
Still delirious, follows better in [place] with his son  Bed side swallow eval again  A fib with RVR, add BB again  CXR pul vascular congestion, will try small dose diuretic  Hypotonic fluid for hypernatremia, monitor Na  Glycemic control  PT

## 2018-10-18 DIAGNOSIS — E11.9 TYPE 2 DIABETES MELLITUS WITHOUT COMPLICATIONS: ICD-10-CM

## 2018-10-18 DIAGNOSIS — I48.91 UNSPECIFIED ATRIAL FIBRILLATION: ICD-10-CM

## 2018-10-18 DIAGNOSIS — R56.9 UNSPECIFIED CONVULSIONS: ICD-10-CM

## 2018-10-18 DIAGNOSIS — R41.82 ALTERED MENTAL STATUS, UNSPECIFIED: ICD-10-CM

## 2018-10-18 DIAGNOSIS — K66.1 HEMOPERITONEUM: ICD-10-CM

## 2018-10-18 DIAGNOSIS — E87.0 HYPEROSMOLALITY AND HYPERNATREMIA: ICD-10-CM

## 2018-10-18 DIAGNOSIS — J96.00 ACUTE RESPIRATORY FAILURE, UNSPECIFIED WHETHER WITH HYPOXIA OR HYPERCAPNIA: ICD-10-CM

## 2018-10-18 DIAGNOSIS — I10 ESSENTIAL (PRIMARY) HYPERTENSION: ICD-10-CM

## 2018-10-18 LAB
ANION GAP SERPL CALC-SCNC: 12 MMOL/L — SIGNIFICANT CHANGE UP (ref 5–17)
ANION GAP SERPL CALC-SCNC: 15 MMOL/L — SIGNIFICANT CHANGE UP (ref 5–17)
APTT BLD: 31.3 SEC — SIGNIFICANT CHANGE UP (ref 27.5–37.4)
BUN SERPL-MCNC: 41 MG/DL — HIGH (ref 7–23)
BUN SERPL-MCNC: 41 MG/DL — HIGH (ref 7–23)
CA-I BLD-SCNC: 1.18 MMOL/L — SIGNIFICANT CHANGE UP (ref 1.12–1.3)
CALCIUM SERPL-MCNC: 8.9 MG/DL — SIGNIFICANT CHANGE UP (ref 8.4–10.5)
CALCIUM SERPL-MCNC: 9.1 MG/DL — SIGNIFICANT CHANGE UP (ref 8.4–10.5)
CHLORIDE SERPL-SCNC: 106 MMOL/L — SIGNIFICANT CHANGE UP (ref 96–108)
CHLORIDE SERPL-SCNC: 107 MMOL/L — SIGNIFICANT CHANGE UP (ref 96–108)
CO2 SERPL-SCNC: 24 MMOL/L — SIGNIFICANT CHANGE UP (ref 22–31)
CO2 SERPL-SCNC: 26 MMOL/L — SIGNIFICANT CHANGE UP (ref 22–31)
CREAT SERPL-MCNC: 1.08 MG/DL — SIGNIFICANT CHANGE UP (ref 0.5–1.3)
CREAT SERPL-MCNC: 1.15 MG/DL — SIGNIFICANT CHANGE UP (ref 0.5–1.3)
GLUCOSE BLDC GLUCOMTR-MCNC: 140 MG/DL — HIGH (ref 70–99)
GLUCOSE BLDC GLUCOMTR-MCNC: 175 MG/DL — HIGH (ref 70–99)
GLUCOSE BLDC GLUCOMTR-MCNC: 187 MG/DL — HIGH (ref 70–99)
GLUCOSE BLDC GLUCOMTR-MCNC: 243 MG/DL — HIGH (ref 70–99)
GLUCOSE SERPL-MCNC: 173 MG/DL — HIGH (ref 70–99)
GLUCOSE SERPL-MCNC: 234 MG/DL — HIGH (ref 70–99)
HCT VFR BLD CALC: 30.2 % — LOW (ref 39–50)
HGB BLD-MCNC: 10.2 G/DL — LOW (ref 13–17)
INR BLD: 1.36 RATIO — HIGH (ref 0.88–1.16)
MAGNESIUM SERPL-MCNC: 2.1 MG/DL — SIGNIFICANT CHANGE UP (ref 1.6–2.6)
MAGNESIUM SERPL-MCNC: 2.2 MG/DL — SIGNIFICANT CHANGE UP (ref 1.6–2.6)
MCHC RBC-ENTMCNC: 31.4 PG — SIGNIFICANT CHANGE UP (ref 27–34)
MCHC RBC-ENTMCNC: 33.7 GM/DL — SIGNIFICANT CHANGE UP (ref 32–36)
MCV RBC AUTO: 93.3 FL — SIGNIFICANT CHANGE UP (ref 80–100)
PHOSPHATE SERPL-MCNC: 3.1 MG/DL — SIGNIFICANT CHANGE UP (ref 2.5–4.5)
PHOSPHATE SERPL-MCNC: 3.6 MG/DL — SIGNIFICANT CHANGE UP (ref 2.5–4.5)
PLATELET # BLD AUTO: 362 K/UL — SIGNIFICANT CHANGE UP (ref 150–400)
POTASSIUM SERPL-MCNC: 3.4 MMOL/L — LOW (ref 3.5–5.3)
POTASSIUM SERPL-MCNC: 3.6 MMOL/L — SIGNIFICANT CHANGE UP (ref 3.5–5.3)
POTASSIUM SERPL-SCNC: 3.4 MMOL/L — LOW (ref 3.5–5.3)
POTASSIUM SERPL-SCNC: 3.6 MMOL/L — SIGNIFICANT CHANGE UP (ref 3.5–5.3)
PROTHROM AB SERPL-ACNC: 14.8 SEC — HIGH (ref 9.8–12.7)
RBC # BLD: 3.24 M/UL — LOW (ref 4.2–5.8)
RBC # FLD: 12.8 % — SIGNIFICANT CHANGE UP (ref 10.3–14.5)
SODIUM SERPL-SCNC: 145 MMOL/L — SIGNIFICANT CHANGE UP (ref 135–145)
SODIUM SERPL-SCNC: 145 MMOL/L — SIGNIFICANT CHANGE UP (ref 135–145)
WBC # BLD: 11.3 K/UL — HIGH (ref 3.8–10.5)
WBC # FLD AUTO: 11.3 K/UL — HIGH (ref 3.8–10.5)

## 2018-10-18 PROCEDURE — 99233 SBSQ HOSP IP/OBS HIGH 50: CPT

## 2018-10-18 PROCEDURE — 71045 X-RAY EXAM CHEST 1 VIEW: CPT | Mod: 26

## 2018-10-18 RX ORDER — INSULIN GLARGINE 100 [IU]/ML
22 INJECTION, SOLUTION SUBCUTANEOUS AT BEDTIME
Qty: 0 | Refills: 0 | Status: DISCONTINUED | OUTPATIENT
Start: 2018-10-18 | End: 2018-10-18

## 2018-10-18 RX ORDER — INSULIN GLARGINE 100 [IU]/ML
18 INJECTION, SOLUTION SUBCUTANEOUS AT BEDTIME
Qty: 0 | Refills: 0 | Status: DISCONTINUED | OUTPATIENT
Start: 2018-10-18 | End: 2018-10-19

## 2018-10-18 RX ORDER — LEVETIRACETAM 250 MG/1
500 TABLET, FILM COATED ORAL EVERY 12 HOURS
Qty: 0 | Refills: 0 | Status: DISCONTINUED | OUTPATIENT
Start: 2018-10-18 | End: 2018-10-23

## 2018-10-18 RX ORDER — METOPROLOL TARTRATE 50 MG
5 TABLET ORAL EVERY 6 HOURS
Qty: 0 | Refills: 0 | Status: DISCONTINUED | OUTPATIENT
Start: 2018-10-18 | End: 2018-10-19

## 2018-10-18 RX ORDER — POTASSIUM CHLORIDE 20 MEQ
10 PACKET (EA) ORAL
Qty: 0 | Refills: 0 | Status: COMPLETED | OUTPATIENT
Start: 2018-10-18 | End: 2018-10-18

## 2018-10-18 RX ORDER — POTASSIUM CHLORIDE 20 MEQ
20 PACKET (EA) ORAL
Qty: 0 | Refills: 0 | Status: COMPLETED | OUTPATIENT
Start: 2018-10-18 | End: 2018-10-18

## 2018-10-18 RX ADMIN — Medication 100 MILLIEQUIVALENT(S): at 21:50

## 2018-10-18 RX ADMIN — Medication 2: at 18:27

## 2018-10-18 RX ADMIN — Medication 2: at 13:05

## 2018-10-18 RX ADMIN — Medication 20 MILLIEQUIVALENT(S): at 09:09

## 2018-10-18 RX ADMIN — Medication 5 MILLIGRAM(S): at 23:05

## 2018-10-18 RX ADMIN — CEFTRIAXONE 100 GRAM(S): 500 INJECTION, POWDER, FOR SOLUTION INTRAMUSCULAR; INTRAVENOUS at 14:14

## 2018-10-18 RX ADMIN — CHLORHEXIDINE GLUCONATE 1 APPLICATION(S): 213 SOLUTION TOPICAL at 05:07

## 2018-10-18 RX ADMIN — POLYETHYLENE GLYCOL 3350 17 GRAM(S): 17 POWDER, FOR SOLUTION ORAL at 12:48

## 2018-10-18 RX ADMIN — LACTULOSE 20 GRAM(S): 10 SOLUTION ORAL at 05:06

## 2018-10-18 RX ADMIN — LEVETIRACETAM 400 MILLIGRAM(S): 250 TABLET, FILM COATED ORAL at 21:52

## 2018-10-18 RX ADMIN — SODIUM CHLORIDE 100 MILLILITER(S): 9 INJECTION INTRAMUSCULAR; INTRAVENOUS; SUBCUTANEOUS at 05:09

## 2018-10-18 RX ADMIN — Medication 100 MILLIGRAM(S): at 05:07

## 2018-10-18 RX ADMIN — Medication 20 MILLIEQUIVALENT(S): at 06:33

## 2018-10-18 RX ADMIN — Medication 100 MILLIEQUIVALENT(S): at 20:59

## 2018-10-18 RX ADMIN — TAMSULOSIN HYDROCHLORIDE 0.4 MILLIGRAM(S): 0.4 CAPSULE ORAL at 21:38

## 2018-10-18 RX ADMIN — LEVETIRACETAM 500 MILLIGRAM(S): 250 TABLET, FILM COATED ORAL at 05:07

## 2018-10-18 RX ADMIN — Medication 4: at 09:03

## 2018-10-18 RX ADMIN — LEVALBUTEROL 0.63 MILLIGRAM(S): 1.25 SOLUTION, CONCENTRATE RESPIRATORY (INHALATION) at 23:28

## 2018-10-18 RX ADMIN — SODIUM CHLORIDE 50 MILLILITER(S): 9 INJECTION INTRAMUSCULAR; INTRAVENOUS; SUBCUTANEOUS at 12:48

## 2018-10-18 RX ADMIN — Medication 100 MILLIEQUIVALENT(S): at 23:05

## 2018-10-18 RX ADMIN — INSULIN GLARGINE 18 UNIT(S): 100 INJECTION, SOLUTION SUBCUTANEOUS at 21:38

## 2018-10-18 RX ADMIN — HALOPERIDOL DECANOATE 1 MILLIGRAM(S): 100 INJECTION INTRAMUSCULAR at 05:07

## 2018-10-18 RX ADMIN — Medication 50 MILLIGRAM(S): at 05:06

## 2018-10-18 RX ADMIN — ENOXAPARIN SODIUM 40 MILLIGRAM(S): 100 INJECTION SUBCUTANEOUS at 12:48

## 2018-10-18 RX ADMIN — SENNA PLUS 2 TABLET(S): 8.6 TABLET ORAL at 21:38

## 2018-10-18 NOTE — PROGRESS NOTE ADULT - ASSESSMENT
77M presented 10/10 with abd pain, hypotension, and tachycardia. Patient had downtrending H/H and CT abd showed hemoperitoneum. Now s/p IR embolization of segment 5 bleeding liver mass on 10/11.     Plan:  - Soft diet   - Bowel regimen: senna, miralax, colace  - Trend H/H  - DVT ppx: Lovenox  - Follow care plan per SICU team

## 2018-10-18 NOTE — PROGRESS NOTE ADULT - ASSESSMENT
ASSESSMENT:  77 year old male presenting with hemoperitoneum and hemorrhagic shock s/p IR embolization of liver lesion in segment 5 complicated by acute respiratory failure requiring reintubation on 10/13. Patient extubated since 10/15.    PLAN:    Neuro: acute abdominal pain, seizures, delirium  - Monitor mental status.  - Home Keppra for seizures.  - Avoiding narcotics. Pain control as needed with Tylenol.  - Haldol for delirium.    Resp: acute respiratory failure (resolved)  - Monitor pulse oximeter.  - Out of bed to chair, incentive spirometry, frequent chest PT, and frequent suctioning to prevent atelectasis.  - Levalbuterol q6hrs as needed for shortness of breath and/or wheezing.    CV: atrial fibrillation, hemorrhagic shock (resolved), HTN  - Monitor vital signs.  - Home metoprolol for atrial fibrillation.  - Amlodipine for HTN.    GI: hemoperitoneum, embolized segment 5 liver lesion, distended abdomen with constipation on abdominal x-ray  - Mechanical soft diet as tolerated.  - Protonix for stress ulcer prophylaxis.  - Bowel regimen with Colace, senna, Miralax, and lactulose.  - Follow up liver lesion work-up as outpatient. Appreciate surgical oncology recommendations.    Renal: DONY from likely hemorrhagic ATN on CKD stage 2 (baseline Cr 1.0, resolved), hypernatremia  - Monitor I&Os.  - Monitor electrolytes and replete as necessary.  - 1/4NS at 100 mL/hr for hypernatremia. Will also encourage free water intake.    Heme: hemorrhagic shock (resolved)  - Monitor CBC and coags daily.  - Lovenox for VTE prophylaxis.    ID: no acute issues  - Monitor for clinical evidence of active infection.    Endo: DM type II  - Monitor blood glucose. Lantus 12 units at bedtime while PO intake is poor with moderate ISS before meals and at bedtime for glycemic control.    Disposition:  - Full code.  - Stable for transfer to floors.    Amy Quevedo PA-C    [id number]

## 2018-10-18 NOTE — CONSULT NOTE ADULT - ASSESSMENT
77 year old male presenting with hemoperitoneum and hemorrhagic shock s/p IR embolization of liver lesion in segment 5 complicated by acute respiratory failure requiring reintubation on 10/13. Patient extubated since 10/15.

## 2018-10-18 NOTE — PROGRESS NOTE ADULT - SUBJECTIVE AND OBJECTIVE BOX
Red Team Surgery Progress Note     Subjective/24hour Events: + +  GI function. Pain controlled. Tolerating diet. No N/V. No CP or SOB.    Vital Signs:  Vital Signs Last 24 Hrs  T(C): 37.1 (16 Oct 2018 07:00), Max: 37.1 (15 Oct 2018 15:00)  T(F): 98.7 (16 Oct 2018 07:00), Max: 98.7 (15 Oct 2018 15:00)  HR: 91 (16 Oct 2018 09:12) (85 - 131)  BP: 174/93 (15 Oct 2018 20:00) (174/93 - 174/93)  BP(mean): 124 (15 Oct 2018 20:00) (124 - 124)  RR: 24 (16 Oct 2018 09:12) (21 - 34)  SpO2: 99% (16 Oct 2018 09:12) (92% - 99%)    CAPILLARY BLOOD GLUCOSE      POCT Blood Glucose.: 114 mg/dL (16 Oct 2018 04:59)  POCT Blood Glucose.: 147 mg/dL (16 Oct 2018 01:09)  POCT Blood Glucose.: 142 mg/dL (15 Oct 2018 21:46)  POCT Blood Glucose.: 132 mg/dL (15 Oct 2018 17:21)  POCT Blood Glucose.: 145 mg/dL (15 Oct 2018 14:28)      I&O's Detail    15 Oct 2018 07:01  -  16 Oct 2018 07:00  --------------------------------------------------------  IN:    dexmedetomidine Infusion: 25.8 mL    dextrose 5% + sodium chloride 0.45%.: 150 mL    Enteral Tube Flush: 200 mL    sodium chloride 0.45%.: 440 mL    Solution: 100 mL  Total IN: 915.8 mL    OUT:    Indwelling Catheter - Urethral: 3245 mL  Total OUT: 3245 mL    Total NET: -2329.2 mL            MEDICATIONS  (STANDING):  ALBUTerol/ipratropium for Nebulization 3 milliLiter(s) Nebulizer every 6 hours  amLODIPine   Tablet 2.5 milliGRAM(s) Oral every 24 hours  chlorhexidine 4% Liquid 1 Application(s) Topical <User Schedule>  docusate sodium Liquid 100 milliGRAM(s) Oral three times a day  enoxaparin Injectable 40 milliGRAM(s) SubCutaneous daily  insulin glargine Injectable (LANTUS) 20 Unit(s) SubCutaneous at bedtime  insulin lispro (HumaLOG) corrective regimen sliding scale   SubCutaneous every 6 hours  lactulose Syrup 20 Gram(s) Oral every 12 hours  levETIRAcetam  Solution 500 milliGRAM(s) Oral every 12 hours  metolazone 10 milliGRAM(s) Oral once  metoprolol tartrate 50 milliGRAM(s) Oral every 12 hours  polyethylene glycol 3350 17 Gram(s) Oral daily  senna Syrup 5 milliLiter(s) Oral at bedtime  sodium chloride 0.45%. 1000 milliLiter(s) (20 mL/Hr) IV Continuous <Continuous>  tamsulosin 0.4 milliGRAM(s) Oral once  tamsulosin 0.4 milliGRAM(s) Oral at bedtime    MEDICATIONS  (PRN):  acetaminophen    Suspension .. 975 milliGRAM(s) Oral every 6 hours PRN Mild Pain (1 - 3)        Physical Exam:  Gen: NAD  LS: nml respiratory effort  Card: pulse regularly present  GI: abd soft, nontender, bloated  Ext: warm      Labs:    10-16    148<H>  |  112<H>  |  37<H>  ----------------------------<  123<H>  4.0   |  22  |  1.16    Ca    9.0      16 Oct 2018 03:17  Phos  3.0     10-16  Mg     2.3     10-16    TPro  5.8<L>  /  Alb  2.6<L>  /  TBili  1.3<H>  /  DBili  0.4<H>  /  AST  18  /  ALT  34  /  AlkPhos  68  10-15    LIVER FUNCTIONS - ( 15 Oct 2018 02:21 )  Alb: 2.6 g/dL / Pro: 5.8 g/dL / ALK PHOS: 68 U/L / ALT: 34 U/L / AST: 18 U/L / GGT: x                                 9.8    12.1  )-----------( 291      ( 16 Oct 2018 03:17 )             28.7     PT/INR - ( 16 Oct 2018 03:17 )   PT: 15.9 sec;   INR: 1.46 ratio         PTT - ( 16 Oct 2018 03:17 )  PTT:28.1 sec          Imaging:  EXAM:  XR CHEST PORTABLE ROUTINE 1V                          PROCEDURE DATE:  10/16/2018      INTERPRETATION:  A single chest x-ray was obtained on October 16, 2018.    Indication: Respiratory failure.    Impression:    Poor inspiratory effort. The heart is slightly enlarged. Bibasilar   atelectasis and or pneumonia. Suboptimal study the head of the patient   obscuring the upper part of the chest.

## 2018-10-18 NOTE — PROGRESS NOTE ADULT - SUBJECTIVE AND OBJECTIVE BOX
HISTORY:  77 year old man with PMH of atrial fibrillation on Xarelto, HTN, DM, BPH, and seizures presented yesterday with abdominal pain and was found to have perihepatic and perisplenic hemorrhagic ascites with likely bleeding liver mass on CTA. Hct was 34 on presentation and dropped to 29. He received 2u pRBCs and was transferred to SICU for close monitoring. He received 2u FFP. This morning he went to interventional radiology for angiography and a lesion in segment 5 (with tumor vascularity) was identified. Two subsegmental vessels supplying segment 5 were embolized and completion angiography demonstrated a devascularized lesion. He received an additional 2u pRBCs while at interventional radiology with no increase in Hct (32.9 --> 30.6). He was transferred back to the SICU on phenylephrine. He became hypotensive and tachycardic, and his phenylephrine increased to 1.5. He was given another 2u pRBCs and 2u FFP.     24 HOUR EVENTS:  - Discontinued Remeron and started on Haldol 1 mg PO q12hrs for 3 days for ICU delirium.  - Refused to take PO medications this morning so went into atrial fibrillation with RVR requiring metoprolol 5 mg IV x1 dose.  - Adjusted Duonebs to levalbuterol as needed in the setting of tachycardia.  - Advanced to mechanical soft diet but poor PO intake so remains on IV fluids and Lantus only increased from 10 units to 12 units at bedtime (normally takes Lantus 20 units at bedtime)  - Adjusted IV fluids from D5 at 50 mL/hr to 1/4NS at 100 mL/hr. Remains hypernatremic but sodium is trending down.  - Lares discontinued and passed trial to void. Post-void residual of   - Febrile to 38.2 during the day. UA positive so started on ceftriaxone for 5 days.  - Right femoral arterial line discontinued. HISTORY:  77 year old man with PMH of atrial fibrillation on Xarelto, HTN, DM, BPH, and seizures presented yesterday with abdominal pain and was found to have perihepatic and perisplenic hemorrhagic ascites with likely bleeding liver mass on CTA. Hct was 34 on presentation and dropped to 29. He received 2u pRBCs and was transferred to SICU for close monitoring. He received 2u FFP. This morning he went to interventional radiology for angiography and a lesion in segment 5 (with tumor vascularity) was identified. Two subsegmental vessels supplying segment 5 were embolized and completion angiography demonstrated a devascularized lesion. He received an additional 2u pRBCs while at interventional radiology with no increase in Hct (32.9 --> 30.6). He was transferred back to the SICU on phenylephrine. He became hypotensive and tachycardic, and his phenylephrine increased to 1.5. He was given another 2u pRBCs and 2u FFP.     24 HOUR EVENTS:  - Discontinued Remeron and started on Haldol 1 mg PO q12hrs for 3 days for ICU delirium.  - Refused to take PO medications this morning so went into atrial fibrillation with RVR requiring metoprolol 5 mg IV x1 dose.  - Adjusted Duonebs to levalbuterol as needed in the setting of tachycardia.  - Advanced to mechanical soft diet but poor PO intake so remains on IV fluids and Lantus only increased from 10 units to 12 units at bedtime (normally takes Lantus 20 units at bedtime)  - Adjusted IV fluids from D5 at 50 mL/hr to 1/4NS at 100 mL/hr. Remains hypernatremic but sodium is trending down.  - Lares discontinued and passed trial to void. Post-void residual of   - Febrile to 38.2 during the day. UA positive so started on ceftriaxone for 5 days.  - Right femoral arterial line discontinued.    SUBJECTIVE/ROS:  [x] A ten-point review of systems was otherwise negative except as noted.  [ ] Due to altered mental status/intubation, subjective information were not able to be obtained from the patient. History was obtained, to the extent possible, from review of the chart and collateral sources of information.    NEURO  CAM ICU: positive  Exam: awake, alert, oriented to self only, intermittently agitated, occasionally refuses care  Meds:  - acetaminophen   Tablet .. 975 milliGRAM(s) Oral every 6 hours PRN Mild Pain (1 - 3)  - haloperidol     Tablet 1 milliGRAM(s) Oral every 12 hours  - levETIRAcetam 500 milliGRAM(s) Oral every 12 hours  [x] Adequacy of sedation and pain control has been assessed and adjusted    RESPIRATORY  RR: 28 (10-18-18 @ 06:00) (24 - 31)  SpO2: 94% (10-18-18 @ 06:00) (92% - 99%)  Exam: coarse rhonchi in all lung fields  Mechanical Ventilation: no  [N/A] Extubation Readiness Assessed  Meds: levalbuterol Inhalation 0.63 milliGRAM(s) Inhalation every 6 hours PRN shortness of breath/wheezing    CARDIOVASCULAR  HR: 91 (10-18-18 @ 06:00) (87 - 124)  ABP: 127/42 (10-18-18 @ 06:00) (122/38 - 164/51)  ABP(mean): 67 (10-18-18 @ 06:00) (63 - 90)  Exam:   Cardiac Rhythm:   Perfusion    [x]Adequate    [ ]Inadequate  Mentation   [x]Normal       [ ]Reduced  Extremities  [x]Warm         [ ]Cool  Volume Status [ ]Hypervolemic [ ]Euvolemic [ ]Hypovolemic  Meds: metoprolol tartrate 50 milliGRAM(s) Oral every 12 hours  tamsulosin 0.4 milliGRAM(s) Oral at bedtime        GI/NUTRITION  Exam:  Diet:  Meds: docusate sodium 100 milliGRAM(s) Oral three times a day  lactulose Syrup 20 Gram(s) Oral every 12 hours  polyethylene glycol 3350 17 Gram(s) Oral daily  senna 2 Tablet(s) Oral at bedtime      GENITOURINARY  I&O's Detail    10-16 @ 07:01  -  10-17 @ 07:00  --------------------------------------------------------  IN:    dextrose 5%.: 75 mL    Enteral Tube Flush: 30 mL    Glucerna: 160 mL    Oral Fluid: 200 mL    sodium chloride 0.45%: 60 mL    Solution: 100 mL  Total IN: 625 mL    OUT:    Indwelling Catheter - Urethral: 2240 mL  Total OUT: 2240 mL    Total NET: -1615 mL      10-17 @ 07:01  -  10-18 @ 06:55  --------------------------------------------------------  IN:    dextrose 5%.: 150 mL    sodium chloride 0.225%.: 2000 mL    Solution: 50 mL  Total IN: 2200 mL    OUT:    Indwelling Catheter - Urethral: 610 mL  Total OUT: 610 mL    Total NET: 1590 mL          10-18    145  |  106  |  41<H>  ----------------------------<  234<H>  3.6   |  24  |  1.15    Ca    9.1      18 Oct 2018 03:40  Phos  3.6     10-18  Mg     2.2     10-18    TPro  6.1  /  Alb  2.6<L>  /  TBili  2.5<H>  /  DBili  0.9<H>  /  AST  19  /  ALT  19  /  AlkPhos  83  10-17    [ ] Lares catheter, indication:   Meds: potassium chloride    Tablet ER 20 milliEquivalent(s) Oral every 2 hours  sodium chloride 0.225%. 2000 milliLiter(s) IV Continuous <Continuous>        HEMATOLOGIC  Meds: enoxaparin Injectable 40 milliGRAM(s) SubCutaneous daily    [x] VTE Prophylaxis                        10.2   11.3  )-----------( 362      ( 18 Oct 2018 03:40 )             30.2     PT/INR - ( 18 Oct 2018 03:40 )   PT: 14.8 sec;   INR: 1.36 ratio         PTT - ( 18 Oct 2018 03:40 )  PTT:31.3 sec  Transfusion     [ ] PRBC   [ ] Platelets   [ ] FFP   [ ] Cryoprecipitate      INFECTIOUS DISEASES  T(C): 37.3 (10-18-18 @ 03:00), Max: 38.2 (10-17-18 @ 07:00)  Wt(kg): --  WBC Count: 11.3 K/uL (10-18 @ 03:40)    Recent Cultures:    Meds: cefTRIAXone   IVPB      cefTRIAXone   IVPB 1 Gram(s) IV Intermittent every 24 hours        ENDOCRINE  Capillary Blood Glucose    Meds: insulin glargine Injectable (LANTUS) 12 Unit(s) SubCutaneous at bedtime  insulin lispro (HumaLOG) corrective regimen sliding scale   SubCutaneous three times a day before meals  insulin lispro (HumaLOG) corrective regimen sliding scale   SubCutaneous at bedtime        ACCESS DEVICES:  [ ] Peripheral IV  [ ] Central Venous Line	[ ] R	[ ] L	[ ] IJ	[ ] Fem	[ ] SC	Placed:   [ ] Arterial Line		[ ] R	[ ] L	[ ] Fem	[ ] Rad	[ ] Ax	Placed:   [ ] PICC:					[ ] Mediport  [ ] Urinary Catheter, Date Placed:   [ ] Necessity of urinary, arterial, and venous catheters discussed    OTHER MEDICATIONS:  chlorhexidine 4% Liquid 1 Application(s) Topical <User Schedule>      CODE STATUS:     IMAGING: HISTORY:  77 year old man with PMH of atrial fibrillation on Xarelto, HTN, DM, BPH, and seizures presented yesterday with abdominal pain and was found to have perihepatic and perisplenic hemorrhagic ascites with likely bleeding liver mass on CTA. Hct was 34 on presentation and dropped to 29. He received 2u pRBCs and was transferred to SICU for close monitoring. He received 2u FFP. This morning he went to interventional radiology for angiography and a lesion in segment 5 (with tumor vascularity) was identified. Two subsegmental vessels supplying segment 5 were embolized and completion angiography demonstrated a devascularized lesion. He received an additional 2u pRBCs while at interventional radiology with no increase in Hct (32.9 --> 30.6). He was transferred back to the SICU on phenylephrine. He became hypotensive and tachycardic, and his phenylephrine increased to 1.5. He was given another 2u pRBCs and 2u FFP.     24 HOUR EVENTS:  - Discontinued Remeron and started on Haldol 1 mg PO q12hrs for 3 days for ICU delirium.  - Refused to take PO medications this morning so went into atrial fibrillation with RVR requiring metoprolol 5 mg IV x1 dose.  - Adjusted Duonebs to levalbuterol as needed in the setting of tachycardia.  - Advanced to mechanical soft diet but poor PO intake so remains on IV fluids and Lantus only increased from 10 units to 12 units at bedtime (normally takes Lantus 20 units at bedtime)  - Adjusted IV fluids from D5 at 50 mL/hr to 1/4NS at 100 mL/hr. Remains hypernatremic but sodium is trending down.  - Lares discontinued and passed trial to void. Post-void residual of   - Febrile to 38.2 during the day. UA positive so started on ceftriaxone for 5 days.  - Right femoral arterial line discontinued.    SUBJECTIVE/ROS:  [x] A ten-point review of systems was otherwise negative except as noted.  [ ] Due to altered mental status/intubation, subjective information were not able to be obtained from the patient. History was obtained, to the extent possible, from review of the chart and collateral sources of information.    NEURO  CAM ICU: positive  Exam: awake, alert, oriented to self only, intermittently agitated, occasionally refuses care  Meds:  - acetaminophen   Tablet .. 975 milliGRAM(s) Oral every 6 hours PRN Mild Pain (1 - 3)  - haloperidol     Tablet 1 milliGRAM(s) Oral every 12 hours  - levETIRAcetam 500 milliGRAM(s) Oral every 12 hours  [x] Adequacy of sedation and pain control has been assessed and adjusted    RESPIRATORY  RR: 28 (10-18-18 @ 06:00) (24 - 31)  SpO2: 94% (10-18-18 @ 06:00) (92% - 99%)  Exam: coarse rhonchi in all lung fields  Mechanical Ventilation: no  [N/A] Extubation Readiness Assessed  Meds: levalbuterol Inhalation 0.63 milliGRAM(s) Inhalation every 6 hours PRN shortness of breath/wheezing    CARDIOVASCULAR  HR: 91 (10-18-18 @ 06:00) (87 - 124)  ABP: 127/42 (10-18-18 @ 06:00) (122/38 - 164/51)  ABP(mean): 67 (10-18-18 @ 06:00) (63 - 90)  Exam: irregularly irregular  Cardiac Rhythm: atrial fibrillation  Perfusion    [x]Adequate    [ ]Inadequate  Mentation   [ ]Normal       [x]Reduced  Extremities  [x]Warm         [ ]Cool  Volume Status [x]Hypervolemic [ ]Euvolemic [ ]Hypovolemic  Meds: metoprolol tartrate 50 milliGRAM(s) Oral every 12 hours    GI/NUTRITION  Exam: soft, nontender, nondistended  Diet: mechanical soft  Meds:  - docusate sodium 100 milliGRAM(s) Oral three times a day  - lactulose Syrup 20 Gram(s) Oral every 12 hours  - polyethylene glycol 3350 17 Gram(s) Oral daily  - senna 2 Tablet(s) Oral at bedtime    GENITOURINARY      145  |  106  |  41<H>  ----------------------------<  234<H>  3.6   |  24  |  1.15    Ca    9.1      18 Oct 2018 03:40  Phos  3.6  Mg     2.2    [ ] Lares catheter, indication: N/A  Meds:  - sodium chloride 0.225% infuse at 100 mL/hr  - tamsulosin 0.4 milliGRAM(s) Oral at bedtime    HEMATOLOGIC  Meds: enoxaparin Injectable 40 milliGRAM(s) SubCutaneous daily  [x] VTE Prophylaxis                        10.2   11.3  )-----------( 362      ( 18 Oct 2018 03:40 )             30.2     PT/INR - ( 18 Oct 2018 03:40 )   PT: 14.8 sec;   INR: 1.36 ratio    PTT - ( 18 Oct 2018 03:40 )  PTT:31.3 sec    INFECTIOUS DISEASES  T(C): 37.3 (10-18-18 @ 03:00), Max: 38.2 (10-17-18 @ 07:00)  WBC Count: 11.3 K/uL (10-18 @ 03:40)  Recent Cultures: no  Meds: cefTRIAXone   IVPB 1 Gram(s) IV Intermittent every 24 hours    ENDOCRINE  Capillary Blood Glucose:  - 226 mg/dL (17 Oct 2018 22:18)  - 238 mg/dL (17 Oct 2018 17:47)  - 236 mg/dL (17 Oct 2018 12:32)  Meds:  - insulin glargine Injectable (LANTUS) 12 Unit(s) SubCutaneous at bedtime  - insulin lispro (HumaLOG) corrective regimen sliding scale   SubCutaneous three times a day before meals  - insulin lispro (HumaLOG) corrective regimen sliding scale   SubCutaneous at bedtime    ACCESS DEVICES:  [x] Peripheral IV  [ ] Central Venous Line	[ ] R	[ ] L	[ ] IJ	[ ] Fem	[ ] SC	Placed:   [x] Arterial Line		[x] R	[ ] L	[x] Fem	[ ] Rad	[ ] Ax	Placed: 10/11/2018  [ ] PICC:					[ ] Mediport  [ ] Urinary Catheter, Date Placed:   [x] Necessity of urinary, arterial, and venous catheters discussed    OTHER MEDICATIONS: chlorhexidine 4% Liquid 1 Application(s) Topical <User Schedule>    CODE STATUS: Full code.    IMAGING:

## 2018-10-18 NOTE — PROVIDER CONTACT NOTE (CHANGE IN STATUS NOTIFICATION) - SITUATION
Pt  Is very lethargic  compare from this morning , did not take any po intake, vital signs within normal limit charted in [facility]

## 2018-10-18 NOTE — CONSULT NOTE ADULT - SUBJECTIVE AND OBJECTIVE BOX
Patient is a 77y old  Male who presents with a chief complaint of abdominal pain, hemoperitoneum (18 Oct 2018 08:33)      HPI:  Patient is a 77y old  Male who presents with a chief complaint of abdominal pain.  Patient's symptoms started acutely at 6pm while he was sitting, eating dinner.  He felt weak at that time, and he went to urgent care.  He was noted to be hypotensive at urgent care and was referred to the ER.  On presentation, the patient continued to complain of abdominal pain, and was noted to be hypotensive.  He became progressively tachycardic.  Patient's labs showed a downtrending hct and a CTA was done which was consistent with hemoperitoneum, without obvious source.  Surgery consulted for evaluation of hemoperitoneum.      Past medical history is significant for afib, patient is on xeralto. (11 Oct 2018 01:58)      PAST MEDICAL & SURGICAL HISTORY:  BPH (benign prostatic hyperplasia)  Atrial fibrillation  Seizure  Diabetes mellitus  Hypertension  H/O craniotomy: bilateral, for ICH after fall  S/P inguinal hernia repair  ASD (atrial septal defect): closure      Social History:    FAMILY HISTORY:  Family history of glioblastoma (Sibling): brother      Allergies    Aleve (Unknown)    Intolerances        REVIEW OF SYSTEMS:    CONSTITUTIONAL: No fever, weight loss, or fatigue  EYES: No eye pain, visual disturbances, or discharge  RESPIRATORY: No cough, wheezing, chills or hemoptysis; No shortness of breath  CARDIOVASCULAR: No chest pain, palpitations, dizziness, or leg swelling  GASTROINTESTINAL: No abdominal or epigastric pain. No nausea, vomiting, or hematemesis; No diarrhea or constipation. No melena or hematochezia.  GENITOURINARY: No dysuria, frequency, hematuria, or incontinence  NEUROLOGICAL: No headaches, memory loss, loss of strength, numbness, or tremors  SKIN: No itching, burning, rashes, or lesions   ENDOCRINE: No heat or cold intolerance; No hair loss  MUSCULOSKELETAL: No joint pain or swelling; No muscle, back, or extremity pain  PSYCHIATRIC: No depression, anxiety, mood swings, or difficulty sleeping      MEDICATIONS  (STANDING):  cefTRIAXone   IVPB      cefTRIAXone   IVPB 1 Gram(s) IV Intermittent every 24 hours  chlorhexidine 4% Liquid 1 Application(s) Topical <User Schedule>  docusate sodium 100 milliGRAM(s) Oral three times a day  enoxaparin Injectable 40 milliGRAM(s) SubCutaneous daily  haloperidol     Tablet 1 milliGRAM(s) Oral every 12 hours  insulin glargine Injectable (LANTUS) 22 Unit(s) SubCutaneous at bedtime  insulin lispro (HumaLOG) corrective regimen sliding scale   SubCutaneous three times a day before meals  insulin lispro (HumaLOG) corrective regimen sliding scale   SubCutaneous at bedtime  lactulose Syrup 20 Gram(s) Oral every 12 hours  levETIRAcetam 500 milliGRAM(s) Oral every 12 hours  metolazone 5 milliGRAM(s) Oral once  metoprolol tartrate 50 milliGRAM(s) Oral every 12 hours  polyethylene glycol 3350 17 Gram(s) Oral daily  senna 2 Tablet(s) Oral at bedtime  sodium chloride 0.225%. 1000 milliLiter(s) (50 mL/Hr) IV Continuous <Continuous>  tamsulosin 0.4 milliGRAM(s) Oral at bedtime    MEDICATIONS  (PRN):  acetaminophen   Tablet .. 975 milliGRAM(s) Oral every 6 hours PRN Mild Pain (1 - 3)  levalbuterol Inhalation 0.63 milliGRAM(s) Inhalation every 6 hours PRN shortness of breath/wheezing      Vital Signs Last 24 Hrs  T(C): 37.3 (18 Oct 2018 07:00), Max: 37.4 (17 Oct 2018 19:00)  T(F): 99.1 (18 Oct 2018 07:00), Max: 99.3 (17 Oct 2018 19:00)  HR: 97 (18 Oct 2018 10:00) (87 - 124)  BP: --  BP(mean): --  RR: 32 (18 Oct 2018 10:00) (24 - 33)  SpO2: 97% (18 Oct 2018 10:00) (93% - 99%)    PHYSICAL EXAM:    GENERAL: NAD, well-groomed, well-developed  HEAD:  Atraumatic, Normocephalic  EYES: EOMI, PERRLA, conjunctiva and sclera clear  ENMT: No tonsillar erythema, exudates, or enlargement; Moist mucous membranes, Good dentition, No lesions  NECK: Supple, No JVD, Normal thyroid  NERVOUS SYSTEM:  Alert & Oriented X3, Good concentration; Motor Strength 5/5 B/L upper and lower extremities; DTRs 2+ intact and symmetric  CHEST/LUNG: Clear to percussion bilaterally; No rales, rhonchi, wheezing, or rubs  HEART: Regular rate and rhythm; No murmurs, rubs, or gallops  ABDOMEN: Soft, Nontender, Nondistended; Bowel sounds present  EXTREMITIES:  2+ Peripheral Pulses, No clubbing, cyanosis, or edema  LYMPH: No lymphadenopathy noted  SKIN: No rashes or lesions    LABS:                        10.2   11.3  )-----------( 362      ( 18 Oct 2018 03:40 )             30.2     10-18    145  |  106  |  41<H>  ----------------------------<  234<H>  3.6   |  24  |  1.15    Ca    9.1      18 Oct 2018 03:40  Phos  3.6     10-  Mg     2.2     10-    TPro  6.1  /  Alb  2.6<L>  /  TBili  2.5<H>  /  DBili  0.9<H>  /  AST  19  /  ALT  19  /  AlkPhos  83  10-17    PT/INR - ( 18 Oct 2018 03:40 )   PT: 14.8 sec;   INR: 1.36 ratio         PTT - ( 18 Oct 2018 03:40 )  PTT:31.3 sec  Urinalysis Basic - ( 17 Oct 2018 12:24 )    Color: Yellow / Appearance: Slightly Turbid / S.018 / pH: x  Gluc: x / Ketone: Negative  / Bili: Negative / Urobili: Negative   Blood: x / Protein: 30 mg/dL / Nitrite: Negative   Leuk Esterase: Large / RBC: 265 /hpf / WBC 99 /hpf   Sq Epi: x / Non Sq Epi: 1 /hpf / Bacteria: Few          RADIOLOGY & ADDITIONAL STUDIES: Patient is a 77y old  Male who presents with a chief complaint of abdominal pain, hemoperitoneum       HPI:  Patient is a 77y old  Male who presents with a chief complaint of abdominal pain.  Patient's symptoms started acutely at 6pm while he was sitting, eating dinner.  He felt weak at that time, and he went to urgent care.  He was noted to be hypotensive at urgent care and was referred to the ER.  On presentation, the patient continued to complain of abdominal pain, and was noted to be hypotensive.  He became progressively tachycardic.  Patient's labs showed a downtrending hct and a CTA was done which was consistent with hemoperitoneum, without obvious source.  Surgery consulted for evaluation of hemoperitoneum.      per son today more lethargic       PAST MEDICAL & SURGICAL HISTORY:  BPH (benign prostatic hyperplasia)  Atrial fibrillation  Seizure  Diabetes mellitus  Hypertension  H/O craniotomy: bilateral, for ICH after fall  S/P inguinal hernia repair  ASD (atrial septal defect): closure      Social History:    FAMILY HISTORY:  Family history of glioblastoma (Sibling): brother      Allergies    Aleve (Unknown)    Intolerances        REVIEW OF SYSTEMS:    CONSTITUTIONAL: No fever, weight loss, or fatigue  lethargic       MEDICATIONS  (STANDING):  cefTRIAXone   IVPB      cefTRIAXone   IVPB 1 Gram(s) IV Intermittent every 24 hours  chlorhexidine 4% Liquid 1 Application(s) Topical <User Schedule>  docusate sodium 100 milliGRAM(s) Oral three times a day  enoxaparin Injectable 40 milliGRAM(s) SubCutaneous daily  haloperidol     Tablet 1 milliGRAM(s) Oral every 12 hours  insulin glargine Injectable (LANTUS) 22 Unit(s) SubCutaneous at bedtime  insulin lispro (HumaLOG) corrective regimen sliding scale   SubCutaneous three times a day before meals  insulin lispro (HumaLOG) corrective regimen sliding scale   SubCutaneous at bedtime  lactulose Syrup 20 Gram(s) Oral every 12 hours  levETIRAcetam 500 milliGRAM(s) Oral every 12 hours  metolazone 5 milliGRAM(s) Oral once  metoprolol tartrate 50 milliGRAM(s) Oral every 12 hours  polyethylene glycol 3350 17 Gram(s) Oral daily  senna 2 Tablet(s) Oral at bedtime  sodium chloride 0.225%. 1000 milliLiter(s) (50 mL/Hr) IV Continuous <Continuous>  tamsulosin 0.4 milliGRAM(s) Oral at bedtime    MEDICATIONS  (PRN):  acetaminophen   Tablet .. 975 milliGRAM(s) Oral every 6 hours PRN Mild Pain (1 - 3)  levalbuterol Inhalation 0.63 milliGRAM(s) Inhalation every 6 hours PRN shortness of breath/wheezing      Vital Signs Last 24 Hrs  T(C): 37.3 (18 Oct 2018 07:00), Max: 37.4 (17 Oct 2018 19:00)  T(F): 99.1 (18 Oct 2018 07:00), Max: 99.3 (17 Oct 2018 19:00)  HR: 97 (18 Oct 2018 10:00) (87 - 124)  BP: --  BP(mean): --  RR: 32 (18 Oct 2018 10:00) (24 - 33)  SpO2: 97% (18 Oct 2018 10:00) (93% - 99%)    PHYSICAL EXAM:    GENERAL: NAD, well-groomed, well-developed  HEAD:  Atraumatic, Normocephalic  NECK: Supple, No JVD, Normal thyroid  NERVOUS SYSTEM:  Alert & lethargic   CHEST/LUNG: Air entry good  bilaterally; Rales +  HEART: Regular rate and rhythm; No murmurs, rubs, or gallops  ABDOMEN: Soft, Nontender, Nondistended; Bowel sounds present  EXTREMITIES:  2+ Peripheral Pulses, No clubbing, cyanosis, or edema    LABS:                        10.2   11.3  )-----------( 362      ( 18 Oct 2018 03:40 )             30.2     10-18    145  |  106  |  41<H>  ----------------------------<  234<H>  3.6   |  24  |  1.15    Ca    9.1      18 Oct 2018 03:40  Phos  3.6     10-18  Mg     2.2     10-18    TPro  6.1  /  Alb  2.6<L>  /  TBili  2.5<H>  /  DBili  0.9<H>  /  AST  19  /  ALT  19  /  AlkPhos  83  10-17    PT/INR - ( 18 Oct 2018 03:40 )   PT: 14.8 sec;   INR: 1.36 ratio         PTT - ( 18 Oct 2018 03:40 )  PTT:31.3 sec  Urinalysis Basic - ( 17 Oct 2018 12:24 )    Color: Yellow / Appearance: Slightly Turbid / S.018 / pH: x  Gluc: x / Ketone: Negative  / Bili: Negative / Urobili: Negative   Blood: x / Protein: 30 mg/dL / Nitrite: Negative   Leuk Esterase: Large / RBC: 265 /hpf / WBC 99 /hpf   Sq Epi: x / Non Sq Epi: 1 /hpf / Bacteria: Few          RADIOLOGY & ADDITIONAL STUDIES:

## 2018-10-18 NOTE — PROGRESS NOTE ADULT - ATTENDING COMMENTS
Delirious, on small dose standing Haldol  Hypernatremia better, decrease hypotonic fluid  Will try to diurese with metolazone not to worsen hypernatremia  PO if possible  Course of abx for possible UTI, afebrile without leucocytosis

## 2018-10-19 DIAGNOSIS — R16.0 HEPATOMEGALY, NOT ELSEWHERE CLASSIFIED: ICD-10-CM

## 2018-10-19 LAB
ANION GAP SERPL CALC-SCNC: 12 MMOL/L — SIGNIFICANT CHANGE UP (ref 5–17)
BUN SERPL-MCNC: 42 MG/DL — HIGH (ref 7–23)
CA-I BLD-SCNC: 1.14 MMOL/L — SIGNIFICANT CHANGE UP (ref 1.12–1.3)
CALCIUM SERPL-MCNC: 8.6 MG/DL — SIGNIFICANT CHANGE UP (ref 8.4–10.5)
CHLORIDE SERPL-SCNC: 107 MMOL/L — SIGNIFICANT CHANGE UP (ref 96–108)
CO2 SERPL-SCNC: 25 MMOL/L — SIGNIFICANT CHANGE UP (ref 22–31)
CREAT SERPL-MCNC: 1.08 MG/DL — SIGNIFICANT CHANGE UP (ref 0.5–1.3)
GLUCOSE BLDC GLUCOMTR-MCNC: 109 MG/DL — HIGH (ref 70–99)
GLUCOSE BLDC GLUCOMTR-MCNC: 135 MG/DL — HIGH (ref 70–99)
GLUCOSE BLDC GLUCOMTR-MCNC: 201 MG/DL — HIGH (ref 70–99)
GLUCOSE BLDC GLUCOMTR-MCNC: 296 MG/DL — HIGH (ref 70–99)
GLUCOSE SERPL-MCNC: 176 MG/DL — HIGH (ref 70–99)
HCT VFR BLD CALC: 29.6 % — LOW (ref 39–50)
HGB BLD-MCNC: 9.7 G/DL — LOW (ref 13–17)
MAGNESIUM SERPL-MCNC: 2.1 MG/DL — SIGNIFICANT CHANGE UP (ref 1.6–2.6)
MCHC RBC-ENTMCNC: 30.6 PG — SIGNIFICANT CHANGE UP (ref 27–34)
MCHC RBC-ENTMCNC: 32.8 GM/DL — SIGNIFICANT CHANGE UP (ref 32–36)
MCV RBC AUTO: 93.4 FL — SIGNIFICANT CHANGE UP (ref 80–100)
PHOSPHATE SERPL-MCNC: 2.8 MG/DL — SIGNIFICANT CHANGE UP (ref 2.5–4.5)
PLATELET # BLD AUTO: 397 K/UL — SIGNIFICANT CHANGE UP (ref 150–400)
POTASSIUM SERPL-MCNC: 3.5 MMOL/L — SIGNIFICANT CHANGE UP (ref 3.5–5.3)
POTASSIUM SERPL-SCNC: 3.5 MMOL/L — SIGNIFICANT CHANGE UP (ref 3.5–5.3)
RBC # BLD: 3.17 M/UL — LOW (ref 4.2–5.8)
RBC # FLD: 13.1 % — SIGNIFICANT CHANGE UP (ref 10.3–14.5)
SODIUM SERPL-SCNC: 144 MMOL/L — SIGNIFICANT CHANGE UP (ref 135–145)
WBC # BLD: 11.5 K/UL — HIGH (ref 3.8–10.5)
WBC # FLD AUTO: 11.5 K/UL — HIGH (ref 3.8–10.5)

## 2018-10-19 PROCEDURE — 71045 X-RAY EXAM CHEST 1 VIEW: CPT | Mod: 26

## 2018-10-19 PROCEDURE — 99232 SBSQ HOSP IP/OBS MODERATE 35: CPT

## 2018-10-19 RX ORDER — ACETYLCYSTEINE 200 MG/ML
2.5 VIAL (ML) MISCELLANEOUS EVERY 6 HOURS
Qty: 0 | Refills: 0 | Status: COMPLETED | OUTPATIENT
Start: 2018-10-19 | End: 2018-10-22

## 2018-10-19 RX ORDER — METOPROLOL TARTRATE 50 MG
50 TABLET ORAL EVERY 12 HOURS
Qty: 0 | Refills: 0 | Status: DISCONTINUED | OUTPATIENT
Start: 2018-10-19 | End: 2018-10-22

## 2018-10-19 RX ORDER — LEVALBUTEROL 1.25 MG/.5ML
0.63 SOLUTION, CONCENTRATE RESPIRATORY (INHALATION) EVERY 6 HOURS
Qty: 0 | Refills: 0 | Status: DISCONTINUED | OUTPATIENT
Start: 2018-10-19 | End: 2018-10-23

## 2018-10-19 RX ORDER — FUROSEMIDE 40 MG
20 TABLET ORAL DAILY
Qty: 0 | Refills: 0 | Status: COMPLETED | OUTPATIENT
Start: 2018-10-19 | End: 2018-10-22

## 2018-10-19 RX ORDER — INSULIN GLARGINE 100 [IU]/ML
26 INJECTION, SOLUTION SUBCUTANEOUS AT BEDTIME
Qty: 0 | Refills: 0 | Status: DISCONTINUED | OUTPATIENT
Start: 2018-10-19 | End: 2018-10-23

## 2018-10-19 RX ORDER — LACTULOSE 10 G/15ML
20 SOLUTION ORAL DAILY
Qty: 0 | Refills: 0 | Status: DISCONTINUED | OUTPATIENT
Start: 2018-10-19 | End: 2018-10-23

## 2018-10-19 RX ORDER — SODIUM CHLORIDE 9 MG/ML
1000 INJECTION, SOLUTION INTRAVENOUS
Qty: 0 | Refills: 0 | Status: DISCONTINUED | OUTPATIENT
Start: 2018-10-19 | End: 2018-10-19

## 2018-10-19 RX ORDER — METOPROLOL TARTRATE 50 MG
50 TABLET ORAL ONCE
Qty: 0 | Refills: 0 | Status: COMPLETED | OUTPATIENT
Start: 2018-10-19 | End: 2018-10-19

## 2018-10-19 RX ORDER — LACTULOSE 10 G/15ML
20 SOLUTION ORAL ONCE
Qty: 0 | Refills: 0 | Status: COMPLETED | OUTPATIENT
Start: 2018-10-19 | End: 2018-10-19

## 2018-10-19 RX ORDER — POTASSIUM PHOSPHATE, MONOBASIC POTASSIUM PHOSPHATE, DIBASIC 236; 224 MG/ML; MG/ML
15 INJECTION, SOLUTION INTRAVENOUS ONCE
Qty: 0 | Refills: 0 | Status: COMPLETED | OUTPATIENT
Start: 2018-10-19 | End: 2018-10-19

## 2018-10-19 RX ADMIN — Medication 2.5 MILLILITER(S): at 17:46

## 2018-10-19 RX ADMIN — POLYETHYLENE GLYCOL 3350 17 GRAM(S): 17 POWDER, FOR SOLUTION ORAL at 12:40

## 2018-10-19 RX ADMIN — LEVETIRACETAM 400 MILLIGRAM(S): 250 TABLET, FILM COATED ORAL at 22:18

## 2018-10-19 RX ADMIN — CEFTRIAXONE 100 GRAM(S): 500 INJECTION, POWDER, FOR SOLUTION INTRAMUSCULAR; INTRAVENOUS at 16:46

## 2018-10-19 RX ADMIN — ENOXAPARIN SODIUM 40 MILLIGRAM(S): 100 INJECTION SUBCUTANEOUS at 12:40

## 2018-10-19 RX ADMIN — INSULIN GLARGINE 26 UNIT(S): 100 INJECTION, SOLUTION SUBCUTANEOUS at 22:19

## 2018-10-19 RX ADMIN — SENNA PLUS 2 TABLET(S): 8.6 TABLET ORAL at 22:19

## 2018-10-19 RX ADMIN — Medication 100 MILLIGRAM(S): at 06:14

## 2018-10-19 RX ADMIN — Medication 5 MILLIGRAM(S): at 06:13

## 2018-10-19 RX ADMIN — LEVETIRACETAM 400 MILLIGRAM(S): 250 TABLET, FILM COATED ORAL at 12:40

## 2018-10-19 RX ADMIN — CHLORHEXIDINE GLUCONATE 1 APPLICATION(S): 213 SOLUTION TOPICAL at 06:14

## 2018-10-19 RX ADMIN — Medication 20 MILLIGRAM(S): at 12:39

## 2018-10-19 RX ADMIN — Medication 2.5 MILLILITER(S): at 14:08

## 2018-10-19 RX ADMIN — LEVALBUTEROL 0.63 MILLIGRAM(S): 1.25 SOLUTION, CONCENTRATE RESPIRATORY (INHALATION) at 17:45

## 2018-10-19 RX ADMIN — TAMSULOSIN HYDROCHLORIDE 0.4 MILLIGRAM(S): 0.4 CAPSULE ORAL at 22:19

## 2018-10-19 RX ADMIN — LACTULOSE 20 GRAM(S): 10 SOLUTION ORAL at 12:39

## 2018-10-19 RX ADMIN — Medication 100 MILLIGRAM(S): at 22:19

## 2018-10-19 RX ADMIN — SODIUM CHLORIDE 75 MILLILITER(S): 9 INJECTION, SOLUTION INTRAVENOUS at 06:52

## 2018-10-19 RX ADMIN — Medication 50 MILLIGRAM(S): at 12:39

## 2018-10-19 RX ADMIN — Medication 50 MILLIGRAM(S): at 19:06

## 2018-10-19 RX ADMIN — Medication 100 MILLIGRAM(S): at 16:47

## 2018-10-19 RX ADMIN — Medication 4: at 14:04

## 2018-10-19 RX ADMIN — Medication 6: at 09:30

## 2018-10-19 RX ADMIN — POTASSIUM PHOSPHATE, MONOBASIC POTASSIUM PHOSPHATE, DIBASIC 62.5 MILLIMOLE(S): 236; 224 INJECTION, SOLUTION INTRAVENOUS at 06:53

## 2018-10-19 NOTE — CONSULT NOTE ADULT - SUBJECTIVE AND OBJECTIVE BOX
HISTORY OF PRESENT ILLNESS:   77 year old man with PMH of atrial fibrillation on Xarelto, HTN, DM, BPH, and seizures presented yesterday with abdominal pain and was found to have perihepatic and perisplenic hemorrhagic ascites with likely bleeding liver mass on CTA. Hct was 34 on presentation and dropped to 29. He received 2u pRBCs and was transferred to SICU for close monitoring. He received 2u FFP. This morning he went to interventional radiology for angiography and a lesion in segment 5 (with tumor vascularity) was identified. Two subsegmental vessels supplying segment 5 were embolized and completion angiography demonstrated a devascularized lesion. He received an additional 2u pRBCs while at interventional radiology with no increase in Hct (32.9 --> 30.6).  PAST MEDICAL & SURGICAL HISTORY:  BPH (benign prostatic hyperplasia)  Atrial fibrillation  Seizure  Diabetes mellitus  Hypertension  H/O craniotomy: bilateral, for ICH after fall  S/P inguinal hernia repair  ASD (atrial septal defect): closure          MEDICATIONS:  enoxaparin Injectable 40 milliGRAM(s) SubCutaneous daily  metoprolol tartrate Injectable 5 milliGRAM(s) IV Push every 6 hours  tamsulosin 0.4 milliGRAM(s) Oral at bedtime    cefTRIAXone   IVPB      cefTRIAXone   IVPB 1 Gram(s) IV Intermittent every 24 hours    levalbuterol Inhalation 0.63 milliGRAM(s) Inhalation every 6 hours PRN    acetaminophen   Tablet .. 975 milliGRAM(s) Oral every 6 hours PRN  levETIRAcetam  IVPB 500 milliGRAM(s) IV Intermittent every 12 hours    docusate sodium 100 milliGRAM(s) Oral three times a day  polyethylene glycol 3350 17 Gram(s) Oral daily  senna 2 Tablet(s) Oral at bedtime    insulin glargine Injectable (LANTUS) 18 Unit(s) SubCutaneous at bedtime  insulin lispro (HumaLOG) corrective regimen sliding scale   SubCutaneous three times a day before meals  insulin lispro (HumaLOG) corrective regimen sliding scale   SubCutaneous at bedtime    chlorhexidine 4% Liquid 1 Application(s) Topical <User Schedule>  dextrose 5% + sodium chloride 0.45%. 1000 milliLiter(s) IV Continuous <Continuous>      FAMILY HISTORY:  Family history of glioblastoma (Sibling): brother      SOCIAL HISTORY:    [ ] Non-smoker  [ ] Smoker  [ ] Alcohol    Allergies    Aleve (Unknown)    Intolerances    	    REVIEW OF SYSTEMS:  CONSTITUTIONAL: No fever, weight loss, + fatigue  EYES: No eye pain, visual disturbances, or discharge  ENMT:  No difficulty hearing, tinnitus, vertigo; No sinus or throat pain  NECK: No pain or stiffness  RESPIRATORY: No cough, wheezing, chills or hemoptysis; No Shortness of Breath  CARDIOVASCULAR: No chest pain, palpitations, passing out, dizziness, or leg swelling  GASTROINTESTINAL: + abdominal or epigastric pain. No nausea, vomiting, or hematemesis; No diarrhea or constipation. No melena or hematochezia.  GENITOURINARY: No dysuria, frequency, hematuria, or incontinence  NEUROLOGICAL: No headaches, memory loss, loss of strength, numbness, or tremors  SKIN: No itching, burning, rashes, or lesions   LYMPH Nodes: No enlarged glands  ENDOCRINE: No heat or cold intolerance; No hair loss  MUSCULOSKELETAL: + joint pain or swelling; No muscle, back, or extremity pain  PSYCHIATRIC: No depression, anxiety, mood swings, or difficulty sleeping  HEME/LYMPH: No easy bruising, or bleeding gums  ALLERY AND IMMUNOLOGIC: No hives or eczema	    [ ] All others negative	  [ ] Unable to obtain    PHYSICAL EXAM:  T(C): 36.6 (10-19-18 @ 07:00), Max: 37.2 (10-18-18 @ 11:00)  HR: 93 (10-19-18 @ 09:00) (88 - 114)  BP: --  RR: 24 (10-19-18 @ 09:00) (18 - 41)  SpO2: 96% (10-19-18 @ 09:00) (94% - 100%)  Wt(kg): --  I&O's Summary    18 Oct 2018 07:01  -  19 Oct 2018 07:00  --------------------------------------------------------  IN: 1750 mL / OUT: 1000 mL / NET: 750 mL        Appearance: NAD lethargic   HEENT:   dry oral mucosa, PERRL, EOMI	  Lymphatic: No lymphadenopathy  Cardiovascular: Irregular tachy l S1 S2, No JVD, No murmurs, No edema  Respiratory: decreased bs   Psychiatry: A & O x 2, Mood & affect appropriate  Gastrointestinal:  Soft, Non-tender, + BS	  Skin: No rashes, No ecchymoses, No cyanosis	  Neurologic: Non-focal  Extremities: Normal range of motion, No clubbing, cyanosis or edema  Vascular: Peripheral pulses palpable 2+ bilaterally    TELEMETRY: AFib	    ECG:  	Afib RVR  RADIOLOGY:  < from: CT Abdomen and Pelvis No Cont (10.11.18 @ 02:39) >    EXAM:  CT ABDOMEN AND PELVIS                            PROCEDURE DATE:  10/11/2018            INTERPRETATION:  CLINICAL INFORMATION: Hemoperitoneum.    COMPARISON: None.    PROCEDURE:   CT of the Abdomen and Pelvis was performed without intravenous contrast.   Intravenous contrast: None.  Oral contrast: None.  Sagittal and coronal reformats were performed.    FINDINGS:    LOWER CHEST: Status post sternotomy.    LIVER: A 6 cm inferior right liver mass again noted.  BILE DUCTS: Normal caliber.  GALLBLADDER: Within normal limits.  SPLEEN: Within normal limits.   PANCREAS: Within normal limits.  ADRENALS: Within normal limits.   KIDNEYS/URETERS: Persistent enhancement of both    BLADDER: Within normal limits.   REPRODUCTIVE ORGANS: The prostate is enlarged.    BOWEL: No bowel obstruction.              PERITONEUM: Moderate hemoperitoneum is essentially unchanged allowing for   differences in technique.  VESSELS:  Right femoral arterial line.  RETROPERITONEUM: No lymphadenopathy.    ABDOMINAL WALL: Small right inguinal hernia containing blood.  BONES: Within normal limits.    IMPRESSION:    Moderate hemoperitoneum likely from ruptured right liver mass is   unchanged.                    JOE TREADWELL M.D., ATTENDING RADIOLOGIST  This documenthas been electronically signed. Oct 11 2018  9:13AM                < end of copied text >    OTHER: 	  	  LABS:	 	    CARDIAC MARKERS:                                  9.7    11.5  )-----------( 397      ( 19 Oct 2018 02:41 )             29.6     10-19    144  |  107  |  42<H>  ----------------------------<  176<H>  3.5   |  25  |  1.08    Ca    8.6      19 Oct 2018 02:41  Phos  2.8     10-19  Mg     2.1     10-19      proBNP:   Lipid Profile:   HgA1c:   TSH:

## 2018-10-19 NOTE — CONSULT NOTE ADULT - SUBJECTIVE AND OBJECTIVE BOX
CHIEF COMPLAINT:  Afib RVR     HISTORY OF PRESENT ILLNESS:   77 year old man with PMH of atrial fibrillation on Xarelto, HTN, DM, BPH, and seizures presented yesterday with abdominal pain and was found to have perihepatic and perisplenic hemorrhagic ascites with likely bleeding liver mass on CTA. Hct was 34 on presentation and dropped to 29. He received 2u pRBCs and was transferred to SICU for close monitoring. He received 2u FFP. This morning he went to interventional radiology for angiography and a lesion in segment 5 (with tumor vascularity) was identified. Two subsegmental vessels supplying segment 5 were embolized and completion angiography demonstrated a devascularized lesion. He received an additional 2u pRBCs while at interventional radiology with no increase in Hct (32.9 --> 30.6). He was transferred back to the SICU on phenylephrine. He became hypotensive and tachycardic, and his phenylephrine increased to 1.5. He was given another 2u pRBCs and 2u FFP.     PAST MEDICAL & SURGICAL HISTORY:  BPH (benign prostatic hyperplasia)  Atrial fibrillation  Seizure  Diabetes mellitus  Hypertension  H/O craniotomy: bilateral, for ICH after fall  S/P inguinal hernia repair  ASD (atrial septal defect): closure          MEDICATIONS:  enoxaparin Injectable 40 milliGRAM(s) SubCutaneous daily  metoprolol tartrate Injectable 5 milliGRAM(s) IV Push every 6 hours  tamsulosin 0.4 milliGRAM(s) Oral at bedtime    cefTRIAXone   IVPB      cefTRIAXone   IVPB 1 Gram(s) IV Intermittent every 24 hours    levalbuterol Inhalation 0.63 milliGRAM(s) Inhalation every 6 hours PRN    acetaminophen   Tablet .. 975 milliGRAM(s) Oral every 6 hours PRN  levETIRAcetam  IVPB 500 milliGRAM(s) IV Intermittent every 12 hours    docusate sodium 100 milliGRAM(s) Oral three times a day  polyethylene glycol 3350 17 Gram(s) Oral daily  senna 2 Tablet(s) Oral at bedtime    insulin glargine Injectable (LANTUS) 18 Unit(s) SubCutaneous at bedtime  insulin lispro (HumaLOG) corrective regimen sliding scale   SubCutaneous three times a day before meals  insulin lispro (HumaLOG) corrective regimen sliding scale   SubCutaneous at bedtime    chlorhexidine 4% Liquid 1 Application(s) Topical <User Schedule>  dextrose 5% + sodium chloride 0.45%. 1000 milliLiter(s) IV Continuous <Continuous>      FAMILY HISTORY:  Family history of glioblastoma (Sibling): brother      SOCIAL HISTORY:    [ ] Non-smoker  [ ] Smoker  [ ] Alcohol    Allergies    Aleve (Unknown)    Intolerances    	    REVIEW OF SYSTEMS:  CONSTITUTIONAL: No fever, weight loss, + fatigue  EYES: No eye pain, visual disturbances, or discharge  ENMT:  No difficulty hearing, tinnitus, vertigo; No sinus or throat pain  NECK: No pain or stiffness  RESPIRATORY: No cough, wheezing, chills or hemoptysis; No Shortness of Breath  CARDIOVASCULAR: No chest pain, palpitations, passing out, dizziness, or leg swelling  GASTROINTESTINAL: + abdominal or epigastric pain. No nausea, vomiting, or hematemesis; No diarrhea or constipation. No melena or hematochezia.  GENITOURINARY: No dysuria, frequency, hematuria, or incontinence  NEUROLOGICAL: No headaches, memory loss, loss of strength, numbness, or tremors  SKIN: No itching, burning, rashes, or lesions   LYMPH Nodes: No enlarged glands  ENDOCRINE: No heat or cold intolerance; No hair loss  MUSCULOSKELETAL: + joint pain or swelling; No muscle, back, or extremity pain  PSYCHIATRIC: No depression, anxiety, mood swings, or difficulty sleeping  HEME/LYMPH: No easy bruising, or bleeding gums  ALLERY AND IMMUNOLOGIC: No hives or eczema	    [ ] All others negative	  [ ] Unable to obtain    PHYSICAL EXAM:  T(C): 36.6 (10-19-18 @ 07:00), Max: 37.2 (10-18-18 @ 11:00)  HR: 93 (10-19-18 @ 09:00) (88 - 114)  BP: --  RR: 24 (10-19-18 @ 09:00) (18 - 41)  SpO2: 96% (10-19-18 @ 09:00) (94% - 100%)  Wt(kg): --  I&O's Summary    18 Oct 2018 07:01  -  19 Oct 2018 07:00  --------------------------------------------------------  IN: 1750 mL / OUT: 1000 mL / NET: 750 mL        Appearance: NAD lethargic   HEENT:   dry oral mucosa, PERRL, EOMI	  Lymphatic: No lymphadenopathy  Cardiovascular: Irregular tachy l S1 S2, No JVD, No murmurs, No edema  Respiratory: decreased bs   Psychiatry: A & O x 2, Mood & affect appropriate  Gastrointestinal:  Soft, Non-tender, + BS	  Skin: No rashes, No ecchymoses, No cyanosis	  Neurologic: Non-focal  Extremities: Normal range of motion, No clubbing, cyanosis or edema  Vascular: Peripheral pulses palpable 2+ bilaterally    TELEMETRY: AFib	    ECG:  	Afib RVR  RADIOLOGY:  < from: CT Abdomen and Pelvis No Cont (10.11.18 @ 02:39) >    EXAM:  CT ABDOMEN AND PELVIS                            PROCEDURE DATE:  10/11/2018            INTERPRETATION:  CLINICAL INFORMATION: Hemoperitoneum.    COMPARISON: None.    PROCEDURE:   CT of the Abdomen and Pelvis was performed without intravenous contrast.   Intravenous contrast: None.  Oral contrast: None.  Sagittal and coronal reformats were performed.    FINDINGS:    LOWER CHEST: Status post sternotomy.    LIVER: A 6 cm inferior right liver mass again noted.  BILE DUCTS: Normal caliber.  GALLBLADDER: Within normal limits.  SPLEEN: Within normal limits.   PANCREAS: Within normal limits.  ADRENALS: Within normal limits.   KIDNEYS/URETERS: Persistent enhancement of both    BLADDER: Within normal limits.   REPRODUCTIVE ORGANS: The prostate is enlarged.    BOWEL: No bowel obstruction.              PERITONEUM: Moderate hemoperitoneum is essentially unchanged allowing for   differences in technique.  VESSELS:  Right femoral arterial line.  RETROPERITONEUM: No lymphadenopathy.    ABDOMINAL WALL: Small right inguinal hernia containing blood.  BONES: Within normal limits.    IMPRESSION:    Moderate hemoperitoneum likely from ruptured right liver mass is   unchanged.                    JOE TREADWELL M.D., ATTENDING RADIOLOGIST  This documenthas been electronically signed. Oct 11 2018  9:13AM                < end of copied text >    OTHER: 	  	  LABS:	 	    CARDIAC MARKERS:                                  9.7    11.5  )-----------( 397      ( 19 Oct 2018 02:41 )             29.6     10-19    144  |  107  |  42<H>  ----------------------------<  176<H>  3.5   |  25  |  1.08    Ca    8.6      19 Oct 2018 02:41  Phos  2.8     10-19  Mg     2.1     10-19      proBNP:   Lipid Profile:   HgA1c:   TSH:

## 2018-10-19 NOTE — PROGRESS NOTE ADULT - SUBJECTIVE AND OBJECTIVE BOX
HISTORY:    77 year old man with PMH of atrial fibrillation on Xarelto, HTN, DM, BPH, and seizures presented yesterday with abdominal pain and was found to have perihepatic and perisplenic hemorrhagic ascites with likely bleeding liver mass on CTA. Hct was 34 on presentation and dropped to 29. He received 2u pRBCs and was transferred to SICU for close monitoring. He received 2u FFP. This morning he went to interventional radiology for angiography and a lesion in segment 5 (with tumor vascularity) was identified. Two subsegmental vessels supplying segment 5 were embolized and completion angiography demonstrated a devascularized lesion. He received an additional 2u pRBCs while at interventional radiology with no increase in Hct (32.9 --> 30.6). He was transferred back to the SICU on phenylephrine. He became hypotensive and tachycardic, and his phenylephrine increased to 1.5. He was given another 2u pRBCs and 2u FFP.     24 HOUR EVENTS:  - Lantus increased to 18 (home is 20)--glucose better controlled, in mid/low 100s  - Hypernatremia resolving--on  NS at 50 mL/hr        SUBJECTIVE/ROS:  [ ] A ten-point review of systems was otherwise negative except as noted.  [x] Due to altered mental status/intubation, subjective information were not able to be obtained from the patient. History was obtained, to the extent possible, from review of the chart and collateral sources of information.      NEURO  Exam: awake, alert, oriented  Meds: acetaminophen   Tablet .. 975 milliGRAM(s) Oral every 6 hours PRN Mild Pain (1 - 3)  levETIRAcetam  IVPB 500 milliGRAM(s) IV Intermittent every 12 hours    RESPIRATORY  RR: 26 (10-19-18 @ 01:00) (24 - 41)  SpO2: 95% (10-19-18 @ 01:00) (93% - 100%)  Exam: unlabored, clear to auscultation bilaterally  Meds: levalbuterol Inhalation 0.63 milliGRAM(s) Inhalation every 6 hours PRN shortness of breath/wheezing    CARDIOVASCULAR  HR: 93 (10-19-18 @ 01:00) (89 - 124)  ABP: 130/42 (10-19-18 @ 01:00) (124/45 - 187/63)  ABP(mean): 68 (10-19-18 @ 01:00) (66 - 105)  Exam: regular rate and rhythm  Cardiac Rhythm: sinus  Perfusion     [x]Adequate   [ ]Inadequate  Mentation   [x]Normal       [ ]Reduced  Extremities  [x]Warm         [ ]Cool  Volume Status [ ]Hypervolemic [x]Euvolemic [ ]Hypovolemic  Meds: metoprolol tartrate Injectable 5 milliGRAM(s) IV Push every 6 hours  tamsulosin 0.4 milliGRAM(s) Oral at bedtime    GI/NUTRITION  Exam: soft, nontender, nondistended, incision C/D/I  Diet: Regular   Meds: docusate sodium 100 milliGRAM(s) Oral three times a day  polyethylene glycol 3350 17 Gram(s) Oral daily  senna 2 Tablet(s) Oral at bedtime    GENITOURINARY  I&O's Detail    10-17 @ 07:01  -  10-18 @ 07:00  --------------------------------------------------------  IN:    dextrose 5%.: 150 mL    sodium chloride 0.225%: 2100 mL    Solution: 50 mL  Total IN: 2300 mL    OUT:    Indwelling Catheter - Urethral: 610 mL  Total OUT: 610 mL    Total NET: 1690 mL      10-18 @ 07:01  -  10-19 @ 01:29  --------------------------------------------------------  IN:    sodium chloride 0.225%: 225 mL    sodium chloride 0.225%.: 650 mL    Solution: 100 mL    Solution: 350 mL  Total IN: 1325 mL    OUT:    Incontinent per Collection Ba mL  Total OUT: 800 mL    Total NET: 525 mL      10-18    145  |  107  |  41<H>  ----------------------------<  173<H>  3.4<L>   |  26  |  1.08    Ca    8.9      18 Oct 2018 18:55  Phos  3.1     10-18  Mg     2.1     10-18    TPro  6.1  /  Alb  2.6<L>  /  TBili  2.5<H>  /  DBili  0.9<H>  /  AST  19  /  ALT  19  /  AlkPhos  83  10-    [ ] Lares catheter, indication: N/A  Meds: sodium chloride 0.225%. 1000 milliLiter(s) IV Continuous <Continuous>      HEMATOLOGIC  Meds: enoxaparin Injectable 40 milliGRAM(s) SubCutaneous daily    [x] VTE Prophylaxis                        10.2   11.3  )-----------( 362      ( 18 Oct 2018 03:40 )             30.2     PT/INR - ( 18 Oct 2018 03:40 )   PT: 14.8 sec;   INR: 1.36 ratio         PTT - ( 18 Oct 2018 03:40 )  PTT:31.3 sec  Transfusion     [ ] PRBC   [ ] Platelets   [ ] FFP   [ ] Cryoprecipitate      INFECTIOUS DISEASES  WBC Count: 11.3 K/uL (10-18 @ 03:40)  Meds: cefTRIAXone   IVPB      cefTRIAXone   IVPB 1 Gram(s) IV Intermittent every 24 hours      ENDOCRINE  CAPILLARY BLOOD GLUCOSE  POCT Blood Glucose.: 140 mg/dL (18 Oct 2018 21:31)  POCT Blood Glucose.: 175 mg/dL (18 Oct 2018 18:25)  POCT Blood Glucose.: 187 mg/dL (18 Oct 2018 12:50)  POCT Blood Glucose.: 243 mg/dL (18 Oct 2018 08:55)  Meds: insulin glargine Injectable (LANTUS) 18 Unit(s) SubCutaneous at bedtime  insulin lispro (HumaLOG) corrective regimen sliding scale   SubCutaneous three times a day before meals  insulin lispro (HumaLOG) corrective regimen sliding scale   SubCutaneous at bedtime    ACCESS DEVICES:  [x] Peripheral IV  [ ] Central Venous Line	[ ] R	[ ] L	[ ] IJ	[ ] Fem	[ ] SC	Placed:   [ ] Arterial Line		[ ] R	[ ] L	[ ] Fem	[ ] Rad	[ ] Ax	Placed:   [ ] PICC:					[ ] Mediport  [ ] Urinary Catheter, Date Placed:   [x] Necessity of urinary, arterial, and venous catheters discussed    OTHER MEDICATIONS:  chlorhexidine 4% Liquid 1 Application(s) Topical <User Schedule>      ASSESSMENT:    77 year old male presenting with hemoperitoneum and hemorrhagic shock s/p IR embolization of liver lesion in segment 5 complicated by acute respiratory failure requiring reintubation on 10/13. Patient extubated since 10/15.    PLAN:    Neuro: acute abdominal pain, seizures, delirium  - Monitor mental status.  - Home Keppra for seizures.  - Avoiding narcotics. Pain control as needed with Tylenol.  - Haldol for delirium.    Resp: acute respiratory failure (resolved)  - Monitor pulse oximeter.  - Out of bed to chair, incentive spirometry, frequent chest PT, and frequent suctioning to prevent atelectasis.  - Levalbuterol q6hrs as needed for shortness of breath and/or wheezing.    CV: atrial fibrillation, hemorrhagic shock (resolved), HTN  - Monitor vital signs.  - Home metoprolol for atrial fibrillation.  - Amlodipine for HTN.    GI: hemoperitoneum, embolized segment 5 liver lesion, distended abdomen with constipation on abdominal x-ray  - Mechanical soft diet as tolerated.  - Protonix for stress ulcer prophylaxis.  - Bowel regimen with Colace, senna, Miralax, and lactulose.  - Follow up liver lesion work-up as outpatient. Appreciate surgical oncology recommendations.    Renal: DONY from likely hemorrhagic ATN on CKD stage 2 (baseline Cr 1.0, resolved), hypernatremia  - Monitor I&Os.  - Monitor electrolytes and replete as necessary.  - 1/4NS at 50 mL/hr -- monitor sodium in setting of resolving hypernatremia     Heme: hemorrhagic shock (resolved)  - Monitor CBC and coags daily.  - Lovenox for VTE prophylaxis.    ID: no acute issues  - Monitor for clinical evidence of active infection.    Endo: DM type II  - C/w Lantus 18  - Monitor blood glucose.     Disposition:  - Full code.  - Stable for transfer to floors. HISTORY:    77 year old man with PMH of atrial fibrillation on Xarelto, HTN, DM, BPH, and seizures presented yesterday with abdominal pain and was found to have perihepatic and perisplenic hemorrhagic ascites with likely bleeding liver mass on CTA. Hct was 34 on presentation and dropped to 29. He received 2u pRBCs and was transferred to SICU for close monitoring. He received 2u FFP. This morning he went to interventional radiology for angiography and a lesion in segment 5 (with tumor vascularity) was identified. Two subsegmental vessels supplying segment 5 were embolized and completion angiography demonstrated a devascularized lesion. He received an additional 2u pRBCs while at interventional radiology with no increase in Hct (32.9 --> 30.6). He was transferred back to the SICU on phenylephrine. He became hypotensive and tachycardic, and his phenylephrine increased to 1.5. He was given another 2u pRBCs and 2u FFP.     24 HOUR EVENTS:  - Lantus increased to 18 (home is 20)--glucose better controlled, in mid/low 100s  - Hypernatremia resolving--on  NS at 50 mL/hr        SUBJECTIVE/ROS:  [ ] A ten-point review of systems was otherwise negative except as noted.  [x] Due to altered mental status/intubation, subjective information were not able to be obtained from the patient. History was obtained, to the extent possible, from review of the chart and collateral sources of information.      NEURO  Exam: awake, alert, oriented  Meds: acetaminophen   Tablet .. 975 milliGRAM(s) Oral every 6 hours PRN Mild Pain (1 - 3)  levETIRAcetam  IVPB 500 milliGRAM(s) IV Intermittent every 12 hours    RESPIRATORY  RR: 26 (10-19-18 @ 01:00) (24 - 41)  SpO2: 95% (10-19-18 @ 01:00) (93% - 100%)  Exam: unlabored, clear to auscultation bilaterally  Meds: levalbuterol Inhalation 0.63 milliGRAM(s) Inhalation every 6 hours PRN shortness of breath/wheezing    CARDIOVASCULAR  HR: 93 (10-19-18 @ 01:00) (89 - 124)  ABP: 130/42 (10-19-18 @ 01:00) (124/45 - 187/63)  ABP(mean): 68 (10-19-18 @ 01:00) (66 - 105)  Exam: regular rate and rhythm  Cardiac Rhythm: sinus  Perfusion     [x]Adequate   [ ]Inadequate  Mentation   [x]Normal       [ ]Reduced  Extremities  [x]Warm         [ ]Cool  Volume Status [ ]Hypervolemic [x]Euvolemic [ ]Hypovolemic  Meds: metoprolol tartrate Injectable 5 milliGRAM(s) IV Push every 6 hours  tamsulosin 0.4 milliGRAM(s) Oral at bedtime    GI/NUTRITION  Exam: soft, nontender, nondistended, incision C/D/I  Diet: Regular   Meds: docusate sodium 100 milliGRAM(s) Oral three times a day  polyethylene glycol 3350 17 Gram(s) Oral daily  senna 2 Tablet(s) Oral at bedtime    GENITOURINARY  I&O's Detail    10-17 @ 07:01  -  10-18 @ 07:00  --------------------------------------------------------  IN:    dextrose 5%.: 150 mL    sodium chloride 0.225%: 2100 mL    Solution: 50 mL  Total IN: 2300 mL    OUT:    Indwelling Catheter - Urethral: 610 mL  Total OUT: 610 mL    Total NET: 1690 mL      10-18 @ 07:01  -  10-19 @ 01:29  --------------------------------------------------------  IN:    sodium chloride 0.225%: 225 mL    sodium chloride 0.225%.: 650 mL    Solution: 100 mL    Solution: 350 mL  Total IN: 1325 mL    OUT:    Incontinent per Collection Ba mL  Total OUT: 800 mL    Total NET: 525 mL      10-18    145  |  107  |  41<H>  ----------------------------<  173<H>  3.4<L>   |  26  |  1.08    Ca    8.9      18 Oct 2018 18:55  Phos  3.1     10-18  Mg     2.1     10-18    TPro  6.1  /  Alb  2.6<L>  /  TBili  2.5<H>  /  DBili  0.9<H>  /  AST  19  /  ALT  19  /  AlkPhos  83  10-    [ ] Lares catheter, indication: N/A  Meds: sodium chloride 0.225%. 1000 milliLiter(s) IV Continuous <Continuous>      HEMATOLOGIC  Meds: enoxaparin Injectable 40 milliGRAM(s) SubCutaneous daily    [x] VTE Prophylaxis                        10.2   11.3  )-----------( 362      ( 18 Oct 2018 03:40 )             30.2     PT/INR - ( 18 Oct 2018 03:40 )   PT: 14.8 sec;   INR: 1.36 ratio         PTT - ( 18 Oct 2018 03:40 )  PTT:31.3 sec  Transfusion     [ ] PRBC   [ ] Platelets   [ ] FFP   [ ] Cryoprecipitate      INFECTIOUS DISEASES  WBC Count: 11.3 K/uL (10-18 @ 03:40)  Meds: cefTRIAXone   IVPB      cefTRIAXone   IVPB 1 Gram(s) IV Intermittent every 24 hours      ENDOCRINE  CAPILLARY BLOOD GLUCOSE  POCT Blood Glucose.: 140 mg/dL (18 Oct 2018 21:31)  POCT Blood Glucose.: 175 mg/dL (18 Oct 2018 18:25)  POCT Blood Glucose.: 187 mg/dL (18 Oct 2018 12:50)  POCT Blood Glucose.: 243 mg/dL (18 Oct 2018 08:55)  Meds: insulin glargine Injectable (LANTUS) 18 Unit(s) SubCutaneous at bedtime  insulin lispro (HumaLOG) corrective regimen sliding scale   SubCutaneous three times a day before meals  insulin lispro (HumaLOG) corrective regimen sliding scale   SubCutaneous at bedtime    ACCESS DEVICES:  [x] Peripheral IV  [ ] Central Venous Line	[ ] R	[ ] L	[ ] IJ	[ ] Fem	[ ] SC	Placed:   [ ] Arterial Line		[ ] R	[ ] L	[ ] Fem	[ ] Rad	[ ] Ax	Placed:   [ ] PICC:					[ ] Mediport  [ ] Urinary Catheter, Date Placed:   [x] Necessity of urinary, arterial, and venous catheters discussed    OTHER MEDICATIONS:  chlorhexidine 4% Liquid 1 Application(s) Topical <User Schedule>      ASSESSMENT:    77 year old male presenting with hemoperitoneum and hemorrhagic shock s/p IR embolization of liver lesion in segment 5 complicated by acute respiratory failure requiring reintubation on 10/13. Patient extubated since 10/15.    PLAN:    Neuro: acute abdominal pain, seizures, delirium  - Monitor mental status.  - Home Keppra for seizures.  - Avoiding narcotics. Pain control as needed with Tylenol.  - Haldol for delirium.    Resp: acute respiratory failure (resolved)  - Monitor pulse oximeter.  - Out of bed to chair, incentive spirometry, frequent chest PT, and frequent suctioning to prevent atelectasis.  - Levalbuterol q6hrs as needed for shortness of breath and/or wheezing.    CV: atrial fibrillation, hemorrhagic shock (resolved), HTN  - Monitor vital signs.  - Home metoprolol for atrial fibrillation.    GI: hemoperitoneum, embolized segment 5 liver lesion, distended abdomen with constipation on abdominal x-ray  - Mechanical soft diet as tolerated.  - Protonix for stress ulcer prophylaxis.  - Bowel regimen with Colace, senna, Miralax, and lactulose.  - Follow up liver lesion work-up as outpatient. Appreciate surgical oncology recommendations.    Renal: DONY from likely hemorrhagic ATN on CKD stage 2 (baseline Cr 1.0, resolved), hypernatremia  - Monitor I&Os.  - Monitor electrolytes and replete as necessary.  - 1/4NS at 50 mL/hr -- monitor sodium in setting of resolving hypernatremia     Heme: hemorrhagic shock (resolved)  - Monitor CBC and coags daily.  - Lovenox for VTE prophylaxis.    ID: no acute issues  - Monitor for clinical evidence of active infection.    Endo: DM type II  - C/w Lantus 18  - Monitor blood glucose.     Disposition:  - Full code.  - Stable for transfer to floors. HISTORY:    77 year old man with PMH of atrial fibrillation on Xarelto, HTN, DM, BPH, and seizures presented yesterday with abdominal pain and was found to have perihepatic and perisplenic hemorrhagic ascites with likely bleeding liver mass on CTA. Hct was 34 on presentation and dropped to 29. He received 2u pRBCs and was transferred to SICU for close monitoring. He received 2u FFP. This morning he went to interventional radiology for angiography and a lesion in segment 5 (with tumor vascularity) was identified. Two subsegmental vessels supplying segment 5 were embolized and completion angiography demonstrated a devascularized lesion. He received an additional 2u pRBCs while at interventional radiology with no increase in Hct (32.9 --> 30.6). He was transferred back to the SICU on phenylephrine. He became hypotensive and tachycardic, and his phenylephrine increased to 1.5. He was given another 2u pRBCs and 2u FFP.     24 HOUR EVENTS:  - Lantus increased to 18 (home is 20)--glucose better controlled, in mid/low 100s  - Hypernatremia resolving--on  NS at 50 mL/hr        SUBJECTIVE/ROS:  [ ] A ten-point review of systems was otherwise negative except as noted.  [x] Due to altered mental status/intubation, subjective information were not able to be obtained from the patient. History was obtained, to the extent possible, from review of the chart and collateral sources of information.      NEURO  Exam: awake, alert, oriented  Meds: acetaminophen   Tablet .. 975 milliGRAM(s) Oral every 6 hours PRN Mild Pain (1 - 3)  levETIRAcetam  IVPB 500 milliGRAM(s) IV Intermittent every 12 hours    RESPIRATORY  RR: 26 (10-19-18 @ 01:00) (24 - 41)  SpO2: 95% (10-19-18 @ 01:00) (93% - 100%)  Exam: unlabored, clear to auscultation bilaterally  Meds: levalbuterol Inhalation 0.63 milliGRAM(s) Inhalation every 6 hours PRN shortness of breath/wheezing    CARDIOVASCULAR  HR: 93 (10-19-18 @ 01:00) (89 - 124)  ABP: 130/42 (10-19-18 @ 01:00) (124/45 - 187/63)  ABP(mean): 68 (10-19-18 @ 01:00) (66 - 105)  Exam: regular rate and rhythm  Cardiac Rhythm: sinus  Perfusion     [x]Adequate   [ ]Inadequate  Mentation   [x]Normal       [ ]Reduced  Extremities  [x]Warm         [ ]Cool  Volume Status [ ]Hypervolemic [x]Euvolemic [ ]Hypovolemic  Meds: metoprolol tartrate Injectable 5 milliGRAM(s) IV Push every 6 hours  tamsulosin 0.4 milliGRAM(s) Oral at bedtime    GI/NUTRITION  Exam: soft, nontender, nondistended, incision C/D/I  Diet: Regular   Meds: docusate sodium 100 milliGRAM(s) Oral three times a day  polyethylene glycol 3350 17 Gram(s) Oral daily  senna 2 Tablet(s) Oral at bedtime    GENITOURINARY  I&O's Detail    10-17 @ 07:01  -  10-18 @ 07:00  --------------------------------------------------------  IN:    dextrose 5%.: 150 mL    sodium chloride 0.225%: 2100 mL    Solution: 50 mL  Total IN: 2300 mL    OUT:    Indwelling Catheter - Urethral: 610 mL  Total OUT: 610 mL    Total NET: 1690 mL      10-18 @ 07:01  -  10-19 @ 01:29  --------------------------------------------------------  IN:    sodium chloride 0.225%: 225 mL    sodium chloride 0.225%.: 650 mL    Solution: 100 mL    Solution: 350 mL  Total IN: 1325 mL    OUT:    Incontinent per Collection Ba mL  Total OUT: 800 mL    Total NET: 525 mL      10-18    145  |  107  |  41<H>  ----------------------------<  173<H>  3.4<L>   |  26  |  1.08    Ca    8.9      18 Oct 2018 18:55  Phos  3.1     10-18  Mg     2.1     10-18    TPro  6.1  /  Alb  2.6<L>  /  TBili  2.5<H>  /  DBili  0.9<H>  /  AST  19  /  ALT  19  /  AlkPhos  83  10-    [ ] Lares catheter, indication: N/A  Meds: sodium chloride 0.225%. 1000 milliLiter(s) IV Continuous <Continuous>      HEMATOLOGIC  Meds: enoxaparin Injectable 40 milliGRAM(s) SubCutaneous daily    [x] VTE Prophylaxis                        10.2   11.3  )-----------( 362      ( 18 Oct 2018 03:40 )             30.2     PT/INR - ( 18 Oct 2018 03:40 )   PT: 14.8 sec;   INR: 1.36 ratio         PTT - ( 18 Oct 2018 03:40 )  PTT:31.3 sec  Transfusion     [ ] PRBC   [ ] Platelets   [ ] FFP   [ ] Cryoprecipitate      INFECTIOUS DISEASES  WBC Count: 11.3 K/uL (10-18 @ 03:40)  Meds: cefTRIAXone   IVPB      cefTRIAXone   IVPB 1 Gram(s) IV Intermittent every 24 hours      ENDOCRINE  CAPILLARY BLOOD GLUCOSE  POCT Blood Glucose.: 140 mg/dL (18 Oct 2018 21:31)  POCT Blood Glucose.: 175 mg/dL (18 Oct 2018 18:25)  POCT Blood Glucose.: 187 mg/dL (18 Oct 2018 12:50)  POCT Blood Glucose.: 243 mg/dL (18 Oct 2018 08:55)  Meds: insulin glargine Injectable (LANTUS) 18 Unit(s) SubCutaneous at bedtime  insulin lispro (HumaLOG) corrective regimen sliding scale   SubCutaneous three times a day before meals  insulin lispro (HumaLOG) corrective regimen sliding scale   SubCutaneous at bedtime    ACCESS DEVICES:  [x] Peripheral IV  [ ] Central Venous Line	[ ] R	[ ] L	[ ] IJ	[ ] Fem	[ ] SC	Placed:   [ ] Arterial Line		[ ] R	[ ] L	[ ] Fem	[ ] Rad	[ ] Ax	Placed:   [ ] PICC:					[ ] Mediport  [ ] Urinary Catheter, Date Placed:   [x] Necessity of urinary, arterial, and venous catheters discussed    OTHER MEDICATIONS:  chlorhexidine 4% Liquid 1 Application(s) Topical <User Schedule>      ASSESSMENT:    77 year old male presenting with hemoperitoneum and hemorrhagic shock s/p IR embolization of liver lesion in segment 5 complicated by acute respiratory failure requiring reintubation on 10/13. Patient extubated since 10/15.    PLAN:    Neuro: acute abdominal pain, seizures, delirium  - Monitor mental status.  - Home Keppra for seizures.  - Avoiding narcotics. Pain control as needed with Tylenol.    Resp: acute respiratory failure (resolved)  - Monitor pulse oximeter.  - Out of bed to chair, incentive spirometry, frequent chest PT, and frequent suctioning to prevent atelectasis.  - Levalbuterol q6hrs as needed for shortness of breath and/or wheezing.    CV: atrial fibrillation, hemorrhagic shock (resolved), HTN  - Monitor vital signs.  - Home metoprolol for atrial fibrillation.    GI: hemoperitoneum, embolized segment 5 liver lesion, distended abdomen with constipation on abdominal x-ray  - Mechanical soft diet as tolerated.  - Protonix for stress ulcer prophylaxis.  - Bowel regimen with Colace, senna, Miralax, and lactulose.  - Follow up liver lesion work-up as outpatient. Appreciate surgical oncology recommendations.    Renal: DONY from likely hemorrhagic ATN on CKD stage 2 (baseline Cr 1.0, resolved), hypernatremia  - Monitor I&Os.  - Monitor electrolytes and replete as necessary.  - 1/4NS at 50 mL/hr -- monitor sodium in setting of resolving hypernatremia     Heme: hemorrhagic shock (resolved)  - Monitor CBC and coags daily.  - Lovenox for VTE prophylaxis.    ID: no acute issues  - Monitor for clinical evidence of active infection.    Endo: DM type II  - C/w Lantus 18  - Monitor blood glucose.     Disposition:  - Full code.  - Stable for transfer to floors.

## 2018-10-19 NOTE — PROGRESS NOTE ADULT - SUBJECTIVE AND OBJECTIVE BOX
Subjective: Patient seen and examined. No new events except as noted.   remains in ICU   wife at bedside   tachycardic this am     REVIEW OF SYSTEMS:    CONSTITUTIONAL: + weakness, fevers or chills  EYES/ENT: No visual changes;  No vertigo or throat pain   NECK: No pain or stiffness  RESPIRATORY: = cough, wheezing, hemoptysis; No shortness of breath  CARDIOVASCULAR: No chest pain or palpitations  GASTROINTESTINAL: No abdominal or epigastric pain. No nausea, vomiting, or hematemesis; No diarrhea or constipation. No melena or hematochezia.  GENITOURINARY: No dysuria, frequency or hematuria  NEUROLOGICAL: No numbness or weakness  SKIN: No itching, burning, rashes, or lesions   All other review of systems is negative unless indicated above.    MEDICATIONS:  MEDICATIONS  (STANDING):  acetylcysteine 20% Inhalation 2.5 milliLiter(s) Inhalation every 6 hours  cefTRIAXone   IVPB      cefTRIAXone   IVPB 1 Gram(s) IV Intermittent every 24 hours  chlorhexidine 4% Liquid 1 Application(s) Topical <User Schedule>  docusate sodium 100 milliGRAM(s) Oral three times a day  enoxaparin Injectable 40 milliGRAM(s) SubCutaneous daily  furosemide    Tablet 20 milliGRAM(s) Oral daily  insulin glargine Injectable (LANTUS) 26 Unit(s) SubCutaneous at bedtime  insulin lispro (HumaLOG) corrective regimen sliding scale   SubCutaneous three times a day before meals  insulin lispro (HumaLOG) corrective regimen sliding scale   SubCutaneous at bedtime  levalbuterol Inhalation 0.63 milliGRAM(s) Inhalation every 6 hours  levETIRAcetam  IVPB 500 milliGRAM(s) IV Intermittent every 12 hours  metolazone 5 milliGRAM(s) Oral daily  metoprolol tartrate 50 milliGRAM(s) Oral every 12 hours  polyethylene glycol 3350 17 Gram(s) Oral daily  senna 2 Tablet(s) Oral at bedtime  tamsulosin 0.4 milliGRAM(s) Oral at bedtime      PHYSICAL EXAM:  T(C): 36.6 (10-19-18 @ 07:00), Max: 37.1 (10-18-18 @ 15:00)  HR: 71 (10-19-18 @ 14:00) (71 - 118)  BP: --  RR: 28 (10-19-18 @ 11:15) (18 - 41)  SpO2: 96% (10-19-18 @ 14:00) (94% - 100%)  Wt(kg): --  I&O's Summary    18 Oct 2018 07:01  -  19 Oct 2018 07:00  --------------------------------------------------------  IN: 1750 mL / OUT: 1000 mL / NET: 750 mL          Appearance: NAD	  HEENT:   Normal oral mucosa, PERRL, EOMI	  Lymphatic: No lymphadenopathy , no edema  Cardiovascular: Irregular tachy S1 S2, No JVD, No murmurs , Peripheral pulses palpable 2+ bilaterally  Respiratory:  decreased bs and effort 	  Gastrointestinal:  Soft, Non-tender, + BS	  Skin: No rashes, No ecchymoses, No cyanosis, warm to touch  Musculoskeletal: Decreased range of motion and strength  Psychiatry:  more awake and alert   Ext: No edema      LABS:    CARDIAC MARKERS:                                9.7    11.5  )-----------( 397      ( 19 Oct 2018 02:41 )             29.6     10-19    144  |  107  |  42<H>  ----------------------------<  176<H>  3.5   |  25  |  1.08    Ca    8.6      19 Oct 2018 02:41  Phos  2.8     10-19  Mg     2.1     10-19      proBNP:   Lipid Profile:   HgA1c:   TSH:     12          TELEMETRY: 	    ECG:  	  RADIOLOGY:   DIAGNOSTIC TESTING:  [ ] Echocardiogram:  [ ]  Catheterization:  [ ] Stress Test:    OTHER:

## 2018-10-19 NOTE — PROGRESS NOTE ADULT - ASSESSMENT
77 year old male presenting with hemoperitoneum and hemorrhagic shock s/p IR embolization of liver lesion in segment 5 complicated by acute respiratory failure requiring reintubation on 10/13. Patient extubated since 10/15.         Problem/Recommendation - 1:  Problem: Hemoperitoneum, nontraumatic. Recommendation: OFF AC and S/P IR embolization.     Problem/Recommendation - 2:  ·  Problem: Altered mental status with Underlying Dementia .  Recommendation: Multifactorial .Resolving.       Problem/Recommendation - 3:  ·  Problem: Atrial fibrillation.  Recommendation: OFF AC and cardiology consulted.      Problem/Recommendation - 4:  ·  Problem: Seizure.  Recommendation: On Keppra.      Problem/Recommendation - 5:  ·  Problem: Diabetes mellitus.  Recommendation: SSI.      Problem/Recommendation - 6:  Problem: Hypertension. Recommendation: BP meds with aprameters.     Problem/Recommendation - 7:  Problem: Acute respiratory failure. Recommendation: S/P Extubation and on NC Oxygen.     Problem/Recommendation - 8:  Problem: Liver Mass with ?cirrhosis . Recommendation: GI consulted.     Problem/Recommendation - 9:  Problem: Hypernatremia. Recommendation: resolved.

## 2018-10-19 NOTE — PROGRESS NOTE ADULT - ATTENDING COMMENTS
More awake and alert  Encourage PO  Diuresis with Lasix and Zarxolyn to prevent hypernatremia, DC hypotonic fluid  Pul toileting  Course of abx for UTI  Increase Insulin dose for uncontrolled BS  PT  Sopn kept updated  Spoke to Dr Espinoza earlier

## 2018-10-19 NOTE — PROGRESS NOTE ADULT - SUBJECTIVE AND OBJECTIVE BOX
INTERVAL HPI/OVERNIGHT EVENTS: Sitting in chair and did stand with PT.   Vital Signs Last 24 Hrs  T(C): 36.6 (19 Oct 2018 07:00), Max: 37.1 (18 Oct 2018 15:00)  T(F): 97.9 (19 Oct 2018 07:00), Max: 98.7 (18 Oct 2018 15:00)  HR: 117 (19 Oct 2018 11:15) (88 - 117)  BP: --  BP(mean): --  RR: 28 (19 Oct 2018 11:15) (18 - 41)  SpO2: 95% (19 Oct 2018 11:15) (94% - 100%)  I&O's Summary    18 Oct 2018 07:01  -  19 Oct 2018 07:00  --------------------------------------------------------  IN: 1750 mL / OUT: 1000 mL / NET: 750 mL      MEDICATIONS  (STANDING):  acetylcysteine 20% Inhalation 2.5 milliLiter(s) Inhalation every 6 hours  cefTRIAXone   IVPB      cefTRIAXone   IVPB 1 Gram(s) IV Intermittent every 24 hours  chlorhexidine 4% Liquid 1 Application(s) Topical <User Schedule>  docusate sodium 100 milliGRAM(s) Oral three times a day  enoxaparin Injectable 40 milliGRAM(s) SubCutaneous daily  furosemide    Tablet 20 milliGRAM(s) Oral daily  insulin glargine Injectable (LANTUS) 26 Unit(s) SubCutaneous at bedtime  insulin lispro (HumaLOG) corrective regimen sliding scale   SubCutaneous three times a day before meals  insulin lispro (HumaLOG) corrective regimen sliding scale   SubCutaneous at bedtime  levETIRAcetam  IVPB 500 milliGRAM(s) IV Intermittent every 12 hours  metolazone 5 milliGRAM(s) Oral daily  metoprolol tartrate 50 milliGRAM(s) Oral every 12 hours  polyethylene glycol 3350 17 Gram(s) Oral daily  senna 2 Tablet(s) Oral at bedtime  tamsulosin 0.4 milliGRAM(s) Oral at bedtime    MEDICATIONS  (PRN):  acetaminophen   Tablet .. 975 milliGRAM(s) Oral every 6 hours PRN Mild Pain (1 - 3)  lactulose Syrup 20 Gram(s) Oral daily PRN constipation    LABS:                        9.7    11.5  )-----------( 397      ( 19 Oct 2018 02:41 )             29.6     10-19    144  |  107  |  42<H>  ----------------------------<  176<H>  3.5   |  25  |  1.08    Ca    8.6      19 Oct 2018 02:41  Phos  2.8     10-19  Mg     2.1     10-19      PT/INR - ( 18 Oct 2018 03:40 )   PT: 14.8 sec;   INR: 1.36 ratio         PTT - ( 18 Oct 2018 03:40 )  PTT:31.3 sec    CAPILLARY BLOOD GLUCOSE      POCT Blood Glucose.: 296 mg/dL (19 Oct 2018 11:04)  POCT Blood Glucose.: 140 mg/dL (18 Oct 2018 21:31)  POCT Blood Glucose.: 175 mg/dL (18 Oct 2018 18:25)        Consultant(s) Notes Reviewed:  [x ] YES  [ ] NO    PHYSICAL EXAM:  GENERAL: NAD, well-groomed, well-developed ,not in any distress ,  HEAD:  Atraumatic, Normocephalic  EYES: EOMI, PERRLA, conjunctiva and sclera clear  NECK: Supple, No JVD, Normal thyroid  NERVOUS SYSTEM:  Alert & Oriented X0,  CHEST/LUNG: Good air entry bilateral except bases with occ coarse rales   HEART: Regular rate and rhythm; No murmurs, rubs, or gallops  ABDOMEN: Soft, Nontender, Nondistended; Bowel sounds present  EXTREMITIES:  2+ Peripheral Pulses, No clubbing, cyanosis, or edema    Care Discussed with Consultants/Other Providers [ x] YES  [ ] NO

## 2018-10-19 NOTE — CONSULT NOTE ADULT - ASSESSMENT
77 year old male with Afib presenting with hemoperitoneum and hemorrhagic shock s/p IR embolization of liver lesion in segment 5 complicated by acute respiratory failure requiring reintubation on 10/13. Patient extubated since 10/15.

## 2018-10-19 NOTE — CONSULT NOTE ADULT - PROBLEM SELECTOR RECOMMENDATION 9
Overall rate controlled   cont with IV lopressor as ordered
s/p embolization  CT does show that liver appears cirrhotic  may benefit from lactulose ? component hepatic encephalopathy  would suspect HCC however afp is normal  MRI may be inconclusive given recent embolization but would persue this workup as outpatient
OF AC and S/P IR embolization.

## 2018-10-20 DIAGNOSIS — Z71.89 OTHER SPECIFIED COUNSELING: ICD-10-CM

## 2018-10-20 LAB
ANION GAP SERPL CALC-SCNC: 14 MMOL/L — SIGNIFICANT CHANGE UP (ref 5–17)
ANION GAP SERPL CALC-SCNC: 15 MMOL/L — SIGNIFICANT CHANGE UP (ref 5–17)
BUN SERPL-MCNC: 43 MG/DL — HIGH (ref 7–23)
BUN SERPL-MCNC: 44 MG/DL — HIGH (ref 7–23)
CA-I BLD-SCNC: 1.18 MMOL/L — SIGNIFICANT CHANGE UP (ref 1.12–1.3)
CALCIUM SERPL-MCNC: 8.9 MG/DL — SIGNIFICANT CHANGE UP (ref 8.4–10.5)
CALCIUM SERPL-MCNC: 9.4 MG/DL — SIGNIFICANT CHANGE UP (ref 8.4–10.5)
CHLORIDE SERPL-SCNC: 107 MMOL/L — SIGNIFICANT CHANGE UP (ref 96–108)
CHLORIDE SERPL-SCNC: 108 MMOL/L — SIGNIFICANT CHANGE UP (ref 96–108)
CO2 SERPL-SCNC: 27 MMOL/L — SIGNIFICANT CHANGE UP (ref 22–31)
CO2 SERPL-SCNC: 27 MMOL/L — SIGNIFICANT CHANGE UP (ref 22–31)
CREAT SERPL-MCNC: 1.13 MG/DL — SIGNIFICANT CHANGE UP (ref 0.5–1.3)
CREAT SERPL-MCNC: 1.24 MG/DL — SIGNIFICANT CHANGE UP (ref 0.5–1.3)
GLUCOSE BLDC GLUCOMTR-MCNC: 157 MG/DL — HIGH (ref 70–99)
GLUCOSE BLDC GLUCOMTR-MCNC: 201 MG/DL — HIGH (ref 70–99)
GLUCOSE BLDC GLUCOMTR-MCNC: 215 MG/DL — HIGH (ref 70–99)
GLUCOSE BLDC GLUCOMTR-MCNC: 348 MG/DL — HIGH (ref 70–99)
GLUCOSE SERPL-MCNC: 129 MG/DL — HIGH (ref 70–99)
GLUCOSE SERPL-MCNC: 189 MG/DL — HIGH (ref 70–99)
HCT VFR BLD CALC: 36.7 % — LOW (ref 39–50)
HGB BLD-MCNC: 11.7 G/DL — LOW (ref 13–17)
MAGNESIUM SERPL-MCNC: 2.3 MG/DL — SIGNIFICANT CHANGE UP (ref 1.6–2.6)
MCHC RBC-ENTMCNC: 30.4 PG — SIGNIFICANT CHANGE UP (ref 27–34)
MCHC RBC-ENTMCNC: 32 GM/DL — SIGNIFICANT CHANGE UP (ref 32–36)
MCV RBC AUTO: 95 FL — SIGNIFICANT CHANGE UP (ref 80–100)
PHOSPHATE SERPL-MCNC: 2.8 MG/DL — SIGNIFICANT CHANGE UP (ref 2.5–4.5)
PLATELET # BLD AUTO: 492 K/UL — HIGH (ref 150–400)
POTASSIUM SERPL-MCNC: 3.2 MMOL/L — LOW (ref 3.5–5.3)
POTASSIUM SERPL-MCNC: 3.9 MMOL/L — SIGNIFICANT CHANGE UP (ref 3.5–5.3)
POTASSIUM SERPL-SCNC: 3.2 MMOL/L — LOW (ref 3.5–5.3)
POTASSIUM SERPL-SCNC: 3.9 MMOL/L — SIGNIFICANT CHANGE UP (ref 3.5–5.3)
RBC # BLD: 3.86 M/UL — LOW (ref 4.2–5.8)
RBC # FLD: 13.7 % — SIGNIFICANT CHANGE UP (ref 10.3–14.5)
SODIUM SERPL-SCNC: 149 MMOL/L — HIGH (ref 135–145)
SODIUM SERPL-SCNC: 149 MMOL/L — HIGH (ref 135–145)
WBC # BLD: 13.3 K/UL — HIGH (ref 3.8–10.5)
WBC # FLD AUTO: 13.3 K/UL — HIGH (ref 3.8–10.5)

## 2018-10-20 RX ORDER — POTASSIUM CHLORIDE 20 MEQ
20 PACKET (EA) ORAL ONCE
Qty: 0 | Refills: 0 | Status: COMPLETED | OUTPATIENT
Start: 2018-10-20 | End: 2018-10-20

## 2018-10-20 RX ORDER — POTASSIUM CHLORIDE 20 MEQ
10 PACKET (EA) ORAL ONCE
Qty: 0 | Refills: 0 | Status: COMPLETED | OUTPATIENT
Start: 2018-10-20 | End: 2018-10-20

## 2018-10-20 RX ORDER — SODIUM,POTASSIUM PHOSPHATES 278-250MG
1 POWDER IN PACKET (EA) ORAL ONCE
Qty: 0 | Refills: 0 | Status: COMPLETED | OUTPATIENT
Start: 2018-10-20 | End: 2018-10-20

## 2018-10-20 RX ADMIN — LEVALBUTEROL 0.63 MILLIGRAM(S): 1.25 SOLUTION, CONCENTRATE RESPIRATORY (INHALATION) at 18:58

## 2018-10-20 RX ADMIN — Medication 10 MILLIEQUIVALENT(S): at 09:23

## 2018-10-20 RX ADMIN — Medication 2.5 MILLILITER(S): at 12:55

## 2018-10-20 RX ADMIN — Medication 20 MILLIEQUIVALENT(S): at 09:23

## 2018-10-20 RX ADMIN — LEVETIRACETAM 400 MILLIGRAM(S): 250 TABLET, FILM COATED ORAL at 21:40

## 2018-10-20 RX ADMIN — INSULIN GLARGINE 26 UNIT(S): 100 INJECTION, SOLUTION SUBCUTANEOUS at 21:41

## 2018-10-20 RX ADMIN — Medication 2.5 MILLILITER(S): at 23:20

## 2018-10-20 RX ADMIN — LEVALBUTEROL 0.63 MILLIGRAM(S): 1.25 SOLUTION, CONCENTRATE RESPIRATORY (INHALATION) at 23:21

## 2018-10-20 RX ADMIN — Medication 4: at 09:24

## 2018-10-20 RX ADMIN — Medication 50 MILLIGRAM(S): at 06:39

## 2018-10-20 RX ADMIN — CEFTRIAXONE 100 GRAM(S): 500 INJECTION, POWDER, FOR SOLUTION INTRAMUSCULAR; INTRAVENOUS at 13:15

## 2018-10-20 RX ADMIN — Medication 100 MILLIGRAM(S): at 06:40

## 2018-10-20 RX ADMIN — Medication 2: at 13:15

## 2018-10-20 RX ADMIN — Medication 20 MILLIGRAM(S): at 06:40

## 2018-10-20 RX ADMIN — Medication 50 MILLIGRAM(S): at 18:58

## 2018-10-20 RX ADMIN — POLYETHYLENE GLYCOL 3350 17 GRAM(S): 17 POWDER, FOR SOLUTION ORAL at 12:04

## 2018-10-20 RX ADMIN — Medication 100 MILLIGRAM(S): at 21:41

## 2018-10-20 RX ADMIN — TAMSULOSIN HYDROCHLORIDE 0.4 MILLIGRAM(S): 0.4 CAPSULE ORAL at 21:41

## 2018-10-20 RX ADMIN — ENOXAPARIN SODIUM 40 MILLIGRAM(S): 100 INJECTION SUBCUTANEOUS at 12:03

## 2018-10-20 RX ADMIN — Medication 1 PACKET(S): at 12:03

## 2018-10-20 RX ADMIN — LEVETIRACETAM 400 MILLIGRAM(S): 250 TABLET, FILM COATED ORAL at 09:27

## 2018-10-20 RX ADMIN — Medication 8: at 18:58

## 2018-10-20 RX ADMIN — Medication 100 MILLIGRAM(S): at 13:15

## 2018-10-20 RX ADMIN — Medication 2.5 MILLILITER(S): at 08:06

## 2018-10-20 RX ADMIN — CHLORHEXIDINE GLUCONATE 1 APPLICATION(S): 213 SOLUTION TOPICAL at 06:39

## 2018-10-20 RX ADMIN — LEVALBUTEROL 0.63 MILLIGRAM(S): 1.25 SOLUTION, CONCENTRATE RESPIRATORY (INHALATION) at 06:41

## 2018-10-20 RX ADMIN — LEVALBUTEROL 0.63 MILLIGRAM(S): 1.25 SOLUTION, CONCENTRATE RESPIRATORY (INHALATION) at 12:03

## 2018-10-20 RX ADMIN — SENNA PLUS 2 TABLET(S): 8.6 TABLET ORAL at 21:41

## 2018-10-20 NOTE — PROGRESS NOTE ADULT - ASSESSMENT
77M presented 10/10 with abd pain, hypotension, and tachycardia. Patient had downtrending H/H and CT abd showed hemoperitoneum. Now s/p IR embolization of segment 5 bleeding liver mass on 10/11.     Plan:  - Pain control: Tylenol 975mg PO q6h PRN  - Dysphagia 1 pureed-necter consistency fluid diet   - Bowel regimen: senna, miralax, colace  - Trend H/H  - DVT ppx: Lovenox 77M presented 10/10 with abd pain, hypotension, and tachycardia. Patient had downtrending H/H and CT abd showed hemoperitoneum. Now s/p IR embolization of segment 5 bleeding liver mass on 10/11.     Plan:  - Pain control: Tylenol 975mg PO q6h PRN  - Dysphagia 1 pureed-necter consistency fluid diet   - C/w abx  - Bowel regimen: senna, miralax, colace  - Trend H/H  - DVT ppx: Lovenox 77M presented 10/10 with abd pain, hypotension, and tachycardia. Patient had downtrending H/H and CT abd showed hemoperitoneum. Now s/p IR embolization of segment 5 bleeding liver mass on 10/11.     Plan:  - Pain control: Tylenol 975mg PO q6h PRN  - Dysphagia 1 pureed-necter consistency fluid diet   - C/w abx  - Bowel regimen: senna, miralax, colace  - Trend H/H  - DVT ppx: Lovenox  - f/u pt  - dc planning for rehab  - outpt hpb f/u

## 2018-10-20 NOTE — PROGRESS NOTE ADULT - SUBJECTIVE AND OBJECTIVE BOX
Red Team Surgery Progress Note     Subjective/24hour Events: No acute events overnight. Pain controlled. Tolerating diet. No N/V. No CP or SOB.    Vital Signs:  Vital Signs Last 24 Hrs  T(C): 37.1 (20 Oct 2018 05:39), Max: 37.6 (19 Oct 2018 19:00)  T(F): 98.7 (20 Oct 2018 05:39), Max: 99.6 (19 Oct 2018 19:00)  HR: 109 (20 Oct 2018 05:39) (69 - 118)  BP: 145/74 (20 Oct 2018 05:39) (114/59 - 145/74)  BP(mean): 84 (19 Oct 2018 20:00) (79 - 84)  RR: 20 (20 Oct 2018 05:39) (20 - 30)  SpO2: 91% (20 Oct 2018 05:39) (91% - 100%)    CAPILLARY BLOOD GLUCOSE      POCT Blood Glucose.: 135 mg/dL (19 Oct 2018 22:22)  POCT Blood Glucose.: 109 mg/dL (19 Oct 2018 17:39)  POCT Blood Glucose.: 201 mg/dL (19 Oct 2018 13:45)  POCT Blood Glucose.: 296 mg/dL (19 Oct 2018 11:04)      I&O's Detail    18 Oct 2018 07:01  -  19 Oct 2018 07:00  --------------------------------------------------------  IN:    dextrose 5% + sodium chloride 0.45%.: 75 mL    Oral Fluid: 100 mL    sodium chloride 0.225%: 900 mL    sodium chloride 0.225%: 225 mL    Solution: 100 mL    Solution: 350 mL  Total IN: 1750 mL    OUT:    Incontinent per Collection Ba mL  Total OUT: 1000 mL    Total NET: 750 mL      19 Oct 2018 07:01  -  20 Oct 2018 06:19  --------------------------------------------------------  IN:    Solution: 100 mL  Total IN: 100 mL    OUT:  Total OUT: 0 mL    Total NET: 100 mL            MEDICATIONS  (STANDING):  acetylcysteine 20% Inhalation 2.5 milliLiter(s) Inhalation every 6 hours  cefTRIAXone   IVPB      cefTRIAXone   IVPB 1 Gram(s) IV Intermittent every 24 hours  chlorhexidine 4% Liquid 1 Application(s) Topical <User Schedule>  docusate sodium 100 milliGRAM(s) Oral three times a day  enoxaparin Injectable 40 milliGRAM(s) SubCutaneous daily  furosemide    Tablet 20 milliGRAM(s) Oral daily  insulin glargine Injectable (LANTUS) 26 Unit(s) SubCutaneous at bedtime  insulin lispro (HumaLOG) corrective regimen sliding scale   SubCutaneous three times a day before meals  insulin lispro (HumaLOG) corrective regimen sliding scale   SubCutaneous at bedtime  levalbuterol Inhalation 0.63 milliGRAM(s) Inhalation every 6 hours  levETIRAcetam  IVPB 500 milliGRAM(s) IV Intermittent every 12 hours  metolazone 5 milliGRAM(s) Oral daily  metoprolol tartrate 50 milliGRAM(s) Oral every 12 hours  polyethylene glycol 3350 17 Gram(s) Oral daily  senna 2 Tablet(s) Oral at bedtime  tamsulosin 0.4 milliGRAM(s) Oral at bedtime    MEDICATIONS  (PRN):  acetaminophen   Tablet .. 975 milliGRAM(s) Oral every 6 hours PRN Mild Pain (1 - 3)  lactulose Syrup 20 Gram(s) Oral daily PRN constipation        Physical Exam:  Gen: NAD  LS: nml respiratory effort  Card: pulse regularly present  GI: abd soft, nontender  Ext: warm      Labs:    10-19    144  |  107  |  42<H>  ----------------------------<  176<H>  3.5   |  25  |  1.08    Ca    8.6      19 Oct 2018 02:41  Phos  2.8     10-  Mg     2.1     10-                              9.7    11.5  )-----------( 397      ( 19 Oct 2018 02:41 )             29.6               Imaging: Red Team Surgery Progress Note     Subjective/24hour Events: No acute events overnight. Pain controlled. Tolerating dysphagia 1 pureed diet. No N/V. No CP or SOB.    Vital Signs:  Vital Signs Last 24 Hrs  T(C): 37.1 (20 Oct 2018 05:39), Max: 37.6 (19 Oct 2018 19:00)  T(F): 98.7 (20 Oct 2018 05:39), Max: 99.6 (19 Oct 2018 19:00)  HR: 109 (20 Oct 2018 05:39) (69 - 118)  BP: 145/74 (20 Oct 2018 05:39) (114/59 - 145/74)  BP(mean): 84 (19 Oct 2018 20:00) (79 - 84)  RR: 20 (20 Oct 2018 05:39) (20 - 30)  SpO2: 91% (20 Oct 2018 05:39) (91% - 100%)    CAPILLARY BLOOD GLUCOSE      POCT Blood Glucose.: 135 mg/dL (19 Oct 2018 22:22)  POCT Blood Glucose.: 109 mg/dL (19 Oct 2018 17:39)  POCT Blood Glucose.: 201 mg/dL (19 Oct 2018 13:45)  POCT Blood Glucose.: 296 mg/dL (19 Oct 2018 11:04)      I&O's Detail    18 Oct 2018 07:01  -  19 Oct 2018 07:00  --------------------------------------------------------  IN:    dextrose 5% + sodium chloride 0.45%.: 75 mL    Oral Fluid: 100 mL    sodium chloride 0.225%: 900 mL    sodium chloride 0.225%: 225 mL    Solution: 100 mL    Solution: 350 mL  Total IN: 1750 mL    OUT:    Incontinent per Collection Ba mL  Total OUT: 1000 mL    Total NET: 750 mL      19 Oct 2018 07:01  -  20 Oct 2018 06:19  --------------------------------------------------------  IN:    Solution: 100 mL  Total IN: 100 mL    OUT:  Total OUT: 0 mL    Total NET: 100 mL            MEDICATIONS  (STANDING):  acetylcysteine 20% Inhalation 2.5 milliLiter(s) Inhalation every 6 hours  cefTRIAXone   IVPB      cefTRIAXone   IVPB 1 Gram(s) IV Intermittent every 24 hours  chlorhexidine 4% Liquid 1 Application(s) Topical <User Schedule>  docusate sodium 100 milliGRAM(s) Oral three times a day  enoxaparin Injectable 40 milliGRAM(s) SubCutaneous daily  furosemide    Tablet 20 milliGRAM(s) Oral daily  insulin glargine Injectable (LANTUS) 26 Unit(s) SubCutaneous at bedtime  insulin lispro (HumaLOG) corrective regimen sliding scale   SubCutaneous three times a day before meals  insulin lispro (HumaLOG) corrective regimen sliding scale   SubCutaneous at bedtime  levalbuterol Inhalation 0.63 milliGRAM(s) Inhalation every 6 hours  levETIRAcetam  IVPB 500 milliGRAM(s) IV Intermittent every 12 hours  metolazone 5 milliGRAM(s) Oral daily  metoprolol tartrate 50 milliGRAM(s) Oral every 12 hours  polyethylene glycol 3350 17 Gram(s) Oral daily  senna 2 Tablet(s) Oral at bedtime  tamsulosin 0.4 milliGRAM(s) Oral at bedtime    MEDICATIONS  (PRN):  acetaminophen   Tablet .. 975 milliGRAM(s) Oral every 6 hours PRN Mild Pain (1 - 3)  lactulose Syrup 20 Gram(s) Oral daily PRN constipation        Physical Exam:  Gen: NAD  LS: nml respiratory effort  Card: pulse regularly present  GI: abd soft, nontender  Ext: warm      Labs:    10-19    144  |  107  |  42<H>  ----------------------------<  176<H>  3.5   |  25  |  1.08    Ca    8.6      19 Oct 2018 02:41  Phos  2.8     10-  Mg     2.1     10-                              9.7    11.5  )-----------( 397      ( 19 Oct 2018 02:41 )             29.6

## 2018-10-20 NOTE — PROGRESS NOTE ADULT - SUBJECTIVE AND OBJECTIVE BOX
INTERVAL HPI/OVERNIGHT EVENTS: No new concerns. More awake .   Vital Signs Last 24 Hrs  T(C): 36.6 (20 Oct 2018 09:20), Max: 37.6 (19 Oct 2018 19:00)  T(F): 97.9 (20 Oct 2018 09:20), Max: 99.6 (19 Oct 2018 19:00)  HR: 91 (20 Oct 2018 09:20) (69 - 117)  BP: 137/79 (20 Oct 2018 09:20) (114/59 - 145/74)  BP(mean): 84 (19 Oct 2018 20:00) (79 - 84)  RR: 18 (20 Oct 2018 09:20) (18 - 30)  SpO2: 92% (20 Oct 2018 09:20) (91% - 100%)  I&O's Summary    19 Oct 2018 07:01  -  20 Oct 2018 07:00  --------------------------------------------------------  IN: 100 mL / OUT: 0 mL / NET: 100 mL      MEDICATIONS  (STANDING):  acetylcysteine 20% Inhalation 2.5 milliLiter(s) Inhalation every 6 hours  cefTRIAXone   IVPB      cefTRIAXone   IVPB 1 Gram(s) IV Intermittent every 24 hours  chlorhexidine 4% Liquid 1 Application(s) Topical <User Schedule>  docusate sodium 100 milliGRAM(s) Oral three times a day  enoxaparin Injectable 40 milliGRAM(s) SubCutaneous daily  furosemide    Tablet 20 milliGRAM(s) Oral daily  insulin glargine Injectable (LANTUS) 26 Unit(s) SubCutaneous at bedtime  insulin lispro (HumaLOG) corrective regimen sliding scale   SubCutaneous three times a day before meals  insulin lispro (HumaLOG) corrective regimen sliding scale   SubCutaneous at bedtime  levalbuterol Inhalation 0.63 milliGRAM(s) Inhalation every 6 hours  levETIRAcetam  IVPB 500 milliGRAM(s) IV Intermittent every 12 hours  metolazone 5 milliGRAM(s) Oral daily  metoprolol tartrate 50 milliGRAM(s) Oral every 12 hours  polyethylene glycol 3350 17 Gram(s) Oral daily  potassium phosphate / sodium phosphate powder 1 Packet(s) Oral once  senna 2 Tablet(s) Oral at bedtime  tamsulosin 0.4 milliGRAM(s) Oral at bedtime    MEDICATIONS  (PRN):  acetaminophen   Tablet .. 975 milliGRAM(s) Oral every 6 hours PRN Mild Pain (1 - 3)  lactulose Syrup 20 Gram(s) Oral daily PRN constipation    LABS:                        11.7   13.3  )-----------( 492      ( 20 Oct 2018 06:47 )             36.7     10-20    149<H>  |  107  |  44<H>  ----------------------------<  129<H>  3.2<L>   |  27  |  1.13    Ca    9.4      20 Oct 2018 06:47  Phos  2.8     10-20  Mg     2.3     10-20          CAPILLARY BLOOD GLUCOSE      POCT Blood Glucose.: 215 mg/dL (20 Oct 2018 08:49)  POCT Blood Glucose.: 135 mg/dL (19 Oct 2018 22:22)  POCT Blood Glucose.: 109 mg/dL (19 Oct 2018 17:39)  POCT Blood Glucose.: 201 mg/dL (19 Oct 2018 13:45)  POCT Blood Glucose.: 296 mg/dL (19 Oct 2018 11:04)          PHYSICAL EXAM:  GENERAL: NAD, well-groomed, well-developed, not in any distress ,  HEAD:  Atraumatic, Normocephalic  EYES: EOMI, PERRLA, conjunctiva and sclera clear  NECK: Supple, No JVD, Normal thyroid  NERVOUS SYSTEM:  Alert & limited exam .   CHEST/LUNG: Good air entry bilateral with coarse rales   HEART: Regular rate and rhythm; No murmurs, rubs, or gallops  ABDOMEN: Soft, Nontender, Nondistended; Bowel sounds present,   EXTREMITIES:  2+ Peripheral Pulses, No clubbing, cyanosis, or edema    Care Discussed with Consultants/Other Providers [ x] YES  [ ] NO

## 2018-10-20 NOTE — PROGRESS NOTE ADULT - ASSESSMENT
77 year old male presenting with hemoperitoneum and hemorrhagic shock s/p IR embolization of liver lesion in segment 5 complicated by acute respiratory failure requiring reintubation on 10/13. Patient extubated since 10/15.         Problem/Recommendation - 1:  Problem: Hemoperitoneum, nontraumatic. Recommendation: HD and HH stable . OFF AC and S/P IR embolization.     Problem/Recommendation - 2:  ·  Problem: Altered mental status with Underlying Dementia .  Recommendation: Multifactorial .Resolving.       Problem/Recommendation - 3:  ·  Problem: Atrial fibrillation.  Recommendation: OFF AC and cardiology following.      Problem/Recommendation - 4:  ·  Problem: Seizure.  Recommendation: On Keppra.      Problem/Recommendation - 5:  ·  Problem: Diabetes mellitus.  Recommendation: Sugars in acceptable range. SSI.      Problem/Recommendation - 6:  Problem: Hypertension. Recommendation: BP meds with parameters     Problem/Recommendation - 7:  Problem: Acute respiratory failure. Recommendation: S/P Extubation and on NC Oxygen.     Problem/Recommendation - 8:  Problem: Liver Mass with ?cirrhosis . Recommendation: GI consulted.     Problem/Recommendation - 9:  Problem: Hypernatremia and hypokalemia . Recommendation: Replacing K and Encourage free water.      Problem/Recommendation - 10:  Problem: UTI . Recommendation: On Rocephin.

## 2018-10-20 NOTE — PROGRESS NOTE ADULT - SUBJECTIVE AND OBJECTIVE BOX
Interval Events: Pt seen and examined. He denies abdominal pain, N/V.  Tolerating dysphagia diet.    MEDICATIONS  (STANDING):  acetylcysteine 20% Inhalation 2.5 milliLiter(s) Inhalation every 6 hours  cefTRIAXone   IVPB      cefTRIAXone   IVPB 1 Gram(s) IV Intermittent every 24 hours  chlorhexidine 4% Liquid 1 Application(s) Topical <User Schedule>  docusate sodium 100 milliGRAM(s) Oral three times a day  enoxaparin Injectable 40 milliGRAM(s) SubCutaneous daily  furosemide    Tablet 20 milliGRAM(s) Oral daily  insulin glargine Injectable (LANTUS) 26 Unit(s) SubCutaneous at bedtime  insulin lispro (HumaLOG) corrective regimen sliding scale   SubCutaneous three times a day before meals  insulin lispro (HumaLOG) corrective regimen sliding scale   SubCutaneous at bedtime  levalbuterol Inhalation 0.63 milliGRAM(s) Inhalation every 6 hours  levETIRAcetam  IVPB 500 milliGRAM(s) IV Intermittent every 12 hours  metolazone 5 milliGRAM(s) Oral daily  metoprolol tartrate 50 milliGRAM(s) Oral every 12 hours  polyethylene glycol 3350 17 Gram(s) Oral daily  potassium chloride    Tablet ER 10 milliEquivalent(s) Oral once  potassium chloride    Tablet ER 20 milliEquivalent(s) Oral once  potassium phosphate / sodium phosphate powder 1 Packet(s) Oral once  senna 2 Tablet(s) Oral at bedtime  tamsulosin 0.4 milliGRAM(s) Oral at bedtime    MEDICATIONS  (PRN):  acetaminophen   Tablet .. 975 milliGRAM(s) Oral every 6 hours PRN Mild Pain (1 - 3)  lactulose Syrup 20 Gram(s) Oral daily PRN constipation      Allergies    Aleve (Unknown)    Intolerances        Review of Systems:    General:  No wt loss, fevers, chills, night sweats,fatigue,   Eyes:  Good vision, no reported pain  ENT:  No sore throat, pain, runny nose, dysphagia  CV:  No pain, palpitations, hypo/hypertension  Resp:  No dyspnea, cough, tachypnea, wheezing  GI:  No pain, No nausea, No vomiting, No diarrhea, No constipation, No weight loss, No fever, No pruritis, No rectal bleeding, No melena, No dysphagia  :  No pain, bleeding, incontinence, nocturia  Muscle:  No pain, weakness  Neuro:  No weakness, tingling, memory problems  Psych:  No fatigue, insomnia, mood problems, depression  Endocrine:  No polyuria, polydypsia, cold/heat intolerance  Heme:  No petechiae, ecchymosis, easy bruisability  Skin:  No rash, tattoos, scars, edema      Vital Signs Last 24 Hrs  T(C): 36.6 (20 Oct 2018 09:20), Max: 37.6 (19 Oct 2018 19:00)  T(F): 97.9 (20 Oct 2018 09:20), Max: 99.6 (19 Oct 2018 19:00)  HR: 91 (20 Oct 2018 09:20) (69 - 118)  BP: 137/79 (20 Oct 2018 09:20) (114/59 - 145/74)  BP(mean): 84 (19 Oct 2018 20:00) (79 - 84)  RR: 18 (20 Oct 2018 09:20) (18 - 30)  SpO2: 92% (20 Oct 2018 09:20) (91% - 100%)    PHYSICAL EXAM:    Appearance: NAD   HEENT:   dry oral mucosa, PERRL, EOMI	  Lymphatic: No lymphadenopathy  Cardiovascular: Irregular tachy l S1 S2, No JVD, No murmurs, No edema  Respiratory: decreased bs   Psychiatry: A & O x 2, Mood & affect appropriate  Gastrointestinal:  Soft, Non-tender, + BS	  Skin: No rashes, No ecchymoses, No cyanosis	  Neurologic: Non-focal  Extremities: Normal range of motion, No clubbing, cyanosis or edema  Vascular: Peripheral pulses palpable 2+ bilaterally      LABS:                        11.7   13.3  )-----------( 492      ( 20 Oct 2018 06:47 )             36.7     10-20    149<H>  |  107  |  44<H>  ----------------------------<  129<H>  3.2<L>   |  27  |  1.13    Ca    9.4      20 Oct 2018 06:47  Phos  2.8     10-20  Mg     2.3     10-20            RADIOLOGY & ADDITIONAL TESTS:

## 2018-10-20 NOTE — PROGRESS NOTE ADULT - SUBJECTIVE AND OBJECTIVE BOX
Subjective: Patient seen and examined. No new events except as noted.   moved out of SICU     REVIEW OF SYSTEMS:    CONSTITUTIONAL: + weakness, fevers or chills  EYES/ENT: No visual changes;  No vertigo or throat pain   NECK: No pain or stiffness  RESPIRATORY: No cough, wheezing, hemoptysis; No shortness of breath  CARDIOVASCULAR: No chest pain or palpitations  GASTROINTESTINAL: No abdominal or epigastric pain. No nausea, vomiting, or hematemesis; No diarrhea or constipation. No melena or hematochezia.  GENITOURINARY: No dysuria, frequency or hematuria  NEUROLOGICAL: No numbness or weakness  SKIN: No itching, burning, rashes, or lesions   All other review of systems is negative unless indicated above.    MEDICATIONS:  MEDICATIONS  (STANDING):  acetylcysteine 20% Inhalation 2.5 milliLiter(s) Inhalation every 6 hours  cefTRIAXone   IVPB      cefTRIAXone   IVPB 1 Gram(s) IV Intermittent every 24 hours  chlorhexidine 4% Liquid 1 Application(s) Topical <User Schedule>  docusate sodium 100 milliGRAM(s) Oral three times a day  enoxaparin Injectable 40 milliGRAM(s) SubCutaneous daily  furosemide    Tablet 20 milliGRAM(s) Oral daily  insulin glargine Injectable (LANTUS) 26 Unit(s) SubCutaneous at bedtime  insulin lispro (HumaLOG) corrective regimen sliding scale   SubCutaneous three times a day before meals  insulin lispro (HumaLOG) corrective regimen sliding scale   SubCutaneous at bedtime  levalbuterol Inhalation 0.63 milliGRAM(s) Inhalation every 6 hours  levETIRAcetam  IVPB 500 milliGRAM(s) IV Intermittent every 12 hours  metolazone 5 milliGRAM(s) Oral daily  metoprolol tartrate 50 milliGRAM(s) Oral every 12 hours  polyethylene glycol 3350 17 Gram(s) Oral daily  senna 2 Tablet(s) Oral at bedtime  tamsulosin 0.4 milliGRAM(s) Oral at bedtime      PHYSICAL EXAM:  T(C): 36.8 (10-20-18 @ 13:23), Max: 37.1 (10-20-18 @ 05:39)  HR: 100 (10-20-18 @ 13:23) (85 - 109)  BP: 119/74 (10-20-18 @ 13:23) (119/74 - 145/74)  RR: 18 (10-20-18 @ 13:23) (18 - 25)  SpO2: 93% (10-20-18 @ 13:23) (91% - 100%)  Wt(kg): --  I&O's Summary    19 Oct 2018 07:01  -  20 Oct 2018 07:00  --------------------------------------------------------  IN: 100 mL / OUT: 0 mL / NET: 100 mL    20 Oct 2018 07:01  -  20 Oct 2018 21:09  --------------------------------------------------------  IN: 420 mL / OUT: 0 mL / NET: 420 mL          Appearance: NAD	  HEENT:   Normal oral mucosa, PERRL, EOMI	  Lymphatic: No lymphadenopathy , no edema  Cardiovascular: irregular  S1 S2, No JVD, No murmurs , Peripheral pulses palpable 2+ bilaterally  Respiratory: Lungs clear to auscultation, normal effort 	  Gastrointestinal:  Soft, Non-tender, + BS	  Skin: No rashes, No ecchymoses, No cyanosis, warm to touch  Musculoskeletal: Normal range of motion, normal strength  Psychiatry:  lethargic   Ext: No edema      LABS:    CARDIAC MARKERS:                                11.7   13.3  )-----------( 492      ( 20 Oct 2018 06:47 )             36.7     10-20    149<H>  |  108  |  43<H>  ----------------------------<  189<H>  3.9   |  27  |  1.24    Ca    8.9      20 Oct 2018 19:22  Phos  2.8     10-20  Mg     2.3     10-20      proBNP:   Lipid Profile:   HgA1c:   TSH:             TELEMETRY: 	    ECG:  	  RADIOLOGY:   DIAGNOSTIC TESTING:  [ ] Echocardiogram:  [ ]  Catheterization:  [ ] Stress Test:    OTHER:

## 2018-10-21 LAB
AMMONIA BLD-MCNC: 43 UMOL/L — SIGNIFICANT CHANGE UP (ref 11–55)
ANION GAP SERPL CALC-SCNC: 15 MMOL/L — SIGNIFICANT CHANGE UP (ref 5–17)
APPEARANCE UR: CLEAR — SIGNIFICANT CHANGE UP
BACTERIA # UR AUTO: NEGATIVE — SIGNIFICANT CHANGE UP
BILIRUB UR-MCNC: NEGATIVE — SIGNIFICANT CHANGE UP
BUN SERPL-MCNC: 39 MG/DL — HIGH (ref 7–23)
CALCIUM SERPL-MCNC: 8.9 MG/DL — SIGNIFICANT CHANGE UP (ref 8.4–10.5)
CHLORIDE SERPL-SCNC: 109 MMOL/L — HIGH (ref 96–108)
CO2 SERPL-SCNC: 28 MMOL/L — SIGNIFICANT CHANGE UP (ref 22–31)
COLOR SPEC: YELLOW — SIGNIFICANT CHANGE UP
CREAT SERPL-MCNC: 1.12 MG/DL — SIGNIFICANT CHANGE UP (ref 0.5–1.3)
DIFF PNL FLD: ABNORMAL
EPI CELLS # UR: 0 /HPF — SIGNIFICANT CHANGE UP
GLUCOSE BLDC GLUCOMTR-MCNC: 189 MG/DL — HIGH (ref 70–99)
GLUCOSE BLDC GLUCOMTR-MCNC: 208 MG/DL — HIGH (ref 70–99)
GLUCOSE BLDC GLUCOMTR-MCNC: 220 MG/DL — HIGH (ref 70–99)
GLUCOSE BLDC GLUCOMTR-MCNC: 268 MG/DL — HIGH (ref 70–99)
GLUCOSE SERPL-MCNC: 177 MG/DL — HIGH (ref 70–99)
GLUCOSE UR QL: NEGATIVE — SIGNIFICANT CHANGE UP
HCT VFR BLD CALC: 32.9 % — LOW (ref 39–50)
HGB BLD-MCNC: 10.7 G/DL — LOW (ref 13–17)
HYALINE CASTS # UR AUTO: 3 /LPF — HIGH (ref 0–2)
KETONES UR-MCNC: NEGATIVE — SIGNIFICANT CHANGE UP
LEUKOCYTE ESTERASE UR-ACNC: NEGATIVE — SIGNIFICANT CHANGE UP
MAGNESIUM SERPL-MCNC: 2.1 MG/DL — SIGNIFICANT CHANGE UP (ref 1.6–2.6)
MCHC RBC-ENTMCNC: 30.7 PG — SIGNIFICANT CHANGE UP (ref 27–34)
MCHC RBC-ENTMCNC: 32.4 GM/DL — SIGNIFICANT CHANGE UP (ref 32–36)
MCV RBC AUTO: 94.6 FL — SIGNIFICANT CHANGE UP (ref 80–100)
NITRITE UR-MCNC: NEGATIVE — SIGNIFICANT CHANGE UP
OSMOLALITY UR: 496 MOS/KG — SIGNIFICANT CHANGE UP (ref 300–900)
PH UR: 5.5 — SIGNIFICANT CHANGE UP (ref 5–8)
PHOSPHATE SERPL-MCNC: 3.4 MG/DL — SIGNIFICANT CHANGE UP (ref 2.5–4.5)
PLATELET # BLD AUTO: 506 K/UL — HIGH (ref 150–400)
POTASSIUM SERPL-MCNC: 3.5 MMOL/L — SIGNIFICANT CHANGE UP (ref 3.5–5.3)
POTASSIUM SERPL-SCNC: 3.5 MMOL/L — SIGNIFICANT CHANGE UP (ref 3.5–5.3)
PROT UR-MCNC: NEGATIVE — SIGNIFICANT CHANGE UP
RBC # BLD: 3.48 M/UL — LOW (ref 4.2–5.8)
RBC # FLD: 13.7 % — SIGNIFICANT CHANGE UP (ref 10.3–14.5)
RBC CASTS # UR COMP ASSIST: 7 /HPF — HIGH (ref 0–4)
SODIUM SERPL-SCNC: 152 MMOL/L — HIGH (ref 135–145)
SP GR SPEC: 1.01 — SIGNIFICANT CHANGE UP (ref 1.01–1.02)
UROBILINOGEN FLD QL: NEGATIVE — SIGNIFICANT CHANGE UP
WBC # BLD: 11 K/UL — HIGH (ref 3.8–10.5)
WBC # FLD AUTO: 11 K/UL — HIGH (ref 3.8–10.5)
WBC UR QL: 2 /HPF — SIGNIFICANT CHANGE UP (ref 0–5)

## 2018-10-21 RX ORDER — POTASSIUM CHLORIDE 20 MEQ
10 PACKET (EA) ORAL ONCE
Qty: 0 | Refills: 0 | Status: COMPLETED | OUTPATIENT
Start: 2018-10-21 | End: 2018-10-21

## 2018-10-21 RX ORDER — DEXTROSE MONOHYDRATE, SODIUM CHLORIDE, AND POTASSIUM CHLORIDE 50; .745; 4.5 G/1000ML; G/1000ML; G/1000ML
1000 INJECTION, SOLUTION INTRAVENOUS
Qty: 0 | Refills: 0 | Status: DISCONTINUED | OUTPATIENT
Start: 2018-10-21 | End: 2018-10-22

## 2018-10-21 RX ORDER — POTASSIUM CHLORIDE 20 MEQ
20 PACKET (EA) ORAL ONCE
Qty: 0 | Refills: 0 | Status: COMPLETED | OUTPATIENT
Start: 2018-10-21 | End: 2018-10-21

## 2018-10-21 RX ADMIN — SENNA PLUS 2 TABLET(S): 8.6 TABLET ORAL at 22:36

## 2018-10-21 RX ADMIN — LEVETIRACETAM 400 MILLIGRAM(S): 250 TABLET, FILM COATED ORAL at 11:38

## 2018-10-21 RX ADMIN — LEVALBUTEROL 0.63 MILLIGRAM(S): 1.25 SOLUTION, CONCENTRATE RESPIRATORY (INHALATION) at 22:37

## 2018-10-21 RX ADMIN — CEFTRIAXONE 100 GRAM(S): 500 INJECTION, POWDER, FOR SOLUTION INTRAMUSCULAR; INTRAVENOUS at 16:34

## 2018-10-21 RX ADMIN — POLYETHYLENE GLYCOL 3350 17 GRAM(S): 17 POWDER, FOR SOLUTION ORAL at 13:29

## 2018-10-21 RX ADMIN — Medication 20 MILLIGRAM(S): at 06:24

## 2018-10-21 RX ADMIN — Medication 4: at 18:44

## 2018-10-21 RX ADMIN — Medication 2: at 22:35

## 2018-10-21 RX ADMIN — LEVALBUTEROL 0.63 MILLIGRAM(S): 1.25 SOLUTION, CONCENTRATE RESPIRATORY (INHALATION) at 06:25

## 2018-10-21 RX ADMIN — LACTULOSE 20 GRAM(S): 10 SOLUTION ORAL at 22:37

## 2018-10-21 RX ADMIN — LEVALBUTEROL 0.63 MILLIGRAM(S): 1.25 SOLUTION, CONCENTRATE RESPIRATORY (INHALATION) at 17:48

## 2018-10-21 RX ADMIN — Medication 20 MILLIEQUIVALENT(S): at 10:05

## 2018-10-21 RX ADMIN — Medication 100 MILLIGRAM(S): at 06:24

## 2018-10-21 RX ADMIN — Medication 2.5 MILLILITER(S): at 11:38

## 2018-10-21 RX ADMIN — Medication 10 MILLIEQUIVALENT(S): at 10:05

## 2018-10-21 RX ADMIN — TAMSULOSIN HYDROCHLORIDE 0.4 MILLIGRAM(S): 0.4 CAPSULE ORAL at 22:37

## 2018-10-21 RX ADMIN — DEXTROSE MONOHYDRATE, SODIUM CHLORIDE, AND POTASSIUM CHLORIDE 60 MILLILITER(S): 50; .745; 4.5 INJECTION, SOLUTION INTRAVENOUS at 11:38

## 2018-10-21 RX ADMIN — Medication 50 MILLIGRAM(S): at 06:24

## 2018-10-21 RX ADMIN — Medication 4: at 13:29

## 2018-10-21 RX ADMIN — Medication 50 MILLIGRAM(S): at 18:42

## 2018-10-21 RX ADMIN — ENOXAPARIN SODIUM 40 MILLIGRAM(S): 100 INJECTION SUBCUTANEOUS at 11:38

## 2018-10-21 RX ADMIN — Medication 2: at 10:06

## 2018-10-21 RX ADMIN — LEVALBUTEROL 0.63 MILLIGRAM(S): 1.25 SOLUTION, CONCENTRATE RESPIRATORY (INHALATION) at 11:38

## 2018-10-21 RX ADMIN — Medication 2.5 MILLILITER(S): at 17:51

## 2018-10-21 RX ADMIN — Medication 2.5 MILLILITER(S): at 06:25

## 2018-10-21 RX ADMIN — Medication 2.5 MILLILITER(S): at 22:37

## 2018-10-21 RX ADMIN — INSULIN GLARGINE 26 UNIT(S): 100 INJECTION, SOLUTION SUBCUTANEOUS at 22:36

## 2018-10-21 RX ADMIN — Medication 100 MILLIGRAM(S): at 22:36

## 2018-10-21 RX ADMIN — LEVETIRACETAM 400 MILLIGRAM(S): 250 TABLET, FILM COATED ORAL at 22:37

## 2018-10-21 NOTE — PROGRESS NOTE ADULT - SUBJECTIVE AND OBJECTIVE BOX
INTERVAL HPI/OVERNIGHT EVENTS: More awake and alert . Eating per nurse as son fed him.   Vital Signs Last 24 Hrs  T(C): 36.7 (21 Oct 2018 09:02), Max: 37.3 (20 Oct 2018 21:31)  T(F): 98 (21 Oct 2018 09:02), Max: 99.2 (20 Oct 2018 21:31)  HR: 57 (21 Oct 2018 09:02) (57 - 100)  BP: 117/77 (21 Oct 2018 09:02) (114/71 - 143/76)  BP(mean): --  RR: 18 (21 Oct 2018 09:02) (18 - 19)  SpO2: 98% (21 Oct 2018 09:02) (93% - 98%)  I&O's Summary    20 Oct 2018 07:01  -  21 Oct 2018 07:00  --------------------------------------------------------  IN: 780 mL / OUT: 0 mL / NET: 780 mL    21 Oct 2018 07:01  -  21 Oct 2018 12:05  --------------------------------------------------------  IN: 280 mL / OUT: 0 mL / NET: 280 mL      MEDICATIONS  (STANDING):  acetylcysteine 20% Inhalation 2.5 milliLiter(s) Inhalation every 6 hours  cefTRIAXone   IVPB      cefTRIAXone   IVPB 1 Gram(s) IV Intermittent every 24 hours  chlorhexidine 4% Liquid 1 Application(s) Topical <User Schedule>  docusate sodium 100 milliGRAM(s) Oral three times a day  enoxaparin Injectable 40 milliGRAM(s) SubCutaneous daily  furosemide    Tablet 20 milliGRAM(s) Oral daily  insulin glargine Injectable (LANTUS) 26 Unit(s) SubCutaneous at bedtime  insulin lispro (HumaLOG) corrective regimen sliding scale   SubCutaneous three times a day before meals  insulin lispro (HumaLOG) corrective regimen sliding scale   SubCutaneous at bedtime  levalbuterol Inhalation 0.63 milliGRAM(s) Inhalation every 6 hours  levETIRAcetam  IVPB 500 milliGRAM(s) IV Intermittent every 12 hours  metolazone 5 milliGRAM(s) Oral daily  metoprolol tartrate 50 milliGRAM(s) Oral every 12 hours  polyethylene glycol 3350 17 Gram(s) Oral daily  senna 2 Tablet(s) Oral at bedtime  sodium chloride 0.45% with potassium chloride 20 mEq/L 1000 milliLiter(s) (60 mL/Hr) IV Continuous <Continuous>  tamsulosin 0.4 milliGRAM(s) Oral at bedtime    MEDICATIONS  (PRN):  acetaminophen   Tablet .. 975 milliGRAM(s) Oral every 6 hours PRN Mild Pain (1 - 3)  lactulose Syrup 20 Gram(s) Oral daily PRN constipation    LABS:                        10.7   11.0  )-----------( 506      ( 21 Oct 2018 07:18 )             32.9     10-21    152<H>  |  109<H>  |  39<H>  ----------------------------<  177<H>  3.5   |  28  |  1.12    Ca    8.9      21 Oct 2018 07:16  Phos  3.4     10-21  Mg     2.1     10-21          CAPILLARY BLOOD GLUCOSE      POCT Blood Glucose.: 189 mg/dL (21 Oct 2018 09:21)  POCT Blood Glucose.: 201 mg/dL (20 Oct 2018 21:29)  POCT Blood Glucose.: 348 mg/dL (20 Oct 2018 18:23)  POCT Blood Glucose.: 157 mg/dL (20 Oct 2018 13:11)          Consultant(s) Notes Reviewed:  [x ] YES  [ ] NO    PHYSICAL EXAM:  GENERAL: NAD, well-groomed, well-developed, not in any distress ,  HEAD:  Atraumatic, Normocephalic  EYES: EOMI, PERRLA, conjunctiva and sclera clear  NECK: Supple, No JVD, Normal thyroid  NERVOUS SYSTEM:  Alert & Oriented X1, No focal deficit   CHEST/LUNG: Good air entry bilateral except bases with occ rhonchi   HEART: Regular rate and rhythm; No murmurs, rubs, or gallops  ABDOMEN: Soft, Nontender, Nondistended; Bowel sounds present  EXTREMITIES:  2+ Peripheral Pulses, No clubbing, cyanosis, or edema    Care Discussed with Consultants/Other Providers [ x] YES  [ ] NO

## 2018-10-21 NOTE — PROGRESS NOTE ADULT - ASSESSMENT
77M presented 10/10 with abd pain, hypotension, and tachycardia. Patient had downtrending H/H and CT abd showed hemoperitoneum. Now s/p IR embolization of segment 5 bleeding liver mass on 10/11.     Plan:  - Pain control: Tylenol 975mg PO q6h PRN  - Dysphagia 1 pureed-necter consistency fluid diet   - C/w abx  - Bowel regimen: senna, miralax, colace  - Trend H/H  - DVT ppx: Lovenox  - f/u pt  - dc planning for rehab  - outpt hpb f/u 77M presented 10/10 with abd pain, hypotension, and tachycardia. Patient had downtrending H/H and CT abd showed hemoperitoneum. Now s/p IR embolization of segment 5 bleeding liver mass on 10/11.     Plan:  - Pain control: Tylenol 975mg PO q6h PRN  - Dysphagia 1 pureed-necter consistency fluid diet   - C/w abx  - Bowel regimen: senna, miralax, colace  - Trend H/H  - DVT ppx: Lovenox  - D/c planning for rehab  - Outpt hpb f/u 77M presented 10/10 with abd pain, hypotension, and tachycardia. Patient had downtrending H/H and CT abd showed hemoperitoneum. Now s/p IR embolization of segment 5 bleeding liver mass on 10/11.     Plan:  - Pain control: Tylenol 975mg PO q6h PRN  - Dysphagia 1 pureed-necter consistency fluid diet   - C/w abx  - Ammonia level checked for possible hepatic encephalopathy and resulted WNL  - Bowel regimen: senna, miralax, colace  - Trend H/H  - DVT ppx: Lovenox  - D/c planning for rehab  - Outpt hpb f/u 77M presented 10/10 with abd pain, hypotension, and tachycardia. Patient had downtrending H/H and CT abd showed hemoperitoneum. Now s/p IR embolization of segment 5 bleeding liver mass on 10/11.     Plan:  - Pain control: Tylenol 975mg PO q6h PRN  - Dysphagia 1 pureed-necter consistency fluid diet   - C/w abx  - Ammonia level checked for possible hepatic encephalopathy and resulted WNL  - Bowel regimen: senna, miralax, colace  - Trend H/H  - DVT ppx: Lovenox  -renal f/u re elev Na   - D/c planning for rehab  - Outpt hpb f/u

## 2018-10-21 NOTE — PROGRESS NOTE ADULT - PROBLEM SELECTOR PLAN 2
s/p embolized segment 5 liver lesion  aspiration precautions   Order per SICU team. s/p embolized segment 5 liver lesion  aspiration precautions

## 2018-10-21 NOTE — PROGRESS NOTE ADULT - SUBJECTIVE AND OBJECTIVE BOX
Red Team Surgery Progress Note     Subjective/24hour Events: No acute events overnight. Pain controlled. Tolerating diet. No N/V. No CP or SOB.    Vital Signs:  Vital Signs Last 24 Hrs  T(C): 37.2 (21 Oct 2018 05:42), Max: 37.3 (20 Oct 2018 21:31)  T(F): 99 (21 Oct 2018 05:42), Max: 99.2 (20 Oct 2018 21:31)  HR: 87 (21 Oct 2018 05:42) (81 - 100)  BP: 143/76 (21 Oct 2018 05:42) (114/71 - 143/76)  BP(mean): --  RR: 18 (21 Oct 2018 05:42) (18 - 19)  SpO2: 95% (21 Oct 2018 05:42) (92% - 98%)    CAPILLARY BLOOD GLUCOSE      POCT Blood Glucose.: 201 mg/dL (20 Oct 2018 21:29)  POCT Blood Glucose.: 348 mg/dL (20 Oct 2018 18:23)  POCT Blood Glucose.: 157 mg/dL (20 Oct 2018 13:11)  POCT Blood Glucose.: 215 mg/dL (20 Oct 2018 08:49)      I&O's Detail    19 Oct 2018 07:01  -  20 Oct 2018 07:00  --------------------------------------------------------  IN:    Solution: 100 mL  Total IN: 100 mL    OUT:  Total OUT: 0 mL    Total NET: 100 mL      20 Oct 2018 07:01  -  21 Oct 2018 06:20  --------------------------------------------------------  IN:    Oral Fluid: 660 mL  Total IN: 660 mL    OUT:  Total OUT: 0 mL    Total NET: 660 mL            MEDICATIONS  (STANDING):  acetylcysteine 20% Inhalation 2.5 milliLiter(s) Inhalation every 6 hours  cefTRIAXone   IVPB      cefTRIAXone   IVPB 1 Gram(s) IV Intermittent every 24 hours  chlorhexidine 4% Liquid 1 Application(s) Topical <User Schedule>  docusate sodium 100 milliGRAM(s) Oral three times a day  enoxaparin Injectable 40 milliGRAM(s) SubCutaneous daily  furosemide    Tablet 20 milliGRAM(s) Oral daily  insulin glargine Injectable (LANTUS) 26 Unit(s) SubCutaneous at bedtime  insulin lispro (HumaLOG) corrective regimen sliding scale   SubCutaneous three times a day before meals  insulin lispro (HumaLOG) corrective regimen sliding scale   SubCutaneous at bedtime  levalbuterol Inhalation 0.63 milliGRAM(s) Inhalation every 6 hours  levETIRAcetam  IVPB 500 milliGRAM(s) IV Intermittent every 12 hours  metolazone 5 milliGRAM(s) Oral daily  metoprolol tartrate 50 milliGRAM(s) Oral every 12 hours  polyethylene glycol 3350 17 Gram(s) Oral daily  senna 2 Tablet(s) Oral at bedtime  tamsulosin 0.4 milliGRAM(s) Oral at bedtime    MEDICATIONS  (PRN):  acetaminophen   Tablet .. 975 milliGRAM(s) Oral every 6 hours PRN Mild Pain (1 - 3)  lactulose Syrup 20 Gram(s) Oral daily PRN constipation        Physical Exam:  Gen: NAD  LS: nml respiratory effort  Card: pulse regularly present  GI: abd soft, nontender  Ext: warm      Labs:    10-20    149<H>  |  108  |  43<H>  ----------------------------<  189<H>  3.9   |  27  |  1.24    Ca    8.9      20 Oct 2018 19:22  Phos  2.8     10-20  Mg     2.3     10-20                              11.7   13.3  )-----------( 492      ( 20 Oct 2018 06:47 )             36.7               Imaging: Red Team Surgery Progress Note     Subjective/24hour Events: No acute events overnight. Pain controlled. Tolerating pureed diet. No N/V. Mentation improved since SICU, but still exhibits confusion. No CP or SOB.    Vital Signs:  Vital Signs Last 24 Hrs  T(C): 37.2 (21 Oct 2018 05:42), Max: 37.3 (20 Oct 2018 21:31)  T(F): 99 (21 Oct 2018 05:42), Max: 99.2 (20 Oct 2018 21:31)  HR: 87 (21 Oct 2018 05:42) (81 - 100)  BP: 143/76 (21 Oct 2018 05:42) (114/71 - 143/76)  BP(mean): --  RR: 18 (21 Oct 2018 05:42) (18 - 19)  SpO2: 95% (21 Oct 2018 05:42) (92% - 98%)    CAPILLARY BLOOD GLUCOSE      POCT Blood Glucose.: 201 mg/dL (20 Oct 2018 21:29)  POCT Blood Glucose.: 348 mg/dL (20 Oct 2018 18:23)  POCT Blood Glucose.: 157 mg/dL (20 Oct 2018 13:11)  POCT Blood Glucose.: 215 mg/dL (20 Oct 2018 08:49)      I&O's Detail    19 Oct 2018 07:01  -  20 Oct 2018 07:00  --------------------------------------------------------  IN:    Solution: 100 mL  Total IN: 100 mL    OUT:  Total OUT: 0 mL    Total NET: 100 mL      20 Oct 2018 07:01  -  21 Oct 2018 06:20  --------------------------------------------------------  IN:    Oral Fluid: 660 mL  Total IN: 660 mL    OUT:  Total OUT: 0 mL    Total NET: 660 mL            MEDICATIONS  (STANDING):  acetylcysteine 20% Inhalation 2.5 milliLiter(s) Inhalation every 6 hours  cefTRIAXone   IVPB      cefTRIAXone   IVPB 1 Gram(s) IV Intermittent every 24 hours  chlorhexidine 4% Liquid 1 Application(s) Topical <User Schedule>  docusate sodium 100 milliGRAM(s) Oral three times a day  enoxaparin Injectable 40 milliGRAM(s) SubCutaneous daily  furosemide    Tablet 20 milliGRAM(s) Oral daily  insulin glargine Injectable (LANTUS) 26 Unit(s) SubCutaneous at bedtime  insulin lispro (HumaLOG) corrective regimen sliding scale   SubCutaneous three times a day before meals  insulin lispro (HumaLOG) corrective regimen sliding scale   SubCutaneous at bedtime  levalbuterol Inhalation 0.63 milliGRAM(s) Inhalation every 6 hours  levETIRAcetam  IVPB 500 milliGRAM(s) IV Intermittent every 12 hours  metolazone 5 milliGRAM(s) Oral daily  metoprolol tartrate 50 milliGRAM(s) Oral every 12 hours  polyethylene glycol 3350 17 Gram(s) Oral daily  senna 2 Tablet(s) Oral at bedtime  tamsulosin 0.4 milliGRAM(s) Oral at bedtime    MEDICATIONS  (PRN):  acetaminophen   Tablet .. 975 milliGRAM(s) Oral every 6 hours PRN Mild Pain (1 - 3)  lactulose Syrup 20 Gram(s) Oral daily PRN constipation        Physical Exam:  Gen: NAD  LS: nml respiratory effort  Card: pulse regularly present  GI: abd soft, nontender  Ext: warm      Labs:    10-20    149<H>  |  108  |  43<H>  ----------------------------<  189<H>  3.9   |  27  |  1.24    Ca    8.9      20 Oct 2018 19:22  Phos  2.8     10-20  Mg     2.3     10-20                              11.7   13.3  )-----------( 492      ( 20 Oct 2018 06:47 )             36.7

## 2018-10-21 NOTE — PROGRESS NOTE ADULT - ASSESSMENT
77 year old male presenting with hemoperitoneum and hemorrhagic shock s/p IR embolization of liver lesion in segment 5 complicated by acute respiratory failure requiring reintubation on 10/13. Patient extubated since 10/15.         Problem/Recommendation - 1:  Problem: Hemoperitoneum, nontraumatic. Recommendation: HD and HH stable . OFF AC and S/P IR embolization.     Problem/Recommendation - 2:  ·  Problem: Altered mental status with Underlying Dementia .  Recommendation: Multifactorial .Resolving.       Problem/Recommendation - 3:  ·  Problem: Atrial fibrillation.  Recommendation: OFF AC and cardiology following.      Problem/Recommendation - 4:  ·  Problem: Seizure.  Recommendation: On Keppra.      Problem/Recommendation - 5:  ·  Problem: Diabetes mellitus.  Recommendation: Sugars in acceptable range. SSI.      Problem/Recommendation - 6:  Problem: Hypertension. Recommendation: BP meds with parameters     Problem/Recommendation - 7:  Problem: Acute respiratory failure. Recommendation: S/P Extubation and on NC Oxygen. On Neb RX.      Problem/Recommendation - 8:  Problem: Liver Mass with ?cirrhosis . Recommendation: GI consulted.     Problem/Recommendation - 9:  Problem: Hypernatremia and hypokalemia . Recommendation: Replacing K and Encourage free water. Renal consult noted.      Problem/Recommendation - 10:  Problem: UTI . Recommendation: On Rocephin. Awaiting final C/S.

## 2018-10-21 NOTE — PROGRESS NOTE ADULT - SUBJECTIVE AND OBJECTIVE BOX
Interval Events: Pt seen and examined. He denies abdominal pain, N/V.  Tolerating dysphagia diet.    MEDICATIONS  (STANDING):  acetylcysteine 20% Inhalation 2.5 milliLiter(s) Inhalation every 6 hours  cefTRIAXone   IVPB      cefTRIAXone   IVPB 1 Gram(s) IV Intermittent every 24 hours  chlorhexidine 4% Liquid 1 Application(s) Topical <User Schedule>  docusate sodium 100 milliGRAM(s) Oral three times a day  enoxaparin Injectable 40 milliGRAM(s) SubCutaneous daily  furosemide    Tablet 20 milliGRAM(s) Oral daily  insulin glargine Injectable (LANTUS) 26 Unit(s) SubCutaneous at bedtime  insulin lispro (HumaLOG) corrective regimen sliding scale   SubCutaneous three times a day before meals  insulin lispro (HumaLOG) corrective regimen sliding scale   SubCutaneous at bedtime  levalbuterol Inhalation 0.63 milliGRAM(s) Inhalation every 6 hours  levETIRAcetam  IVPB 500 milliGRAM(s) IV Intermittent every 12 hours  metolazone 5 milliGRAM(s) Oral daily  metoprolol tartrate 50 milliGRAM(s) Oral every 12 hours  polyethylene glycol 3350 17 Gram(s) Oral daily  potassium chloride    Tablet ER 20 milliEquivalent(s) Oral once  potassium chloride    Tablet ER 10 milliEquivalent(s) Oral once  senna 2 Tablet(s) Oral at bedtime  tamsulosin 0.4 milliGRAM(s) Oral at bedtime    MEDICATIONS  (PRN):  acetaminophen   Tablet .. 975 milliGRAM(s) Oral every 6 hours PRN Mild Pain (1 - 3)  lactulose Syrup 20 Gram(s) Oral daily PRN constipation      Allergies    Aleve (Unknown)    Intolerances        Review of Systems:    General:  No wt loss, fevers, chills, night sweats,fatigue,   Eyes:  Good vision, no reported pain  ENT:  No sore throat, pain, runny nose, dysphagia  CV:  No pain, palpitations, hypo/hypertension  Resp:  No dyspnea, cough, tachypnea, wheezing  GI:  No pain, No nausea, No vomiting, No diarrhea, No constipation, No weight loss, No fever, No pruritis, No rectal bleeding, No melena, No dysphagia  :  No pain, bleeding, incontinence, nocturia  Muscle:  No pain, weakness  Neuro:  No weakness, tingling, memory problems  Psych:  No fatigue, insomnia, mood problems, depression  Endocrine:  No polyuria, polydypsia, cold/heat intolerance  Heme:  No petechiae, ecchymosis, easy bruisability  Skin:  No rash, tattoos, scars, edema      Vital Signs Last 24 Hrs  T(C): 36.7 (21 Oct 2018 09:02), Max: 37.3 (20 Oct 2018 21:31)  T(F): 98 (21 Oct 2018 09:02), Max: 99.2 (20 Oct 2018 21:31)  HR: 57 (21 Oct 2018 09:02) (57 - 100)  BP: 117/77 (21 Oct 2018 09:02) (114/71 - 143/76)  BP(mean): --  RR: 18 (21 Oct 2018 09:02) (18 - 19)  SpO2: 98% (21 Oct 2018 09:02) (93% - 98%)    PHYSICAL EXAM:    Appearance: NAD   HEENT:   dry oral mucosa, PERRL, EOMI	  Lymphatic: No lymphadenopathy  Cardiovascular: Irregular tachy l S1 S2, No JVD, No murmurs, No edema  Respiratory: decreased bs   Psychiatry: A & O x 2, Mood & affect appropriate  Gastrointestinal:  Soft, Non-tender, + BS	  Skin: No rashes, No ecchymoses, No cyanosis  Neurologic: Non-focal  Extremities: Normal range of motion, No clubbing, cyanosis or edema  Vascular: Peripheral pulses palpable 2+ bilaterally    LABS:                        10.7   11.0  )-----------( 506      ( 21 Oct 2018 07:18 )             32.9     10-21    152<H>  |  109<H>  |  39<H>  ----------------------------<  177<H>  3.5   |  28  |  1.12    Ca    8.9      21 Oct 2018 07:16  Phos  3.4     10-21  Mg     2.1     10-21            RADIOLOGY & ADDITIONAL TESTS:

## 2018-10-21 NOTE — PROGRESS NOTE ADULT - SUBJECTIVE AND OBJECTIVE BOX
Subjective: Patient seen and examined. No new events except as noted.   +cough     REVIEW OF SYSTEMS:    CONSTITUTIONAL: + weakness, fevers or chills  EYES/ENT: No visual changes;  No vertigo or throat pain   NECK: No pain or stiffness  RESPIRATORY: No cough, wheezing, hemoptysis; No shortness of breath  CARDIOVASCULAR: No chest pain or palpitations  GASTROINTESTINAL: No abdominal or epigastric pain. No nausea, vomiting, or hematemesis; No diarrhea or constipation. No melena or hematochezia.  GENITOURINARY: No dysuria, frequency or hematuria  NEUROLOGICAL: + numbness or weakness  SKIN: No itching, burning, rashes, or lesions   All other review of systems is negative unless indicated above.    MEDICATIONS:  MEDICATIONS  (STANDING):  acetylcysteine 20% Inhalation 2.5 milliLiter(s) Inhalation every 6 hours  cefTRIAXone   IVPB      cefTRIAXone   IVPB 1 Gram(s) IV Intermittent every 24 hours  chlorhexidine 4% Liquid 1 Application(s) Topical <User Schedule>  docusate sodium 100 milliGRAM(s) Oral three times a day  enoxaparin Injectable 40 milliGRAM(s) SubCutaneous daily  furosemide    Tablet 20 milliGRAM(s) Oral daily  insulin glargine Injectable (LANTUS) 26 Unit(s) SubCutaneous at bedtime  insulin lispro (HumaLOG) corrective regimen sliding scale   SubCutaneous three times a day before meals  insulin lispro (HumaLOG) corrective regimen sliding scale   SubCutaneous at bedtime  levalbuterol Inhalation 0.63 milliGRAM(s) Inhalation every 6 hours  levETIRAcetam  IVPB 500 milliGRAM(s) IV Intermittent every 12 hours  metolazone 5 milliGRAM(s) Oral daily  metoprolol tartrate 50 milliGRAM(s) Oral every 12 hours  polyethylene glycol 3350 17 Gram(s) Oral daily  senna 2 Tablet(s) Oral at bedtime  sodium chloride 0.45% with potassium chloride 20 mEq/L 1000 milliLiter(s) (60 mL/Hr) IV Continuous <Continuous>  tamsulosin 0.4 milliGRAM(s) Oral at bedtime      PHYSICAL EXAM:  T(C): 36.7 (10-21-18 @ 09:02), Max: 37.3 (10-20-18 @ 21:31)  HR: 57 (10-21-18 @ 09:02) (57 - 100)  BP: 117/77 (10-21-18 @ 09:02) (114/71 - 143/76)  RR: 18 (10-21-18 @ 09:02) (18 - 19)  SpO2: 98% (10-21-18 @ 09:02) (93% - 98%)  Wt(kg): --  I&O's Summary    20 Oct 2018 07:01  -  21 Oct 2018 07:00  --------------------------------------------------------  IN: 780 mL / OUT: 0 mL / NET: 780 mL    21 Oct 2018 07:01  -  21 Oct 2018 12:02  --------------------------------------------------------  IN: 280 mL / OUT: 0 mL / NET: 280 mL          Appearance: NAD	  HEENT:   Normal oral mucosa, PERRL, EOMI	  Lymphatic: No lymphadenopathy , no edema  Cardiovascular: Irregular  S1 S2, No JVD, No murmurs , Peripheral pulses palpable 2+ bilaterally  Respiratory: Decreased bs and effort 	  Gastrointestinal:  Soft, Non-tender, + BS	  Skin: No rashes, No ecchymoses, No cyanosis, warm to touch  Musculoskeletal: Decreased range of motion and  strength  Psychiatry:  Mood & affect appropriate  Ext: + edema      LABS:    CARDIAC MARKERS:                                10.7   11.0  )-----------( 506      ( 21 Oct 2018 07:18 )             32.9     10-21    152<H>  |  109<H>  |  39<H>  ----------------------------<  177<H>  3.5   |  28  |  1.12    Ca    8.9      21 Oct 2018 07:16  Phos  3.4     10-21  Mg     2.1     10-21      proBNP:   Lipid Profile:   HgA1c:   TSH:             TELEMETRY: 	    ECG:  	  RADIOLOGY:   DIAGNOSTIC TESTING:  [ ] Echocardiogram:  [ ]  Catheterization:  [ ] Stress Test:    OTHER:

## 2018-10-21 NOTE — CONSULT NOTE ADULT - SUBJECTIVE AND OBJECTIVE BOX
HPI:  Patient is a 77y old  Male who presents with a chief complaint of abdominal pain.  Patient's symptoms started acutely at 6pm while he was sitting, eating dinner.  He felt weak at that time, and he went to urgent care.  He was noted to be hypotensive at urgent care and was referred to the ER.  On presentation, the patient continued to complain of abdominal pain, and was noted to be hypotensive.  He became progressively tachycardic.  Patient's labs showed a downtrending hct and a CTA was done which was consistent with hemoperitoneum, without obvious source.  Surgery consulted for evaluation of hemoperitoneum.      Past medical history is significant for afib, patient is on xeralto. (11 Oct 2018 01:58)      PAST MEDICAL & SURGICAL HISTORY:  BPH (benign prostatic hyperplasia)  Atrial fibrillation  Seizure  Diabetes mellitus  Hypertension  H/O craniotomy: bilateral, for ICH after fall  S/P inguinal hernia repair  ASD (atrial septal defect): closure    Allergies  Aleve (Unknown)    SOCIAL HISTORY:  Denies ETOh,Smoking,     FAMILY HISTORY:  Family history of glioblastoma (Sibling): brother    REVIEW OF SYSTEMS:  CONSTITUTIONAL: No weakness, fevers or chills  EYES/ENT: No visual changes;  No vertigo or throat pain   NECK: No pain or stiffness  RESPIRATORY: No cough, wheezing, hemoptysis; No shortness of breath  CARDIOVASCULAR: No chest pain or palpitations  GASTROINTESTINAL: No abdominal or epigastric pain. No nausea, vomiting, or hematemesis; No diarrhea or constipation. No melena or hematochezia.  GENITOURINARY: No dysuria, frequency or hematuria  NEUROLOGICAL: No numbness or weakness  SKIN: No itching, burning, rashes, or lesions   All other review of systems is negative unless indicated above.    VITAL:  T(C): , Max: 37.3 (10-20-18 @ 21:31)  T(F): , Max: 99.2 (10-20-18 @ 21:31)  HR: 57 (10-21-18 @ 09:02)  BP: 117/77 (10-21-18 @ 09:02)  BP(mean): --  RR: 18 (10-21-18 @ 09:02)  SpO2: 98% (10-21-18 @ 09:02)    PHYSICAL EXAM:  Constitutional: NAD, Alert  HEENT: NCAT, MMM  Neck: Supple, No JVD  Respiratory: CTA-b/l  Cardiovascular: RRR s1s2, no m/r/g  Gastrointestinal: BS+, soft, NT/ND  Extremities: No peripheral edema b/l  Neurological: no focal deficits; strength grossly intact  Psychiatric: Normal mood, normal affect  Back: no CVAT b/l  Skin: No rashes, no nevi    LABS:                        10.7   11.0  )-----------( 506      ( 21 Oct 2018 07:18 )             32.9     Na(152)/K(3.5)/Cl(109)/HCO3(28)/BUN(39)/Cr(1.12)Glu(177)/Ca(8.9)/Mg(2.1)/PO4(3.4)    10-21 @ 07:16  Na(149)/K(3.9)/Cl(108)/HCO3(27)/BUN(43)/Cr(1.24)Glu(189)/Ca(8.9)/Mg(--)/PO4(--)    10-20 @ 19:22  Na(149)/K(3.2)/Cl(107)/HCO3(27)/BUN(44)/Cr(1.13)Glu(129)/Ca(9.4)/Mg(2.3)/PO4(2.8)    10-20 @ 06:47  Na(144)/K(3.5)/Cl(107)/HCO3(25)/BUN(42)/Cr(1.08)Glu(176)/Ca(8.6)/Mg(2.1)/PO4(2.8)    10-19 @ 02:41  Na(145)/K(3.4)/Cl(107)/HCO3(26)/BUN(41)/Cr(1.08)Glu(173)/Ca(8.9)/Mg(2.1)/PO4(3.1)    10-18 @ 18:55            IMAGING:  < from: Xray Chest 1 View- PORTABLE-Routine (10.19.18 @ 06:59) >  The mediastinal cardiac silhouette is unremarkable. Sternotomy.  Limited by low lung volumes. Bibasilar linear atelectasis and/or scarring unchanged.  No acute osseous finding.       ASSESSMENT:      RECOMMEND:      Thank you for involving [facility] in this patient's care.    With warm regards,    Elías Escobar MD   [facility]  [phone]            h HPI:  Mr. Kingsley is a 77 year-old man with history of multiple medical issues including hypertension, diabetes mellitus, atrial fibrillation, atrial septal defect s/p closure, and past intracranial hemorrhage s/p fall, requiring craniotomy. He presented on 10/11/18 to the [facility] ER with abdominal pain. he was noted to be hypotensive and anemic; he had a CT of the abdominal performed, demonstrating hemoperitoneum/likely bleeding liver mass. He was admitted to the SICU, intubated, received multiple units of FFP (on xarelto prior to admission) and multiple units of PRBCs. He underwent embolization with IR on the date of admission. He was extubated after procedure, but developed respiratory failure within the next 1-2 days, and required reintubation on 10/13; he was re-extubated on 10/15. His hospital course has now been complicated by hyponatremia; therefore, a renal consultation was requested.    Son is at bedside and helps provide history on patient's behalf. States that patient is not able to feed himself/take in water on his own; he is helping the patient at present. They deny polyuria/polydipsia. They deny past history of hypernatremia.    PAST MEDICAL & SURGICAL HISTORY:  BPH (benign prostatic hyperplasia)  Atrial fibrillation  Seizure  Diabetes mellitus  Hypertension  H/O craniotomy: bilateral, for ICH after fall  S/P inguinal hernia repair  ASD (atrial septal defect): closure    Allergies  Aleve (Unknown)    SOCIAL HISTORY:  Denies ETOh,Smoking,     FAMILY HISTORY:  Family history of glioblastoma (Sibling): brother    REVIEW OF SYSTEMS:  CONSTITUTIONAL: (+)fatigue; no fevers or chills  EYES/ENT: No visual changes;  No vertigo or throat pain;(+)hoarse voice  NECK: No pain or stiffness  RESPIRATORY: No cough, wheezing, hemoptysis; No shortness of breath  CARDIOVASCULAR: No chest pain or palpitations  GASTROINTESTINAL: No abdominal or epigastric pain. No nausea, vomiting, or hematemesis; No diarrhea or constipation. No melena or hematochezia.  GENITOURINARY: No dysuria, frequency or hematuria  NEUROLOGICAL: No numbness or weakness  SKIN: No itching, burning, rashes, or lesions   All other review of systems is negative unless indicated above.    VITAL:  T(C): , Max: 37.3 (10-20-18 @ 21:31)  T(F): , Max: 99.2 (10-20-18 @ 21:31)  HR: 57 (10-21-18 @ 09:02)  BP: 117/77 (10-21-18 @ 09:02)  RR: 18 (10-21-18 @ 09:02)  SpO2: 98% (10-21-18 @ 09:02)    PHYSICAL EXAM:  Constitutional: NAD, lethargic but alert  HEENT: NCAT, DMM  Neck: Supple, No JVD  Respiratory: (+)b/l rhonchi  Cardiovascular: irreg s1s2  Gastrointestinal: BS+, soft, NT/ND  Extremities: No peripheral edema b/l  Neurological: reduced generalized strength  Back: no CVAT b/l  Skin: (+)scaly/fungating left temporal lesion    LABS:                        10.7   11.0  )-----------( 506      ( 21 Oct 2018 07:18 )             32.9     Na(152)/K(3.5)/Cl(109)/HCO3(28)/BUN(39)/Cr(1.12)Glu(177)/Ca(8.9)/Mg(2.1)/PO4(3.4)    10-21 @ 07:16  Na(149)/K(3.9)/Cl(108)/HCO3(27)/BUN(43)/Cr(1.24)Glu(189)/Ca(8.9)/Mg(--)/PO4(--)    10-20 @ 19:22  Na(149)/K(3.2)/Cl(107)/HCO3(27)/BUN(44)/Cr(1.13)Glu(129)/Ca(9.4)/Mg(2.3)/PO4(2.8)    10-20 @ 06:47  Na(144)/K(3.5)/Cl(107)/HCO3(25)/BUN(42)/Cr(1.08)Glu(176)/Ca(8.6)/Mg(2.1)/PO4(2.8)    10-19 @ 02:41  Na(145)/K(3.4)/Cl(107)/HCO3(26)/BUN(41)/Cr(1.08)Glu(173)/Ca(8.9)/Mg(2.1)/PO4(3.1)    10-18 @ 18:55    IMAGING:  < from: Xray Chest 1 View- PORTABLE-Routine (10.19.18 @ 06:59) >  The mediastinal cardiac silhouette is unremarkable. Sternotomy.  Limited by low lung volumes. Bibasilar linear atelectasis and/or scarring unchanged.  No acute osseous finding.       ASSESSMENT:  (1)Hyponatremia - highly likely due to poor free water intake of late/increased insensible losses. Highly doubt DI  (2)Hypokalemia - being repleted    RECOMMEND:  (1)1/2NS + 20meq/L KCl @ 60cc/h  (2)Encourage increased free water intake by mouth  (3)BMP daily  (4)Dose new meds for GFR >60ml/min    Thank you for involving [facility] in this patient's care.    With warm regards,    Elías Escobar MD   [facility]  [phone]            h

## 2018-10-21 NOTE — CONSULT NOTE ADULT - REASON FOR ADMISSION
abdominal pain, hemoperitoneum

## 2018-10-22 ENCOUNTER — TRANSCRIPTION ENCOUNTER (OUTPATIENT)
Age: 78
End: 2018-10-22

## 2018-10-22 LAB
ANION GAP SERPL CALC-SCNC: 15 MMOL/L — SIGNIFICANT CHANGE UP (ref 5–17)
BUN SERPL-MCNC: 37 MG/DL — HIGH (ref 7–23)
CALCIUM SERPL-MCNC: 8.7 MG/DL — SIGNIFICANT CHANGE UP (ref 8.4–10.5)
CHLORIDE SERPL-SCNC: 105 MMOL/L — SIGNIFICANT CHANGE UP (ref 96–108)
CO2 SERPL-SCNC: 29 MMOL/L — SIGNIFICANT CHANGE UP (ref 22–31)
CREAT SERPL-MCNC: 1.06 MG/DL — SIGNIFICANT CHANGE UP (ref 0.5–1.3)
GLUCOSE BLDC GLUCOMTR-MCNC: 166 MG/DL — HIGH (ref 70–99)
GLUCOSE BLDC GLUCOMTR-MCNC: 197 MG/DL — HIGH (ref 70–99)
GLUCOSE BLDC GLUCOMTR-MCNC: 262 MG/DL — HIGH (ref 70–99)
GLUCOSE BLDC GLUCOMTR-MCNC: 308 MG/DL — HIGH (ref 70–99)
GLUCOSE SERPL-MCNC: 178 MG/DL — HIGH (ref 70–99)
HCT VFR BLD CALC: 33.2 % — LOW (ref 39–50)
HGB BLD-MCNC: 10.3 G/DL — LOW (ref 13–17)
MAGNESIUM SERPL-MCNC: 1.8 MG/DL — SIGNIFICANT CHANGE UP (ref 1.6–2.6)
MCHC RBC-ENTMCNC: 29.5 PG — SIGNIFICANT CHANGE UP (ref 27–34)
MCHC RBC-ENTMCNC: 31.1 GM/DL — LOW (ref 32–36)
MCV RBC AUTO: 94.9 FL — SIGNIFICANT CHANGE UP (ref 80–100)
PHOSPHATE SERPL-MCNC: 2.8 MG/DL — SIGNIFICANT CHANGE UP (ref 2.5–4.5)
PLATELET # BLD AUTO: 404 K/UL — HIGH (ref 150–400)
POTASSIUM SERPL-MCNC: 3.7 MMOL/L — SIGNIFICANT CHANGE UP (ref 3.5–5.3)
POTASSIUM SERPL-SCNC: 3.7 MMOL/L — SIGNIFICANT CHANGE UP (ref 3.5–5.3)
RBC # BLD: 3.5 M/UL — LOW (ref 4.2–5.8)
RBC # FLD: 13.5 % — SIGNIFICANT CHANGE UP (ref 10.3–14.5)
SODIUM SERPL-SCNC: 149 MMOL/L — HIGH (ref 135–145)
WBC # BLD: 12.4 K/UL — HIGH (ref 3.8–10.5)
WBC # FLD AUTO: 12.4 K/UL — HIGH (ref 3.8–10.5)

## 2018-10-22 RX ORDER — POTASSIUM PHOSPHATE, MONOBASIC POTASSIUM PHOSPHATE, DIBASIC 236; 224 MG/ML; MG/ML
15 INJECTION, SOLUTION INTRAVENOUS ONCE
Qty: 0 | Refills: 0 | Status: COMPLETED | OUTPATIENT
Start: 2018-10-22 | End: 2018-10-22

## 2018-10-22 RX ORDER — MAGNESIUM SULFATE 500 MG/ML
2 VIAL (ML) INJECTION ONCE
Qty: 0 | Refills: 0 | Status: COMPLETED | OUTPATIENT
Start: 2018-10-22 | End: 2018-10-22

## 2018-10-22 RX ORDER — METOPROLOL TARTRATE 50 MG
75 TABLET ORAL
Qty: 0 | Refills: 0 | Status: DISCONTINUED | OUTPATIENT
Start: 2018-10-22 | End: 2018-10-23

## 2018-10-22 RX ORDER — DOCUSATE SODIUM 100 MG
100 CAPSULE ORAL THREE TIMES A DAY
Qty: 0 | Refills: 0 | Status: DISCONTINUED | OUTPATIENT
Start: 2018-10-22 | End: 2018-10-23

## 2018-10-22 RX ADMIN — Medication 2: at 08:06

## 2018-10-22 RX ADMIN — INSULIN GLARGINE 26 UNIT(S): 100 INJECTION, SOLUTION SUBCUTANEOUS at 22:44

## 2018-10-22 RX ADMIN — Medication 2: at 14:35

## 2018-10-22 RX ADMIN — Medication 50 GRAM(S): at 17:35

## 2018-10-22 RX ADMIN — SENNA PLUS 2 TABLET(S): 8.6 TABLET ORAL at 22:44

## 2018-10-22 RX ADMIN — LEVETIRACETAM 400 MILLIGRAM(S): 250 TABLET, FILM COATED ORAL at 13:00

## 2018-10-22 RX ADMIN — TAMSULOSIN HYDROCHLORIDE 0.4 MILLIGRAM(S): 0.4 CAPSULE ORAL at 22:44

## 2018-10-22 RX ADMIN — POTASSIUM PHOSPHATE, MONOBASIC POTASSIUM PHOSPHATE, DIBASIC 62.5 MILLIMOLE(S): 236; 224 INJECTION, SOLUTION INTRAVENOUS at 18:40

## 2018-10-22 RX ADMIN — Medication 4: at 22:44

## 2018-10-22 RX ADMIN — Medication 75 MILLIGRAM(S): at 17:44

## 2018-10-22 RX ADMIN — Medication 6: at 19:37

## 2018-10-22 RX ADMIN — Medication 100 MILLIGRAM(S): at 14:45

## 2018-10-22 RX ADMIN — Medication 2.5 MILLILITER(S): at 05:13

## 2018-10-22 RX ADMIN — ENOXAPARIN SODIUM 40 MILLIGRAM(S): 100 INJECTION SUBCUTANEOUS at 13:10

## 2018-10-22 RX ADMIN — LEVALBUTEROL 0.63 MILLIGRAM(S): 1.25 SOLUTION, CONCENTRATE RESPIRATORY (INHALATION) at 05:13

## 2018-10-22 RX ADMIN — Medication 100 MILLIGRAM(S): at 22:48

## 2018-10-22 RX ADMIN — Medication 20 MILLIGRAM(S): at 05:12

## 2018-10-22 RX ADMIN — LEVETIRACETAM 400 MILLIGRAM(S): 250 TABLET, FILM COATED ORAL at 22:48

## 2018-10-22 RX ADMIN — Medication 50 MILLIGRAM(S): at 05:12

## 2018-10-22 RX ADMIN — LEVALBUTEROL 0.63 MILLIGRAM(S): 1.25 SOLUTION, CONCENTRATE RESPIRATORY (INHALATION) at 23:31

## 2018-10-22 RX ADMIN — LEVALBUTEROL 0.63 MILLIGRAM(S): 1.25 SOLUTION, CONCENTRATE RESPIRATORY (INHALATION) at 17:45

## 2018-10-22 RX ADMIN — LEVALBUTEROL 0.63 MILLIGRAM(S): 1.25 SOLUTION, CONCENTRATE RESPIRATORY (INHALATION) at 13:09

## 2018-10-22 RX ADMIN — CEFTRIAXONE 100 GRAM(S): 500 INJECTION, POWDER, FOR SOLUTION INTRAMUSCULAR; INTRAVENOUS at 14:46

## 2018-10-22 NOTE — DISCHARGE NOTE ADULT - MEDICATION SUMMARY - MEDICATIONS TO TAKE
I will START or STAY ON the medications listed below when I get home from the hospital:    acetaminophen 325 mg oral tablet  -- 3 tab(s) by mouth every 6 hours, As needed, Mild Pain (1 - 3)  -- Indication: For pain relief    Flomax 0.4 mg oral capsule  -- 1 cap(s) by mouth once a day  -- Indication: For urinary retention    Keppra 500 mg oral tablet  -- 1 tab(s) by mouth 2 times a day  -- Indication: For Anticonvulsant    Lantus 100 units/mL subcutaneous solution  -- 40 unit(s) subcutaneous once a day (at bedtime)  -- Indication: For Diabetes mellitus    NovoLOG 100 units/mL subcutaneous solution  -- 11 units and up (ISS) subcutaneous 3 times a day (with meals)  -- Indication: For Diabetes mellitus    metoprolol tartrate 75 mg oral tablet  -- 1 tab(s) by mouth 2 times a day  -- Indication: For cardiovascular health    levalbuterol 0.63 mg/3 mL inhalation solution  -- 3 milliliter(s) inhaled every 6 hours  -- Indication: For respiratory health    amLODIPine 2.5 mg oral tablet  -- 1 tab(s) by mouth once a day  -- Indication: For cardiovascular health    senna oral tablet  -- 2 tab(s) by mouth once a day (at bedtime)  -- Indication: For bowel regimen    docusate sodium 10 mg/mL oral liquid  -- 10 milliliter(s) by mouth 3 times a day  -- Indication: For bowel regimen    lactulose 10 g/15 mL oral syrup  -- 30 milliliter(s) by mouth once a day, As needed, constipation  -- Indication: For bowel regimen    polyethylene glycol 3350 oral powder for reconstitution  -- 17 gram(s) by mouth once a day  -- Indication: For bowel regimen I will START or STAY ON the medications listed below when I get home from the hospital:    acetaminophen 325 mg oral tablet  -- 3 tab(s) by mouth every 6 hours, As needed, Mild Pain (1 - 3)  -- Indication: For pain relief    Flomax 0.4 mg oral capsule  -- 1 cap(s) by mouth once a day  -- Indication: For urinary retention    Keppra 500 mg oral tablet  -- 1 tab(s) by mouth 2 times a day  -- Indication: For Anti-convulsant    Lantus 100 units/mL subcutaneous solution  -- 40 unit(s) subcutaneous once a day (at bedtime)  -- Indication: For Diabetes mellitus    NovoLOG 100 units/mL subcutaneous solution  -- 11 units and up (ISS) subcutaneous 3 times a day (with meals)  -- Indication: For Diabetes mellitus    metoprolol tartrate 75 mg oral tablet  -- 1 tab(s) by mouth 2 times a day  -- Indication: For cardiovascular health    levalbuterol 0.63 mg/3 mL inhalation solution  -- 3 milliliter(s) inhaled every 6 hours  -- Indication: For Asthma    amLODIPine 2.5 mg oral tablet  -- 1 tab(s) by mouth once a day  -- Indication: For cardiovascular health    senna oral tablet  -- 2 tab(s) by mouth once a day (at bedtime)  -- Indication: For bowel regimen    docusate sodium 10 mg/mL oral liquid  -- 10 milliliter(s) by mouth 3 times a day  -- Indication: For bowel regimen    lactulose 10 g/15 mL oral syrup  -- 30 milliliter(s) by mouth once a day, As needed, constipation  -- Indication: For bowel regimen    polyethylene glycol 3350 oral powder for reconstitution  -- 17 gram(s) by mouth once a day  -- Indication: For bowel regimen

## 2018-10-22 NOTE — PROGRESS NOTE ADULT - SUBJECTIVE AND OBJECTIVE BOX
INTERVAL HPI/OVERNIGHT EVENTS:  Vital Signs Last 24 Hrs  T(C): 36.7 (22 Oct 2018 05:13), Max: 37.5 (21 Oct 2018 22:20)  T(F): 98.1 (22 Oct 2018 05:13), Max: 99.5 (21 Oct 2018 22:20)  HR: 100 (22 Oct 2018 05:13) (89 - 100)  BP: 128/76 (22 Oct 2018 05:13) (115/69 - 144/66)  BP(mean): --  RR: 19 (22 Oct 2018 05:13) (18 - 19)  SpO2: 92% (22 Oct 2018 05:13) (92% - 94%)  I&O's Summary    21 Oct 2018 07:01  -  22 Oct 2018 07:00  --------------------------------------------------------  IN: 1280 mL / OUT: 0 mL / NET: 1280 mL      MEDICATIONS  (STANDING):  cefTRIAXone   IVPB      cefTRIAXone   IVPB 1 Gram(s) IV Intermittent every 24 hours  chlorhexidine 4% Liquid 1 Application(s) Topical <User Schedule>  docusate sodium Liquid 100 milliGRAM(s) Oral three times a day  enoxaparin Injectable 40 milliGRAM(s) SubCutaneous daily  insulin glargine Injectable (LANTUS) 26 Unit(s) SubCutaneous at bedtime  insulin lispro (HumaLOG) corrective regimen sliding scale   SubCutaneous three times a day before meals  insulin lispro (HumaLOG) corrective regimen sliding scale   SubCutaneous at bedtime  levalbuterol Inhalation 0.63 milliGRAM(s) Inhalation every 6 hours  levETIRAcetam  IVPB 500 milliGRAM(s) IV Intermittent every 12 hours  magnesium sulfate  IVPB 2 Gram(s) IV Intermittent once  metoprolol tartrate 75 milliGRAM(s) Oral two times a day  polyethylene glycol 3350 17 Gram(s) Oral daily  potassium phosphate IVPB 15 milliMole(s) IV Intermittent once  senna 2 Tablet(s) Oral at bedtime  sodium chloride 0.45% with potassium chloride 20 mEq/L 1000 milliLiter(s) (60 mL/Hr) IV Continuous <Continuous>  tamsulosin 0.4 milliGRAM(s) Oral at bedtime    MEDICATIONS  (PRN):  acetaminophen   Tablet .. 975 milliGRAM(s) Oral every 6 hours PRN Mild Pain (1 - 3)  lactulose Syrup 20 Gram(s) Oral daily PRN constipation    LABS:                        10.3   12.4  )-----------( 404      ( 22 Oct 2018 07:21 )             33.2     10-22    149<H>  |  105  |  37<H>  ----------------------------<  178<H>  3.7   |  29  |  1.06    Ca    8.7      22 Oct 2018 07:21  Phos  2.8     10-  Mg     1.8     10-        Urinalysis Basic - ( 21 Oct 2018 13:58 )    Color: Yellow / Appearance: Clear / S.014 / pH: x  Gluc: x / Ketone: Negative  / Bili: Negative / Urobili: Negative   Blood: x / Protein: Negative / Nitrite: Negative   Leuk Esterase: Negative / RBC: 7 /hpf / WBC 2 /hpf   Sq Epi: x / Non Sq Epi: 0 /hpf / Bacteria: Negative      CAPILLARY BLOOD GLUCOSE      POCT Blood Glucose.: 197 mg/dL (22 Oct 2018 08:01)  POCT Blood Glucose.: 268 mg/dL (21 Oct 2018 22:17)  POCT Blood Glucose.: 220 mg/dL (21 Oct 2018 18:19)  POCT Blood Glucose.: 208 mg/dL (21 Oct 2018 13:11)        Urinalysis Basic - ( 21 Oct 2018 13:58 )    Color: Yellow / Appearance: Clear / S.014 / pH: x  Gluc: x / Ketone: Negative  / Bili: Negative / Urobili: Negative   Blood: x / Protein: Negative / Nitrite: Negative   Leuk Esterase: Negative / RBC: 7 /hpf / WBC 2 /hpf   Sq Epi: x / Non Sq Epi: 0 /hpf / Bacteria: Negative      REVIEW OF SYSTEMS:  CONSTITUTIONAL: No fever, weight loss, or fatigue  EYES: No eye pain, visual disturbances, or discharge  ENMT:  No difficulty hearing, tinnitus, vertigo; No sinus or throat pain  NECK: No pain or stiffness  BREASTS: No pain, masses, or nipple discharge  RESPIRATORY: No cough, wheezing, chills or hemoptysis; No shortness of breath  CARDIOVASCULAR: No chest pain, palpitations, dizziness, or leg swelling  GASTROINTESTINAL: No abdominal or epigastric pain. No nausea, vomiting, or hematemesis; No diarrhea or constipation. No melena or hematochezia.  GENITOURINARY: No dysuria, frequency, hematuria, or incontinence  NEUROLOGICAL: No headaches, memory loss, loss of strength, numbness, or tremors  SKIN: No itching, burning, rashes, or lesions   LYMPH NODES: No enlarged glands  ENDOCRINE: No heat or cold intolerance; No hair loss  MUSCULOSKELETAL: No joint pain or swelling; No muscle, back, or extremity pain  PSYCHIATRIC: No depression, anxiety, mood swings, or difficulty sleeping  HEME/LYMPH: No easy bruising, or bleeding gums  ALLERY AND IMMUNOLOGIC: No hives or eczema    RADIOLOGY & ADDITIONAL TESTS:    Consultant(s) Notes Reviewed:  [x ] YES  [ ] NO    PHYSICAL EXAM:  GENERAL: NAD, well-groomed, well-developed,not in any distress ,  HEAD:  Atraumatic, Normocephalic  EYES: EOMI, PERRLA, conjunctiva and sclera clear  ENMT: No tonsillar erythema, exudates, or enlargement; Moist mucous membranes, Good dentition, No lesions  NECK: Supple, No JVD, Normal thyroid  NERVOUS SYSTEM:  Alert & Oriented X3, No focal deficit   CHEST/LUNG: Good air entry bilateral with no  rales, rhonchi, wheezing, or rubs  HEART: Regular rate and rhythm; No murmurs, rubs, or gallops  ABDOMEN: Soft, Nontender, Nondistended; Bowel sounds present  EXTREMITIES:  2+ Peripheral Pulses, No clubbing, cyanosis, or edema  SKIN: No rashes or lesions    Care Discussed with Consultants/Other Providers [ x] YES  [ ] NO INTERVAL HPI/OVERNIGHT EVENTS: No new concerns.   Vital Signs Last 24 Hrs  T(C): 36.7 (22 Oct 2018 05:13), Max: 37.5 (21 Oct 2018 22:20)  T(F): 98.1 (22 Oct 2018 05:13), Max: 99.5 (21 Oct 2018 22:20)  HR: 100 (22 Oct 2018 05:13) (89 - 100)  BP: 128/76 (22 Oct 2018 05:13) (115/69 - 144/66)  BP(mean): --  RR: 19 (22 Oct 2018 05:13) (18 - 19)  SpO2: 92% (22 Oct 2018 05:13) (92% - 94%)  I&O's Summary    21 Oct 2018 07:01  -  22 Oct 2018 07:00  --------------------------------------------------------  IN: 1280 mL / OUT: 0 mL / NET: 1280 mL      MEDICATIONS  (STANDING):  cefTRIAXone   IVPB      cefTRIAXone   IVPB 1 Gram(s) IV Intermittent every 24 hours  chlorhexidine 4% Liquid 1 Application(s) Topical <User Schedule>  docusate sodium Liquid 100 milliGRAM(s) Oral three times a day  enoxaparin Injectable 40 milliGRAM(s) SubCutaneous daily  insulin glargine Injectable (LANTUS) 26 Unit(s) SubCutaneous at bedtime  insulin lispro (HumaLOG) corrective regimen sliding scale   SubCutaneous three times a day before meals  insulin lispro (HumaLOG) corrective regimen sliding scale   SubCutaneous at bedtime  levalbuterol Inhalation 0.63 milliGRAM(s) Inhalation every 6 hours  levETIRAcetam  IVPB 500 milliGRAM(s) IV Intermittent every 12 hours  magnesium sulfate  IVPB 2 Gram(s) IV Intermittent once  metoprolol tartrate 75 milliGRAM(s) Oral two times a day  polyethylene glycol 3350 17 Gram(s) Oral daily  potassium phosphate IVPB 15 milliMole(s) IV Intermittent once  senna 2 Tablet(s) Oral at bedtime  sodium chloride 0.45% with potassium chloride 20 mEq/L 1000 milliLiter(s) (60 mL/Hr) IV Continuous <Continuous>  tamsulosin 0.4 milliGRAM(s) Oral at bedtime    MEDICATIONS  (PRN):  acetaminophen   Tablet .. 975 milliGRAM(s) Oral every 6 hours PRN Mild Pain (1 - 3)  lactulose Syrup 20 Gram(s) Oral daily PRN constipation    LABS:                        10.3   12.4  )-----------( 404      ( 22 Oct 2018 07:21 )             33.2     10-22    149<H>  |  105  |  37<H>  ----------------------------<  178<H>  3.7   |  29  |  1.06    Ca    8.7      22 Oct 2018 07:21  Phos  2.8     10-  Mg     1.8     10-        Urinalysis Basic - ( 21 Oct 2018 13:58 )    Color: Yellow / Appearance: Clear / S.014 / pH: x  Gluc: x / Ketone: Negative  / Bili: Negative / Urobili: Negative   Blood: x / Protein: Negative / Nitrite: Negative   Leuk Esterase: Negative / RBC: 7 /hpf / WBC 2 /hpf   Sq Epi: x / Non Sq Epi: 0 /hpf / Bacteria: Negative      CAPILLARY BLOOD GLUCOSE      POCT Blood Glucose.: 197 mg/dL (22 Oct 2018 08:01)  POCT Blood Glucose.: 268 mg/dL (21 Oct 2018 22:17)  POCT Blood Glucose.: 220 mg/dL (21 Oct 2018 18:19)  POCT Blood Glucose.: 208 mg/dL (21 Oct 2018 13:11)        Urinalysis Basic - ( 21 Oct 2018 13:58 )    Color: Yellow / Appearance: Clear / S.014 / pH: x  Gluc: x / Ketone: Negative  / Bili: Negative / Urobili: Negative   Blood: x / Protein: Negative / Nitrite: Negative   Leuk Esterase: Negative / RBC: 7 /hpf / WBC 2 /hpf   Sq Epi: x / Non Sq Epi: 0 /hpf / Bacteria: Negative        Consultant(s) Notes Reviewed:  [x ] YES  [ ] NO    PHYSICAL EXAM:  GENERAL: NAD, well-groomed, well-developed, not in any distress ,  HEAD:  Atraumatic, Normocephalic  NECK: Supple, No JVD, Normal thyroid  NERVOUS SYSTEM:  Alert & Oriented X1, No focal deficit   CHEST/LUNG: Good air entry bilateral with no  rales, rhonchi, wheezing, or rubs  HEART: Regular rate and rhythm; No murmurs, rubs, or gallops  ABDOMEN: Soft, Nontender, Nondistended; Bowel sounds present  EXTREMITIES:  2+ Peripheral Pulses, No clubbing, cyanosis, or edema    Care Discussed with Consultants/Other Providers [ x] YES  [ ] NO

## 2018-10-22 NOTE — DISCHARGE NOTE ADULT - PLAN OF CARE
follow up liver mass work up follow up with Dr. Singleton in 1-2 weeks to work up mass, please call office to schedule an appointment control heart rate metoprolol increased to 75mg twice a day   Xarelto on hold due to bleeding Please stop taking your Xarelto until your follow-up appointment with Dr. Singleton.   Follow up with Dr. Singleton in 1-2 weeks to work up mass, please call office to schedule an appointment

## 2018-10-22 NOTE — PROGRESS NOTE ADULT - SUBJECTIVE AND OBJECTIVE BOX
General Surgery Progress Note    SUBJECTIVE:  The patient was seen and examined. No acute events overnight. Laying in bed with levalbuterol ongoing.     OBJECTIVE:     ** VITAL SIGNS / I&O's **    Vital Signs Last 24 Hrs  T(C): 36.7 (22 Oct 2018 05:13), Max: 37.5 (21 Oct 2018 22:20)  T(F): 98.1 (22 Oct 2018 05:13), Max: 99.5 (21 Oct 2018 22:20)  HR: 100 (22 Oct 2018 05:13) (57 - 100)  BP: 128/76 (22 Oct 2018 05:13) (115/69 - 144/66)  BP(mean): --  RR: 19 (22 Oct 2018 05:13) (18 - 19)  SpO2: 92% (22 Oct 2018 05:13) (92% - 98%)      21 Oct 2018 07:01  -  22 Oct 2018 07:00  --------------------------------------------------------  IN:    Oral Fluid: 1180 mL    Solution: 100 mL  Total IN: 1280 mL    OUT:  Total OUT: 0 mL    Total NET: 1280 mL          ** PHYSICAL EXAM **    -- CONSTITUTIONAL: Alert, NAD  -- PULMONARY: non-labored respirations  -- ABDOMEN: soft, non-distended, non-tender  -- NEURO: A&Ox3    ** LABS **                          10.7   11.0  )-----------( 506      ( 21 Oct 2018 07:18 )             32.9     21 Oct 2018 07:16    152    |  109    |  39     ----------------------------<  177    3.5     |  28     |  1.12     Ca    8.9        21 Oct 2018 07:16  Phos  3.4       21 Oct 2018 07:16  Mg     2.1       21 Oct 2018 07:16        CAPILLARY BLOOD GLUCOSE      POCT Blood Glucose.: 268 mg/dL (21 Oct 2018 22:17)  POCT Blood Glucose.: 220 mg/dL (21 Oct 2018 18:19)  POCT Blood Glucose.: 208 mg/dL (21 Oct 2018 13:11)  POCT Blood Glucose.: 189 mg/dL (21 Oct 2018 09:21)            Urinalysis Basic - ( 21 Oct 2018 13:58 )    Color: Yellow / Appearance: Clear / S.014 / pH: x  Gluc: x / Ketone: Negative  / Bili: Negative / Urobili: Negative   Blood: x / Protein: Negative / Nitrite: Negative   Leuk Esterase: Negative / RBC: 7 /hpf / WBC 2 /hpf   Sq Epi: x / Non Sq Epi: 0 /hpf / Bacteria: Negative        MEDICATIONS  (STANDING):  cefTRIAXone   IVPB      cefTRIAXone   IVPB 1 Gram(s) IV Intermittent every 24 hours  chlorhexidine 4% Liquid 1 Application(s) Topical <User Schedule>  docusate sodium 100 milliGRAM(s) Oral three times a day  enoxaparin Injectable 40 milliGRAM(s) SubCutaneous daily  insulin glargine Injectable (LANTUS) 26 Unit(s) SubCutaneous at bedtime  insulin lispro (HumaLOG) corrective regimen sliding scale   SubCutaneous three times a day before meals  insulin lispro (HumaLOG) corrective regimen sliding scale   SubCutaneous at bedtime  levalbuterol Inhalation 0.63 milliGRAM(s) Inhalation every 6 hours  levETIRAcetam  IVPB 500 milliGRAM(s) IV Intermittent every 12 hours  metoprolol tartrate 50 milliGRAM(s) Oral every 12 hours  polyethylene glycol 3350 17 Gram(s) Oral daily  senna 2 Tablet(s) Oral at bedtime  sodium chloride 0.45% with potassium chloride 20 mEq/L 1000 milliLiter(s) (60 mL/Hr) IV Continuous <Continuous>  tamsulosin 0.4 milliGRAM(s) Oral at bedtime    MEDICATIONS  (PRN):  acetaminophen   Tablet .. 975 milliGRAM(s) Oral every 6 hours PRN Mild Pain (1 - 3)  lactulose Syrup 20 Gram(s) Oral daily PRN constipation

## 2018-10-22 NOTE — DISCHARGE NOTE ADULT - CARE PLAN
Principal Discharge DX:	Liver mass  Goal:	follow up liver mass work up  Assessment and plan of treatment:	follow up with Dr. Singleton in 1-2 weeks to work up mass, please call office to schedule an appointment  Secondary Diagnosis:	Hemoperitoneum, nontraumatic  Secondary Diagnosis:	Atrial fibrillation  Goal:	control heart rate  Assessment and plan of treatment:	metoprolol increased to 75mg twice a day   Xarelto on hold due to bleeding Principal Discharge DX:	Liver mass  Goal:	follow up liver mass work up  Assessment and plan of treatment:	Please stop taking your Xarelto until your follow-up appointment with Dr. Singleton.   Follow up with Dr. Singleton in 1-2 weeks to work up mass, please call office to schedule an appointment  Secondary Diagnosis:	Hemoperitoneum, nontraumatic  Secondary Diagnosis:	Atrial fibrillation  Goal:	control heart rate  Assessment and plan of treatment:	metoprolol increased to 75mg twice a day   Xarelto on hold due to bleeding

## 2018-10-22 NOTE — PROGRESS NOTE ADULT - ASSESSMENT
77 year old male presenting with hemoperitoneum and hemorrhagic shock s/p IR embolization of liver lesion in segment 5 complicated by acute respiratory failure requiring reintubation on 10/13. Patient extubated since 10/15.         Problem/Recommendation - 1:  Problem: Hemoperitoneum, nontraumatic. Recommendation: HD and HH stable . OFF AC and S/P IR embolization.     Problem/Recommendation - 2:  ·  Problem: Altered mental status with Underlying Dementia .  Recommendation: Multifactorial .Resolving.       Problem/Recommendation - 3:  ·  Problem: Atrial fibrillation.  Recommendation: OFF AC and cardiology following.      Problem/Recommendation - 4:  ·  Problem: Seizure.  Recommendation: On Keppra.      Problem/Recommendation - 5:  ·  Problem: Diabetes mellitus.  Recommendation: Sugars in acceptable range. SSI.      Problem/Recommendation - 6:  Problem: Hypertension. Recommendation: BP meds with parameters     Problem/Recommendation - 7:  Problem: Acute respiratory failure. Recommendation: S/P Extubation and on NC Oxygen. On Neb RX.      Problem/Recommendation - 8:  Problem: Liver Mass with ?cirrhosis . Recommendation: GI consulted.     Problem/Recommendation - 9:  Problem: Hypernatremia and hypokalemia . Recommendation: Replacing K and Encourage free water. Renal helping. Na improving.      Problem/Recommendation - 10:  Problem: UTI . Recommendation: On Rocephin. Awaiting final C/S.

## 2018-10-22 NOTE — DISCHARGE NOTE ADULT - PATIENT PORTAL LINK FT
You can access the [facility] Patient Portal, offered by [facility], by registering with the following website: [email or web address]

## 2018-10-22 NOTE — DISCHARGE NOTE ADULT - CARE PROVIDER_API CALL
Christos Fernandes), Surgery  [address]  Division [place] [address]  Phone: [phone]  Fax: [phone]

## 2018-10-22 NOTE — PROGRESS NOTE ADULT - SUBJECTIVE AND OBJECTIVE BOX
No pain, no shortness of breath      VITAL:  T(C): , Max: 37.5 (10-21-18 @ 22:20)  T(F): , Max: 99.5 (10-21-18 @ 22:20)  HR: 100 (10-22-18 @ 05:13)  BP: 128/76 (10-22-18 @ 05:13)  BP(mean): --  RR: 19 (10-22-18 @ 05:13)  SpO2: 92% (10-22-18 @ 05:13)    PHYSICAL EXAM:  Constitutional: NAD, lethargic but alert  HEENT: NCAT, DMM  Neck: Supple, No JVD  Respiratory: (+)b/l rhonchi  Cardiovascular: irreg s1s2  Gastrointestinal: BS+, soft, NT/ND  Extremities: No peripheral edema b/l  Neurological: reduced generalized strength  Back: no CVAT b/l  Skin: (+)scaly/fungating left temporal lesion      LABS:                        10.3   12.4  )-----------( 404      ( 22 Oct 2018 07:21 )             33.2     Na(149)/K(3.7)/Cl(105)/HCO3(29)/BUN(37)/Cr(1.06)Glu(178)/Ca(8.7)/Mg(1.8)/PO4(2.8)    10-22 @ 07:21  Na(152)/K(3.5)/Cl(109)/HCO3(28)/BUN(39)/Cr(1.12)Glu(177)/Ca(8.9)/Mg(2.1)/PO4(3.4)    10-21 @ 07:16  Na(149)/K(3.9)/Cl(108)/HCO3(27)/BUN(43)/Cr(1.24)Glu(189)/Ca(8.9)/Mg(--)/PO4(--)    10-20 @ 19:22  Na(149)/K(3.2)/Cl(107)/HCO3(27)/BUN(44)/Cr(1.13)Glu(129)/Ca(9.4)/Mg(2.3)/PO4(2.8)    10-20 @ 06:47              ASSESSMENT: 77M w/ HTN, DM2, AFib, ASD-closure, and past ICB-craniotomy, 10/11/18 a/w hemoperitoneum/bleeding liver mass; s/p IR embolization; complicated postprocedural course including hypernatremia    (1)Hyponatremia - due to poor free water intake/increased insensible losses. Improving, with gentle hypotonic IVF  (2)Hypokalemia - whole body deficit from poor intake of late; improving, with repletion    RECOMMEND:  (1)Can d/c IVF  (2)Encourage oral water intake/osmotic intake      Elías Escobar MD  [facility]  [phone]

## 2018-10-22 NOTE — PROGRESS NOTE ADULT - SUBJECTIVE AND OBJECTIVE BOX
Subjective: Patient seen and examined. No new events except as noted.   resting comfortably in bed   no cp or sob   +cough  REVIEW OF SYSTEMS:    CONSTITUTIONAL: + weakness, fevers or chills  EYES/ENT: No visual changes;  No vertigo or throat pain   NECK: No pain or stiffness  RESPIRATORY: No cough, wheezing, hemoptysis; No shortness of breath  CARDIOVASCULAR: No chest pain or palpitations  GASTROINTESTINAL: No abdominal or epigastric pain. No nausea, vomiting, or hematemesis; No diarrhea or constipation. No melena or hematochezia.  GENITOURINARY: No dysuria, frequency or hematuria  NEUROLOGICAL: No numbness or weakness  SKIN: No itching, burning, rashes, or lesions   All other review of systems is negative unless indicated above.    MEDICATIONS:  MEDICATIONS  (STANDING):  cefTRIAXone   IVPB      cefTRIAXone   IVPB 1 Gram(s) IV Intermittent every 24 hours  chlorhexidine 4% Liquid 1 Application(s) Topical <User Schedule>  docusate sodium Liquid 100 milliGRAM(s) Oral three times a day  enoxaparin Injectable 40 milliGRAM(s) SubCutaneous daily  insulin glargine Injectable (LANTUS) 26 Unit(s) SubCutaneous at bedtime  insulin lispro (HumaLOG) corrective regimen sliding scale   SubCutaneous three times a day before meals  insulin lispro (HumaLOG) corrective regimen sliding scale   SubCutaneous at bedtime  levalbuterol Inhalation 0.63 milliGRAM(s) Inhalation every 6 hours  levETIRAcetam  IVPB 500 milliGRAM(s) IV Intermittent every 12 hours  magnesium sulfate  IVPB 2 Gram(s) IV Intermittent once  metoprolol tartrate 50 milliGRAM(s) Oral every 12 hours  polyethylene glycol 3350 17 Gram(s) Oral daily  potassium phosphate IVPB 15 milliMole(s) IV Intermittent once  senna 2 Tablet(s) Oral at bedtime  sodium chloride 0.45% with potassium chloride 20 mEq/L 1000 milliLiter(s) (60 mL/Hr) IV Continuous <Continuous>  tamsulosin 0.4 milliGRAM(s) Oral at bedtime      PHYSICAL EXAM:  T(C): 36.7 (10-22-18 @ 05:13), Max: 37.5 (10-21-18 @ 22:20)  HR: 100 (10-22-18 @ 05:13) (89 - 100)  BP: 128/76 (10-22-18 @ 05:13) (115/69 - 144/66)  RR: 19 (10-22-18 @ 05:13) (18 - 19)  SpO2: 92% (10-22-18 @ 05:13) (92% - 94%)  Wt(kg): --  I&O's Summary    21 Oct 2018 07:01  -  22 Oct 2018 07:00  --------------------------------------------------------  IN: 1280 mL / OUT: 0 mL / NET: 1280 mL          Appearance: NAD lethargic	  HEENT:   Normal oral mucosa, PERRL, EOMI	  Lymphatic: No lymphadenopathy , no edema  Cardiovascular: irregular  S1 S2, No JVD, No murmurs , Peripheral pulses palpable 2+ bilaterally  Respiratory: decreased bs and effort 	  Gastrointestinal:  Soft, Non-tender, + BS	  Skin: No rashes, No ecchymoses, No cyanosis, warm to touch  Musculoskeletal: Normal range of motion, normal strength  Psychiatry:  lethargic   Ext: + edema      LABS:    CARDIAC MARKERS:                                10.3   12.4  )-----------( 404      ( 22 Oct 2018 07:21 )             33.2     10-22    149<H>  |  105  |  37<H>  ----------------------------<  178<H>  3.7   |  29  |  1.06    Ca    8.7      22 Oct 2018 07:21  Phos  2.8     10-22  Mg     1.8     10-22      proBNP:   Lipid Profile:   HgA1c:   TSH:     3          TELEMETRY: 	    ECG:  	  RADIOLOGY:   DIAGNOSTIC TESTING:  [ ] Echocardiogram:  [ ]  Catheterization:  [ ] Stress Test:    OTHER:

## 2018-10-22 NOTE — DISCHARGE NOTE ADULT - INSTRUCTIONS
dysphasia 1 pureed diet with nectar consistency liquids If unable to tolerate diet, nausea, vomiting, fever above 100.3, chills, or an increase in pain, notify provider or return to ER.

## 2018-10-22 NOTE — DISCHARGE NOTE ADULT - ADDITIONAL INSTRUCTIONS
Please do not take your Xarelto until after your follow-up appointment with Dr. Singleton.     Please take all other home meds as prescribed.     Please call [phone] to schedule your appointment with Dr. Singleton in 1-2 wks. Please do not take your Xarelto due to increased risk of bleeding until after your follow-up appointment with Dr. Singleton.     Please take all other home meds as prescribed. Your metoprolol has been increased to 75mg twice a day.     Please call [phone] to schedule your appointment with Dr. Singleton in 1-2 wks.

## 2018-10-22 NOTE — PROGRESS NOTE ADULT - ASSESSMENT
77M presented 10/10 with abd pain, hypotension, and tachycardia. Patient had downtrending H/H and CT abd showed hemoperitoneum. Now s/p IR embolization of segment 5 bleeding liver mass on 10/11.     Plan:  - d/c ceftriaxone today  - Pain control: Tylenol 975mg PO q6h PRN  - Dysphagia 1 pureed-necter consistency fluid diet   - C/w abx  - Ammonia level checked for possible hepatic encephalopathy and resulted WNL  - Bowel regimen: lactulose syrup PRN for constipation.   - senna, miralax, colace on board  - Trend H/H  - DVT ppx: Lovenox  - D/c planning for rehab    Daily Aly, PGY-1  General Surgery Red Team [phone]

## 2018-10-22 NOTE — DISCHARGE NOTE ADULT - HOSPITAL COURSE
77y old  Male who presents with a chief complaint of abdominal pain.  Patient's symptoms started acutely at 6pm while he was sitting, eating dinner.  He felt weak at that time, and he went to urgent care.  He was noted to be hypotensive at urgent care and was referred to the ER.  On presentation, the patient continued to complain of abdominal pain, and was noted to be hypotensive.  He became progressively tachycardic.  Patient's labs showed a downtrending hct and a CTA was done which was consistent with hemoperitoneum, without obvious source.  Surgery consulted for evaluation of hemoperitoneum.MTP initiated patient received  PRBC, FFP, K centra in SICU for resuscitation.  Upon further review of CT scan CT w/o contrast confirmed that segment 6 liver hyperdensity on CTA is a blush. Pt with continued hypotension/tachcyardia despite blood. IR then performed Selective celiac, Common hepatic, Proper hepatic, Right hepatic and inferior right hepatic segmental and subsegmental arteriography and embolization of a segment 5 mass with marked reduction in tumor vascularity. Surgical oncology consulted for mass. Following day patient extubated and increased WOB with accessory muscle use and paradoxical respirations. BiPAP was attempted but the patient refused to wear the mask or nasal canula. Saturations decreased to mid 80s off supplemental oxygen. The patient was reintubated for airway protection 77y old  Male who presents with a chief complaint of abdominal pain.  Patient's symptoms started acutely at 6pm while he was sitting, eating dinner.  He felt weak at that time, and he went to urgent care.  He was noted to be hypotensive at urgent care and was referred to the ER.  On presentation, the patient continued to complain of abdominal pain, and was noted to be hypotensive.  He became progressively tachycardic.  Patient's labs showed a downtrending hct and a CTA was done which was consistent with hemoperitoneum, without obvious source.  Surgery consulted for evaluation of hemoperitoneum.MTP initiated patient received  PRBC, FFP, K centra in SICU for resuscitation.  Upon further review of CT scan CT w/o contrast confirmed that segment 6 liver hyperdensity on CTA is a blush. Pt with continued hypotension/tachcyardia despite blood. IR then performed Selective celiac, Common hepatic, Proper hepatic, Right hepatic and inferior right hepatic segmental and subsegmental arteriography and embolization of a segment 5 mass with marked reduction in tumor vascularity. Surgical oncology consulted for mass. Following day patient extubated and increased WOB with accessory muscle use and paradoxical respirations. BiPAP was attempted but the patient refused to wear the mask or nasal canula. Saturations decreased to mid 80s off supplemental oxygen. The patient was reintubated for airway protection. Pt hypotensive requiring ravinder. Pressors weaned. Pt extubated. pt with afib RVR- BB. CXR pulmonary vascular congestion, diuresis. Hypernatremia improved, decrease hypotonic fluid  Diuresis with Lasix and Zarxolyn to prevent hypernatremia, DC hypotonic fluid  Pulmonary  toileting, Course of abx for UTI. pt then with hyponatremia renal consulted (1)1/2NS + 20meq/L KCl @ 60cc/h  (2)Encourage increased free water intake by mouth 77y old  Male who presents with a chief complaint of abdominal pain.  Patient's symptoms started acutely at 6pm while he was sitting, eating dinner.  He felt weak at that time, and he went to urgent care.  He was noted to be hypotensive at urgent care and was referred to the ER.  On presentation, the patient continued to complain of abdominal pain, and was noted to be hypotensive.  He became progressively tachycardic.  Patient's labs showed a downtrending hct and a CTA was done which was consistent with hemoperitoneum, without obvious source.  Surgery consulted for evaluation of hemoperitoneum.MTP initiated patient received  PRBC, FFP, K centra in SICU for resuscitation.  Upon further review of CT scan CT w/o contrast confirmed that segment 6 liver hyperdensity on CTA is a blush. Pt with continued hypotension/tachcyardia despite blood. IR then performed Selective celiac, Common hepatic, Proper hepatic, Right hepatic and inferior right hepatic segmental and subsegmental arteriography and embolization of a segment 5 mass with marked reduction in tumor vascularity. Surgical oncology consulted for mass. Following day patient extubated and increased WOB with accessory muscle use and paradoxical respirations. BiPAP was attempted but the patient refused to wear the mask or nasal canula. Saturations decreased to mid 80s off supplemental oxygen. The patient was reintubated for airway protection. Pt hypotensive requiring ravinder. Pressors weaned. Pt extubated. pt with afib RVR- BB. CXR pulmonary vascular congestion, diuresis. Hypernatremia improved, decrease hypotonic fluid  Diuresis with Lasix and Zarxolyn to prevent hypernatremia, DC hypotonic fluid  Pulmonary  toileting, Course of abx for UTI. pt then with hyponatremia renal consulted (1)1/2NS + 20meq/L KCl @ 60cc/h  (2)Encourage increased free water intake by mouth. Hyponatremia improved IVf stopped. Pt discharged to rehab to follow up with Dr. Singleton for further work up of liver mass 77y old  Male who presents with a chief complaint of abdominal pain.  Patient's symptoms started acutely at 6pm while he was sitting, eating dinner.  He felt weak at that time, and he went to urgent care.  He was noted to be hypotensive at urgent care and was referred to the ER.  On presentation, the patient continued to complain of abdominal pain, and was noted to be hypotensive.  He became progressively tachycardic.  Patient's labs showed a downtrending hct and a CTA was done which was consistent with hemoperitoneum, without obvious source.  Surgery consulted for evaluation of hemoperitoneum. MTP initiated patient received  PRBC, FFP, K centra in SICU for resuscitation.  Upon further review of CT scan CT w/o contrast confirmed that segment 6 liver hyperdensity on CTA is a blush. Pt with continued hypotension/tachcyardia despite blood. IR then performed Selective celiac, Common hepatic, Proper hepatic, Right hepatic and inferior right hepatic segmental and subsegmental arteriography and embolization of a segment 5 mass with marked reduction in tumor vascularity. Surgical oncology consulted for mass. Following day patient extubated and increased WOB with accessory muscle use and paradoxical respirations. BiPAP was attempted but the patient refused to wear the mask or nasal canula. Saturations decreased to mid 80s off supplemental oxygen. The patient was reintubated for airway protection. Pt hypotensive requiring ravinder. Pressors weaned. Pt extubated. pt with afib RVR- BB. CXR pulmonary vascular congestion, diuresis. Hypernatremia improved, decrease hypotonic fluid  Diuresis with Lasix and Zarxolyn to prevent hypernatremia, DC hypotonic fluid. Pulmonary  toileting, course of abx for UTI. pt then with hyponatremia renal consulted (1)1/2NS + 20meq/L KCl @ 60cc/h  (2)Encourage increased free water intake by mouth. Hyponatremia improved IVF stopped. Pt discharged to rehab to follow up with Dr. Singleton for further work up of liver mass. 77y old  Male who presents with a chief complaint of abdominal pain.  Patient's symptoms started acutely at 6pm while he was sitting, eating dinner.  He felt weak at that time, and he went to urgent care.  He was noted to be hypotensive at urgent care and was referred to the ER.  On presentation, the patient continued to complain of abdominal pain, and was noted to be hypotensive.  He became progressively tachycardic.  Patient's labs showed a downtrending hct and a CTA was done which was consistent with hemoperitoneum, without obvious source.  Surgery consulted for evaluation of hemoperitoneum. MTP initiated patient received  PRBC, FFP, K centra in SICU for resuscitation.  Upon further review of CT scan CT w/o contrast confirmed that segment 6 liver hyperdensity on CTA is a blush. Pt with continued hypotension/tachcyardia despite blood. IR then performed Selective celiac, Common hepatic, Proper hepatic, Right hepatic and inferior right hepatic segmental and subsegmental arteriography and embolization of a segment 5 mass with marked reduction in tumor vascularity. Surgical oncology consulted for mass. Following day patient extubated and increased WOB with accessory muscle use and paradoxical respirations. BiPAP was attempted but the patient refused to wear the mask or nasal canula. Saturations decreased to mid 80s off supplemental oxygen. The patient was reintubated for airway protection. Pt hypotensive requiring ravinder. Pressors weaned. Pt extubated. pt with afib RVR- BB. CXR pulmonary vascular congestion, diuresis. Hypernatremia improved, decrease hypotonic fluid  Diuresis with Lasix and Zarxolyn to prevent hypernatremia, DC hypotonic fluid. Pulmonary  toileting, course of abx for UTI. pt then with hyponatremia renal consulted (1)1/2NS + 20meq/L KCl @ 60cc/h  (2)Encourage increased free water intake by mouth. Hyponatremia improved IVF stopped. Pt discharged to rehab to follow up with Dr. Singleton for further work up of liver mass. Pt instructed to discontinue his Xarelto until after his follow-up appointment with Dr. Singleton.

## 2018-10-22 NOTE — PROGRESS NOTE ADULT - SUBJECTIVE AND OBJECTIVE BOX
Interval Events: Pt seen and examined. He denies abdominal pain, N/V.  Tolerating dysphagia diet.   Pt had a large, brown bm yesterday as per RN.     MEDICATIONS  (STANDING):  cefTRIAXone   IVPB      cefTRIAXone   IVPB 1 Gram(s) IV Intermittent every 24 hours  chlorhexidine 4% Liquid 1 Application(s) Topical <User Schedule>  docusate sodium Liquid 100 milliGRAM(s) Oral three times a day  enoxaparin Injectable 40 milliGRAM(s) SubCutaneous daily  insulin glargine Injectable (LANTUS) 26 Unit(s) SubCutaneous at bedtime  insulin lispro (HumaLOG) corrective regimen sliding scale   SubCutaneous three times a day before meals  insulin lispro (HumaLOG) corrective regimen sliding scale   SubCutaneous at bedtime  levalbuterol Inhalation 0.63 milliGRAM(s) Inhalation every 6 hours  levETIRAcetam  IVPB 500 milliGRAM(s) IV Intermittent every 12 hours  magnesium sulfate  IVPB 2 Gram(s) IV Intermittent once  metoprolol tartrate 50 milliGRAM(s) Oral every 12 hours  polyethylene glycol 3350 17 Gram(s) Oral daily  potassium phosphate IVPB 15 milliMole(s) IV Intermittent once  senna 2 Tablet(s) Oral at bedtime  sodium chloride 0.45% with potassium chloride 20 mEq/L 1000 milliLiter(s) (60 mL/Hr) IV Continuous <Continuous>  tamsulosin 0.4 milliGRAM(s) Oral at bedtime    MEDICATIONS  (PRN):  acetaminophen   Tablet .. 975 milliGRAM(s) Oral every 6 hours PRN Mild Pain (1 - 3)  lactulose Syrup 20 Gram(s) Oral daily PRN constipation      Allergies    Aleve (Unknown)    Intolerances        Review of Systems:    General:  No wt loss, fevers, chills, night sweats,fatigue,   Eyes:  Good vision, no reported pain  ENT:  No sore throat, pain, runny nose, dysphagia  CV:  No pain, palpitations, hypo/hypertension  Resp:  No dyspnea, cough, tachypnea, wheezing  GI:  No pain, No nausea, No vomiting, No diarrhea, No constipation, No weight loss, No fever, No pruritis, No rectal bleeding, No melena, No dysphagia  :  No pain, bleeding, incontinence, nocturia  Muscle:  No pain, weakness  Neuro:  No weakness, tingling, memory problems  Psych:  No fatigue, insomnia, mood problems, depression  Endocrine:  No polyuria, polydypsia, cold/heat intolerance  Heme:  No petechiae, ecchymosis, easy bruisability  Skin:  No rash, tattoos, scars, edema      Vital Signs Last 24 Hrs  T(C): 36.7 (22 Oct 2018 05:13), Max: 37.5 (21 Oct 2018 22:20)  T(F): 98.1 (22 Oct 2018 05:13), Max: 99.5 (21 Oct 2018 22:20)  HR: 100 (22 Oct 2018 05:13) (89 - 100)  BP: 128/76 (22 Oct 2018 05:13) (115/69 - 144/66)  BP(mean): --  RR: 19 (22 Oct 2018 05:13) (18 - 19)  SpO2: 92% (22 Oct 2018 05:13) (92% - 94%)    PHYSICAL EXAM:    Appearance: NAD   HEENT:   dry oral mucosa, PERRL, EOMI	  Lymphatic: No lymphadenopathy  Cardiovascular: Irregular tachy l S1 S2, No JVD, No murmurs, No edema  Respiratory: decreased bs   Psychiatry: A & O x 2, Mood & affect appropriate  Gastrointestinal:  Soft, Non-tender, + BS	  Skin: No rashes, No ecchymoses, No cyanosis  Neurologic: Non-focal  Extremities: Normal range of motion, No clubbing, cyanosis or edema  Vascular: Peripheral pulses palpable 2+ bilaterally        LABS:                        10.3   12.4  )-----------( 404      ( 22 Oct 2018 07:21 )             33.2     10-22    149<H>  |  105  |  37<H>  ----------------------------<  178<H>  3.7   |  29  |  1.06    Ca    8.7      22 Oct 2018 07:21  Phos  2.8     10-22  Mg     1.8     10-22        Urinalysis Basic - ( 21 Oct 2018 13:58 )    Color: Yellow / Appearance: Clear / S.014 / pH: x  Gluc: x / Ketone: Negative  / Bili: Negative / Urobili: Negative   Blood: x / Protein: Negative / Nitrite: Negative   Leuk Esterase: Negative / RBC: 7 /hpf / WBC 2 /hpf   Sq Epi: x / Non Sq Epi: 0 /hpf / Bacteria: Negative        RADIOLOGY & ADDITIONAL TESTS:

## 2018-10-23 VITALS
HEART RATE: 63 BPM | RESPIRATION RATE: 19 BRPM | SYSTOLIC BLOOD PRESSURE: 114 MMHG | TEMPERATURE: 98 F | OXYGEN SATURATION: 98 % | DIASTOLIC BLOOD PRESSURE: 62 MMHG

## 2018-10-23 LAB
ANION GAP SERPL CALC-SCNC: 13 MMOL/L — SIGNIFICANT CHANGE UP (ref 5–17)
BUN SERPL-MCNC: 35 MG/DL — HIGH (ref 7–23)
CALCIUM SERPL-MCNC: 8.4 MG/DL — SIGNIFICANT CHANGE UP (ref 8.4–10.5)
CHLORIDE SERPL-SCNC: 104 MMOL/L — SIGNIFICANT CHANGE UP (ref 96–108)
CO2 SERPL-SCNC: 30 MMOL/L — SIGNIFICANT CHANGE UP (ref 22–31)
CREAT SERPL-MCNC: 1.03 MG/DL — SIGNIFICANT CHANGE UP (ref 0.5–1.3)
GLUCOSE BLDC GLUCOMTR-MCNC: 191 MG/DL — HIGH (ref 70–99)
GLUCOSE BLDC GLUCOMTR-MCNC: 227 MG/DL — HIGH (ref 70–99)
GLUCOSE BLDC GLUCOMTR-MCNC: 252 MG/DL — HIGH (ref 70–99)
GLUCOSE SERPL-MCNC: 194 MG/DL — HIGH (ref 70–99)
HCT VFR BLD CALC: 35.4 % — LOW (ref 39–50)
HGB BLD-MCNC: 11.3 G/DL — LOW (ref 13–17)
MAGNESIUM SERPL-MCNC: 2.2 MG/DL — SIGNIFICANT CHANGE UP (ref 1.6–2.6)
MCHC RBC-ENTMCNC: 30.2 PG — SIGNIFICANT CHANGE UP (ref 27–34)
MCHC RBC-ENTMCNC: 31.9 GM/DL — LOW (ref 32–36)
MCV RBC AUTO: 94.9 FL — SIGNIFICANT CHANGE UP (ref 80–100)
PHOSPHATE SERPL-MCNC: 3.8 MG/DL — SIGNIFICANT CHANGE UP (ref 2.5–4.5)
PLATELET # BLD AUTO: 454 K/UL — HIGH (ref 150–400)
POTASSIUM SERPL-MCNC: 4.2 MMOL/L — SIGNIFICANT CHANGE UP (ref 3.5–5.3)
POTASSIUM SERPL-SCNC: 4.2 MMOL/L — SIGNIFICANT CHANGE UP (ref 3.5–5.3)
RBC # BLD: 3.73 M/UL — LOW (ref 4.2–5.8)
RBC # FLD: 14.2 % — SIGNIFICANT CHANGE UP (ref 10.3–14.5)
SODIUM SERPL-SCNC: 147 MMOL/L — HIGH (ref 135–145)
WBC # BLD: 12 K/UL — HIGH (ref 3.8–10.5)
WBC # FLD AUTO: 12 K/UL — HIGH (ref 3.8–10.5)

## 2018-10-23 RX ORDER — ACETAMINOPHEN 500 MG
3 TABLET ORAL
Qty: 0 | Refills: 0 | COMMUNITY
Start: 2018-10-23

## 2018-10-23 RX ORDER — POLYETHYLENE GLYCOL 3350 17 G/17G
17 POWDER, FOR SOLUTION ORAL
Qty: 0 | Refills: 0 | COMMUNITY
Start: 2018-10-23

## 2018-10-23 RX ORDER — DOCUSATE SODIUM 100 MG
10 CAPSULE ORAL
Qty: 0 | Refills: 0 | COMMUNITY
Start: 2018-10-23

## 2018-10-23 RX ORDER — LEVALBUTEROL 1.25 MG/.5ML
3 SOLUTION, CONCENTRATE RESPIRATORY (INHALATION)
Qty: 0 | Refills: 0 | COMMUNITY
Start: 2018-10-23

## 2018-10-23 RX ORDER — LACTULOSE 10 G/15ML
30 SOLUTION ORAL
Qty: 0 | Refills: 0 | COMMUNITY
Start: 2018-10-23

## 2018-10-23 RX ORDER — SENNA PLUS 8.6 MG/1
2 TABLET ORAL
Qty: 0 | Refills: 0 | COMMUNITY
Start: 2018-10-23

## 2018-10-23 RX ORDER — METOPROLOL TARTRATE 50 MG
1 TABLET ORAL
Qty: 0 | Refills: 0 | COMMUNITY
Start: 2018-10-23

## 2018-10-23 RX ORDER — RIVAROXABAN 15 MG-20MG
1 KIT ORAL
Qty: 0 | Refills: 0 | COMMUNITY

## 2018-10-23 RX ADMIN — LEVALBUTEROL 0.63 MILLIGRAM(S): 1.25 SOLUTION, CONCENTRATE RESPIRATORY (INHALATION) at 05:43

## 2018-10-23 RX ADMIN — LEVETIRACETAM 400 MILLIGRAM(S): 250 TABLET, FILM COATED ORAL at 09:33

## 2018-10-23 RX ADMIN — Medication 4: at 12:45

## 2018-10-23 RX ADMIN — Medication 100 MILLIGRAM(S): at 05:43

## 2018-10-23 RX ADMIN — Medication 6: at 09:33

## 2018-10-23 RX ADMIN — LEVALBUTEROL 0.63 MILLIGRAM(S): 1.25 SOLUTION, CONCENTRATE RESPIRATORY (INHALATION) at 12:45

## 2018-10-23 RX ADMIN — Medication 75 MILLIGRAM(S): at 05:43

## 2018-10-23 RX ADMIN — ENOXAPARIN SODIUM 40 MILLIGRAM(S): 100 INJECTION SUBCUTANEOUS at 12:45

## 2018-10-23 NOTE — PROGRESS NOTE ADULT - REASON FOR ADMISSION
Mesenteric angiography
abdominal pain, hemoperitoneum

## 2018-10-23 NOTE — PROGRESS NOTE ADULT - SUBJECTIVE AND OBJECTIVE BOX
NEPHROLOGY - NSN    Patient seen and examined.    MEDICATIONS  (STANDING):  chlorhexidine 4% Liquid 1 Application(s) Topical <User Schedule>  docusate sodium Liquid 100 milliGRAM(s) Oral three times a day  enoxaparin Injectable 40 milliGRAM(s) SubCutaneous daily  insulin glargine Injectable (LANTUS) 26 Unit(s) SubCutaneous at bedtime  insulin lispro (HumaLOG) corrective regimen sliding scale   SubCutaneous three times a day before meals  insulin lispro (HumaLOG) corrective regimen sliding scale   SubCutaneous at bedtime  levalbuterol Inhalation 0.63 milliGRAM(s) Inhalation every 6 hours  levETIRAcetam  IVPB 500 milliGRAM(s) IV Intermittent every 12 hours  metoprolol tartrate 75 milliGRAM(s) Oral two times a day  polyethylene glycol 3350 17 Gram(s) Oral daily  senna 2 Tablet(s) Oral at bedtime  tamsulosin 0.4 milliGRAM(s) Oral at bedtime    VITALS:  T(C): , Max: 37.2 (10-22-18 @ 14:12)  T(F): , Max: 98.9 (10-22-18 @ 14:12)  HR: 75 (10-23-18 @ 05:41)  BP: 127/61 (10-23-18 @ 05:41)  BP(mean): --  RR: 19 (10-23-18 @ 05:41)  SpO2: 97% (10-23-18 @ 05:41)  Wt(kg): --  I and O's:    10-22 @ 07:01  -  10-23 @ 07:00  --------------------------------------------------------  IN: 1130 mL / OUT: 0 mL / NET: 1130 mL          REVIEW OF SYSTEMS:  Full ROS done and were negative unless otherwise indicated in HPI/assessment.     PHYSICAL EXAM:  Constitutional: NAD  Respiratory: CTA B/L  Cardiovascular: S1 and S2, RRR  Gastrointestinal: + BS, soft, NT, ND  Extremities: No peripheral edema  Neurological: AAO x 3, CN 2-12 intact  : No Lares  Access: Not applicable    LABS:                        11.3   12.0  )-----------( 454      ( 23 Oct 2018 06:51 )             35.4     10-    147<H>  |  104  |  35<H>  ----------------------------<  194<H>  4.2   |  30  |  1.03    Ca    8.4      23 Oct 2018 06:49  Phos  3.8     10-23  Mg     2.2     10-23        Urine Studies:  Urinalysis Basic - ( 21 Oct 2018 13:58 )    Color: Yellow / Appearance: Clear / S.014 / pH: x  Gluc: x / Ketone: Negative  / Bili: Negative / Urobili: Negative   Blood: x / Protein: Negative / Nitrite: Negative   Leuk Esterase: Negative / RBC: 7 /hpf / WBC 2 /hpf   Sq Epi: x / Non Sq Epi: 0 /hpf / Bacteria: Negative      Osmolality, Random Urine: 496 mos/kg (10-21 @ 13:58)      RADIOLOGY & ADDITIONAL STUDIES:      ASSESSMENT/RECOMMEND    Jeni Cody NP  [facility]  [phone] NEPHROLOGY - NSN    Patient seen and examined.  No complaints; no significant events noted  Wife at bedside.     MEDICATIONS  (STANDING):  chlorhexidine 4% Liquid 1 Application(s) Topical <User Schedule>  docusate sodium Liquid 100 milliGRAM(s) Oral three times a day  enoxaparin Injectable 40 milliGRAM(s) SubCutaneous daily  insulin glargine Injectable (LANTUS) 26 Unit(s) SubCutaneous at bedtime  insulin lispro (HumaLOG) corrective regimen sliding scale   SubCutaneous three times a day before meals  insulin lispro (HumaLOG) corrective regimen sliding scale   SubCutaneous at bedtime  levalbuterol Inhalation 0.63 milliGRAM(s) Inhalation every 6 hours  levETIRAcetam  IVPB 500 milliGRAM(s) IV Intermittent every 12 hours  metoprolol tartrate 75 milliGRAM(s) Oral two times a day  polyethylene glycol 3350 17 Gram(s) Oral daily  senna 2 Tablet(s) Oral at bedtime  tamsulosin 0.4 milliGRAM(s) Oral at bedtime    VITALS:  T(C): , Max: 37.2 (10-22-18 @ 14:12)  T(F): , Max: 98.9 (10-22-18 @ 14:12)  HR: 75 (10-23-18 @ 05:41)  BP: 127/61 (10-23-18 @ 05:41)  BP(mean): --  RR: 19 (10-23-18 @ 05:41)  SpO2: 97% (10-23-18 @ 05:41)  Wt(kg): --  I and O's:    10-22 @ 07:01  -  10-23 @ 07:00  --------------------------------------------------------  IN: 1130 mL / OUT: 0 mL / NET: 1130 mL          REVIEW OF SYSTEMS:  Full ROS done and were negative unless otherwise indicated in HPI/assessment.     PHYSICAL EXAM:  Constitutional: NAD; frail  Respiratory: diminished B/L  Cardiovascular: S1 and S2, RRR  Gastrointestinal: + BS, soft, NT, ND  Extremities: No peripheral edema  Neurological: forgetful   : No Lares  Access: Not applicable    LABS:                        11.3   12.0  )-----------( 454      ( 23 Oct 2018 06:51 )             35.4     10-    147<H>  |  104  |  35<H>  ----------------------------<  194<H>  4.2   |  30  |  1.03    Ca    8.4      23 Oct 2018 06:49  Phos  3.8     10-23  Mg     2.2     10-23        Urine Studies:  Urinalysis Basic - ( 21 Oct 2018 13:58 )    Color: Yellow / Appearance: Clear / S.014 / pH: x  Gluc: x / Ketone: Negative  / Bili: Negative / Urobili: Negative   Blood: x / Protein: Negative / Nitrite: Negative   Leuk Esterase: Negative / RBC: 7 /hpf / WBC 2 /hpf   Sq Epi: x / Non Sq Epi: 0 /hpf / Bacteria: Negative      Osmolality, Random Urine: 496 mos/kg (10-21 @ 13:58)      RADIOLOGY & ADDITIONAL STUDIES:      ASSESSMENT  77M w/ HTN, DM2, AFib, ASD-closure, and past ICB-craniotomy, 10/11/18 a/w hemoperitoneum/bleeding liver mass; s/p IR embolization; complicated postprocedural course including hypernatremia  (1)Hypernatremia - due to poor free water intake/increased insensible losses. Improving  (2)Hypokalemia - whole body deficit from poor intake of late; improving s/p repletion    RECOMMEND:  (1)Encourage oral water intake/osmotic intake  (2)no renal objection to discharge    D/W wife    Jeni Cody NP  [facility]  [phone]

## 2018-10-23 NOTE — PROGRESS NOTE ADULT - PROBLEM SELECTOR PROBLEM 2
ACP (advance care planning)
Hemoperitoneum, nontraumatic

## 2018-10-23 NOTE — PROGRESS NOTE ADULT - PROBLEM SELECTOR PLAN 1
Lopressor 50 mg bid
Lopressor 50 mg bid
Lopressor 50 mg bid   consider Digoxin loading if remains uncontrolled   Discussed with ICU team
increase Lopressor 75 mg bid
s/p embolization  CT does show that liver appears cirrhotic  may benefit from lactulose ? component hepatic encephalopathy  would suspect HCC however afp is normal  MRI may be inconclusive given recent embolization but would persue this workup as outpatient.  cw bowel regimen: senna, miralax, colace
HR better   Cont Lopressor 75 mg bid

## 2018-10-23 NOTE — PROGRESS NOTE ADULT - SUBJECTIVE AND OBJECTIVE BOX
General Surgery Progress Note    SUBJECTIVE:  The patient was seen and examined. No acute events overnight. Laying in bed with levalbuterol running, no complaints at this time.     OBJECTIVE:     ** VITAL SIGNS / I&O's **    Vital Signs Last 24 Hrs  T(C): 36.7 (23 Oct 2018 10:29), Max: 37.2 (22 Oct 2018 14:12)  T(F): 98.1 (23 Oct 2018 10:29), Max: 98.9 (22 Oct 2018 14:12)  HR: 65 (23 Oct 2018 10:29) (65 - 97)  BP: 121/71 (23 Oct 2018 10:29) (102/68 - 130/77)  BP(mean): --  RR: 19 (23 Oct 2018 10:29) (17 - 20)  SpO2: 100% (23 Oct 2018 10:29) (93% - 100%)      22 Oct 2018 07:01  -  23 Oct 2018 07:00  --------------------------------------------------------  IN:    Oral Fluid: 320 mL    sodium chloride 0.45% with potassium chloride 20 mEq/L: 360 mL    Solution: 450 mL  Total IN: 1130 mL    OUT:  Total OUT: 0 mL    Total NET: 1130 mL      23 Oct 2018 07:01  -  23 Oct 2018 11:20  --------------------------------------------------------  IN:    Oral Fluid: 100 mL  Total IN: 100 mL    OUT:  Total OUT: 0 mL    Total NET: 100 mL          ** PHYSICAL EXAM **    -- CONSTITUTIONAL: Alert, NAD  -- PULMONARY: non-labored respirations  -- ABDOMEN: soft, non-distended, non-tender  -- NEURO: A&Ox3    ** LABS **                          11.3   12.0  )-----------( 454      ( 23 Oct 2018 06:51 )             35.4     23 Oct 2018 06:49    147    |  104    |  35     ----------------------------<  194    4.2     |  30     |  1.03     Ca    8.4        23 Oct 2018 06:49  Phos  3.8       23 Oct 2018 06:49  Mg     2.2       23 Oct 2018 06:49        CAPILLARY BLOOD GLUCOSE      POCT Blood Glucose.: 252 mg/dL (23 Oct 2018 09:28)  POCT Blood Glucose.: 191 mg/dL (23 Oct 2018 08:28)  POCT Blood Glucose.: 308 mg/dL (22 Oct 2018 22:33)  POCT Blood Glucose.: 262 mg/dL (22 Oct 2018 18:52)  POCT Blood Glucose.: 166 mg/dL (22 Oct 2018 13:34)            Urinalysis Basic - ( 21 Oct 2018 13:58 )    Color: Yellow / Appearance: Clear / S.014 / pH: x  Gluc: x / Ketone: Negative  / Bili: Negative / Urobili: Negative   Blood: x / Protein: Negative / Nitrite: Negative   Leuk Esterase: Negative / RBC: 7 /hpf / WBC 2 /hpf   Sq Epi: x / Non Sq Epi: 0 /hpf / Bacteria: Negative        MEDICATIONS  (STANDING):  chlorhexidine 4% Liquid 1 Application(s) Topical <User Schedule>  docusate sodium Liquid 100 milliGRAM(s) Oral three times a day  enoxaparin Injectable 40 milliGRAM(s) SubCutaneous daily  insulin glargine Injectable (LANTUS) 26 Unit(s) SubCutaneous at bedtime  insulin lispro (HumaLOG) corrective regimen sliding scale   SubCutaneous three times a day before meals  insulin lispro (HumaLOG) corrective regimen sliding scale   SubCutaneous at bedtime  levalbuterol Inhalation 0.63 milliGRAM(s) Inhalation every 6 hours  levETIRAcetam  IVPB 500 milliGRAM(s) IV Intermittent every 12 hours  metoprolol tartrate 75 milliGRAM(s) Oral two times a day  polyethylene glycol 3350 17 Gram(s) Oral daily  senna 2 Tablet(s) Oral at bedtime  tamsulosin 0.4 milliGRAM(s) Oral at bedtime    MEDICATIONS  (PRN):  acetaminophen   Tablet .. 975 milliGRAM(s) Oral every 6 hours PRN Mild Pain (1 - 3)  lactulose Syrup 20 Gram(s) Oral daily PRN constipation

## 2018-10-23 NOTE — PROGRESS NOTE ADULT - ASSESSMENT
77 year old male presenting with hemoperitoneum and hemorrhagic shock s/p IR embolization of liver lesion in segment 5 complicated by acute respiratory failure requiring reintubation on 10/13. Patient extubated since 10/15.         Problem/Recommendation - 1:  Problem: Hemoperitoneum, nontraumatic. Recommendation: HD and HH stable . OFF AC and S/P IR embolization.     Problem/Recommendation - 2:  ·  Problem: Altered mental status with Underlying Dementia .  Recommendation: Multifactorial .Resolving.       Problem/Recommendation - 3:  ·  Problem: Atrial fibrillation.  Recommendation: OFF AC and cardiology following.      Problem/Recommendation - 4:  ·  Problem: Seizure.  Recommendation: On Keppra.      Problem/Recommendation - 5:  ·  Problem: Diabetes mellitus.  Recommendation: Sugars in acceptable range. SSI.      Problem/Recommendation - 6:  Problem: Hypertension. Recommendation: BP meds with parameters     Problem/Recommendation - 7:  Problem: Acute respiratory failure. Recommendation: S/P Extubation and on NC Oxygen. On Neb RX.      Problem/Recommendation - 8:  Problem: Liver Mass with ?cirrhosis . Recommendation: GI consulted.     Problem/Recommendation - 9:  Problem: Hypernatremia and hypokalemia . Recommendation: Replacing K and Encourage free water. Renal helping. Na improving.      Problem/Recommendation - 10:  Problem: UTI . Recommendation: S/P Abxs.       DC planning per surgery

## 2018-10-23 NOTE — PROGRESS NOTE ADULT - ASSESSMENT
77M presented 10/10 with abd pain, hypotension, and tachycardia. Patient had downtrending H/H and CT abd showed hemoperitoneum. Now s/p IR embolization of segment 5 bleeding liver mass on 10/11.     Plan:  - Pain control: Tylenol 975mg PO q6h PRN  - Dysphagia 1 pureed-necter consistency fluid diet   - C/w abx  - Ammonia level checked for possible hepatic encephalopathy and resulted WNL  - Bowel regimen: lactulose syrup PRN for constipation.   - senna, miralax, colace on board  - Trend H/H  - DVT ppx: Lovenox  - D/c planning for rehab    Daily Aly, PGY-1  General Surgery Red Team [phone]

## 2018-10-23 NOTE — PROGRESS NOTE ADULT - PROVIDER SPECIALTY LIST ADULT
Anesthesia
Cardiology
Critical Care
Gastroenterology
Internal Medicine
Intervent Radiology
Nephrology
Nephrology
SICU
Surgery
SICU
Surgery
Cardiology
Surgery

## 2018-10-23 NOTE — PROGRESS NOTE ADULT - SUBJECTIVE AND OBJECTIVE BOX
INTERVAL HPI/OVERNIGHT EVENTS: Doing well .   Vital Signs Last 24 Hrs  T(C): 36.7 (23 Oct 2018 13:37), Max: 36.8 (22 Oct 2018 21:39)  T(F): 98.1 (23 Oct 2018 13:37), Max: 98.3 (22 Oct 2018 21:39)  HR: 63 (23 Oct 2018 13:37) (63 - 92)  BP: 114/62 (23 Oct 2018 13:37) (102/68 - 127/61)  BP(mean): --  RR: 19 (23 Oct 2018 13:37) (17 - 19)  SpO2: 98% (23 Oct 2018 13:37) (94% - 100%)  I&O's Summary    22 Oct 2018 07:01  -  23 Oct 2018 07:00  --------------------------------------------------------  IN: 1130 mL / OUT: 0 mL / NET: 1130 mL    23 Oct 2018 07:01  -  23 Oct 2018 15:00  --------------------------------------------------------  IN: 220 mL / OUT: 0 mL / NET: 220 mL      MEDICATIONS  (STANDING):  chlorhexidine 4% Liquid 1 Application(s) Topical <User Schedule>  docusate sodium Liquid 100 milliGRAM(s) Oral three times a day  enoxaparin Injectable 40 milliGRAM(s) SubCutaneous daily  insulin glargine Injectable (LANTUS) 26 Unit(s) SubCutaneous at bedtime  insulin lispro (HumaLOG) corrective regimen sliding scale   SubCutaneous three times a day before meals  insulin lispro (HumaLOG) corrective regimen sliding scale   SubCutaneous at bedtime  levalbuterol Inhalation 0.63 milliGRAM(s) Inhalation every 6 hours  levETIRAcetam  IVPB 500 milliGRAM(s) IV Intermittent every 12 hours  metoprolol tartrate 75 milliGRAM(s) Oral two times a day  polyethylene glycol 3350 17 Gram(s) Oral daily  senna 2 Tablet(s) Oral at bedtime  tamsulosin 0.4 milliGRAM(s) Oral at bedtime    MEDICATIONS  (PRN):  acetaminophen   Tablet .. 975 milliGRAM(s) Oral every 6 hours PRN Mild Pain (1 - 3)  lactulose Syrup 20 Gram(s) Oral daily PRN constipation    LABS:                        11.3   12.0  )-----------( 454      ( 23 Oct 2018 06:51 )             35.4     10-23    147<H>  |  104  |  35<H>  ----------------------------<  194<H>  4.2   |  30  |  1.03    Ca    8.4      23 Oct 2018 06:49  Phos  3.8     10-23  Mg     2.2     10-23          CAPILLARY BLOOD GLUCOSE      POCT Blood Glucose.: 227 mg/dL (23 Oct 2018 12:31)  POCT Blood Glucose.: 252 mg/dL (23 Oct 2018 09:28)  POCT Blood Glucose.: 191 mg/dL (23 Oct 2018 08:28)  POCT Blood Glucose.: 308 mg/dL (22 Oct 2018 22:33)  POCT Blood Glucose.: 262 mg/dL (22 Oct 2018 18:52)            Consultant(s) Notes Reviewed:  [x ] YES  [ ] NO    PHYSICAL EXAM:  GENERAL: NAD, well-groomed, well-developed,not in any distress ,  HEAD:  Atraumatic, Normocephalic  NECK: Supple, No JVD, Normal thyroid  NERVOUS SYSTEM:  Alert &  No focal deficit   CHEST/LUNG: Good air entry bilateral with no  rales, rhonchi, wheezing, or rubs  HEART: Regular rate and rhythm; No murmurs, rubs, or gallops  ABDOMEN: Soft, Nontender, Nondistended; Bowel sounds present  EXTREMITIES:  2+ Peripheral Pulses, No clubbing, cyanosis, or edema    Care Discussed with Consultants/Other Providers [ x] YES  [ ] NO

## 2018-10-23 NOTE — PROGRESS NOTE ADULT - SUBJECTIVE AND OBJECTIVE BOX
Subjective: Patient seen and examined. No new events except as noted.   weak   hoarsed voice   no cp or sob     REVIEW OF SYSTEMS:    CONSTITUTIONAL: + weakness, fevers or chills  EYES/ENT: No visual changes;  No vertigo or throat pain   NECK: No pain or stiffness  RESPIRATORY: No cough, wheezing, hemoptysis; No shortness of breath  CARDIOVASCULAR: No chest pain or palpitations  GASTROINTESTINAL: No abdominal or epigastric pain. No nausea, vomiting, or hematemesis; No diarrhea or constipation. No melena or hematochezia.  GENITOURINARY: No dysuria, frequency or hematuria  NEUROLOGICAL: No numbness or weakness  SKIN: No itching, burning, rashes, or lesions   All other review of systems is negative unless indicated above.    MEDICATIONS:  MEDICATIONS  (STANDING):  chlorhexidine 4% Liquid 1 Application(s) Topical <User Schedule>  docusate sodium Liquid 100 milliGRAM(s) Oral three times a day  enoxaparin Injectable 40 milliGRAM(s) SubCutaneous daily  insulin glargine Injectable (LANTUS) 26 Unit(s) SubCutaneous at bedtime  insulin lispro (HumaLOG) corrective regimen sliding scale   SubCutaneous three times a day before meals  insulin lispro (HumaLOG) corrective regimen sliding scale   SubCutaneous at bedtime  levalbuterol Inhalation 0.63 milliGRAM(s) Inhalation every 6 hours  levETIRAcetam  IVPB 500 milliGRAM(s) IV Intermittent every 12 hours  metoprolol tartrate 75 milliGRAM(s) Oral two times a day  polyethylene glycol 3350 17 Gram(s) Oral daily  senna 2 Tablet(s) Oral at bedtime  tamsulosin 0.4 milliGRAM(s) Oral at bedtime      PHYSICAL EXAM:  T(C): 36.7 (10-23-18 @ 10:29), Max: 37.2 (10-22-18 @ 14:12)  HR: 65 (10-23-18 @ 10:29) (65 - 97)  BP: 121/71 (10-23-18 @ 10:29) (102/68 - 130/77)  RR: 19 (10-23-18 @ 10:29) (17 - 20)  SpO2: 100% (10-23-18 @ 10:29) (93% - 100%)  Wt(kg): --  I&O's Summary    22 Oct 2018 07:01  -  23 Oct 2018 07:00  --------------------------------------------------------  IN: 1130 mL / OUT: 0 mL / NET: 1130 mL    23 Oct 2018 07:01  -  23 Oct 2018 12:28  --------------------------------------------------------  IN: 100 mL / OUT: 0 mL / NET: 100 mL          Appearance: NAD lethargic   HEENT:   Normal oral mucosa, PERRL, EOMI	  Lymphatic: No lymphadenopathy , no edema  Cardiovascular: Irregular S1 S2, No JVD, No murmurs , Peripheral pulses palpable 2+ bilaterally  Respiratory: decreased bs and effort 	  Gastrointestinal:  Soft, Non-tender, + BS	  Skin: No rashes, No ecchymoses, No cyanosis, warm to touch  Musculoskeletal: decreased range of motion and  strength  Psychiatry:  lethargic   Ext: No edema      LABS:    CARDIAC MARKERS:                                11.3   12.0  )-----------( 454      ( 23 Oct 2018 06:51 )             35.4     10-23    147<H>  |  104  |  35<H>  ----------------------------<  194<H>  4.2   |  30  |  1.03    Ca    8.4      23 Oct 2018 06:49  Phos  3.8     10-23  Mg     2.2     10-23      proBNP:   Lipid Profile:   HgA1c:   TSH:     3          TELEMETRY: 	    ECG:  	  RADIOLOGY:   DIAGNOSTIC TESTING:  [ ] Echocardiogram:  [ ]  Catheterization:  [ ] Stress Test:    OTHER:

## 2018-10-23 NOTE — PROGRESS NOTE ADULT - PROBLEM SELECTOR PROBLEM 1
Atrial fibrillation
Liver mass
Atrial fibrillation

## 2018-10-23 NOTE — PROGRESS NOTE ADULT - SUBJECTIVE AND OBJECTIVE BOX
Interval Events: Pt seen and examined. He denies abdominal pain, N/V.  Tolerating dysphagia diet.       MEDICATIONS  (STANDING):  chlorhexidine 4% Liquid 1 Application(s) Topical <User Schedule>  docusate sodium Liquid 100 milliGRAM(s) Oral three times a day  enoxaparin Injectable 40 milliGRAM(s) SubCutaneous daily  insulin glargine Injectable (LANTUS) 26 Unit(s) SubCutaneous at bedtime  insulin lispro (HumaLOG) corrective regimen sliding scale   SubCutaneous three times a day before meals  insulin lispro (HumaLOG) corrective regimen sliding scale   SubCutaneous at bedtime  levalbuterol Inhalation 0.63 milliGRAM(s) Inhalation every 6 hours  levETIRAcetam  IVPB 500 milliGRAM(s) IV Intermittent every 12 hours  metoprolol tartrate 75 milliGRAM(s) Oral two times a day  polyethylene glycol 3350 17 Gram(s) Oral daily  senna 2 Tablet(s) Oral at bedtime  tamsulosin 0.4 milliGRAM(s) Oral at bedtime    MEDICATIONS  (PRN):  acetaminophen   Tablet .. 975 milliGRAM(s) Oral every 6 hours PRN Mild Pain (1 - 3)  lactulose Syrup 20 Gram(s) Oral daily PRN constipation      Allergies    Aleve (Unknown)    Intolerances        Review of Systems:    General:  No wt loss, fevers, chills, night sweats,fatigue,   Eyes:  Good vision, no reported pain  ENT:  No sore throat, pain, runny nose, dysphagia  CV:  No pain, palpitations, hypo/hypertension  Resp:  No dyspnea, cough, tachypnea, wheezing  GI:  No pain, No nausea, No vomiting, No diarrhea, No constipation, No weight loss, No fever, No pruritis, No rectal bleeding, No melena, No dysphagia  :  No pain, bleeding, incontinence, nocturia  Muscle:  No pain, weakness  Neuro:  No weakness, tingling, memory problems  Psych:  No fatigue, insomnia, mood problems, depression  Endocrine:  No polyuria, polydypsia, cold/heat intolerance  Heme:  No petechiae, ecchymosis, easy bruisability  Skin:  No rash, tattoos, scars, edema      Vital Signs Last 24 Hrs  T(C): 36.7 (23 Oct 2018 10:29), Max: 37.2 (22 Oct 2018 14:12)  T(F): 98.1 (23 Oct 2018 10:29), Max: 98.9 (22 Oct 2018 14:12)  HR: 65 (23 Oct 2018 10:29) (65 - 97)  BP: 121/71 (23 Oct 2018 10:29) (102/68 - 130/77)  BP(mean): --  RR: 19 (23 Oct 2018 10:29) (17 - 20)  SpO2: 100% (23 Oct 2018 10:29) (93% - 100%)    PHYSICAL EXAM:    Appearance: NAD   HEENT:   dry oral mucosa, PERRL, EOMI	  Lymphatic: No lymphadenopathy  Cardiovascular: Irregular tachy l S1 S2, No JVD, No murmurs, No edema  Respiratory: decreased bs   Psychiatry: A & O x 2, Mood & affect appropriate  Gastrointestinal:  Soft, Non-tender, + BS	  Skin: No rashes, No ecchymoses, No cyanosis  Neurologic: Non-focal  Extremities: Normal range of motion, No clubbing, cyanosis or edema  Vascular: Peripheral pulses palpable 2+ bilaterally      LABS:                        11.3   12.0  )-----------( 454      ( 23 Oct 2018 06:51 )             35.4     10-23    147<H>  |  104  |  35<H>  ----------------------------<  194<H>  4.2   |  30  |  1.03    Ca    8.4      23 Oct 2018 06:49  Phos  3.8     10-23  Mg     2.2     10-23        Urinalysis Basic - ( 21 Oct 2018 13:58 )    Color: Yellow / Appearance: Clear / S.014 / pH: x  Gluc: x / Ketone: Negative  / Bili: Negative / Urobili: Negative   Blood: x / Protein: Negative / Nitrite: Negative   Leuk Esterase: Negative / RBC: 7 /hpf / WBC 2 /hpf   Sq Epi: x / Non Sq Epi: 0 /hpf / Bacteria: Negative        RADIOLOGY & ADDITIONAL TESTS:

## 2018-10-26 ENCOUNTER — RX RENEWAL (OUTPATIENT)
Age: 78
End: 2018-10-26

## 2018-10-26 ENCOUNTER — INPATIENT (INPATIENT)
Facility: HOSPITAL | Age: 78
LOS: 16 days | Discharge: NURSING, REHAB OR LONG-TERM CARE | DRG: 871 | End: 2018-11-12
Attending: HOSPITALIST | Admitting: HOSPITALIST
Payer: MEDICARE

## 2018-10-26 VITALS
HEART RATE: 112 BPM | RESPIRATION RATE: 19 BRPM | OXYGEN SATURATION: 97 % | WEIGHT: 177.03 LBS | DIASTOLIC BLOOD PRESSURE: 60 MMHG | SYSTOLIC BLOOD PRESSURE: 105 MMHG

## 2018-10-26 DIAGNOSIS — I48.91 UNSPECIFIED ATRIAL FIBRILLATION: ICD-10-CM

## 2018-10-26 DIAGNOSIS — Z29.9 ENCOUNTER FOR PROPHYLACTIC MEASURES, UNSPECIFIED: ICD-10-CM

## 2018-10-26 DIAGNOSIS — E11.9 TYPE 2 DIABETES MELLITUS WITHOUT COMPLICATIONS: ICD-10-CM

## 2018-10-26 DIAGNOSIS — A41.9 SEPSIS, UNSPECIFIED ORGANISM: ICD-10-CM

## 2018-10-26 DIAGNOSIS — Z98.890 OTHER SPECIFIED POSTPROCEDURAL STATES: Chronic | ICD-10-CM

## 2018-10-26 DIAGNOSIS — R41.82 ALTERED MENTAL STATUS, UNSPECIFIED: ICD-10-CM

## 2018-10-26 DIAGNOSIS — N40.0 BENIGN PROSTATIC HYPERPLASIA WITHOUT LOWER URINARY TRACT SYMPTOMS: ICD-10-CM

## 2018-10-26 DIAGNOSIS — I10 ESSENTIAL (PRIMARY) HYPERTENSION: ICD-10-CM

## 2018-10-26 DIAGNOSIS — R56.9 UNSPECIFIED CONVULSIONS: ICD-10-CM

## 2018-10-26 DIAGNOSIS — N17.9 ACUTE KIDNEY FAILURE, UNSPECIFIED: ICD-10-CM

## 2018-10-26 LAB
ALBUMIN SERPL ELPH-MCNC: 3.2 G/DL — LOW (ref 3.3–5)
ALP SERPL-CCNC: 121 U/L — HIGH (ref 40–120)
ALT FLD-CCNC: 19 U/L — SIGNIFICANT CHANGE UP (ref 10–45)
ANION GAP SERPL CALC-SCNC: 19 MMOL/L — HIGH (ref 5–17)
APPEARANCE UR: ABNORMAL
APTT BLD: 30.3 SEC — SIGNIFICANT CHANGE UP (ref 27.5–37.4)
AST SERPL-CCNC: 36 U/L — SIGNIFICANT CHANGE UP (ref 10–40)
BASOPHILS # BLD AUTO: 0 K/UL — SIGNIFICANT CHANGE UP (ref 0–0.2)
BASOPHILS NFR BLD AUTO: 0 % — SIGNIFICANT CHANGE UP (ref 0–2)
BILIRUB SERPL-MCNC: 1.9 MG/DL — HIGH (ref 0.2–1.2)
BILIRUB UR-MCNC: NEGATIVE — SIGNIFICANT CHANGE UP
BUN SERPL-MCNC: 50 MG/DL — HIGH (ref 7–23)
CALCIUM SERPL-MCNC: 9.3 MG/DL — SIGNIFICANT CHANGE UP (ref 8.4–10.5)
CHLORIDE SERPL-SCNC: 103 MMOL/L — SIGNIFICANT CHANGE UP (ref 96–108)
CO2 SERPL-SCNC: 27 MMOL/L — SIGNIFICANT CHANGE UP (ref 22–31)
COLOR SPEC: YELLOW — SIGNIFICANT CHANGE UP
CREAT SERPL-MCNC: 1.87 MG/DL — HIGH (ref 0.5–1.3)
DIFF PNL FLD: ABNORMAL
EOSINOPHIL # BLD AUTO: 0.1 K/UL — SIGNIFICANT CHANGE UP (ref 0–0.5)
EOSINOPHIL NFR BLD AUTO: 0.3 % — SIGNIFICANT CHANGE UP (ref 0–6)
GAS PNL BLDV: SIGNIFICANT CHANGE UP
GLUCOSE BLDC GLUCOMTR-MCNC: 112 MG/DL — HIGH (ref 70–99)
GLUCOSE BLDC GLUCOMTR-MCNC: 136 MG/DL — HIGH (ref 70–99)
GLUCOSE SERPL-MCNC: 51 MG/DL — LOW (ref 70–99)
GLUCOSE UR QL: NEGATIVE — SIGNIFICANT CHANGE UP
HCT VFR BLD CALC: 39.5 % — SIGNIFICANT CHANGE UP (ref 39–50)
HGB BLD-MCNC: 12.6 G/DL — LOW (ref 13–17)
INR BLD: 1.3 RATIO — HIGH (ref 0.88–1.16)
KETONES UR-MCNC: NEGATIVE — SIGNIFICANT CHANGE UP
LACTATE BLDV-MCNC: 1.8 MMOL/L — SIGNIFICANT CHANGE UP (ref 0.7–2)
LEUKOCYTE ESTERASE UR-ACNC: ABNORMAL
LYMPHOCYTES # BLD AUTO: 10.8 % — LOW (ref 13–44)
LYMPHOCYTES # BLD AUTO: 2 K/UL — SIGNIFICANT CHANGE UP (ref 1–3.3)
MCHC RBC-ENTMCNC: 30.3 PG — SIGNIFICANT CHANGE UP (ref 27–34)
MCHC RBC-ENTMCNC: 32 GM/DL — SIGNIFICANT CHANGE UP (ref 32–36)
MCV RBC AUTO: 94.7 FL — SIGNIFICANT CHANGE UP (ref 80–100)
MONOCYTES # BLD AUTO: 1.5 K/UL — HIGH (ref 0–0.9)
MONOCYTES NFR BLD AUTO: 7.8 % — SIGNIFICANT CHANGE UP (ref 2–14)
NEUTROPHILS # BLD AUTO: 15.3 K/UL — HIGH (ref 1.8–7.4)
NEUTROPHILS NFR BLD AUTO: 81 % — HIGH (ref 43–77)
NITRITE UR-MCNC: NEGATIVE — SIGNIFICANT CHANGE UP
PH UR: 6 — SIGNIFICANT CHANGE UP (ref 5–8)
PLATELET # BLD AUTO: 475 K/UL — HIGH (ref 150–400)
POTASSIUM SERPL-MCNC: 4.8 MMOL/L — SIGNIFICANT CHANGE UP (ref 3.5–5.3)
POTASSIUM SERPL-SCNC: 4.8 MMOL/L — SIGNIFICANT CHANGE UP (ref 3.5–5.3)
PROT SERPL-MCNC: 7.4 G/DL — SIGNIFICANT CHANGE UP (ref 6–8.3)
PROT UR-MCNC: ABNORMAL
PROTHROM AB SERPL-ACNC: 14.2 SEC — HIGH (ref 9.8–12.7)
RAPID RVP RESULT: SIGNIFICANT CHANGE UP
RBC # BLD: 4.17 M/UL — LOW (ref 4.2–5.8)
RBC # FLD: 14.7 % — HIGH (ref 10.3–14.5)
SODIUM SERPL-SCNC: 149 MMOL/L — HIGH (ref 135–145)
SP GR SPEC: 1.02 — SIGNIFICANT CHANGE UP (ref 1.01–1.02)
UROBILINOGEN FLD QL: NEGATIVE — SIGNIFICANT CHANGE UP
WBC # BLD: 18.9 K/UL — HIGH (ref 3.8–10.5)
WBC # FLD AUTO: 18.9 K/UL — HIGH (ref 3.8–10.5)

## 2018-10-26 PROCEDURE — 71045 X-RAY EXAM CHEST 1 VIEW: CPT | Mod: 26

## 2018-10-26 PROCEDURE — 93010 ELECTROCARDIOGRAM REPORT: CPT

## 2018-10-26 PROCEDURE — 99291 CRITICAL CARE FIRST HOUR: CPT | Mod: GC

## 2018-10-26 PROCEDURE — 70450 CT HEAD/BRAIN W/O DYE: CPT | Mod: 26

## 2018-10-26 PROCEDURE — 99223 1ST HOSP IP/OBS HIGH 75: CPT | Mod: GC

## 2018-10-26 PROCEDURE — 71250 CT THORAX DX C-: CPT | Mod: 26

## 2018-10-26 RX ORDER — GLUCAGON INJECTION, SOLUTION 0.5 MG/.1ML
1 INJECTION, SOLUTION SUBCUTANEOUS ONCE
Qty: 0 | Refills: 0 | Status: DISCONTINUED | OUTPATIENT
Start: 2018-10-26 | End: 2018-11-12

## 2018-10-26 RX ORDER — VANCOMYCIN HCL 1 G
2000 VIAL (EA) INTRAVENOUS ONCE
Qty: 0 | Refills: 0 | Status: COMPLETED | OUTPATIENT
Start: 2018-10-26 | End: 2018-10-26

## 2018-10-26 RX ORDER — ALBUTEROL 90 UG/1
3 AEROSOL, METERED ORAL
Qty: 0 | Refills: 0 | COMMUNITY

## 2018-10-26 RX ORDER — METOPROLOL TARTRATE 50 MG
50 TABLET ORAL
Qty: 0 | Refills: 0 | Status: DISCONTINUED | OUTPATIENT
Start: 2018-10-26 | End: 2018-10-29

## 2018-10-26 RX ORDER — SODIUM CHLORIDE 9 MG/ML
1000 INJECTION INTRAMUSCULAR; INTRAVENOUS; SUBCUTANEOUS
Qty: 0 | Refills: 0 | Status: DISCONTINUED | OUTPATIENT
Start: 2018-10-26 | End: 2018-10-27

## 2018-10-26 RX ORDER — ACETAMINOPHEN 500 MG
650 TABLET ORAL ONCE
Qty: 0 | Refills: 0 | Status: COMPLETED | OUTPATIENT
Start: 2018-10-26 | End: 2018-10-26

## 2018-10-26 RX ORDER — PIPERACILLIN AND TAZOBACTAM 4; .5 G/20ML; G/20ML
3.38 INJECTION, POWDER, LYOPHILIZED, FOR SOLUTION INTRAVENOUS EVERY 8 HOURS
Qty: 0 | Refills: 0 | Status: DISCONTINUED | OUTPATIENT
Start: 2018-10-26 | End: 2018-10-30

## 2018-10-26 RX ORDER — TAMSULOSIN HYDROCHLORIDE 0.4 MG/1
0.4 CAPSULE ORAL AT BEDTIME
Qty: 0 | Refills: 0 | Status: DISCONTINUED | OUTPATIENT
Start: 2018-10-26 | End: 2018-10-29

## 2018-10-26 RX ORDER — DEXTROSE 50 % IN WATER 50 %
15 SYRINGE (ML) INTRAVENOUS ONCE
Qty: 0 | Refills: 0 | Status: DISCONTINUED | OUTPATIENT
Start: 2018-10-26 | End: 2018-11-12

## 2018-10-26 RX ORDER — INSULIN LISPRO 100/ML
VIAL (ML) SUBCUTANEOUS EVERY 6 HOURS
Qty: 0 | Refills: 0 | Status: DISCONTINUED | OUTPATIENT
Start: 2018-10-26 | End: 2018-10-29

## 2018-10-26 RX ORDER — DEXTROSE 50 % IN WATER 50 %
25 SYRINGE (ML) INTRAVENOUS ONCE
Qty: 0 | Refills: 0 | Status: DISCONTINUED | OUTPATIENT
Start: 2018-10-26 | End: 2018-11-12

## 2018-10-26 RX ORDER — DEXTROSE 50 % IN WATER 50 %
12.5 SYRINGE (ML) INTRAVENOUS ONCE
Qty: 0 | Refills: 0 | Status: DISCONTINUED | OUTPATIENT
Start: 2018-10-26 | End: 2018-11-12

## 2018-10-26 RX ORDER — LEVETIRACETAM 250 MG/1
500 TABLET, FILM COATED ORAL EVERY 12 HOURS
Qty: 0 | Refills: 0 | Status: DISCONTINUED | OUTPATIENT
Start: 2018-10-26 | End: 2018-11-06

## 2018-10-26 RX ORDER — INSULIN GLARGINE 100 [IU]/ML
15 INJECTION, SOLUTION SUBCUTANEOUS AT BEDTIME
Qty: 0 | Refills: 0 | Status: DISCONTINUED | OUTPATIENT
Start: 2018-10-26 | End: 2018-10-29

## 2018-10-26 RX ORDER — SODIUM CHLORIDE 9 MG/ML
1000 INJECTION, SOLUTION INTRAVENOUS
Qty: 0 | Refills: 0 | Status: DISCONTINUED | OUTPATIENT
Start: 2018-10-26 | End: 2018-11-12

## 2018-10-26 RX ORDER — PIPERACILLIN AND TAZOBACTAM 4; .5 G/20ML; G/20ML
3.38 INJECTION, POWDER, LYOPHILIZED, FOR SOLUTION INTRAVENOUS ONCE
Qty: 0 | Refills: 0 | Status: COMPLETED | OUTPATIENT
Start: 2018-10-26 | End: 2018-10-26

## 2018-10-26 RX ORDER — HEPARIN SODIUM 5000 [USP'U]/ML
5000 INJECTION INTRAVENOUS; SUBCUTANEOUS EVERY 8 HOURS
Qty: 0 | Refills: 0 | Status: DISCONTINUED | OUTPATIENT
Start: 2018-10-26 | End: 2018-11-05

## 2018-10-26 RX ORDER — SODIUM CHLORIDE 9 MG/ML
2500 INJECTION INTRAMUSCULAR; INTRAVENOUS; SUBCUTANEOUS ONCE
Qty: 0 | Refills: 0 | Status: COMPLETED | OUTPATIENT
Start: 2018-10-26 | End: 2018-10-26

## 2018-10-26 RX ADMIN — Medication 650 MILLIGRAM(S): at 16:22

## 2018-10-26 RX ADMIN — SODIUM CHLORIDE 2500 MILLILITER(S): 9 INJECTION INTRAMUSCULAR; INTRAVENOUS; SUBCUTANEOUS at 16:22

## 2018-10-26 RX ADMIN — INSULIN GLARGINE 15 UNIT(S): 100 INJECTION, SOLUTION SUBCUTANEOUS at 23:46

## 2018-10-26 RX ADMIN — HEPARIN SODIUM 5000 UNIT(S): 5000 INJECTION INTRAVENOUS; SUBCUTANEOUS at 23:46

## 2018-10-26 RX ADMIN — Medication 500 MILLIGRAM(S): at 17:02

## 2018-10-26 RX ADMIN — Medication 650 MILLIGRAM(S): at 19:00

## 2018-10-26 RX ADMIN — PIPERACILLIN AND TAZOBACTAM 200 GRAM(S): 4; .5 INJECTION, POWDER, LYOPHILIZED, FOR SOLUTION INTRAVENOUS at 16:23

## 2018-10-26 RX ADMIN — Medication 2000 MILLIGRAM(S): at 19:00

## 2018-10-26 NOTE — H&P ADULT - PROBLEM SELECTOR PLAN 7
- c/w tamsulosin Pt currently hypotensive in setting of severe sepsis. Will hold home BP medications and add back as tolerated Pt currently hypotensive in setting of severe sepsis. Will hold home BP medications and add back as tolerated  - holding home amlodipine

## 2018-10-26 NOTE — H&P ADULT - NSHPSOCIALHISTORY_GEN_ALL_CORE
Lives with wife in assisted living that also has adjacent nursing home. Was high functioning  prior to craniotomies. No alcohol, tobacco, or illicit drug use. Lives with wife in assisted living that also has adjacent nursing home.   Was high functioning  prior to craniotomies. No alcohol, tobacco, or illicit drug use.

## 2018-10-26 NOTE — H&P ADULT - NSHPPHYSICALEXAM_GEN_ALL_CORE
.  VITAL SIGNS:  T(C): 37.3 (10-26-18 @ 18:15), Max: 38.6 (10-26-18 @ 15:40)  T(F): 99.1 (10-26-18 @ 18:15), Max: 101.4 (10-26-18 @ 15:40)  HR: 78 (10-26-18 @ 18:15) (78 - 112)  BP: 116/49 (10-26-18 @ 18:15) (100/57 - 116/49)  BP(mean): --  RR: 16 (10-26-18 @ 18:15) (16 - 22)  SpO2: 100% (10-26-18 @ 18:15) (97% - 100%)  Wt(kg): --    PHYSICAL EXAM:    Constitutional: NAD, lethargic  Head: NC/AT  Eyes: PERRLA, EOMI  ENT: no nasal discharge; no oropharyngeal erythema or exudates; dry oral mucosa  Neck: supple; no JVD or thyromegaly  Respiratory: diffuse rhonchi in right lung fields  Cardiac: +S1/S2; RRR; no M/R/G; PMI non-displaced  Gastrointestinal: soft, NT/ND; no rebound or guarding  Extremities: WWP, no clubbing or cyanosis; no peripheral edema  Vascular: 2+ radial, DP/PT pulses B/L  Lymphatic: no submandibular or cervical LAD  Neurologic: alert, responsive to voice, but only able to answer yes or no questions VITAL SIGNS:  T(C): 37.3 (10-26-18 @ 18:15), Max: 38.6 (10-26-18 @ 15:40)  T(F): 99.1 (10-26-18 @ 18:15), Max: 101.4 (10-26-18 @ 15:40)  HR: 78 (10-26-18 @ 18:15) (78 - 112)  BP: 116/49 (10-26-18 @ 18:15) (100/57 - 116/49)  BP(mean): --  RR: 16 (10-26-18 @ 18:15) (16 - 22)  SpO2: 100% (10-26-18 @ 18:15) (97% - 100%)  Wt(kg): --    PHYSICAL EXAM:    Constitutional: NAD, lethargic  Head: NC/AT  Eyes: PERRLA, EOMI  ENT: no nasal discharge; no oropharyngeal erythema or exudates; dry oral mucosa  Neck: supple; no JVD or thyromegaly  Respiratory: diffuse rhonchi in right lung fields  Cardiac: +S1/S2; RRR; no M/R/G; PMI non-displaced  Gastrointestinal: soft, NT/ND; no rebound or guarding  Extremities: WWP, no clubbing or cyanosis; no peripheral edema  Vascular: 2+ radial, DP/PT pulses B/L  Lymphatic: no submandibular or cervical LAD  Neurologic: alert, responsive to voice, but only able to answer yes or no questions  PSYCH: lethargic, does not respond verbally to questions

## 2018-10-26 NOTE — H&P ADULT - PROBLEM SELECTOR PLAN 9
Diet: NPO  DVT ppx: hep subQ  GOC: per discussion with wife, pt wishes are to be FULL CODE Seizure  - c/w keppra 500mg BID

## 2018-10-26 NOTE — H&P ADULT - PROBLEM SELECTOR PLAN 1
Pt meets severe sepsis criteria on admission with WBC of 18.9, fever to 101.4, tachycardia to 112 with CXR showing R basilar pna and grossly positive UA. Ddx includes HCAP, VAP, aspiration pna. Received vanc, zosyn x1 and 2.5L NS bolus in ED.   - c/w zosyn and vanc by level for pna and UTI  - f/u blood and urine cultures  - NPO pending formal speech and swallow Pt meets severe sepsis criteria on admission with WBC of 18.9, fever to 101.4, tachycardia to 112 with CXR showing R basilar pna and grossly positive UA. Ddx includes HCAP, VAP, aspiration pna. Received vanc, zosyn x1 and 2.5L NS bolus in ED.   - c/w zosyn and vanc by level for pna and UTI  - f/u blood and urine cultures  - CT chest for further evaluation of probably pna  - NPO pending formal speech and swallow Pt meets severe sepsis criteria on admission with WBC of 18.9, fever to 101.4, tachycardia to 112 with CXR showing R basilar pna and grossly positive UA. Ddx includes HCAP, VAP, aspiration pna. Received vanc, zosyn x1 and 2.5L NS bolus in ED.   - c/w zosyn and vanc by level for pna and UTI  - f/u blood and urine cultures  - CT chest for further evaluation of probably pna  - NPO pending formal speech and swallow  - c/w mIVF: NS at 100 Pt minimally responsive and interactive, acutely changed from discharge on 10/23 per wife at bedside. 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 Pt also has hx of hospital/ICU associated delirium requiring haldol, physical restraints. Per wife, pt did not have witnessed seizure activity at home.  - f/u CT head  - currently not agitated, consider haldol PRN  - c/w antiepileptics  - fall precautions  - bedrest for now Pt minimally responsive and interactive, acutely changed from discharge on 10/23 per wife at bedside. 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 Pt also has hx of hospital/ICU associated delirium requiring haldol, physical restraints. Per wife, pt did not have witnessed seizure activity at home.  - continue infectious workup as noted in problem 2  - f/u CT head  - currently not agitated, consider haldol PRN  - c/w antiepileptics  - fall precautions  - bedrest for now

## 2018-10-26 NOTE — ED PROVIDER NOTE - MEDICAL DECISION MAKING DETAILS
78yo M pmhx spontaneous hemoperitoneum, HTN, DM p/w CC AMS - febrile to 101.4. Will begin sepsis workup, start abx, fluids. Will CT head in setting on AMS

## 2018-10-26 NOTE — H&P ADULT - PROBLEM SELECTOR PLAN 6
Pt currently hypotensive in setting of severe sepsis. Will hold home BP medications and add back as tolerated On lantus 40U QHS and 11 units sliding scale, however has not been eating. Blood glucose on admission 92  - will check FS Q6H while NPO with ISS coverage On lantus 40U QHS and 11 units sliding scale, however has not been eating. Blood glucose on admission 92  - lantus 15mg QHS  - will check FS Q6H while NPO with ISS coverage Patient was found to have hepatic lesion on last admission that was suspected to be the cause of acute hemoperitoneum, and pt was recommended follow up with surgical oncology for diagnostic purposes.   Will require further hepatic workup

## 2018-10-26 NOTE — H&P ADULT - PROBLEM SELECTOR PLAN 3
Pt has atrial fibrillation, currently rate controlled with metoprolol 75mg BID. Not on anticoagulation 2/2 multiple bleeds. Has hx of RVR while inpatient, however septic with hypotension on admission.  - telemetry monitoring  - will hold metoprolol for now Creatinine elevated to 1.8 on admission Creatinine elevated to 1.8 on admission, suspect pre-renal due to severe sepsis  Continue IVF, repeat in AM  Will obtain urine studies if not improved  Check bladder scan if patient fails to void

## 2018-10-26 NOTE — ED ADULT NURSE NOTE - NSIMPLEMENTINTERV_GEN_ALL_ED
Implemented All Fall with Harm Risk Interventions:  [place] to call system. Call bell, personal items and telephone within reach. Instruct patient to call for assistance. Room bathroom lighting operational. Non-slip footwear when patient is off stretcher. Physically safe environment: no spills, clutter or unnecessary equipment. Stretcher in lowest position, wheels locked, appropriate side rails in place. Provide visual cue, wrist band, yellow gown, etc. Monitor gait and stability. Monitor for mental status changes and reorient to person, place, and time. Review medications for side effects contributing to fall risk. Reinforce activity limits and safety measures with patient and family. Provide visual clues: red socks.

## 2018-10-26 NOTE — H&P ADULT - PROBLEM SELECTOR PLAN 8
Diet: NPO  DVT ppx: hep subQ  GOC: per discussion with wife, pt wishes are to be FULL CODE - c/w tamsulosin

## 2018-10-26 NOTE — H&P ADULT - PROBLEM SELECTOR PLAN 4
Serenity woodard/w keppra Pt has atrial fibrillation, currently rate controlled with metoprolol 75mg BID. Not on anticoagulation 2/2 multiple bleeds. Has hx of RVR while inpatient, however septic with hypotension on admission.  - telemetry monitoring  - will hold metoprolol for now Pt has atrial fibrillation, currently rate controlled with metoprolol 75mg BID. Not on anticoagulation 2/2 multiple bleeds. Has hx of RVR while inpatient, however septic with hypotension on admission. Currently rate controlled  - will c/w decreased dose of 50mg BID, titrate up after severe sepsis resolved

## 2018-10-26 NOTE — H&P ADULT - ATTENDING COMMENTS
Patient assigned to me by night hospitalist in charge for management and care for patient for this evening only. Care to be resumed by day hospitalist in the morning and thereafter.     On exam, patient arouses to verbal and tactile stimuli and turns head in response to questions. Eye exam PERRL, EOMI. Oropharynx appears dry. Lungs with crackles at L base. Cardiac exam NS1S2, no r/m/g. Abd soft nontender nondistended. No lower extremity edema. Patient does not follow commands and cannot offer history or ROS. Agree with plan above for further pulmonary imaging, continue with antibiotic coverage for HCAP vs aspiration related PNA. Monitor renal function- continue renal dosing of vancomycin. Off anticoagulation due to recent bleeding, continue to hold anticoagulation at this time. Monitor fingersticks on lowered dose of lantus. Pt will need swallow eval in AM. Patient assigned to me by night hospitalist in charge for management and care for patient for this evening only. Care to be resumed by day hospitalist in the morning and thereafter.     This patient is a 77M with PMHx of afib not on anticoagulation, HTN, DMII, brain bleed in 2015 on coumadin requiring bilateral craniotomy who presents from [facility] with worsening lethargy, cough, and SOB. On exam, patient arouses to verbal and tactile stimuli and turns head in response to questions. Eye exam PERRL, EOMI. Oropharynx appears dry. Lungs with crackles at L base. Cardiac exam NS1S2, no r/m/g. Abd soft nontender nondistended. No lower extremity edema. Patient does not follow commands and cannot offer history or ROS. Agree with plan above for further pulmonary imaging, continue with antibiotic coverage for HCAP vs aspiration related PNA. Monitor renal function- continue renal dosing of vancomycin. Off anticoagulation due to recent bleeding, continue to hold anticoagulation at this time. Monitor fingersticks on lowered dose of lantus. Pt will need swallow eval in AM. Patient assigned to me by night hospitalist in charge for management and care for patient for this evening only. Care to be resumed by day hospitalist in the morning and thereafter.     This patient is a 77M with PMHx of afib not on anticoagulation, HTN, DMII, brain bleed in 2015 on coumadin requiring bilateral craniotomy who presents from [facility] with worsening lethargy, cough, and SOB. On exam, patient arouses to verbal and tactile stimuli and turns head in response to questions. Eye exam PERRL, EOMI. Oropharynx appears dry. Lungs with crackles at L base. Cardiac exam NS1S2, no r/m/g. Abd soft nontender nondistended. No lower extremity edema. Ecchymoses noted at L elbow, but no other skin lesions. Patient does not follow commands and cannot offer history or ROS due to current clinical condition. Agree with plan above for further pulmonary imaging, continue with antibiotic coverage for HCAP vs aspiration related PNA. Monitor renal function- continue renal dosing of vancomycin. Off anticoagulation due to recent bleeding, continue to hold anticoagulation at this time. Monitor fingersticks on lowered dose of lantus. Pt will need swallow eval in AM.

## 2018-10-26 NOTE — ED PROVIDER NOTE - ABNORMAL RHYTHM
aberrant conduction/Coarse A fib vs A flutter, RBBB and LPFB, morphology, nonspecific anterior ST/TW changes anteriorly c/w RBBB, no acute lateraql changes,/atrial fibrillation

## 2018-10-26 NOTE — H&P ADULT - PROBLEM SELECTOR PLAN 5
On lantus 40U QHS and 11 units sliding scale, however has not been eating. Blood glucose on admission 92  - will check FS Q6H while NPO with ISS coverage Serenity woodard/w keppra - c/w keppra 500mg BID On lantus 40U QHS and 11 units sliding scale, however has not been eating. Blood glucose on admission 92  - lantus 15mg QHS  - will check FS Q6H while NPO with ISS coverage

## 2018-10-26 NOTE — H&P ADULT - HISTORY OF PRESENT ILLNESS
Pt is a 77M with PMHx of afib not on anticoagulation, HTN, DMII, brain bleed in 2015 on coumadin requiring bilateral craniotomy who presents with lethargy, cough, and shortness of breath. Pt was recently hospitalized from 10/11 - 10/23 for abd pain, found to have nontraumatic hemoperitoneum requiring massive transfusion protocol activation, intubation and prolonged SICU stay. Pt was found to have a liver mass and underwent selective celiac, common hepatic, proper hepatic, right hepatic and inferior right hepatic segmental and subsegmental arteriography and embolization of a segment 5 mass with marked reduction in tumor vascularity. Pt left the hospital to nursing home on 10/23 and original was doing well, was able to recognize and interact with wife, was eating. Pt then abruptly became lethargic with increased weakness, inability to take PO, worsening cough. Wife states that the cough first developed prior to discharge from the hospital on 10/23, but has become worse over the past several days. Pt was placed on honey thickened diet yesterday, but had been eating a regular diet on discharge. Pt minimally interactive on exam, able to nod "yes" and "no." Denies pain, SOB.     In ED, initial VS were T101.4  /60 RR19 Sa 97% on 4L NC. Labs significant for WBC of 18.9, creatinine of 1.87 (elevated from 1.03 on 10/23). Chest X-ray showed right basilar opacity which may represent atelectasis vs pna. UA had moderate bacteria, +leuk esterase Pt is a 77M with PMHx of afib not on anticoagulation, HTN, DMII, brain bleed in 2015 on coumadin requiring bilateral craniotomy who presents with lethargy, cough, and shortness of breath. Pt was recently hospitalized from 10/11 - 10/23 for abd pain, found to have nontraumatic hemoperitoneum requiring massive transfusion protocol activation, intubation and prolonged SICU stay. Pt was found to have a liver mass and underwent selective celiac, common hepatic, proper hepatic, right hepatic and inferior right hepatic segmental and subsegmental arteriography and embolization of a segment 5 mass with marked reduction in tumor vascularity. Pt left the hospital to nursing home on 10/23 and originally was doing well, was able to recognize and interact with wife, was eating. Pt then abruptly became lethargic with increased weakness, inability to take PO, worsening cough. Wife states that the cough first developed prior to discharge from the hospital on 10/23, but has become worse over the past several days. Pt was placed on honey thickened diet yesterday, but had been eating a regular diet on discharge. Pt minimally interactive on exam, able to nod "yes" and "no." Denies pain, SOB.     In ED, initial VS were T101.4  /60 RR19 Sa 97% on 4L NC. Labs significant for WBC of 18.9, creatinine of 1.87 (elevated from 1.03 on 10/23). Chest X-ray showed right basilar opacity which may represent atelectasis vs pna. UA had moderate bacteria, +leuk esterase

## 2018-10-26 NOTE — ED PROVIDER NOTE - OBJECTIVE STATEMENT
76yo M pmhx HTN, afib not on AC, DM p/w CC weakness/lethargy. Per wife pt. was recently discharged after prolonged hospital stay for nontraumatic hemoperitoneum, states that for past couple of days he is increasingly more lethargic and weak, not responding to questions. Also reports worsening cough.

## 2018-10-26 NOTE — ED PROVIDER NOTE - ATTENDING CONTRIBUTION TO CARE
ATTENDING MD:  Jose HADDAD, personally have seen and examined this patient.  I have discussed all aspects of care with the resident physician. Resident note reviewed and agree on plan of care and except where noted.  See HPI, PE, and MDM for details.    GEN: ill appearing, nontoxic, alert, responsive to voice  HEAD: NCAT  EYES: clear  ENT: mucus membranes are dry  RESP: diffuse rhonci, mildly tachypneic  CV: tachycardic, irregular, S1,S2, distal pulses intact/symmetric  ABD: the abdomen is soft, nontender, and nondistended, there are no palpable masses or organomegaly  : no CVAT  SKIN: warm, dry, no rash  MSK: no significant pedal edema    MDM: pt ill appearing, likely septic in setting of recent hospitalization. likley pulmonary source will also eval for UTI. abdomen benign, neuro status appears to be at baseline iwthout otherwise focal exam.

## 2018-10-26 NOTE — ED ADULT NURSE NOTE - OBJECTIVE STATEMENT
Male 77 years old with history of HTN and Afib was brought in by EMS from a nursing home for weakness. As per wife pt refused to eat and to take medications. Male 77 years old with history of HTN and Afib was brought in by EMS from a nursing home for weakness. As per wife pt refused to eat and to take medications. Pt was discharged from [facility] last Tuesday for no traumatic bleeding from peritoneum. As per wife pt received 8 units of blood during stay in ICU. Today pt is non verbal and for the past days been increasingly weak. Rectal temp 101.4. IV heplock at left wrist. Closely monitored.

## 2018-10-26 NOTE — ED ADULT NURSE REASSESSMENT NOTE - NS ED NURSE REASSESS COMMENT FT1
Patient admitted to Health system. Admission band applied to patient utilizing two patient identifiers. Patient updated on plan of care.

## 2018-10-26 NOTE — H&P ADULT - PROBLEM SELECTOR PLAN 2
Pt minimally responsive and interactive, acutely changed from discharge on 10/23 per wife at bedside. 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 Pt also has hx of hospital/ICU associated delirium requiring haldol, physical restraints  - f/u CT head  - currently not agitated, consider haldol PRN Pt minimally responsive and interactive, acutely changed from discharge on 10/23 per wife at bedside. 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 Pt also has hx of hospital/ICU associated delirium requiring haldol, physical restraints. Per wife, pt did not have witnessed seizure activity at home.  - f/u CT head  - currently not agitated, consider haldol PRN  - c/w antiepileptics Pt meets severe sepsis criteria on admission with WBC of 18.9, fever to 101.4, tachycardia to 112 with CXR showing R basilar pna and grossly positive UA. Ddx includes HCAP, VAP, aspiration pna. Received vanc, zosyn x1 and 2.5L NS bolus in ED.   - c/w zosyn and vanc by level for pna and UTI  - f/u blood and urine cultures  - CT chest for further evaluation of probably pna  - NPO pending formal speech and swallow  - c/w mIVF: NS at 100

## 2018-10-26 NOTE — ED PROVIDER NOTE - PROGRESS NOTE DETAILS
admitted to hospitalist for severe sepsis. signed out to hospitalist that repeat lactate and perfusion exam are pending. need for completion of sepsis bundle discussed with medicine team at bedside

## 2018-10-27 DIAGNOSIS — G92 TOXIC ENCEPHALOPATHY: ICD-10-CM

## 2018-10-27 DIAGNOSIS — G93.41 METABOLIC ENCEPHALOPATHY: ICD-10-CM

## 2018-10-27 DIAGNOSIS — E11.9 TYPE 2 DIABETES MELLITUS WITHOUT COMPLICATIONS: ICD-10-CM

## 2018-10-27 DIAGNOSIS — K76.9 LIVER DISEASE, UNSPECIFIED: ICD-10-CM

## 2018-10-27 LAB
ANION GAP SERPL CALC-SCNC: 15 MMOL/L — SIGNIFICANT CHANGE UP (ref 5–17)
ANION GAP SERPL CALC-SCNC: 16 MMOL/L — SIGNIFICANT CHANGE UP (ref 5–17)
BUN SERPL-MCNC: 44 MG/DL — HIGH (ref 7–23)
BUN SERPL-MCNC: 51 MG/DL — HIGH (ref 7–23)
CALCIUM SERPL-MCNC: 8.3 MG/DL — LOW (ref 8.4–10.5)
CALCIUM SERPL-MCNC: 8.6 MG/DL — SIGNIFICANT CHANGE UP (ref 8.4–10.5)
CHLORIDE SERPL-SCNC: 106 MMOL/L — SIGNIFICANT CHANGE UP (ref 96–108)
CHLORIDE SERPL-SCNC: 107 MMOL/L — SIGNIFICANT CHANGE UP (ref 96–108)
CO2 SERPL-SCNC: 26 MMOL/L — SIGNIFICANT CHANGE UP (ref 22–31)
CO2 SERPL-SCNC: 27 MMOL/L — SIGNIFICANT CHANGE UP (ref 22–31)
CREAT SERPL-MCNC: 1.52 MG/DL — HIGH (ref 0.5–1.3)
CREAT SERPL-MCNC: 1.8 MG/DL — HIGH (ref 0.5–1.3)
GLUCOSE BLDC GLUCOMTR-MCNC: 107 MG/DL — HIGH (ref 70–99)
GLUCOSE BLDC GLUCOMTR-MCNC: 108 MG/DL — HIGH (ref 70–99)
GLUCOSE BLDC GLUCOMTR-MCNC: 133 MG/DL — HIGH (ref 70–99)
GLUCOSE BLDC GLUCOMTR-MCNC: 174 MG/DL — HIGH (ref 70–99)
GLUCOSE BLDC GLUCOMTR-MCNC: 198 MG/DL — HIGH (ref 70–99)
GLUCOSE SERPL-MCNC: 141 MG/DL — HIGH (ref 70–99)
GLUCOSE SERPL-MCNC: 98 MG/DL — SIGNIFICANT CHANGE UP (ref 70–99)
HCT VFR BLD CALC: 36.6 % — LOW (ref 39–50)
HGB BLD-MCNC: 11.8 G/DL — LOW (ref 13–17)
LDH SERPL L TO P-CCNC: 478 U/L — HIGH (ref 50–242)
MAGNESIUM SERPL-MCNC: 2 MG/DL — SIGNIFICANT CHANGE UP (ref 1.6–2.6)
MAGNESIUM SERPL-MCNC: 2 MG/DL — SIGNIFICANT CHANGE UP (ref 1.6–2.6)
MCHC RBC-ENTMCNC: 30.6 PG — SIGNIFICANT CHANGE UP (ref 27–34)
MCHC RBC-ENTMCNC: 32.3 GM/DL — SIGNIFICANT CHANGE UP (ref 32–36)
MCV RBC AUTO: 94.7 FL — SIGNIFICANT CHANGE UP (ref 80–100)
PHOSPHATE SERPL-MCNC: 3.3 MG/DL — SIGNIFICANT CHANGE UP (ref 2.5–4.5)
PHOSPHATE SERPL-MCNC: 4.6 MG/DL — HIGH (ref 2.5–4.5)
PLATELET # BLD AUTO: 381 K/UL — SIGNIFICANT CHANGE UP (ref 150–400)
POTASSIUM SERPL-MCNC: 4.1 MMOL/L — SIGNIFICANT CHANGE UP (ref 3.5–5.3)
POTASSIUM SERPL-MCNC: 4.1 MMOL/L — SIGNIFICANT CHANGE UP (ref 3.5–5.3)
POTASSIUM SERPL-SCNC: 4.1 MMOL/L — SIGNIFICANT CHANGE UP (ref 3.5–5.3)
POTASSIUM SERPL-SCNC: 4.1 MMOL/L — SIGNIFICANT CHANGE UP (ref 3.5–5.3)
RBC # BLD: 3.87 M/UL — LOW (ref 4.2–5.8)
RBC # FLD: 14.5 % — SIGNIFICANT CHANGE UP (ref 10.3–14.5)
SODIUM SERPL-SCNC: 148 MMOL/L — HIGH (ref 135–145)
SODIUM SERPL-SCNC: 149 MMOL/L — HIGH (ref 135–145)
VANCOMYCIN FLD-MCNC: 23.5 UG/ML — SIGNIFICANT CHANGE UP
WBC # BLD: 13.2 K/UL — HIGH (ref 3.8–10.5)
WBC # FLD AUTO: 13.2 K/UL — HIGH (ref 3.8–10.5)

## 2018-10-27 PROCEDURE — 99233 SBSQ HOSP IP/OBS HIGH 50: CPT | Mod: GC

## 2018-10-27 RX ORDER — SODIUM CHLORIDE 9 MG/ML
1000 INJECTION, SOLUTION INTRAVENOUS
Qty: 0 | Refills: 0 | Status: DISCONTINUED | OUTPATIENT
Start: 2018-10-27 | End: 2018-10-28

## 2018-10-27 RX ADMIN — PIPERACILLIN AND TAZOBACTAM 25 GRAM(S): 4; .5 INJECTION, POWDER, LYOPHILIZED, FOR SOLUTION INTRAVENOUS at 01:37

## 2018-10-27 RX ADMIN — INSULIN GLARGINE 15 UNIT(S): 100 INJECTION, SOLUTION SUBCUTANEOUS at 22:00

## 2018-10-27 RX ADMIN — HEPARIN SODIUM 5000 UNIT(S): 5000 INJECTION INTRAVENOUS; SUBCUTANEOUS at 05:15

## 2018-10-27 RX ADMIN — PIPERACILLIN AND TAZOBACTAM 25 GRAM(S): 4; .5 INJECTION, POWDER, LYOPHILIZED, FOR SOLUTION INTRAVENOUS at 17:28

## 2018-10-27 RX ADMIN — SODIUM CHLORIDE 100 MILLILITER(S): 9 INJECTION, SOLUTION INTRAVENOUS at 16:05

## 2018-10-27 RX ADMIN — PIPERACILLIN AND TAZOBACTAM 25 GRAM(S): 4; .5 INJECTION, POWDER, LYOPHILIZED, FOR SOLUTION INTRAVENOUS at 09:19

## 2018-10-27 RX ADMIN — LEVETIRACETAM 400 MILLIGRAM(S): 250 TABLET, FILM COATED ORAL at 17:28

## 2018-10-27 RX ADMIN — TAMSULOSIN HYDROCHLORIDE 0.4 MILLIGRAM(S): 0.4 CAPSULE ORAL at 21:09

## 2018-10-27 RX ADMIN — SODIUM CHLORIDE 100 MILLILITER(S): 9 INJECTION INTRAMUSCULAR; INTRAVENOUS; SUBCUTANEOUS at 05:14

## 2018-10-27 RX ADMIN — LEVETIRACETAM 400 MILLIGRAM(S): 250 TABLET, FILM COATED ORAL at 05:14

## 2018-10-27 RX ADMIN — HEPARIN SODIUM 5000 UNIT(S): 5000 INJECTION INTRAVENOUS; SUBCUTANEOUS at 21:09

## 2018-10-27 RX ADMIN — HEPARIN SODIUM 5000 UNIT(S): 5000 INJECTION INTRAVENOUS; SUBCUTANEOUS at 14:55

## 2018-10-27 NOTE — PROGRESS NOTE ADULT - PROBLEM SELECTOR PLAN 1
Patient now minimally responsive baseline mental status of  minimally responsive and interactive at baseline, acutely changed from discharge on 10/23 per wife at bedside. 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 Pt also has hx of hospital/ICU associated delirium requiring haldol, physical restraints. Per wife, pt did not have witnessed seizure activity at home.  - continue infectious workup as noted in problem 2  - f/u CT head  - currently not agitated, consider haldol PRN  - c/w antiepileptics  - fall precautions  - bedrest for now

## 2018-10-27 NOTE — PROGRESS NOTE ADULT - PROBLEM SELECTOR PLAN 5
On lantus 40U QHS and 11 units sliding scale, however has not been eating. Blood glucose on admission 92  - lantus 15mg QHS  - will check FS Q6H while NPO with ISS coverage

## 2018-10-27 NOTE — PROGRESS NOTE ADULT - ATTENDING COMMENTS
I personally performed the E/M service provided and was physically present during key portions of the service furnished by the resident/fellow. I agree with the history, physical and assessment/plan as stated above, with the following additional remarks:  - severe sepsis 2/2 PNA (HCAP vs. VAP vs. aspiration) with acute toxic/metabolic encephalopathy- encephalopathy improving, juan slightly improved, patient stable on vanc + zosyn. Will continue for now.  - hypernatremia: 2.6 L free water deficit. Continue D5 1/4NS @ 100. Trend BMP q6 hours.

## 2018-10-27 NOTE — PROGRESS NOTE ADULT - ASSESSMENT
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

## 2018-10-27 NOTE — PROGRESS NOTE ADULT - PROBLEM SELECTOR PLAN 3
Creatinine elevated to 1.8 on admission, suspect pre-renal due to severe sepsis  Continue IVF, repeat in AM  Will obtain urine studies if not improved  Check bladder scan if patient fails to void

## 2018-10-27 NOTE — PROGRESS NOTE ADULT - PROBLEM SELECTOR PLAN 2
Pt meets severe sepsis criteria on admission with WBC of 18.9, fever to 101.4, tachycardia to 112 with CXR showing R basilar pna and grossly positive UA. Ddx includes HCAP, VAP, aspiration pna. Received vanc, zosyn x1 and 2.5L NS bolus in ED.   - c/w zosyn and vanc by level for pna and UTI  - f/u blood and urine cultures  - CT chest for further evaluation of probably pna  - NPO pending formal speech and swallow  - c/w mIVF: NS at 100 Pt meets severe sepsis criteria on admission with WBC of 18.9, fever to 101.4, tachycardia to 112 with CXR showing R basilar pna and grossly positive UA. Ddx includes HCAP, VAP, aspiration pna. Received vanc, zosyn x1 and 2.5L NS bolus in ED.   - c/w zosyn and vanc by level for pna and UTI  - f/u blood and urine cultures  - CT chest for further evaluation of probably pna  - HOB elevated 30-45  - Chest PT   - aspiration precaution  - NPO pending formal speech and swallow  - c/w mIVF: NS at 100

## 2018-10-27 NOTE — PROGRESS NOTE ADULT - PROBLEM SELECTOR PLAN 7
Pt currently hypotensive in setting of severe sepsis. Will hold home BP medications and add back as tolerated  - holding home amlodipine

## 2018-10-27 NOTE — PROGRESS NOTE ADULT - SUBJECTIVE AND OBJECTIVE BOX
Charlie Donahue, PGY2  NS Pager:[phone] /LUIS Pager: [phone]  After 7: Night Float pager    Patient is a 77y old  Male who presents with a chief complaint of altered mental status, cough (26 Oct 2018 20:35)      SUBJECTIVE / OVERNIGHT EVENTS:    77M with PMHx of afib not on AC 2/2 ICH, HTN, DMII, ICH (2015 on coumadin, s/p b/l craniotomy who presents with toxic metabolic encephalopathy with lethargy, cough, and shortness of breath. 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 Pt was discharged to NH on 10/23, and originally was doing well, was able to recognize and interact with wife, was eating. Pt then abruptly became lethargic with increased weakness, inability to take PO, worsening cough. Wife states that the cough first developed prior to discharge from the hospital on 10/23, but has become worse over the past several days. Pt was placed on honey thickened diet yesterday, but had been eating a regular diet on discharge. Pt minimally interactive on exam, able to nod "yes" and "no." Denies pain, SOB.     In ED, initial VS were T101.4  /60 RR19 Sa 97% on 4L NC. Labs significant for WBC of 18.9, creatinine of 1.87 (elevated from 1.03 on 10/23). Chest X-ray showed right basilar opacity which may represent atelectasis vs pna. UA had moderate bacteria, +leuk esterase        MEDICATIONS  (STANDING):  dextrose 5%. 1000 milliLiter(s) (50 mL/Hr) IV Continuous <Continuous>  dextrose 50% Injectable 12.5 Gram(s) IV Push once  dextrose 50% Injectable 25 Gram(s) IV Push once  dextrose 50% Injectable 25 Gram(s) IV Push once  heparin  Injectable 5000 Unit(s) SubCutaneous every 8 hours  insulin glargine Injectable (LANTUS) 15 Unit(s) SubCutaneous at bedtime  insulin lispro (HumaLOG) corrective regimen sliding scale   SubCutaneous every 6 hours  levETIRAcetam  IVPB 500 milliGRAM(s) IV Intermittent every 12 hours  metoprolol tartrate 50 milliGRAM(s) Oral two times a day  piperacillin/tazobactam IVPB. 3.375 Gram(s) IV Intermittent every 8 hours  sodium chloride 0.9%. 1000 milliLiter(s) (100 mL/Hr) IV Continuous <Continuous>  tamsulosin 0.4 milliGRAM(s) Oral at bedtime    MEDICATIONS  (PRN):  dextrose 40% Gel 15 Gram(s) Oral once PRN Blood Glucose LESS THAN 70 milliGRAM(s)/deciliter  glucagon  Injectable 1 milliGRAM(s) IntraMuscular once PRN Glucose LESS THAN 70 milligrams/deciliter      Vital Signs Last 24 Hrs  T(C): 36.5 (27 Oct 2018 04:47), Max: 38.6 (26 Oct 2018 15:40)  T(F): 97.7 (27 Oct 2018 04:47), Max: 101.4 (26 Oct 2018 15:40)  HR: 95 (27 Oct 2018 04:47) (61 - 112)  BP: 122/67 (27 Oct 2018 04:47) (100/57 - 122/67)  BP(mean): --  RR: 20 (27 Oct 2018 04:47) (16 - 22)  SpO2: 95% (27 Oct 2018 04:47) (95% - 100%)  CAPILLARY BLOOD GLUCOSE      POCT Blood Glucose.: 107 mg/dL (27 Oct 2018 06:29)  POCT Blood Glucose.: 112 mg/dL (26 Oct 2018 23:42)  POCT Blood Glucose.: 136 mg/dL (26 Oct 2018 21:27)  POCT Blood Glucose.: 92 mg/dL (26 Oct 2018 15:47)    I&O's Summary    26 Oct 2018 07:01  -  27 Oct 2018 07:00  --------------------------------------------------------  IN: 600 mL / OUT: 0 mL / NET: 600 mL      PHYSICAL EXAM:  Constitutional: NAD, lethargic  Eyes: PERRLA, eye tracking  ENT: no nasal discharge; no oropharyngeal erythema or exudates; dry oral mucosa  Neck: supple; no JVD or thyromegaly  Respiratory: diffuse rhonchi in right lung fields  Cardiac: +S1/S2; RRR; no M/R/G;   Gastrointestinal: soft, NT/ND; no rebound or guarding  Extremities: WWP, no clubbing or cyanosis; no peripheral edema  Vascular: 2+ radial, DP/PT pulses B/L  Lymphatic: no submandibular or cervical LAD  Neurologic: alert, responsive to voice, but only able to answer yes or no questions  PSYCH: lethargic, does not respond verbally to questions        LABS:                        12.6   18.9  )-----------( 475      ( 26 Oct 2018 16:31 )             39.5     10-27    149<H>  |  107  |  51<H>  ----------------------------<  98  4.1   |  26  |  1.80<H>    Ca    8.3<L>      27 Oct 2018 06:40  Phos  4.6     10-27  Mg     2.0     10-27    TPro  7.4  /  Alb  3.2<L>  /  TBili  1.9<H>  /  DBili  x   /  AST  36  /  ALT  19  /  AlkPhos  121<H>  10-26    PT/INR - ( 26 Oct 2018 16:31 )   PT: 14.2 sec;   INR: 1.30 ratio         PTT - ( 26 Oct 2018 16:31 )  PTT:30.3 sec      Urinalysis Basic - ( 26 Oct 2018 17:54 )    Color: Yellow / Appearance: Slightly Turbid / S.018 / pH: x  Gluc: x / Ketone: Negative  / Bili: Negative / Urobili: Negative   Blood: x / Protein: 30 mg/dL / Nitrite: Negative   Leuk Esterase: Large / RBC: 8 /hpf /  /hpf   Sq Epi: x / Non Sq Epi: 0 /hpf / Bacteria: Moderate        Microbiology;    Rapid Respiratory Viral Panel (10.26.18 @ 16:32)    Rapid RVP Result: NotDetec          RADIOLOGY & ADDITIONAL TESTS:    Imaging Personally Reviewed:  < from: CT Head No Cont (10.26.18 @ 19:44) >  FINDINGS: Bilateral adrianna holes are noted. There is a left parietal   craniotomy.    There is no acute intracranial hemorrhage, mass effect, shift of the   midline structures, herniation, extra-axial fluid collection, or   hydrocephalus.    There is diffuse cerebral volume loss with prominence of the sulci,   fissures, and cisternal spaces which is normal for the patient's age.   There is mild deep and periventricular white matter hypoattenuation   statistically compatible with microvascular changes given calcific   atherosclerotic disease of the intracranial arteries.    The paranasal sinuses and mastoid air cells are clear. The imaged orbits   are unremarkable.    < end of copied text >          Consultant(s) Notes Reviewed:          Care Discussed with Consultants/Other Providers: Charlie Donahue, PGY2  NS Pager:[phone] /LUIS Pager: [phone]  After 7: Night Float pager    Patient is a 77y old  Male who presents with a chief complaint of altered mental status, cough (26 Oct 2018 20:35)      SUBJECTIVE / OVERNIGHT EVENTS:    77M with PMHx of afib not on AC 2/2 ICH, HTN, DMII, ICH (2015 on coumadin, s/p b/l craniotomy who presents with toxic metabolic encephalopathy with lethargy, cough, and shortness of breath. 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 Pt was discharged to NH on 10/23, and originally was doing well, was able to recognize and interact with wife, was eating. Pt then abruptly became lethargic with increased weakness, inability to take PO, worsening cough. Wife states that the cough first developed prior to discharge from the hospital on 10/23, but has become worse over the past several days. Pt was placed on honey thickened diet yesterday, but had been eating a regular diet on discharge. Pt minimally interactive on exam, able to nod "yes" and "no." Denies pain, SOB.     In ED, initial VS were T101.4  /60 RR19 Sa 97% on 4L NC. Labs significant for WBC of 18.9, creatinine of 1.87 (elevated from 1.03 on 10/23). Chest X-ray showed right basilar opacity which may represent atelectasis vs pna. UA had moderate bacteria, +leuk esterase    This morning patient endorsing no when asked about if in any pain or discomfort.       MEDICATIONS  (STANDING):  dextrose 5%. 1000 milliLiter(s) (50 mL/Hr) IV Continuous <Continuous>  dextrose 50% Injectable 12.5 Gram(s) IV Push once  dextrose 50% Injectable 25 Gram(s) IV Push once  dextrose 50% Injectable 25 Gram(s) IV Push once  heparin  Injectable 5000 Unit(s) SubCutaneous every 8 hours  insulin glargine Injectable (LANTUS) 15 Unit(s) SubCutaneous at bedtime  insulin lispro (HumaLOG) corrective regimen sliding scale   SubCutaneous every 6 hours  levETIRAcetam  IVPB 500 milliGRAM(s) IV Intermittent every 12 hours  metoprolol tartrate 50 milliGRAM(s) Oral two times a day  piperacillin/tazobactam IVPB. 3.375 Gram(s) IV Intermittent every 8 hours  sodium chloride 0.9%. 1000 milliLiter(s) (100 mL/Hr) IV Continuous <Continuous>  tamsulosin 0.4 milliGRAM(s) Oral at bedtime    MEDICATIONS  (PRN):  dextrose 40% Gel 15 Gram(s) Oral once PRN Blood Glucose LESS THAN 70 milliGRAM(s)/deciliter  glucagon  Injectable 1 milliGRAM(s) IntraMuscular once PRN Glucose LESS THAN 70 milligrams/deciliter      Vital Signs Last 24 Hrs  T(C): 36.5 (27 Oct 2018 04:47), Max: 38.6 (26 Oct 2018 15:40)  T(F): 97.7 (27 Oct 2018 04:47), Max: 101.4 (26 Oct 2018 15:40)  HR: 95 (27 Oct 2018 04:47) (61 - 112)  BP: 122/67 (27 Oct 2018 04:47) (100/57 - 122/67)  BP(mean): --  RR: 20 (27 Oct 2018 04:47) (16 - 22)  SpO2: 95% (27 Oct 2018 04:47) (95% - 100%)  CAPILLARY BLOOD GLUCOSE      POCT Blood Glucose.: 107 mg/dL (27 Oct 2018 06:29)  POCT Blood Glucose.: 112 mg/dL (26 Oct 2018 23:42)  POCT Blood Glucose.: 136 mg/dL (26 Oct 2018 21:27)  POCT Blood Glucose.: 92 mg/dL (26 Oct 2018 15:47)    I&O's Summary    26 Oct 2018 07:01  -  27 Oct 2018 07:00  --------------------------------------------------------  IN: 600 mL / OUT: 0 mL / NET: 600 mL      PHYSICAL EXAM:  Constitutional: NAD, awake alert,   Eyes: PERRLA, eye tracking  ENT: no nasal discharge; no oropharyngeal erythema or exudates  Neck: supple; no JVD or thyromegaly  Respiratory: diffuse rhonchi in right lung fields  Cardiac: +S1/S2; RRR; no M/R/G;   Gastrointestinal: soft, NT/ND; no rebound or guarding  Extremities: WWP, no clubbing or cyanosis; no peripheral edema  Vascular: 2+ radial, DP/PT pulses B/L  Lymphatic: no submandibular or cervical LAD  Neurologic: alert, responsive to voice, but only able to answer yes or no questions  PSYCH: lethargic, does not respond verbally to questions        LABS:                        12.6   18.9  )-----------( 475      ( 26 Oct 2018 16:31 )             39.5     10-27    149<H>  |  107  |  51<H>  ----------------------------<  98  4.1   |  26  |  1.80<H>    Ca    8.3<L>      27 Oct 2018 06:40  Phos  4.6     10-27  Mg     2.0     10-27    TPro  7.4  /  Alb  3.2<L>  /  TBili  1.9<H>  /  DBili  x   /  AST  36  /  ALT  19  /  AlkPhos  121<H>  10-26    PT/INR - ( 26 Oct 2018 16:31 )   PT: 14.2 sec;   INR: 1.30 ratio         PTT - ( 26 Oct 2018 16:31 )  PTT:30.3 sec      Urinalysis Basic - ( 26 Oct 2018 17:54 )    Color: Yellow / Appearance: Slightly Turbid / S.018 / pH: x  Gluc: x / Ketone: Negative  / Bili: Negative / Urobili: Negative   Blood: x / Protein: 30 mg/dL / Nitrite: Negative   Leuk Esterase: Large / RBC: 8 /hpf /  /hpf   Sq Epi: x / Non Sq Epi: 0 /hpf / Bacteria: Moderate        Microbiology;    Rapid Respiratory Viral Panel (10.26.18 @ 16:32)    Rapid RVP Result: NotDetec          RADIOLOGY & ADDITIONAL TESTS:    Imaging Personally Reviewed:  < from: CT Head No Cont (10.26.18 @ 19:44) >  FINDINGS: Bilateral adrianna holes are noted. There is a left parietal   craniotomy.    There is no acute intracranial hemorrhage, mass effect, shift of the   midline structures, herniation, extra-axial fluid collection, or   hydrocephalus.    There is diffuse cerebral volume loss with prominence of the sulci,   fissures, and cisternal spaces which is normal for the patient's age.   There is mild deep and periventricular white matter hypoattenuation   statistically compatible with microvascular changes given calcific   atherosclerotic disease of the intracranial arteries.    The paranasal sinuses and mastoid air cells are clear. The imaged orbits   are unremarkable.    < end of copied text >          Consultant(s) Notes Reviewed:          Care Discussed with Consultants/Other Providers:

## 2018-10-27 NOTE — PROGRESS NOTE ADULT - PROBLEM SELECTOR PLAN 6
Patient was found to have hepatic lesion on last admission that was suspected to be the cause of acute hemoperitoneum, and pt was recommended follow up with surgical oncology for diagnostic purposes.   Will require further hepatic workup Patient was found to have hepatic lesion on last admission that was suspected to be the cause of acute hemoperitoneum, and pt was recommended follow up with surgical oncology for diagnostic purposes.   - Will require further hepatic workup

## 2018-10-27 NOTE — PROGRESS NOTE ADULT - PROBLEM SELECTOR PLAN 4
Pt has atrial fibrillation, currently rate controlled with metoprolol 75mg BID. Not on anticoagulation 2/2 multiple bleeds. Has hx of RVR while inpatient, however septic with hypotension on admission. Currently rate controlled  - will c/w decreased dose of 50mg BID, titrate up after severe sepsis resolved

## 2018-10-28 ENCOUNTER — TRANSCRIPTION ENCOUNTER (OUTPATIENT)
Age: 78
End: 2018-10-28

## 2018-10-28 LAB
-  AMPICILLIN: SIGNIFICANT CHANGE UP
-  CIPROFLOXACIN: SIGNIFICANT CHANGE UP
-  LEVOFLOXACIN: SIGNIFICANT CHANGE UP
-  NITROFURANTOIN: SIGNIFICANT CHANGE UP
-  TETRACYCLINE: SIGNIFICANT CHANGE UP
-  VANCOMYCIN: SIGNIFICANT CHANGE UP
ANION GAP SERPL CALC-SCNC: 11 MMOL/L — SIGNIFICANT CHANGE UP (ref 5–17)
ANION GAP SERPL CALC-SCNC: 12 MMOL/L — SIGNIFICANT CHANGE UP (ref 5–17)
ANION GAP SERPL CALC-SCNC: 13 MMOL/L — SIGNIFICANT CHANGE UP (ref 5–17)
BUN SERPL-MCNC: 26 MG/DL — HIGH (ref 7–23)
BUN SERPL-MCNC: 27 MG/DL — HIGH (ref 7–23)
BUN SERPL-MCNC: 34 MG/DL — HIGH (ref 7–23)
CALCIUM SERPL-MCNC: 8.3 MG/DL — LOW (ref 8.4–10.5)
CALCIUM SERPL-MCNC: 8.3 MG/DL — LOW (ref 8.4–10.5)
CALCIUM SERPL-MCNC: 8.4 MG/DL — SIGNIFICANT CHANGE UP (ref 8.4–10.5)
CHLORIDE SERPL-SCNC: 102 MMOL/L — SIGNIFICANT CHANGE UP (ref 96–108)
CHLORIDE SERPL-SCNC: 103 MMOL/L — SIGNIFICANT CHANGE UP (ref 96–108)
CHLORIDE SERPL-SCNC: 103 MMOL/L — SIGNIFICANT CHANGE UP (ref 96–108)
CO2 SERPL-SCNC: 26 MMOL/L — SIGNIFICANT CHANGE UP (ref 22–31)
CO2 SERPL-SCNC: 27 MMOL/L — SIGNIFICANT CHANGE UP (ref 22–31)
CO2 SERPL-SCNC: 29 MMOL/L — SIGNIFICANT CHANGE UP (ref 22–31)
CREAT SERPL-MCNC: 1.07 MG/DL — SIGNIFICANT CHANGE UP (ref 0.5–1.3)
CREAT SERPL-MCNC: 1.08 MG/DL — SIGNIFICANT CHANGE UP (ref 0.5–1.3)
CREAT SERPL-MCNC: 1.31 MG/DL — HIGH (ref 0.5–1.3)
CULTURE RESULTS: SIGNIFICANT CHANGE UP
GLUCOSE BLDC GLUCOMTR-MCNC: 187 MG/DL — HIGH (ref 70–99)
GLUCOSE BLDC GLUCOMTR-MCNC: 217 MG/DL — HIGH (ref 70–99)
GLUCOSE BLDC GLUCOMTR-MCNC: 230 MG/DL — HIGH (ref 70–99)
GLUCOSE BLDC GLUCOMTR-MCNC: 238 MG/DL — HIGH (ref 70–99)
GLUCOSE SERPL-MCNC: 196 MG/DL — HIGH (ref 70–99)
GLUCOSE SERPL-MCNC: 218 MG/DL — HIGH (ref 70–99)
GLUCOSE SERPL-MCNC: 233 MG/DL — HIGH (ref 70–99)
HCT VFR BLD CALC: 33.3 % — LOW (ref 39–50)
HGB BLD-MCNC: 10.8 G/DL — LOW (ref 13–17)
MAGNESIUM SERPL-MCNC: 1.8 MG/DL — SIGNIFICANT CHANGE UP (ref 1.6–2.6)
MCHC RBC-ENTMCNC: 30.3 PG — SIGNIFICANT CHANGE UP (ref 27–34)
MCHC RBC-ENTMCNC: 32.5 GM/DL — SIGNIFICANT CHANGE UP (ref 32–36)
MCV RBC AUTO: 93.4 FL — SIGNIFICANT CHANGE UP (ref 80–100)
METHOD TYPE: SIGNIFICANT CHANGE UP
ORGANISM # SPEC MICROSCOPIC CNT: SIGNIFICANT CHANGE UP
ORGANISM # SPEC MICROSCOPIC CNT: SIGNIFICANT CHANGE UP
PHOSPHATE SERPL-MCNC: 2.6 MG/DL — SIGNIFICANT CHANGE UP (ref 2.5–4.5)
PLATELET # BLD AUTO: 360 K/UL — SIGNIFICANT CHANGE UP (ref 150–400)
POTASSIUM SERPL-MCNC: 3.5 MMOL/L — SIGNIFICANT CHANGE UP (ref 3.5–5.3)
POTASSIUM SERPL-MCNC: 3.6 MMOL/L — SIGNIFICANT CHANGE UP (ref 3.5–5.3)
POTASSIUM SERPL-MCNC: 3.6 MMOL/L — SIGNIFICANT CHANGE UP (ref 3.5–5.3)
POTASSIUM SERPL-SCNC: 3.5 MMOL/L — SIGNIFICANT CHANGE UP (ref 3.5–5.3)
POTASSIUM SERPL-SCNC: 3.6 MMOL/L — SIGNIFICANT CHANGE UP (ref 3.5–5.3)
POTASSIUM SERPL-SCNC: 3.6 MMOL/L — SIGNIFICANT CHANGE UP (ref 3.5–5.3)
RBC # BLD: 3.57 M/UL — LOW (ref 4.2–5.8)
RBC # FLD: 14.1 % — SIGNIFICANT CHANGE UP (ref 10.3–14.5)
SODIUM SERPL-SCNC: 140 MMOL/L — SIGNIFICANT CHANGE UP (ref 135–145)
SODIUM SERPL-SCNC: 142 MMOL/L — SIGNIFICANT CHANGE UP (ref 135–145)
SODIUM SERPL-SCNC: 144 MMOL/L — SIGNIFICANT CHANGE UP (ref 135–145)
SPECIMEN SOURCE: SIGNIFICANT CHANGE UP
VANCOMYCIN FLD-MCNC: 13.2 UG/ML — SIGNIFICANT CHANGE UP
WBC # BLD: 10.4 K/UL — SIGNIFICANT CHANGE UP (ref 3.8–10.5)
WBC # FLD AUTO: 10.4 K/UL — SIGNIFICANT CHANGE UP (ref 3.8–10.5)

## 2018-10-28 PROCEDURE — 99233 SBSQ HOSP IP/OBS HIGH 50: CPT | Mod: GC

## 2018-10-28 RX ORDER — SODIUM CHLORIDE 9 MG/ML
1000 INJECTION, SOLUTION INTRAVENOUS
Qty: 0 | Refills: 0 | Status: DISCONTINUED | OUTPATIENT
Start: 2018-10-28 | End: 2018-10-29

## 2018-10-28 RX ORDER — VANCOMYCIN HCL 1 G
1250 VIAL (EA) INTRAVENOUS EVERY 12 HOURS
Qty: 0 | Refills: 0 | Status: DISCONTINUED | OUTPATIENT
Start: 2018-10-28 | End: 2018-10-30

## 2018-10-28 RX ADMIN — PIPERACILLIN AND TAZOBACTAM 25 GRAM(S): 4; .5 INJECTION, POWDER, LYOPHILIZED, FOR SOLUTION INTRAVENOUS at 18:03

## 2018-10-28 RX ADMIN — Medication 50 MILLIGRAM(S): at 05:40

## 2018-10-28 RX ADMIN — SODIUM CHLORIDE 100 MILLILITER(S): 9 INJECTION, SOLUTION INTRAVENOUS at 13:37

## 2018-10-28 RX ADMIN — HEPARIN SODIUM 5000 UNIT(S): 5000 INJECTION INTRAVENOUS; SUBCUTANEOUS at 13:36

## 2018-10-28 RX ADMIN — Medication 166.67 MILLIGRAM(S): at 18:13

## 2018-10-28 RX ADMIN — SODIUM CHLORIDE 100 MILLILITER(S): 9 INJECTION, SOLUTION INTRAVENOUS at 05:22

## 2018-10-28 RX ADMIN — HEPARIN SODIUM 5000 UNIT(S): 5000 INJECTION INTRAVENOUS; SUBCUTANEOUS at 05:40

## 2018-10-28 RX ADMIN — SODIUM CHLORIDE 75 MILLILITER(S): 9 INJECTION, SOLUTION INTRAVENOUS at 18:04

## 2018-10-28 RX ADMIN — Medication 2: at 18:04

## 2018-10-28 RX ADMIN — INSULIN GLARGINE 15 UNIT(S): 100 INJECTION, SOLUTION SUBCUTANEOUS at 23:08

## 2018-10-28 RX ADMIN — Medication 2: at 13:36

## 2018-10-28 RX ADMIN — LEVETIRACETAM 400 MILLIGRAM(S): 250 TABLET, FILM COATED ORAL at 05:28

## 2018-10-28 RX ADMIN — Medication 2: at 23:08

## 2018-10-28 RX ADMIN — HEPARIN SODIUM 5000 UNIT(S): 5000 INJECTION INTRAVENOUS; SUBCUTANEOUS at 21:50

## 2018-10-28 RX ADMIN — LEVETIRACETAM 400 MILLIGRAM(S): 250 TABLET, FILM COATED ORAL at 18:07

## 2018-10-28 RX ADMIN — Medication 1: at 00:06

## 2018-10-28 RX ADMIN — Medication 1: at 06:52

## 2018-10-28 RX ADMIN — Medication 50 MILLIGRAM(S): at 18:08

## 2018-10-28 RX ADMIN — PIPERACILLIN AND TAZOBACTAM 25 GRAM(S): 4; .5 INJECTION, POWDER, LYOPHILIZED, FOR SOLUTION INTRAVENOUS at 01:11

## 2018-10-28 RX ADMIN — TAMSULOSIN HYDROCHLORIDE 0.4 MILLIGRAM(S): 0.4 CAPSULE ORAL at 21:50

## 2018-10-28 RX ADMIN — PIPERACILLIN AND TAZOBACTAM 25 GRAM(S): 4; .5 INJECTION, POWDER, LYOPHILIZED, FOR SOLUTION INTRAVENOUS at 09:38

## 2018-10-28 NOTE — DISCHARGE NOTE ADULT - PATIENT PORTAL LINK FT
You can access the [facility], offered by [facility], by registering with the following website: [email or web address]

## 2018-10-28 NOTE — PROGRESS NOTE ADULT - PROBLEM SELECTOR PLAN 6
Patient was found to have hepatic lesion on last admission that was suspected to be the cause of acute hemoperitoneum, and pt was recommended follow up with surgical oncology for diagnostic purposes.   - Will require further hepatic workup Patient was found to have hepatic lesion on last admission that was suspected to be the cause of acute hemoperitoneum, and pt was recommended follow up with surgical oncology for diagnostic purposes.   - Will require further hepatic workup OP

## 2018-10-28 NOTE — DISCHARGE NOTE ADULT - MEDICATION SUMMARY - MEDICATIONS TO TAKE
I will START or STAY ON the medications listed below when I get home from the hospital:    acetaminophen 325 mg oral tablet  -- 3 tab(s) by mouth every 8 hours, As Needed - 3)  -- Indication: For Pain    heparin  -- 5000 unit(s) subcutaneous every 8 hours for DVT prophylaxis  -- Indication: For DVT prophylaxis    Keppra 500 mg oral tablet  -- 1 tab(s) by gastrostomy tube 2 times a day  -- Indication: For Seizure    insulin isophane (NPH) 100 units/mL human recombinant subcutaneous suspension  -- 5 unit(s) subcutaneous every 6 hours  -- Indication: For Diabetes    metoprolol tartrate 50 mg oral tablet  -- 1 tab(s) by gastrostomy tube 2 times a day  -- Indication: For Afib    ipratropium-albuterol 0.5 mg-2.5 mg/3 mLinhalation solution  -- 3 milliliter(s) inhaled every 6 hours  -- Indication: For Respiratory    pantoprazole 40 mg oral granule, delayed release  -- 40 milligram(s) by gastrostomy tube 2 times a day (with meals)  -- Indication: For PEG tube I will START or STAY ON the medications listed below when I get home from the hospital:    acetaminophen 325 mg oral tablet  -- 3 tab(s) by mouth every 8 hours, As Needed - 3)  -- Indication: For Pain    Keppra 500 mg oral tablet  -- 1 tab(s) by gastrostomy tube 2 times a day  -- Indication: For Seizure    insulin isophane (NPH) 100 units/mL human recombinant subcutaneous suspension  -- 15 unit(s) subcutaneous every 6 hours  -- Indication: For Diabetes mellitus    metoprolol tartrate 50 mg oral tablet  -- 1 tab(s) by gastrostomy tube 2 times a day  -- Indication: For Afib    ipratropium-albuterol 0.5 mg-2.5 mg/3 mLinhalation solution  -- 3 milliliter(s) inhaled every 6 hours  -- Indication: For Respiratory    pantoprazole 40 mg oral granule, delayed release  -- 40 milligram(s) by gastrostomy tube 2 times a day (with meals)  -- Indication: For GERD    cholecalciferol 1000 intl units oral tablet  -- 1 tab(s) by gastrostomy tube once a day  -- Indication: For Low vitamin D I will START or STAY ON the medications listed below when I get home from the hospital:    acetaminophen 325 mg oral tablet  -- 3 tab(s) by mouth every 8 hours, As Needed - 3)  -- Indication: For Pain    Keppra 500 mg oral tablet  -- 1 tab(s) by gastrostomy tube 2 times a day  -- Indication: For Seizure    insulin isophane (NPH) 100 units/mL human recombinant subcutaneous suspension  -- 15 unit(s) subcutaneous every 6 hours  -- Indication: For Diabetes mellitus    metoprolol tartrate 50 mg oral tablet  -- 1 tab(s) by gastrostomy tube 2 times a day  -- Indication: For Atrial fibrillation    ipratropium-albuterol 0.5 mg-2.5 mg/3 mLinhalation solution  -- 3 milliliter(s) inhaled every 6 hours  -- Indication: For Wheezing / shortness of breath    pantoprazole 40 mg oral granule, delayed release  -- 40 milligram(s) by gastrostomy tube 2 times a day (with meals)  -- Indication: For GERD    cholecalciferol 1000 intl units oral tablet  -- 1 tab(s) by gastrostomy tube once a day  -- Indication: For Low vitamin D

## 2018-10-28 NOTE — DISCHARGE NOTE ADULT - ADDITIONAL INSTRUCTIONS
Followup with your primary care doctor within 1-2 weeks after discharge for further management of your diabetes.  Followup with the gastroenterologist within 1-2 weeks after discharge for further managent of your dysphagia.

## 2018-10-28 NOTE — DISCHARGE NOTE ADULT - INSTRUCTIONS
Tube feeding  Glucerna 1.2 Matheus  Total Volume for 24 hrs: 1680mL  starting tube feed: 30mL/hr  Increase Tube feed rate by 10 mL every 6 hours  Goal tube feed rate: 70mL/hr  Tube feed duration: 24 hours.

## 2018-10-28 NOTE — PROGRESS NOTE ADULT - PROBLEM SELECTOR PLAN 2
Pt meets severe sepsis criteria on admission with WBC of 18.9, fever to 101.4, tachycardia to 112 with CXR showing R basilar pna and grossly positive UA. Ddx includes HCAP, VAP, aspiration pna. Received vanc, zosyn x1 and 2.5L NS bolus in ED.   - c/w zosyn and vanc by level for pna and UTI  - f/u blood and urine cultures  - CT chest for further evaluation of probably pna  - HOB elevated 30-45  - Chest PT   - aspiration precaution  - NPO pending formal speech and swallow  - c/w mIVF: NS at 100 On admission pt met severe sepsis criteria with WBC of 18.9, fever to 101.4, tachycardia to 112 with CXR showing R basilar pna and grossly positive UA. Ddx includes HCAP, VAP, aspiration pna.   - c/w zosyn and vanc by level for pna and UTI  - f/u blood and urine cultures  - CT chest significant for HAYDEN lesion that is increased in size compared to prior scans, no new focal consolidations.  - HOB elevated 30-45  - Chest PT   - aspiration precaution  - NPO pending formal speech and swallow On admission pt met severe sepsis criteria with WBC of 18.9, fever to 101.4, tachycardia to 112 with CXR showing R basilar pna and grossly positive UA. Ddx includes HCAP, VAP, aspiration pna.   - c/w zosyn and vanc by level for pna and UTI  - f/u blood and urine cultures  - CT chest significant for HAYDEN lesion that is increased in size compared to prior scans, no new focal consolidations.  - HOB elevated 30-45  2/2 aspiration vs. VAP vs. HCAP- improved  - Chest PT   - aspiration precaution  - NPO pending formal speech and swallow

## 2018-10-28 NOTE — PROGRESS NOTE ADULT - SUBJECTIVE AND OBJECTIVE BOX
Mart Cortez MD BOB  Internal Medicine PGY-1  Pager [facility]: [phone]; [facility] [id number]    Patient is a 77y old  Male who presents with a chief complaint of altered mental status, cough (27 Oct 2018 08:20)      SUBJECTIVE/OVERNIGHT EVENTS   No acute events overnight. Patient tolerating meals, without n/v. Reports having daily BM. Denies fevers, chills, SOB. ROS otherwise negative.     OBJECTIVE  Vital Signs Last 24 Hrs  T(C): 36.6 (28 Oct 2018 05:24), Max: 36.7 (27 Oct 2018 21:48)  T(F): 97.9 (28 Oct 2018 05:24), Max: 98 (27 Oct 2018 21:48)  HR: 94 (28 Oct 2018 05:24) (81 - 94)  BP: 132/77 (28 Oct 2018 05:24) (114/61 - 136/64)  BP(mean): --  RR: 20 (28 Oct 2018 05:24) (18 - 20)  SpO2: 95% (28 Oct 2018 05:24) (95% - 98%)    I&O's Summary    27 Oct 2018 07:01  -  28 Oct 2018 07:00  --------------------------------------------------------  IN: 1200 mL / OUT: 0 mL / NET: 1200 mL        PHYSICAL EXAM:  GENERAL: NAD, well-developed  HEAD:  Atraumatic, Normocephalic  EYES: EOMI, conjunctiva and sclera clear  NECK: Supple, No JVD  CHEST/LUNG: Clear to auscultation bilaterally; No wheeze  HEART: Regular rate and rhythm; No murmurs, rubs, or gallops  ABDOMEN: Soft, Nontender, Nondistended; Bowel sounds present  EXTREMITIES:  2+ Peripheral Pulses, No clubbing, cyanosis, or edema  PSYCH: AAOx3  NEUROLOGY: non-focal  SKIN: No rashes or lesions    LABS                        11.8   13.2  )-----------( 381      ( 27 Oct 2018 12:23 )             36.6                         12.6   18.9  )-----------( 475      ( 26 Oct 2018 16:31 )             39.5     10-27    148<H>  |  106  |  44<H>  ----------------------------<  141<H>  4.1   |  27  |  1.52<H>  10-27    149<H>  |  107  |  51<H>  ----------------------------<  98  4.1   |  26  |  1.80<H>    Ca    8.6      27 Oct 2018 20:19  Ca    8.3<L>      27 Oct 2018 06:40  Phos  3.3     10-27  Mg     2.0     10-27    TPro  7.4  /  Alb  3.2<L>  /  TBili  1.9<H>  /  DBili  x   /  AST  36  /  ALT  19  /  AlkPhos  121<H>  10-26    CAPILLARY BLOOD GLUCOSE      POCT Blood Glucose.: 187 mg/dL (28 Oct 2018 06:48)  POCT Blood Glucose.: 198 mg/dL (27 Oct 2018 23:54)  POCT Blood Glucose.: 174 mg/dL (27 Oct 2018 21:50)  POCT Blood Glucose.: 133 mg/dL (27 Oct 2018 17:25)  POCT Blood Glucose.: 108 mg/dL (27 Oct 2018 11:49)    PT/INR - ( 26 Oct 2018 16:31 )   PT: 14.2 sec;   INR: 1.30 ratio         PTT - ( 26 Oct 2018 16:31 )  PTT:30.3 sec              Urinalysis Basic - ( 26 Oct 2018 17:54 )    Color: Yellow / Appearance: Slightly Turbid / S.018 / pH: x  Gluc: x / Ketone: Negative  / Bili: Negative / Urobili: Negative   Blood: x / Protein: 30 mg/dL / Nitrite: Negative   Leuk Esterase: Large / RBC: 8 /hpf /  /hpf   Sq Epi: x / Non Sq Epi: 0 /hpf / Bacteria: Moderate        RADIOLOGY & ADDITIONAL TESTS:    MEDICATIONS  (STANDING):  dextrose 5% + sodium chloride 0.225%. 1000 milliLiter(s) (100 mL/Hr) IV Continuous <Continuous>  dextrose 5%. 1000 milliLiter(s) (50 mL/Hr) IV Continuous <Continuous>  dextrose 50% Injectable 12.5 Gram(s) IV Push once  dextrose 50% Injectable 25 Gram(s) IV Push once  dextrose 50% Injectable 25 Gram(s) IV Push once  heparin  Injectable 5000 Unit(s) SubCutaneous every 8 hours  insulin glargine Injectable (LANTUS) 15 Unit(s) SubCutaneous at bedtime  insulin lispro (HumaLOG) corrective regimen sliding scale   SubCutaneous every 6 hours  levETIRAcetam  IVPB 500 milliGRAM(s) IV Intermittent every 12 hours  metoprolol tartrate 50 milliGRAM(s) Oral two times a day  piperacillin/tazobactam IVPB. 3.375 Gram(s) IV Intermittent every 8 hours  tamsulosin 0.4 milliGRAM(s) Oral at bedtime    MEDICATIONS  (PRN):  dextrose 40% Gel 15 Gram(s) Oral once PRN Blood Glucose LESS THAN 70 milliGRAM(s)/deciliter  glucagon  Injectable 1 milliGRAM(s) IntraMuscular once PRN Glucose LESS THAN 70 milligrams/deciliter Mart Cortez MD BOB  Internal Medicine PGY-1  Pager [facility]: [phone]; [facility] [id number]    Patient is a 77y old  Male who presents with a chief complaint of altered mental status, cough (27 Oct 2018 08:20)      SUBJECTIVE/OVERNIGHT EVENTS   No acute events overnight. Patient A&Ox 2 (person and place). Reports that his respiratory symptoms are "better". No other active complaints. Patient tolerating meals, without n/v. Reports having daily BM. Denies fevers, chills, SOB. ROS otherwise negative.     OBJECTIVE  Vital Signs Last 24 Hrs  T(C): 36.6 (28 Oct 2018 05:24), Max: 36.7 (27 Oct 2018 21:48)  T(F): 97.9 (28 Oct 2018 05:24), Max: 98 (27 Oct 2018 21:48)  HR: 94 (28 Oct 2018 05:24) (81 - 94)  BP: 132/77 (28 Oct 2018 05:24) (114/61 - 136/64)  BP(mean): --  RR: 20 (28 Oct 2018 05:24) (18 - 20)  SpO2: 95% (28 Oct 2018 05:24) (95% - 98%)    I&O's Summary    27 Oct 2018 07:01  -  28 Oct 2018 07:00  --------------------------------------------------------  IN: 1200 mL / OUT: 0 mL / NET: 1200 mL      PHYSICAL EXAM:  GENERAL: NAD, well-developed, lethargic   HEAD:  Atraumatic, Normocephalic  EYES: EOMI, conjunctiva and sclera clear  NECK: Supple, No JVD  CHEST/LUNG: Clear to auscultation bilaterally; No wheeze  HEART: Regular rate and rhythm; No murmurs, rubs, or gallops  ABDOMEN: Soft, Nontender, Nondistended; Bowel sounds present  EXTREMITIES:  2+ Peripheral Pulses, No clubbing, cyanosis, or edema  PSYCH: AAOx2  NEUROLOGY: non-focal  SKIN: No rashes or lesions    LABS                        11.8   13.2  )-----------( 381      ( 27 Oct 2018 12:23 )             36.6                         12.6   18.9  )-----------( 475      ( 26 Oct 2018 16:31 )             39.5     10-27    148<H>  |  106  |  44<H>  ----------------------------<  141<H>  4.1   |  27  |  1.52<H>  10-27    149<H>  |  107  |  51<H>  ----------------------------<  98  4.1   |  26  |  1.80<H>    Ca    8.6      27 Oct 2018 20:19  Ca    8.3<L>      27 Oct 2018 06:40  Phos  3.3     10-27  Mg     2.0     10-27    TPro  7.4  /  Alb  3.2<L>  /  TBili  1.9<H>  /  DBili  x   /  AST  36  /  ALT  19  /  AlkPhos  121<H>  10-26    CAPILLARY BLOOD GLUCOSE      POCT Blood Glucose.: 187 mg/dL (28 Oct 2018 06:48)  POCT Blood Glucose.: 198 mg/dL (27 Oct 2018 23:54)  POCT Blood Glucose.: 174 mg/dL (27 Oct 2018 21:50)  POCT Blood Glucose.: 133 mg/dL (27 Oct 2018 17:25)  POCT Blood Glucose.: 108 mg/dL (27 Oct 2018 11:49)    PT/INR - ( 26 Oct 2018 16:31 )   PT: 14.2 sec;   INR: 1.30 ratio         PTT - ( 26 Oct 2018 16:31 )  PTT:30.3 sec              Urinalysis Basic - ( 26 Oct 2018 17:54 )    Color: Yellow / Appearance: Slightly Turbid / S.018 / pH: x  Gluc: x / Ketone: Negative  / Bili: Negative / Urobili: Negative   Blood: x / Protein: 30 mg/dL / Nitrite: Negative   Leuk Esterase: Large / RBC: 8 /hpf /  /hpf   Sq Epi: x / Non Sq Epi: 0 /hpf / Bacteria: Moderate        RADIOLOGY & ADDITIONAL TESTS:    MEDICATIONS  (STANDING):  dextrose 5% + sodium chloride 0.225%. 1000 milliLiter(s) (100 mL/Hr) IV Continuous <Continuous>  dextrose 5%. 1000 milliLiter(s) (50 mL/Hr) IV Continuous <Continuous>  dextrose 50% Injectable 12.5 Gram(s) IV Push once  dextrose 50% Injectable 25 Gram(s) IV Push once  dextrose 50% Injectable 25 Gram(s) IV Push once  heparin  Injectable 5000 Unit(s) SubCutaneous every 8 hours  insulin glargine Injectable (LANTUS) 15 Unit(s) SubCutaneous at bedtime  insulin lispro (HumaLOG) corrective regimen sliding scale   SubCutaneous every 6 hours  levETIRAcetam  IVPB 500 milliGRAM(s) IV Intermittent every 12 hours  metoprolol tartrate 50 milliGRAM(s) Oral two times a day  piperacillin/tazobactam IVPB. 3.375 Gram(s) IV Intermittent every 8 hours  tamsulosin 0.4 milliGRAM(s) Oral at bedtime    MEDICATIONS  (PRN):  dextrose 40% Gel 15 Gram(s) Oral once PRN Blood Glucose LESS THAN 70 milliGRAM(s)/deciliter  glucagon  Injectable 1 milliGRAM(s) IntraMuscular once PRN Glucose LESS THAN 70 milligrams/deciliter Mart Cortez MD BOB  Internal Medicine PGY-1  Pager [facility]: [phone]; [facility] [id number]    Patient is a 77y old  Male who presents with a chief complaint of altered mental status, cough (27 Oct 2018 08:20)      SUBJECTIVE/OVERNIGHT EVENTS   No acute events overnight. Patient A&Ox 2 (person and place). Reports that his respiratory symptoms are "better". No other active complaints. Patient tolerating meals, without n/v. Reports having daily BM. Denies fevers, chills, SOB. ROS otherwise negative.     OBJECTIVE  Vital Signs Last 24 Hrs  T(C): 36.6 (28 Oct 2018 05:24), Max: 36.7 (27 Oct 2018 21:48)  T(F): 97.9 (28 Oct 2018 05:24), Max: 98 (27 Oct 2018 21:48)  HR: 94 (28 Oct 2018 05:24) (81 - 94)  BP: 132/77 (28 Oct 2018 05:24) (114/61 - 136/64)  BP(mean): --  RR: 20 (28 Oct 2018 05:24) (18 - 20)  SpO2: 95% (28 Oct 2018 05:24) (95% - 98%)    I&O's Summary    27 Oct 2018 07:01  -  28 Oct 2018 07:00  --------------------------------------------------------  IN: 1200 mL / OUT: 0 mL / NET: 1200 mL      PHYSICAL EXAM:  GENERAL: NAD, well-developed, lethargic   HEAD:  Atraumatic, Normocephalic  EYES: EOMI, conjunctiva and sclera clear  NECK: Supple, No JVD  CHEST/LUNG: Clear to auscultation bilaterally; No wheeze  HEART: Regular rate and rhythm; No murmurs, rubs, or gallops  ABDOMEN: Soft, Nontender, Nondistended; Bowel sounds present  EXTREMITIES:  2+ Peripheral Pulses, No clubbing, cyanosis, or edema  PSYCH: AAOx2  NEUROLOGY: non-focal  SKIN: No rashes or lesions      LABS:                        10.8   10.4  )-----------( 360      ( 28 Oct 2018 06:54 )             33.3     28 Oct 2018 06:54    144    |  103    |  34     ----------------------------<  196    3.5     |  29     |  1.31     Ca    8.3        28 Oct 2018 06:54  Phos  2.6       28 Oct 2018 06:54  Mg     1.8       28 Oct 2018 06:54      PT/INR - ( 26 Oct 2018 16:31 )   PT: 14.2 sec;   INR: 1.30 ratio         PTT - ( 26 Oct 2018 16:31 )  PTT:30.3 sec  CAPILLARY BLOOD GLUCOSE      POCT Blood Glucose.: 230 mg/dL (28 Oct 2018 13:15)  POCT Blood Glucose.: 187 mg/dL (28 Oct 2018 06:48)  POCT Blood Glucose.: 198 mg/dL (27 Oct 2018 23:54)  POCT Blood Glucose.: 174 mg/dL (27 Oct 2018 21:50)  POCT Blood Glucose.: 133 mg/dL (27 Oct 2018 17:25)    BLOOD CULTURE  10-26 @ 20:01   No growth to date.  --  --  10-26 @ 19:53   50,000 - 99,000 CFU/mL Enterococcus faecalis  --  --    RADIOLOGY & ADDITIONAL TESTS:    Imaging Personally Reviewed:  [ ] YES     MEDICATIONS  (STANDING):  dextrose 5% + sodium chloride 0.225%. 1000 milliLiter(s) (100 mL/Hr) IV Continuous <Continuous>  dextrose 5%. 1000 milliLiter(s) (50 mL/Hr) IV Continuous <Continuous>  dextrose 50% Injectable 12.5 Gram(s) IV Push once  dextrose 50% Injectable 25 Gram(s) IV Push once  dextrose 50% Injectable 25 Gram(s) IV Push once  heparin  Injectable 5000 Unit(s) SubCutaneous every 8 hours  insulin glargine Injectable (LANTUS) 15 Unit(s) SubCutaneous at bedtime  insulin lispro (HumaLOG) corrective regimen sliding scale   SubCutaneous every 6 hours  levETIRAcetam  IVPB 500 milliGRAM(s) IV Intermittent every 12 hours  metoprolol tartrate 50 milliGRAM(s) Oral two times a day  piperacillin/tazobactam IVPB. 3.375 Gram(s) IV Intermittent every 8 hours  tamsulosin 0.4 milliGRAM(s) Oral at bedtime    MEDICATIONS  (PRN):  dextrose 40% Gel 15 Gram(s) Oral once PRN Blood Glucose LESS THAN 70 milliGRAM(s)/deciliter  glucagon  Injectable 1 milliGRAM(s) IntraMuscular once PRN Glucose LESS THAN 70 milligrams/deciliter

## 2018-10-28 NOTE — DISCHARGE NOTE ADULT - PLAN OF CARE
Resolution You presenting with shortness of breath, labored breathing an cough concerning for a pneumonia. We were not sure if the pneumonia was due to aspiration, or associated with your recent hospital admission as you were intubated. We administered broad spectrum antibiotics during your admission. You presented less responsive and interactive during this admission. This was in response to the infection in your lung. Your interactiveness improved as your infection was treated appropriately to antibiotics. You presenting with shortness of breath, labored breathing an cough concerning for a pneumonia. We were not sure if the pneumonia was due to aspiration, or associated with your recent hospital admission as you were intubated. We administered broad spectrum antibiotics during your admission. You responded well to the antibiotics and completed the course successfully. Pneumonia resolved and your mental status came back to baseline. You have weak coughing strength, and has higher risk of aspiration. You presented less responsive and interactive during this admission. This was in response to the infection in your lung. Your mental status improved as your infection was treated appropriately to antibiotics. However, your swallowing function remained impaired, which was shown by speech and swallow examination including barium swallow test. After discussion, removable PEG tube was placed for the nutrition and Tube feed was started, which you tolerated well. Continue care at the care facility and please follow up with the gastroenterologists as an outpatient. Continue with PEG Tube feeding until his dysphagia resolves.

## 2018-10-28 NOTE — DISCHARGE NOTE ADULT - MEDICATION SUMMARY - MEDICATIONS TO STOP TAKING
I will STOP taking the medications listed below when I get home from the hospital:    Flomax 0.4 mg oral capsule  -- 1 cap(s) by mouth once a day    Lantus 100 units/mL subcutaneous solution  -- 40 unit(s) subcutaneous once a day (at bedtime)    NovoLOG 100 units/mL subcutaneous solution  -- inject as pre sliding scale:    if 0 - 150 = 11 units  if 151 - 200 = 13 units  if 201 - 250 = 15 units  if 251 - 300 = 17 units  if 301 - 350 = 19 units  if 351 - 400 = 21 units  Notify MD if BG is greater than 400    levalbuterol 0.63 mg/3 mL inhalation solution  -- 3 milliliter(s) inhaled every 6 hours    senna oral tablet  -- 2 tab(s) by mouth once a day (at bedtime)    docusate sodium 10 mg/mL oral liquid  -- 10 milliliter(s) by mouth 3 times a day    lactulose 10 g/15 mL oral syrup  -- 30 milliliter(s) by mouth once a day, As needed, constipation    polyethylene glycol 3350 oral powder for reconstitution  -- 17 gram(s) by mouth once a day I will STOP taking the medications listed below when I get home from the hospital:    Lantus 100 units/mL subcutaneous solution  -- 40 unit(s) subcutaneous once a day (at bedtime)    NovoLOG 100 units/mL subcutaneous solution  -- inject as pre sliding scale:    if 0 - 150 = 11 units  if 151 - 200 = 13 units  if 201 - 250 = 15 units  if 251 - 300 = 17 units  if 301 - 350 = 19 units  if 351 - 400 = 21 units  Notify MD if BG is greater than 400    levalbuterol 0.63 mg/3 mL inhalation solution  -- 3 milliliter(s) inhaled every 6 hours    senna oral tablet  -- 2 tab(s) by mouth once a day (at bedtime)    docusate sodium 10 mg/mL oral liquid  -- 10 milliliter(s) by mouth 3 times a day    lactulose 10 g/15 mL oral syrup  -- 30 milliliter(s) by mouth once a day, As needed, constipation    polyethylene glycol 3350 oral powder for reconstitution  -- 17 gram(s) by mouth once a day    heparin  -- 5000 unit(s) subcutaneous every 8 hours for DVT prophylaxis

## 2018-10-28 NOTE — DISCHARGE NOTE ADULT - CARE PROVIDER_API CALL
Jovanny Angelo (DO), Gastroenterology; Internal Medicine  [address] [place] [address]  Phone: [phone]  Fax: [phone]

## 2018-10-28 NOTE — PROGRESS NOTE ADULT - PROBLEM SELECTOR PLAN 1
Patient now minimally responsive baseline mental status of  minimally responsive and interactive at baseline, acutely changed from discharge on 10/23 per wife at bedside. 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 Pt also has hx of hospital/ICU associated delirium requiring haldol, physical restraints. Per wife, pt did not have witnessed seizure activity at home.  - continue infectious workup as noted in problem 2  - f/u CT head  - currently not agitated, consider haldol PRN  - c/w antiepileptics  - fall precautions  - bedrest for now Patient now minimally responsive baseline mental status of  minimally responsive and interactive at baseline, acutely changed from discharge on 10/23 per wife at bedside. 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 Pt also has hx of hospital/ICU associated delirium requiring haldol, physical restraints. Per wife, pt did not have witnessed seizure activity at home.  - continue infectious workup  - CT head without signs of acute ICH  - hypernatremia (149) on admission, improved on 1/4 NS @ 100 to 144. Will decrease rate to 75cc per hour   - currently not agitated, consider haldol PRN  - c/w antiepileptics  - fall precautions  - bedrest for now Patient now minimally responsive baseline mental status of  minimally responsive and interactive at baseline, acutely changed from discharge on 10/23 per wife at bedside. 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 Pt also has hx of hospital/ICU associated delirium requiring haldol, physical restraints. Per wife, pt did not have witnessed seizure activity at home.  - continue infectious workup  - CT head without signs of acute ICH  - hypernatremia (149) on admission, improved on 1/4 NS @ 100 to 144. Will decrease rate to 75cc per hour, continue trending BMP q6 hours.  - currently not agitated, consider haldol PRN  - c/w antiepileptics  - fall precautions  - bedrest for now

## 2018-10-28 NOTE — DISCHARGE NOTE ADULT - HOSPITAL COURSE
Pt is a 77M with PMHx of afib (not on AC due to prior ICH), HTN, DMII, ICH 2015 on coumadin requiring bilateral craniotomy, who presented with lethargy, cough, and shortness of breath. Pt was recently hospitalized from 10/11 - 10/23 for abd pain, found to have nontraumatic hemoperitoneum requiring massive transfusion protocol activation, intubation and prolonged SICU stay. Admitted for acute on chronic toxic metabolic encephalopathy. Pt presented with lethargy, increased weakness, inability to take PO, worsening cough. Possible aspiration as patient had been eating a regular diet on discharge, (thickened honey during prior admission). Presented minimally interactive on exam, able to nod "yes" and "no." a decline from baseline according to collateral history from wife at bedside.   Labs significant for WBC of 18.9, creatinine of 1.87 (elevated from 1.03 on 10/23). Chest X-ray showed right basilar opacity which may represent atelectasis vs pna. UA had moderate bacteria, +leuk esterase. Patient given broad spectrum coverage with Vanc and Zosyn, uncomplicated hospital course. Pt is a 77M with PMHx of afib (not on AC due to prior ICH), HTN, DMII, ICH 2015 on coumadin requiring bilateral craniotomy, who presented with lethargy, cough, and shortness of breath. Pt was recently hospitalized from 10/11 - 10/23 for abd pain, found to have nontraumatic hemoperitoneum requiring massive transfusion protocol activation, intubation and prolonged SICU stay. Admitted for acute on chronic toxic metabolic encephalopathy. Pt presented with lethargy, increased weakness, inability to take PO, worsening cough. Possible aspiration as patient had been eating a regular diet on discharge, (thickened honey during prior admission). Presented minimally interactive on exam, able to nod "yes" and "no." a decline from baseline according to collateral history from wife at bedside.   Labs significant for WBC of 18.9, creatinine of 1.87 (elevated from 1.03 on 10/23). Chest X-ray showed right basilar opacity which may represent atelectasis vs pna. UA had moderate bacteria, +leuk esterase. Patient given broad spectrum coverage with Vanc and Zosyn, uncomplicated hospital course.   Patient responded well to the Antibiotics and successfully completed the full course of antibiotics. His metabolic encephalopathy from the pneumonia resolved as well, yet patient continued to have hypophonia as well as dysphagia. ENT was consulted, and his vocal cord seems to be traumatized likely due to recent intubation event. No increased secretion was noted but there was poor sensation at laryngeal exam. No intervention by ENT was done. Speech and Swallow evaluation including barium swallow study was performed to reassess his dysphagia, yet even after resolution of infection, his swallowing function remained poor. He did not tolerate NG tube and self removed the NG tubes multiple times. After long discussion with patient and patient's wife, removable PEG tube was placed by GI. Binder is also placed to prevent patient from pulling out the PEG tube, since patient has tendency to pull out lines and tubes. Tube feed was restarted and he was monitored closely for possible refeeding syndrome. His BMP remained stable and he was hemodynamically stable on discharge date.   He will need continuous aspiration precaution as his swallow function is impaired as well as coughing is weak.   Patient is scheduled to be discharged to long term care nursing facility. Pt is a 77M with PMHx of afib (not on AC due to prior ICH), HTN, DMII, ICH 2015 on coumadin requiring bilateral craniotomy, who presented with lethargy, cough, and shortness of breath. Pt was recently hospitalized from 10/11 - 10/23 for abd pain, found to have nontraumatic hemoperitoneum requiring massive transfusion protocol activation, intubation and prolonged SICU stay. Admitted for acute on chronic toxic metabolic encephalopathy. Pt presented with lethargy, increased weakness, inability to take PO, worsening cough. Possible aspiration as patient had been eating a regular diet on discharge, (thickened honey during prior admission). Presented minimally interactive on exam, able to nod "yes" and "no." a decline from baseline according to collateral history from wife at bedside.   Labs significant for WBC of 18.9, creatinine of 1.87 (elevated from 1.03 on 10/23). Chest X-ray showed right basilar opacity which may represent atelectasis vs pna. UA had moderate bacteria, +leuk esterase. Patient given broad spectrum coverage with Vanc and Zosyn, uncomplicated hospital course.   Patient responded well to the Antibiotics and successfully completed the full course of antibiotics. His metabolic encephalopathy from the pneumonia resolved as well, yet patient continued to have hypophonia as well as dysphagia. ENT was consulted, and his vocal cord seems to be traumatized likely due to recent intubation event. No increased secretion was noted but there was poor sensation at laryngeal exam. No intervention by ENT was done. Speech and Swallow evaluation including barium swallow study was performed to reassess his dysphagia, yet even after resolution of infection, his swallowing function remained poor. He did not tolerate NG tube and self removed the NG tubes multiple times. After long discussion with patient and patient's wife, removable PEG tube was placed by GI. Binder is also placed to prevent patient from pulling out the PEG tube, since patient has tendency to pull out lines and tubes. Tube feed was restarted and he was monitored closely for possible refeeding syndrome. Neurology was consulted for possible reversible cause of dysphagia, and MG workup has been ordered, which is still pending. His BMP remained stable and he was hemodynamically stable on discharge date. He will need continuous aspiration precaution as his swallow function is impaired as well as coughing is weak. Patient is scheduled to be discharged to long term care nursing facility. He will need continued monitoring of his BG has he transitioned to Tube feed and need corrective sliding scale insulin as well as adjustments of insulin NPH regimen. Pt is a 77M with PMHx of afib (not on AC due to prior ICH), HTN, DMII, ICH 2015 on coumadin requiring bilateral craniotomy, who presented with lethargy, cough, and shortness of breath. Pt was recently hospitalized from 10/11 - 10/23 for abd pain, found to have nontraumatic hemoperitoneum requiring massive transfusion protocol activation, intubation and prolonged SICU stay. Admitted for acute on chronic toxic metabolic encephalopathy. Pt presented with lethargy, increased weakness, inability to take PO, worsening cough. Possible aspiration as patient had been eating a regular diet on discharge, (thickened honey during prior admission). Presented minimally interactive on exam, able to nod "yes" and "no." a decline from baseline according to collateral history from wife at bedside.   Labs significant for WBC of 18.9, creatinine of 1.87 (elevated from 1.03 on 10/23). Chest X-ray showed right basilar opacity which may represent atelectasis vs pna. UA had moderate bacteria, +leuk esterase. Patient given broad spectrum coverage with Vanc and Zosyn, uncomplicated hospital course.   Patient responded well to the Antibiotics and successfully completed the full course of antibiotics. His metabolic encephalopathy from the pneumonia resolved as well, yet patient continued to have hypophonia as well as dysphagia. ENT was consulted, and his vocal cord seems to be traumatized likely due to recent intubation event. No increased secretion was noted but there was poor sensation at laryngeal exam. No intervention by ENT was done. Speech and Swallow evaluation including barium swallow study was performed to reassess his dysphagia, yet even after resolution of infection, his swallowing function remained poor. He did not tolerate NG tube and self removed the NG tubes multiple times. After long discussion with patient and patient's wife, removable PEG tube was placed by GI. Binder is also placed to prevent patient from pulling out the PEG tube, since patient has tendency to pull out lines and tubes. Tube feed was restarted and he was monitored closely for possible refeeding syndrome. Neurology was consulted for possible reversible cause of dysphagia, and MG workup has been ordered, which is still pending. His BMP remained stable and he was hemodynamically stable on discharge date. He will need continuous aspiration precaution as his swallow function is impaired as well as coughing is weak. Patient is scheduled to be discharged to long term care nursing facility. He will need continued monitoring of his BG has he transitioned to Tube feed and need corrective sliding scale insulin as well as adjustments of insulin NPH regimen, discharged on NPH 15u q6h. Mr. Kingsley is a 77M with PMHx of afib (not on AC due to prior ICH), HTN, DMII, ICH 2015 on coumadin requiring bilateral craniotomy, who presented with lethargy, cough, and shortness of breath. Pt was recently hospitalized from 10/11 - 10/23 for abd pain, found to have nontraumatic hemoperitoneum requiring massive transfusion protocol activation, intubation and prolonged SICU stay. Admitted for acute on chronic toxic metabolic encephalopathy. Pt presented with lethargy, increased weakness, inability to take PO, worsening cough. Possible aspiration as patient had been eating a regular diet on discharge, (thickened honey during prior admission). Presented minimally interactive on exam, able to nod "yes" and "no." a decline from baseline according to collateral history from wife at bedside.   Labs significant for WBC of 18.9, creatinine of 1.87 (elevated from 1.03 on 10/23). Chest X-ray showed right basilar opacity which may represent atelectasis vs pna. UA had moderate bacteria, +leuk esterase. Patient given broad spectrum coverage with Vanc and Zosyn, uncomplicated hospital course. Patient responded well to the Antibiotics and successfully completed the full course of antibiotics.     His metabolic encephalopathy from the pneumonia resolved as well, yet patient continued to have hypophonia as well as dysphagia. ENT was consulted, and his vocal cord seems to be traumatized likely due to recent intubation event and importantly patient has no pharyngeal swallow reflex. No increased secretion was noted but there was poor sensation at laryngeal exam. No intervention by ENT was done. Speech and Swallow evaluation including barium swallow study was performed to reassess his dysphagia, yet even after resolution of infection, his swallowing function remained poor. Extensive evaluation by neurology to elucidate etiology of swallowing dysfunction was unrevealing. He did not tolerate NG tube and self removed the NG tubes multiple times. After long discussion with patient and patient's wife re: risks and benefits, removable PEG tube was placed by GI. Binder was also placed to prevent patient from pulling out the PEG tube, since patient has tendency to pull out lines and tubes. Tube feed was restarted and he was monitored closely for possible refeeding syndrome. Neurology was consulted for possible reversible cause of dysphagia, and MG workup has been ordered, which is still pending. His BMP remained stable and he was hemodynamically stable on discharge date. He will need continuous aspiration precaution as his swallow function is impaired as well as coughing is weak. Patient is scheduled to be discharged to long term care nursing facility. He will need continued monitoring of his BG has he transitioned to Tube feed and need corrective sliding scale insulin as well as adjustments of insulin NPH regimen, discharged on NPH 15u q6h. Patient is high risk for re admission w/ continued aspiration risk w/ PEG tube. Discussed course at length with wife prior to discharge.

## 2018-10-28 NOTE — PROGRESS NOTE ADULT - PROBLEM SELECTOR PLAN 3
Creatinine elevated to 1.8 on admission, suspect pre-renal due to severe sepsis  Continue IVF, repeat in AM  Will obtain urine studies if not improved  Check bladder scan if patient fails to void On admission Creatinine elevated to 1.8 on admission, suspect pre-renal due to severe sepsis  Cr. Downtrending s/p IVF  Continue IVF, repeat in AM  Will obtain urine studies if not improved  Check bladder scan if patient fails to void

## 2018-10-28 NOTE — PROGRESS NOTE ADULT - ASSESSMENT
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 77M with PMHx of afib not on anticoagulation, HTN, DMII, ICH on coumadin requiring bilateral craniotomy who presents with severe sepsis mediated acute on chronic toxic metabolic encephalopathy and SOB likely 2/2 pna from hospitalization vs mechanical ventilation vs aspiration and UTI.

## 2018-10-28 NOTE — PROGRESS NOTE ADULT - ATTENDING COMMENTS
I personally performed the E/M service provided and was physically present during key portions of the service furnished by the resident/fellow. I agree with the history, physical and assessment/plan as stated above.

## 2018-10-28 NOTE — DISCHARGE NOTE ADULT - MEDICATION SUMMARY - MEDICATIONS TO CHANGE
I will SWITCH the dose or number of times a day I take the medications listed below when I get home from the hospital:    metoprolol tartrate 75 mg oral tablet  -- 1 tab(s) by mouth 2 times a day I will SWITCH the dose or number of times a day I take the medications listed below when I get home from the hospital:    metoprolol tartrate 75 mg oral tablet  -- 1 tab(s) by mouth 2 times a day    insulin isophane (NPH) 100 units/mL human recombinant subcutaneous suspension  -- 5 unit(s) subcutaneous every 6 hours    ipratropium-albuterol 0.5 mg-2.5 mg/3 mLinhalation solution  -- 3 milliliter(s) inhaled every 6 hours

## 2018-10-28 NOTE — DISCHARGE NOTE ADULT - CARE PLAN
Principal Discharge DX:	Pneumonia due to infectious organism, unspecified laterality, unspecified part of lung  Goal:	Resolution  Assessment and plan of treatment:	You presenting with shortness of breath, labored breathing an cough concerning for a pneumonia. We were not sure if the pneumonia was due to aspiration, or associated with your recent hospital admission as you were intubated. We administered broad spectrum antibiotics during your admission.  Secondary Diagnosis:	Toxic metabolic encephalopathy  Assessment and plan of treatment:	You presented less responsive and interactive during this admission. This was in response to the infection in your lung. Your interactiveness improved as your infection was treated appropriately to antibiotics. Principal Discharge DX:	Pneumonia due to infectious organism, unspecified laterality, unspecified part of lung  Goal:	Resolution  Assessment and plan of treatment:	You presenting with shortness of breath, labored breathing an cough concerning for a pneumonia. We were not sure if the pneumonia was due to aspiration, or associated with your recent hospital admission as you were intubated. We administered broad spectrum antibiotics during your admission. You responded well to the antibiotics and completed the course successfully. Pneumonia resolved and your mental status came back to baseline. You have weak coughing strength, and has higher risk of aspiration.  Secondary Diagnosis:	Dysphagia, oropharyngeal  Assessment and plan of treatment:	You presented less responsive and interactive during this admission. This was in response to the infection in your lung. Your mental status improved as your infection was treated appropriately to antibiotics. However, your swallowing function remained impaired, which was shown by speech and swallow examination including barium swallow test. After discussion, removable PEG tube was placed for the nutrition and Tube feed was started, which you tolerated well. Continue care at the care facility and please follow up with the gastroenterologists as an outpatient. Principal Discharge DX:	Pneumonia due to infectious organism, unspecified laterality, unspecified part of lung  Goal:	Resolution  Assessment and plan of treatment:	You presenting with shortness of breath, labored breathing an cough concerning for a pneumonia. We were not sure if the pneumonia was due to aspiration, or associated with your recent hospital admission as you were intubated. We administered broad spectrum antibiotics during your admission. You responded well to the antibiotics and completed the course successfully. Pneumonia resolved and your mental status came back to baseline. You have weak coughing strength, and has higher risk of aspiration.  Secondary Diagnosis:	Dysphagia, oropharyngeal  Goal:	Continue with PEG Tube feeding until his dysphagia resolves.  Assessment and plan of treatment:	You presented less responsive and interactive during this admission. This was in response to the infection in your lung. Your mental status improved as your infection was treated appropriately to antibiotics. However, your swallowing function remained impaired, which was shown by speech and swallow examination including barium swallow test. After discussion, removable PEG tube was placed for the nutrition and Tube feed was started, which you tolerated well. Continue care at the care facility and please follow up with the gastroenterologists as an outpatient.

## 2018-10-29 DIAGNOSIS — J38.4 EDEMA OF LARYNX: ICD-10-CM

## 2018-10-29 LAB
ALBUMIN SERPL ELPH-MCNC: 2.6 G/DL — LOW (ref 3.3–5)
ALP SERPL-CCNC: 92 U/L — SIGNIFICANT CHANGE UP (ref 40–120)
ALT FLD-CCNC: 11 U/L — SIGNIFICANT CHANGE UP (ref 10–45)
ANION GAP SERPL CALC-SCNC: 12 MMOL/L — SIGNIFICANT CHANGE UP (ref 5–17)
AST SERPL-CCNC: 18 U/L — SIGNIFICANT CHANGE UP (ref 10–40)
BASOPHILS # BLD AUTO: 0 K/UL — SIGNIFICANT CHANGE UP (ref 0–0.2)
BASOPHILS NFR BLD AUTO: 0.5 % — SIGNIFICANT CHANGE UP (ref 0–2)
BILIRUB SERPL-MCNC: 1.9 MG/DL — HIGH (ref 0.2–1.2)
BUN SERPL-MCNC: 24 MG/DL — HIGH (ref 7–23)
CALCIUM SERPL-MCNC: 8.2 MG/DL — LOW (ref 8.4–10.5)
CHLORIDE SERPL-SCNC: 100 MMOL/L — SIGNIFICANT CHANGE UP (ref 96–108)
CO2 SERPL-SCNC: 27 MMOL/L — SIGNIFICANT CHANGE UP (ref 22–31)
CREAT SERPL-MCNC: 1.06 MG/DL — SIGNIFICANT CHANGE UP (ref 0.5–1.3)
EOSINOPHIL # BLD AUTO: 0.1 K/UL — SIGNIFICANT CHANGE UP (ref 0–0.5)
EOSINOPHIL NFR BLD AUTO: 1.7 % — SIGNIFICANT CHANGE UP (ref 0–6)
GLUCOSE BLDC GLUCOMTR-MCNC: 140 MG/DL — HIGH (ref 70–99)
GLUCOSE BLDC GLUCOMTR-MCNC: 182 MG/DL — HIGH (ref 70–99)
GLUCOSE BLDC GLUCOMTR-MCNC: 208 MG/DL — HIGH (ref 70–99)
GLUCOSE BLDC GLUCOMTR-MCNC: 227 MG/DL — HIGH (ref 70–99)
GLUCOSE SERPL-MCNC: 207 MG/DL — HIGH (ref 70–99)
HCT VFR BLD CALC: 34.6 % — LOW (ref 39–50)
HGB BLD-MCNC: 11.3 G/DL — LOW (ref 13–17)
LYMPHOCYTES # BLD AUTO: 1.3 K/UL — SIGNIFICANT CHANGE UP (ref 1–3.3)
LYMPHOCYTES # BLD AUTO: 14.9 % — SIGNIFICANT CHANGE UP (ref 13–44)
MAGNESIUM SERPL-MCNC: 1.7 MG/DL — SIGNIFICANT CHANGE UP (ref 1.6–2.6)
MCHC RBC-ENTMCNC: 30.5 PG — SIGNIFICANT CHANGE UP (ref 27–34)
MCHC RBC-ENTMCNC: 32.8 GM/DL — SIGNIFICANT CHANGE UP (ref 32–36)
MCV RBC AUTO: 92.9 FL — SIGNIFICANT CHANGE UP (ref 80–100)
MONOCYTES # BLD AUTO: 0.9 K/UL — SIGNIFICANT CHANGE UP (ref 0–0.9)
MONOCYTES NFR BLD AUTO: 10.2 % — SIGNIFICANT CHANGE UP (ref 2–14)
NEUTROPHILS # BLD AUTO: 6.5 K/UL — SIGNIFICANT CHANGE UP (ref 1.8–7.4)
NEUTROPHILS NFR BLD AUTO: 72.7 % — SIGNIFICANT CHANGE UP (ref 43–77)
PHOSPHATE SERPL-MCNC: 2 MG/DL — LOW (ref 2.5–4.5)
PLATELET # BLD AUTO: 360 K/UL — SIGNIFICANT CHANGE UP (ref 150–400)
POTASSIUM SERPL-MCNC: 3.5 MMOL/L — SIGNIFICANT CHANGE UP (ref 3.5–5.3)
POTASSIUM SERPL-SCNC: 3.5 MMOL/L — SIGNIFICANT CHANGE UP (ref 3.5–5.3)
PROT SERPL-MCNC: 6.2 G/DL — SIGNIFICANT CHANGE UP (ref 6–8.3)
RBC # BLD: 3.72 M/UL — LOW (ref 4.2–5.8)
RBC # FLD: 14.3 % — SIGNIFICANT CHANGE UP (ref 10.3–14.5)
SODIUM SERPL-SCNC: 139 MMOL/L — SIGNIFICANT CHANGE UP (ref 135–145)
WBC # BLD: 9 K/UL — SIGNIFICANT CHANGE UP (ref 3.8–10.5)
WBC # FLD AUTO: 9 K/UL — SIGNIFICANT CHANGE UP (ref 3.8–10.5)

## 2018-10-29 PROCEDURE — 99221 1ST HOSP IP/OBS SF/LOW 40: CPT | Mod: 25

## 2018-10-29 PROCEDURE — 31575 DIAGNOSTIC LARYNGOSCOPY: CPT

## 2018-10-29 PROCEDURE — 99233 SBSQ HOSP IP/OBS HIGH 50: CPT | Mod: GC

## 2018-10-29 RX ORDER — INSULIN GLARGINE 100 [IU]/ML
11 INJECTION, SOLUTION SUBCUTANEOUS AT BEDTIME
Qty: 0 | Refills: 0 | Status: DISCONTINUED | OUTPATIENT
Start: 2018-10-29 | End: 2018-11-04

## 2018-10-29 RX ORDER — INSULIN LISPRO 100/ML
VIAL (ML) SUBCUTANEOUS EVERY 6 HOURS
Qty: 0 | Refills: 0 | Status: DISCONTINUED | OUTPATIENT
Start: 2018-10-29 | End: 2018-11-04

## 2018-10-29 RX ORDER — METOPROLOL TARTRATE 50 MG
5 TABLET ORAL EVERY 6 HOURS
Qty: 0 | Refills: 0 | Status: DISCONTINUED | OUTPATIENT
Start: 2018-10-29 | End: 2018-11-06

## 2018-10-29 RX ADMIN — HEPARIN SODIUM 5000 UNIT(S): 5000 INJECTION INTRAVENOUS; SUBCUTANEOUS at 13:02

## 2018-10-29 RX ADMIN — PIPERACILLIN AND TAZOBACTAM 25 GRAM(S): 4; .5 INJECTION, POWDER, LYOPHILIZED, FOR SOLUTION INTRAVENOUS at 08:57

## 2018-10-29 RX ADMIN — Medication 2: at 13:02

## 2018-10-29 RX ADMIN — Medication 166.67 MILLIGRAM(S): at 05:23

## 2018-10-29 RX ADMIN — PIPERACILLIN AND TAZOBACTAM 25 GRAM(S): 4; .5 INJECTION, POWDER, LYOPHILIZED, FOR SOLUTION INTRAVENOUS at 01:15

## 2018-10-29 RX ADMIN — Medication 1: at 23:04

## 2018-10-29 RX ADMIN — Medication 2: at 06:29

## 2018-10-29 RX ADMIN — Medication 62.5 MILLIMOLE(S): at 17:47

## 2018-10-29 RX ADMIN — LEVETIRACETAM 400 MILLIGRAM(S): 250 TABLET, FILM COATED ORAL at 05:12

## 2018-10-29 RX ADMIN — INSULIN GLARGINE 11 UNIT(S): 100 INJECTION, SOLUTION SUBCUTANEOUS at 23:03

## 2018-10-29 RX ADMIN — HEPARIN SODIUM 5000 UNIT(S): 5000 INJECTION INTRAVENOUS; SUBCUTANEOUS at 23:03

## 2018-10-29 RX ADMIN — HEPARIN SODIUM 5000 UNIT(S): 5000 INJECTION INTRAVENOUS; SUBCUTANEOUS at 05:16

## 2018-10-29 RX ADMIN — SODIUM CHLORIDE 75 MILLILITER(S): 9 INJECTION, SOLUTION INTRAVENOUS at 05:11

## 2018-10-29 RX ADMIN — Medication 50 MILLIGRAM(S): at 05:20

## 2018-10-29 RX ADMIN — PIPERACILLIN AND TAZOBACTAM 25 GRAM(S): 4; .5 INJECTION, POWDER, LYOPHILIZED, FOR SOLUTION INTRAVENOUS at 17:47

## 2018-10-29 RX ADMIN — LEVETIRACETAM 400 MILLIGRAM(S): 250 TABLET, FILM COATED ORAL at 17:48

## 2018-10-29 RX ADMIN — Medication 5 MILLIGRAM(S): at 17:55

## 2018-10-29 RX ADMIN — Medication 166.67 MILLIGRAM(S): at 17:48

## 2018-10-29 NOTE — PROGRESS NOTE ADULT - PROBLEM SELECTOR PLAN 3
On admission Creatinine elevated to 1.8 on admission, suspect pre-renal due to severe sepsis  Cr. Downtrending s/p IVF  Continue IVF, repeat in AM  Will obtain urine studies if not improved  Check bladder scan if patient fails to void Cr 1.8 on admission, likely pre-renal in setting of sepsis. Now resolved s/p IVF.

## 2018-10-29 NOTE — SWALLOW BEDSIDE ASSESSMENT ADULT - PHARYNGEAL PHASE
Absent trigger of swallow/Delayed throat clear post oral intake/Delayed pharyngeal swallow/Decreased laryngeal elevation

## 2018-10-29 NOTE — CONSULT NOTE ADULT - SUBJECTIVE AND OBJECTIVE BOX
CC: tracheal malacia thickened secretions, aspiration     HPI: 77M with PMHx of afib not on anticoagulation, HTN, DMII, ICH on coumadin requiring bilateral craniotomy who presents with severe sepsis mediated acute on chronic toxic metabolic encephalopathy and SOB likely 2/2 pna from hospitalization vs mechanical ventilation vs aspiration and UTI. Speech dx pt with significant oropharyngeal dysphagia which appears to be superimposed upon by cognitive linguistic deficits.  Pt with suspected reduced management of secretions in addition to significant hypophonia.  Pharyngeal swallow was severely delayed to absent and required oral suctioning. 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 1000 milliLiter(s) (50 mL/Hr) IV Continuous <Continuous>  dextrose 50% Injectable 12.5 Gram(s) IV Push once  dextrose 50% Injectable 25 Gram(s) IV Push once  dextrose 50% Injectable 25 Gram(s) IV Push once  heparin  Injectable 5000 Unit(s) SubCutaneous every 8 hours  insulin glargine Injectable (LANTUS) 11 Unit(s) SubCutaneous at bedtime  insulin lispro (HumaLOG) corrective regimen sliding scale   SubCutaneous every 6 hours  levETIRAcetam  IVPB 500 milliGRAM(s) IV Intermittent every 12 hours  metoprolol tartrate Injectable 5 milliGRAM(s) IV Push every 6 hours  piperacillin/tazobactam IVPB. 3.375 Gram(s) IV Intermittent every 8 hours  sodium phosphate IVPB 15 milliMole(s) IV Intermittent once  vancomycin  IVPB 1250 milliGRAM(s) IV Intermittent every 12 hours    MEDICATIONS  (PRN):  dextrose 40% Gel 15 Gram(s) Oral once PRN Blood Glucose LESS THAN 70 milliGRAM(s)/deciliter  glucagon  Injectable 1 milliGRAM(s) IntraMuscular once PRN Glucose LESS THAN 70 milligrams/deciliter      Social History: no tobacco, no etoh     Family history: Pt denies any sign FHx    ROS:   ENT: all negative except as noted in HPI   CV: denies palpitations  Pulm: denies SOB, cough, hemoptysis  GI: denies change in apetite, indigestion, n/v  : denies pertinent urinary symptoms, urgency  Neuro: denies numbness/tingling, loss of sensation  Psych: denies anxiety  MS: denies muscle weakness, instability  Heme: denies easy bruising or bleeding  Endo: denies heat/cold intolerance, excessive sweating  Vascular: denies LE edema    Vital Signs Last 24 Hrs  T(C): 36.8 (29 Oct 2018 13:07), Max: 37 (29 Oct 2018 05:07)  T(F): 98.2 (29 Oct 2018 13:07), Max: 98.6 (29 Oct 2018 05:07)  HR: 73 (29 Oct 2018 13:07) (73 - 85)  BP: 113/66 (29 Oct 2018 13:07) (113/66 - 144/75)  BP(mean): --  RR: 18 (29 Oct 2018 13:07) (18 - 18)  SpO2: 97% (29 Oct 2018 13:07) (93% - 97%)                          11.3   9.0   )-----------( 360      ( 29 Oct 2018 07:59 )             34.6    10-29    139  |  100  |  24<H>  ----------------------------<  207<H>  3.5   |  27  |  1.06    Ca    8.2<L>      29 Oct 2018 07:59  Phos  2.0     10-29  Mg     1.7     10-29    TPro  6.2  /  Alb  2.6<L>  /  TBili  1.9<H>  /  DBili  x   /  AST  18  /  ALT  11  /  AlkPhos  92  10-29       PHYSICAL EXAM:  Gen: NAD  Skin: No rashes, bruises, or lesions  Head: Normocephalic, Atraumatic  Face: no edema, erythema, or fluctuance. Parotid glands soft without mass  Eyes: no scleral injection  Nose: Nares bilaterally patent, no discharge  Mouth: No Stridor / Drooling / Trismus.  Mucosa moist, tongue/uvula midline, oropharynx clear  Neck: Flat, supple, no lymphadenopathy, trachea midline, no masses  Lymphatic: No lymphadenopathy  Resp: breathing easily, no stridor  CV: no peripheral edema/cyanosis  GI: nondistended   Peripheral vascular: no JVD or edema  Neuro: facial nerve intact, no facial droop        Diagnostic Nasal Endoscopy: (Scope #2 used)    Fiberoptic Indirect laryngoscopy:  (Scope #2 used)          < from: CT Chest No Cont (10.26.18 @ 22:00) >  IMPRESSION:     Scattered areas of right lung linear and patchy opacity likely   representing examination of atelectasis and pneumonia and is new from the   prior study.    There is flattening of the trachea, which can be seen in the setting of   tracheomalacia. Dynamic chest CT may be performed for complete evaluation.    < end of copied text > CC: tracheal malacia thickened secretions, aspiration     HPI: 77M with PMHx of afib not on anticoagulation, HTN, DMII, ICH on coumadin requiring bilateral craniotomy who presents with severe sepsis mediated acute on chronic toxic metabolic encephalopathy and SOB likely 2/2 pna from hospitalization vs mechanical ventilation vs aspiration and UTI. Speech dx pt with significant oropharyngeal dysphagia which appears to be superimposed upon by cognitive linguistic deficits.  Pt with suspected reduced management of secretions in addition to significant hypophonia.  Pharyngeal swallow was severely delayed to absent and required oral suctioning. 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 1000 milliLiter(s) (50 mL/Hr) IV Continuous <Continuous>  dextrose 50% Injectable 12.5 Gram(s) IV Push once  dextrose 50% Injectable 25 Gram(s) IV Push once  dextrose 50% Injectable 25 Gram(s) IV Push once  heparin  Injectable 5000 Unit(s) SubCutaneous every 8 hours  insulin glargine Injectable (LANTUS) 11 Unit(s) SubCutaneous at bedtime  insulin lispro (HumaLOG) corrective regimen sliding scale   SubCutaneous every 6 hours  levETIRAcetam  IVPB 500 milliGRAM(s) IV Intermittent every 12 hours  metoprolol tartrate Injectable 5 milliGRAM(s) IV Push every 6 hours  piperacillin/tazobactam IVPB. 3.375 Gram(s) IV Intermittent every 8 hours  sodium phosphate IVPB 15 milliMole(s) IV Intermittent once  vancomycin  IVPB 1250 milliGRAM(s) IV Intermittent every 12 hours    MEDICATIONS  (PRN):  dextrose 40% Gel 15 Gram(s) Oral once PRN Blood Glucose LESS THAN 70 milliGRAM(s)/deciliter  glucagon  Injectable 1 milliGRAM(s) IntraMuscular once PRN Glucose LESS THAN 70 milligrams/deciliter      Social History: no tobacco, no etoh     Family history: Pt denies any sign FHx    ROS:   ENT: all negative except as noted in HPI   CV: denies palpitations  Pulm: denies SOB, cough, hemoptysis  GI: denies change in apetite, indigestion, n/v  : denies pertinent urinary symptoms, urgency  Neuro: denies numbness/tingling, loss of sensation  Psych: denies anxiety  MS: denies muscle weakness, instability  Heme: denies easy bruising or bleeding  Endo: denies heat/cold intolerance, excessive sweating  Vascular: denies LE edema    Vital Signs Last 24 Hrs  T(C): 36.8 (29 Oct 2018 13:07), Max: 37 (29 Oct 2018 05:07)  T(F): 98.2 (29 Oct 2018 13:07), Max: 98.6 (29 Oct 2018 05:07)  HR: 73 (29 Oct 2018 13:07) (73 - 85)  BP: 113/66 (29 Oct 2018 13:07) (113/66 - 144/75)  BP(mean): --  RR: 18 (29 Oct 2018 13:07) (18 - 18)  SpO2: 97% (29 Oct 2018 13:07) (93% - 97%)                          11.3   9.0   )-----------( 360      ( 29 Oct 2018 07:59 )             34.6    10-29    139  |  100  |  24<H>  ----------------------------<  207<H>  3.5   |  27  |  1.06    Ca    8.2<L>      29 Oct 2018 07:59  Phos  2.0     10-29  Mg     1.7     10-29    TPro  6.2  /  Alb  2.6<L>  /  TBili  1.9<H>  /  DBili  x   /  AST  18  /  ALT  11  /  AlkPhos  92  10-29       PHYSICAL EXAM:  Gen: NAD  Skin: No rashes, bruises, or lesions  Head: Normocephalic, Atraumatic  Face: no edema, erythema, or fluctuance. Parotid glands soft without mass  Eyes: no scleral injection  Nose: Nares bilaterally patent, no discharge  Mouth: No Stridor / Drooling / Trismus.  Mucosa moist, tongue/uvula midline, oropharynx clear  Neck: Flat, supple, no lymphadenopathy, trachea midline, no masses  Lymphatic: No lymphadenopathy  Resp: breathing easily, no stridor  CV: no peripheral edema/cyanosis  GI: nondistended   Peripheral vascular: no JVD or edema  Neuro: facial nerve intact, no facial droop          Fiberoptic Indirect laryngoscopy:  (Scope #2 used) Reason for Laryngoscopy:    Patient was unable to cooperate with mirror. + left posterior VC erythema, no secretions noted, no sensation.   Nasopharynx, oropharynx, and hypopharynx clear, no bleeding. Tongue base, posterior pharyngeal wall, vallecula, epiglottis, and subglottis appear normal. No erythema, edema, pooling of secretions, masses or lesions. Airway patent, no foreign body visualized. No glottic/supraglottic edema. True vocal cords, arytenoids, vestibular folds, ventricles, pyriform sinuses, and aryepiglottic folds appear normal bilaterally. Vocal cords mobile with good contact b/l.                < from: CT Chest No Cont (10.26.18 @ 22:00) >  IMPRESSION:     Scattered areas of right lung linear and patchy opacity likely   representing examination of atelectasis and pneumonia and is new from the   prior study.    There is flattening of the trachea, which can be seen in the setting of   tracheomalacia. Dynamic chest CT may be performed for complete evaluation.    < end of copied text > CC: tracheal malacia thickened secretions, aspiration     HPI: 77M with PMHx of afib not on anticoagulation, HTN, DMII, ICH on coumadin requiring bilateral craniotomy who presents with severe sepsis mediated acute on chronic toxic metabolic encephalopathy and SOB likely 2/2 pna from hospitalization vs mechanical ventilation vs aspiration and UTI. Speech dx pt with significant oropharyngeal dysphagia which appears to be superimposed upon by cognitive linguistic deficits.  Pt with suspected reduced management of secretions in addition to significant hypophonia.  Pharyngeal swallow was severely delayed to absent and required oral suctioning. 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 1000 milliLiter(s) (50 mL/Hr) IV Continuous <Continuous>  dextrose 50% Injectable 12.5 Gram(s) IV Push once  dextrose 50% Injectable 25 Gram(s) IV Push once  dextrose 50% Injectable 25 Gram(s) IV Push once  heparin  Injectable 5000 Unit(s) SubCutaneous every 8 hours  insulin glargine Injectable (LANTUS) 11 Unit(s) SubCutaneous at bedtime  insulin lispro (HumaLOG) corrective regimen sliding scale   SubCutaneous every 6 hours  levETIRAcetam  IVPB 500 milliGRAM(s) IV Intermittent every 12 hours  metoprolol tartrate Injectable 5 milliGRAM(s) IV Push every 6 hours  piperacillin/tazobactam IVPB. 3.375 Gram(s) IV Intermittent every 8 hours  sodium phosphate IVPB 15 milliMole(s) IV Intermittent once  vancomycin  IVPB 1250 milliGRAM(s) IV Intermittent every 12 hours    MEDICATIONS  (PRN):  dextrose 40% Gel 15 Gram(s) Oral once PRN Blood Glucose LESS THAN 70 milliGRAM(s)/deciliter  glucagon  Injectable 1 milliGRAM(s) IntraMuscular once PRN Glucose LESS THAN 70 milligrams/deciliter      Social History: no tobacco, no etoh     Family history: Pt denies any sign FHx    ROS:   ENT: all negative except as noted in HPI   CV: denies palpitations  Pulm: denies SOB, cough, hemoptysis  GI: denies change in apetite, indigestion, n/v  : denies pertinent urinary symptoms, urgency  Neuro: denies numbness/tingling, loss of sensation  Psych: denies anxiety  MS: denies muscle weakness, instability  Heme: denies easy bruising or bleeding  Endo: denies heat/cold intolerance, excessive sweating  Vascular: denies LE edema    Vital Signs Last 24 Hrs  T(C): 36.8 (29 Oct 2018 13:07), Max: 37 (29 Oct 2018 05:07)  T(F): 98.2 (29 Oct 2018 13:07), Max: 98.6 (29 Oct 2018 05:07)  HR: 73 (29 Oct 2018 13:07) (73 - 85)  BP: 113/66 (29 Oct 2018 13:07) (113/66 - 144/75)  BP(mean): --  RR: 18 (29 Oct 2018 13:07) (18 - 18)  SpO2: 97% (29 Oct 2018 13:07) (93% - 97%)                          11.3   9.0   )-----------( 360      ( 29 Oct 2018 07:59 )             34.6    10-29    139  |  100  |  24<H>  ----------------------------<  207<H>  3.5   |  27  |  1.06    Ca    8.2<L>      29 Oct 2018 07:59  Phos  2.0     10-29  Mg     1.7     10-29    TPro  6.2  /  Alb  2.6<L>  /  TBili  1.9<H>  /  DBili  x   /  AST  18  /  ALT  11  /  AlkPhos  92  10-29       PHYSICAL EXAM:  Gen: NAD  Skin: No rashes, bruises, or lesions  Head: Normocephalic, Atraumatic  Face: no edema, erythema, or fluctuance. Parotid glands soft without mass  Eyes: no scleral injection  Nose: Nares bilaterally patent, no discharge  Mouth: No Stridor / Drooling / Trismus.  Mucosa moist, tongue/uvula midline, oropharynx clear  Neck: Flat, supple, no lymphadenopathy, trachea midline, no masses  Lymphatic: No lymphadenopathy  Resp: breathing easily, no stridor  CV: no peripheral edema/cyanosis  GI: nondistended   Peripheral vascular: no JVD or edema  Neuro: facial nerve intact, no facial droop          Fiberoptic Indirect laryngoscopy:  (Scope #2 used) Reason for Laryngoscopy:    Patient was unable to cooperate with mirror. + left posterior VC erythema, no secretions noted, no sensation.   Nasopharynx, oropharynx, and hypopharynx clear, no bleeding. Tongue base, posterior pharyngeal wall, vallecula, epiglottis, and subglottis appear normal. No erythema, edema, pooling of secretions, masses or lesions. Airway patent, no foreign body visualized. No glottic/supraglottic edema. True vocal cords, arytenoids, vestibular folds, ventricles, pyriform sinuses, and aryepiglottic folds appear normal bilaterally except for trauma as described above. Vocal cords mobile with good contact b/l.                < from: CT Chest No Cont (10.26.18 @ 22:00) >  IMPRESSION:     Scattered areas of right lung linear and patchy opacity likely   representing examination of atelectasis and pneumonia and is new from the   prior study.    There is flattening of the trachea, which can be seen in the setting of   tracheomalacia. Dynamic chest CT may be performed for complete evaluation.    < end of copied text >

## 2018-10-29 NOTE — PROGRESS NOTE ADULT - PROBLEM SELECTOR PLAN 4
Pt has atrial fibrillation, currently rate controlled with metoprolol 75mg BID. Not on anticoagulation 2/2 multiple bleeds. Has hx of RVR while inpatient, however septic with hypotension on admission. Currently rate controlled  - will c/w decreased dose of 50mg BID, titrate up after severe sepsis resolved on metoprolol 75mg BID. Not on AC 2/2 multiple bleeds.   - Has hx of RVR inpatient in setting of septic and hypotensive. Currently rate controlled  - will c/w decreased dose of 50mg BID, will titrate up as tolearted.

## 2018-10-29 NOTE — SWALLOW BEDSIDE ASSESSMENT ADULT - SWALLOW EVAL: DIAGNOSIS
Pt presents with significant oropharyngeal dysphagia.  Pt with suspected reduced management of secretions in addition to significant hypophonia.  Pharyngeal swallow was severely delayed to absent and required oral suctioning. Pt presents with significant oropharyngeal dysphagia which appears to be superimposed upon by cognitive linguistic deficits.  Pt with suspected reduced management of secretions in addition to significant hypophonia.  Pharyngeal swallow was severely delayed to absent and required oral suctioning.

## 2018-10-29 NOTE — PROGRESS NOTE ADULT - PROBLEM SELECTOR PLAN 1
Patient now minimally responsive baseline mental status of  minimally responsive and interactive at baseline, acutely changed from discharge on 10/23 per wife at bedside. 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 Pt also has hx of hospital/ICU associated delirium requiring haldol, physical restraints. Per wife, pt did not have witnessed seizure activity at home.  - continue infectious workup  - CT head without signs of acute ICH  - hypernatremia (149) on admission, improved on 1/4 NS @ 100 to 144. Will decrease rate to 75cc per hour, continue trending BMP q6 hours.  - currently not agitated, consider haldol PRN  - c/w antiepileptics  - fall precautions  - bedrest for now Patient now minimally responsive. Baseline mental status of minimally responsive, acutely changed from discharge on 10/23 per wife at bedside. 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 Pt also has hx of hospital/ICU associated delirium requiring haldol, physical restraints. Per wife, pt did not have witnessed seizure activity at home.  - c/w Antibiotics: Vanc and Zosyn (started on Oct 26th)  - CT head without signs of acute ICH  - hypernatremia on admission, now resolved s/p IVF.  - currently not agitated, consider haldol PRN  - c/w antiepileptics  - fall precautions  - bedrest for now - minimally responsive  - c/w Antibiotics: Vanc and Zosyn (started on Oct 26th)  - CT head without signs of acute ICH  - hypernatremia on admission, now resolved s/p IVF.  - currently not agitated, consider haldol PRN  - c/w antiepileptics  - fall precautions  - bedrest for now

## 2018-10-29 NOTE — PROGRESS NOTE ADULT - PROBLEM SELECTOR PLAN 2
On admission pt met severe sepsis criteria with WBC of 18.9, fever to 101.4, tachycardia to 112 with CXR showing R basilar pna and grossly positive UA. Ddx includes HCAP, VAP, aspiration pna.   - c/w zosyn and vanc by level for pna and UTI  - f/u blood and urine cultures  - CT chest significant for HAYDEN lesion that is increased in size compared to prior scans, no new focal consolidations.  - HOB elevated 30-45  2/2 aspiration vs. VAP vs. HCAP- improved  - Chest PT   - aspiration precaution  - NPO pending formal speech and swallow On admission pt met severe sepsis criteria with WBC of 18.9, fever to 101.4, tachycardia to 112 with CXR showing R basilar pna and grossly positive UA. Ddx includes HCAP, VAP, aspiration pna.  Now resolved.  - c/w zosyn and vanc by level for pna and UTI  - MRSA/MSSA PCR.  - f/u blood and urine cultures  - CT chest significant for HAYDEN lesion that is increased in size compared to prior scans, no new focal consolidations.  - HOB elevated 30-45  - Chest PT   - aspiration precaution  - NPO pending formal speech and swallow - On admission pt met severe sepsis criteria with WBC of 18.9, fever to 101.4, tachycardia to 112 with CXR showing R basilar pna and grossly positive UA.  - Resolved.  - c/w zosyn and vanc by level for pna and UTI  - MRSA/MSSA PCR.  - f/u blood and urine cultures  - CT chest significant for HAYDEN lesion that is increased in size compared to prior scans, no new focal consolidations.  - HOB elevated 30-45  - Chest PT   - aspiration precaution  - NPO pending formal speech and swallow

## 2018-10-29 NOTE — SWALLOW BEDSIDE ASSESSMENT ADULT - COMMENTS
addtnl hx:    10/26/18 CT Chest:  LUNGS AND LARGE AIRWAYS: Tracheobronchial secretions. There is flattening of the trachea. Scattered areas of right lung linear and patchy opacity.  IMPRESSION: Scattered areas of right lung linear and patchy opacity likely representing examination of atelectasis and pneumonia and is new from the prior study.  There is flattening of the trachea, which can be seen in the setting of tracheomalacia. Dynamic chest CT may be performed for complete evaluation.    Per H&P: Pt left the hospital to nursing home on 10/23 and originally was doing well, was able to recognize and interact with wife, was eating. Pt then abruptly became lethargic with increased weakness, inability to take PO, worsening cough. Wife states that the cough first developed prior to discharge from the hospital on 10/23, but has become worse over the past several days. Pt was placed on honey thickened diet yesterday, but had been eating a regular diet on discharge. Pt minimally interactive on exam, able to nod "yes" and "no." Denies pain, SOB.   In ED, initial VS were T101.4  /60 RR19 Sa 97% on 4L NC. Labs significant for WBC of 18.9, creatinine of 1.87 (elevated from 1.03 on 10/23).   Chest X-ray showed right basilar opacity which may represent atelectasis vs pna. UA had moderate bacteria, +leukesterase.     Per previous hospitalization: acute respiratory failure requiring reintubation on 10/13. Patient extubated since 10/15.

## 2018-10-29 NOTE — SWALLOW BEDSIDE ASSESSMENT ADULT - ASR SWALLOW REFERRAL
Suggest ENT consult given aphonia and CT chest findings; Pt also with suspected reduced management of secretions.

## 2018-10-29 NOTE — PROGRESS NOTE ADULT - ATTENDING COMMENTS
As above.   Seen and examined today, essentially nonverbal.    Labs w/ stable CBC, BMP w/ BUN/Cr >20 c/w known intravascular volume depletion.   HD stable afebrile no leukocytosis, await MRSA PCR to peel off Vanc.  Failed speech and swallow, NPO for now. Wife notified, will have GOC conversation tmrw.

## 2018-10-29 NOTE — SWALLOW BEDSIDE ASSESSMENT ADULT - SLP PERTINENT HISTORY OF CURRENT PROBLEM
77M with PMHx of afib not on anticoagulation, HTN, DMII, brain bleed in 2015 on coumadin requiring bilateral craniotomy who presents with lethargy, cough, and shortness of breath. Pt was recently hospitalized from 10/11 - 10/23 for abd pain, found to have nontraumatic hemoperitoneum requiring massive transfusion protocol activation, intubation and prolonged SICU stay. Pt was found to have a liver mass and underwent selective celiac, common hepatic, proper hepatic, right hepatic and inferior right hepatic segmental and subsegmental arteriography and embolization of a segment 5 mass with marked reduction in tumor vascularity.  10/29/18 Per MD f/u note: PMHx of afib not on anticoagulation, HTN, DMII, ICH on coumadin requiring bilateral craniotomy who presents with severe sepsis mediated acute on chronic toxic metabolic encephalopathy and SOB likely 2/2 pna from hospitalization vs mechanical ventilation vs aspiration and UTI.

## 2018-10-29 NOTE — PROGRESS NOTE ADULT - PROBLEM SELECTOR PLAN 10
Diet: NPO  DVT ppx: hep subQ  GOC: per discussion with wife, pt wishes are to be FULL CODE DVT ppx: SQH  Diet: NPO pending Speech/swallow eval.   GOC: per discussion with wife, pt wishes are to be FULL CODE

## 2018-10-29 NOTE — CONSULT NOTE ADULT - ASSESSMENT
[age] found to have VC trauma likely due to intubation, no increased secretions noted, poor sensation.

## 2018-10-29 NOTE — SWALLOW BEDSIDE ASSESSMENT ADULT - SLP GENERAL OBSERVATIONS
Pt awake, alert and non-verbal; upon attempt to achieve phonation with cueing, significant hypophonia noted. Pt awake, alert and non-verbal; Pt cued to achieve phonation; significant hypophonia noted.  +Cognitive linguistic deficits suspected.  Pt did not initiate any functional communicative exchanges; intermittently used head nod to answer y/n questions.

## 2018-10-29 NOTE — PROGRESS NOTE ADULT - SUBJECTIVE AND OBJECTIVE BOX
Nolan Granados Vickie  PGY-1  Pager: [phone]    Patient is a 77y old  Male who presents with a chief complaint of altered mental status, cough (28 Oct 2018 17:30)      SUBJECTIVE / OVERNIGHT EVENTS:    MEDICATIONS  (STANDING):  dextrose 5% + sodium chloride 0.225%. 1000 milliLiter(s) (75 mL/Hr) IV Continuous <Continuous>  dextrose 5%. 1000 milliLiter(s) (50 mL/Hr) IV Continuous <Continuous>  dextrose 50% Injectable 12.5 Gram(s) IV Push once  dextrose 50% Injectable 25 Gram(s) IV Push once  dextrose 50% Injectable 25 Gram(s) IV Push once  heparin  Injectable 5000 Unit(s) SubCutaneous every 8 hours  insulin glargine Injectable (LANTUS) 15 Unit(s) SubCutaneous at bedtime  insulin lispro (HumaLOG) corrective regimen sliding scale   SubCutaneous every 6 hours  levETIRAcetam  IVPB 500 milliGRAM(s) IV Intermittent every 12 hours  metoprolol tartrate 50 milliGRAM(s) Oral two times a day  piperacillin/tazobactam IVPB. 3.375 Gram(s) IV Intermittent every 8 hours  tamsulosin 0.4 milliGRAM(s) Oral at bedtime  vancomycin  IVPB 1250 milliGRAM(s) IV Intermittent every 12 hours    MEDICATIONS  (PRN):  dextrose 40% Gel 15 Gram(s) Oral once PRN Blood Glucose LESS THAN 70 milliGRAM(s)/deciliter  glucagon  Injectable 1 milliGRAM(s) IntraMuscular once PRN Glucose LESS THAN 70 milligrams/deciliter      T(C): 37 (10-29-18 @ 05:07), Max: 37 (10-29-18 @ 05:07)  HR: 85 (10-29-18 @ 05:07) (75 - 85)  BP: 144/75 (10-29-18 @ 05:07) (120/72 - 149/74)  RR: 18 (10-29-18 @ 05:07) (18 - 20)  SpO2: 93% (10-29-18 @ 05:07) (93% - 98%)  CAPILLARY BLOOD GLUCOSE      POCT Blood Glucose.: 217 mg/dL (28 Oct 2018 23:05)  POCT Blood Glucose.: 238 mg/dL (28 Oct 2018 17:54)  POCT Blood Glucose.: 230 mg/dL (28 Oct 2018 13:15)  POCT Blood Glucose.: 187 mg/dL (28 Oct 2018 06:48)    I&O's Summary    27 Oct 2018 07:01  -  28 Oct 2018 07:00  --------------------------------------------------------  IN: 1200 mL / OUT: 0 mL / NET: 1200 mL    28 Oct 2018 07:01  -  29 Oct 2018 06:10  --------------------------------------------------------  IN: 1975 mL / OUT: 0 mL / NET: 1975 mL        PHYSICAL EXAM:  GENERAL: NAD, well-developed  HEAD:  Atraumatic, Normocephalic  EYES: EOMI, PERRLA, conjunctiva and sclera clear  NECK: Supple, No JVD  CHEST/LUNG: Clear to auscultation bilaterally; No wheeze  HEART: Regular rate and rhythm; No murmurs, rubs, or gallops  ABDOMEN: Soft, Nontender, Nondistended; Bowel sounds present  EXTREMITIES:  2+ Peripheral Pulses, No clubbing, cyanosis, or edema  PSYCH: AAOx3  NEUROLOGY: non-focal  SKIN: No rashes or lesions    LABS:                        10.8   10.4  )-----------( 360      ( 28 Oct 2018 06:54 )             33.3     10-28    140  |  102  |  26<H>  ----------------------------<  218<H>  3.6   |  27  |  1.07    Ca    8.4      28 Oct 2018 22:58  Phos  2.6     10-28  Mg     1.8     10-28                RADIOLOGY & ADDITIONAL TESTS:    Imaging Personally Reviewed:    Consultant(s) Notes Reviewed:      Care Discussed with Consultants/Other Providers: Nolan Granados Vickie  PGY-1  Pager: [phone]    Patient is a 77y old  Male who presents with a chief complaint of altered mental status, cough (28 Oct 2018 17:30)      SUBJECTIVE / OVERNIGHT EVENTS:  No acute events overnight. Overnight BMP was stable with Na 140.  Patient responds to voice and answers yes or no if questions are asked repeatedly.  Patient is AOx2 today. Reports that there is no acute complaints, and just wants to sleep more.      MEDICATIONS  (STANDING):  dextrose 5% + sodium chloride 0.225%. 1000 milliLiter(s) (75 mL/Hr) IV Continuous <Continuous>  dextrose 5%. 1000 milliLiter(s) (50 mL/Hr) IV Continuous <Continuous>  dextrose 50% Injectable 12.5 Gram(s) IV Push once  dextrose 50% Injectable 25 Gram(s) IV Push once  dextrose 50% Injectable 25 Gram(s) IV Push once  heparin  Injectable 5000 Unit(s) SubCutaneous every 8 hours  insulin glargine Injectable (LANTUS) 15 Unit(s) SubCutaneous at bedtime  insulin lispro (HumaLOG) corrective regimen sliding scale   SubCutaneous every 6 hours  levETIRAcetam  IVPB 500 milliGRAM(s) IV Intermittent every 12 hours  metoprolol tartrate 50 milliGRAM(s) Oral two times a day  piperacillin/tazobactam IVPB. 3.375 Gram(s) IV Intermittent every 8 hours  tamsulosin 0.4 milliGRAM(s) Oral at bedtime  vancomycin  IVPB 1250 milliGRAM(s) IV Intermittent every 12 hours    MEDICATIONS  (PRN):  dextrose 40% Gel 15 Gram(s) Oral once PRN Blood Glucose LESS THAN 70 milliGRAM(s)/deciliter  glucagon  Injectable 1 milliGRAM(s) IntraMuscular once PRN Glucose LESS THAN 70 milligrams/deciliter      T(C): 37 (10-29-18 @ 05:07), Max: 37 (10-29-18 @ 05:07)  HR: 85 (10-29-18 @ 05:07) (75 - 85)  BP: 144/75 (10-29-18 @ 05:07) (120/72 - 149/74)  RR: 18 (10-29-18 @ 05:07) (18 - 20)  SpO2: 93% (10-29-18 @ 05:07) (93% - 98%)  CAPILLARY BLOOD GLUCOSE      POCT Blood Glucose.: 217 mg/dL (28 Oct 2018 23:05)  POCT Blood Glucose.: 238 mg/dL (28 Oct 2018 17:54)  POCT Blood Glucose.: 230 mg/dL (28 Oct 2018 13:15)  POCT Blood Glucose.: 187 mg/dL (28 Oct 2018 06:48)    I&O's Summary    27 Oct 2018 07:01  -  28 Oct 2018 07:00  --------------------------------------------------------  IN: 1200 mL / OUT: 0 mL / NET: 1200 mL    28 Oct 2018 07:01  -  29 Oct 2018 06:10  --------------------------------------------------------  IN: 1975 mL / OUT: 0 mL / NET: 1975 mL        PHYSICAL EXAM:  GENERAL: NAD, well-developed  HEAD:  Atraumatic, Normocephalic  EYES: EOMI, PERRLA, conjunctiva and sclera clear  NECK: Supple, No JVD  CHEST/LUNG: Clear to auscultation bilaterally; No wheeze  HEART: Irregularly irregular. No murmurs, rubs, or gallops  ABDOMEN: Soft, Nontender, Nondistended; Bowel sounds present  EXTREMITIES:  2+ Peripheral Pulses, No clubbing, cyanosis, or edema  NEUROLOGY: Hard to assess orientation due to limited communication. Patient responds to name.   PSYCH: flat affect on face.  SKIN: No rashes or lesions    LABS:                        10.8   10.4  )-----------( 360      ( 28 Oct 2018 06:54 )             33.3     10-28    140  |  102  |  26<H>  ----------------------------<  218<H>  3.6   |  27  |  1.07    Ca    8.4      28 Oct 2018 22:58  Phos  2.6     10-28  Mg     1.8     10-28                RADIOLOGY & ADDITIONAL TESTS:    Imaging Personally Reviewed:    Consultant(s) Notes Reviewed:      Care Discussed with Consultants/Other Providers:

## 2018-10-29 NOTE — PROGRESS NOTE ADULT - PROBLEM SELECTOR PLAN 7
Pt currently hypotensive in setting of severe sepsis. Will hold home BP medications and add back as tolerated  - holding home amlodipine Will hold home BP medications and add back as tolerated  - currently normotensive.

## 2018-10-29 NOTE — PROGRESS NOTE ADULT - PROBLEM SELECTOR PLAN 5
On lantus 40U QHS and 11 units sliding scale, however has not been eating. Blood glucose on admission 92  - lantus 15mg QHS  - will check FS Q6H while NPO with ISS coverage On lantus 40U QHS and 11 units sliding scale, however has not been eating. Blood glucose on admission 92  - lantus 11 mg QHS from 15 w/ discontinuing the D5.   - will check FS Q6H while NPO with ISS coverage - lantus 11 mg QHS from 15 w/ discontinuing the D5.   - will check FS Q6H while NPO with ISS coverage

## 2018-10-29 NOTE — SWALLOW BEDSIDE ASSESSMENT ADULT - ASR SWALLOW LABIAL MOBILITY
impaired retraction/impaired seal/impaired pursing/impaired coordination Subsequent Stages Histo Method Verbiage: Using a similar technique to that described above, a thin layer of tissue was removed from all areas where tumor was visible on the previous stage.  The tissue was again oriented, mapped, dyed, and processed as above.

## 2018-10-29 NOTE — PROGRESS NOTE ADULT - PROBLEM SELECTOR PLAN 6
Patient was found to have hepatic lesion on last admission that was suspected to be the cause of acute hemoperitoneum, and pt was recommended follow up with surgical oncology for diagnostic purposes.   - Will require further hepatic workup OP

## 2018-10-29 NOTE — PROGRESS NOTE ADULT - ASSESSMENT
77M with PMHx of afib not on anticoagulation, HTN, DMII, ICH on coumadin requiring bilateral craniotomy who presents with severe sepsis mediated acute on chronic toxic metabolic encephalopathy and SOB likely 2/2 pna from hospitalization vs mechanical ventilation vs aspiration and UTI. Mr. Kingsley is a 78 yo man with PMHx of afib not on anticoagulation, HTN, DMII, ICH on coumadin requiring bilateral craniotomy who presents with severe sepsis mediated acute on chronic toxic metabolic encephalopathy and SOB likely 2/2 pna from hospitalization vs mechanical ventilation vs aspiration and UTI.

## 2018-10-30 DIAGNOSIS — R13.12 DYSPHAGIA, OROPHARYNGEAL PHASE: ICD-10-CM

## 2018-10-30 LAB
ANION GAP SERPL CALC-SCNC: 13 MMOL/L — SIGNIFICANT CHANGE UP (ref 5–17)
ANION GAP SERPL CALC-SCNC: 13 MMOL/L — SIGNIFICANT CHANGE UP (ref 5–17)
BUN SERPL-MCNC: 20 MG/DL — SIGNIFICANT CHANGE UP (ref 7–23)
BUN SERPL-MCNC: 21 MG/DL — SIGNIFICANT CHANGE UP (ref 7–23)
CALCIUM SERPL-MCNC: 8.3 MG/DL — LOW (ref 8.4–10.5)
CALCIUM SERPL-MCNC: 8.7 MG/DL — SIGNIFICANT CHANGE UP (ref 8.4–10.5)
CHLORIDE SERPL-SCNC: 100 MMOL/L — SIGNIFICANT CHANGE UP (ref 96–108)
CHLORIDE SERPL-SCNC: 99 MMOL/L — SIGNIFICANT CHANGE UP (ref 96–108)
CO2 SERPL-SCNC: 26 MMOL/L — SIGNIFICANT CHANGE UP (ref 22–31)
CO2 SERPL-SCNC: 27 MMOL/L — SIGNIFICANT CHANGE UP (ref 22–31)
CREAT SERPL-MCNC: 1.06 MG/DL — SIGNIFICANT CHANGE UP (ref 0.5–1.3)
CREAT SERPL-MCNC: 1.07 MG/DL — SIGNIFICANT CHANGE UP (ref 0.5–1.3)
GLUCOSE BLDC GLUCOMTR-MCNC: 147 MG/DL — HIGH (ref 70–99)
GLUCOSE BLDC GLUCOMTR-MCNC: 151 MG/DL — HIGH (ref 70–99)
GLUCOSE BLDC GLUCOMTR-MCNC: 152 MG/DL — HIGH (ref 70–99)
GLUCOSE BLDC GLUCOMTR-MCNC: 167 MG/DL — HIGH (ref 70–99)
GLUCOSE SERPL-MCNC: 163 MG/DL — HIGH (ref 70–99)
GLUCOSE SERPL-MCNC: 178 MG/DL — HIGH (ref 70–99)
HCT VFR BLD CALC: 35.9 % — LOW (ref 39–50)
HGB BLD-MCNC: 11.5 G/DL — LOW (ref 13–17)
MAGNESIUM SERPL-MCNC: 1.7 MG/DL — SIGNIFICANT CHANGE UP (ref 1.6–2.6)
MCHC RBC-ENTMCNC: 29.5 PG — SIGNIFICANT CHANGE UP (ref 27–34)
MCHC RBC-ENTMCNC: 32 GM/DL — SIGNIFICANT CHANGE UP (ref 32–36)
MCV RBC AUTO: 92 FL — SIGNIFICANT CHANGE UP (ref 80–100)
MRSA PCR RESULT.: SIGNIFICANT CHANGE UP
PHOSPHATE SERPL-MCNC: 2.6 MG/DL — SIGNIFICANT CHANGE UP (ref 2.5–4.5)
PLATELET # BLD AUTO: 316 K/UL — SIGNIFICANT CHANGE UP (ref 150–400)
POTASSIUM SERPL-MCNC: 3.5 MMOL/L — SIGNIFICANT CHANGE UP (ref 3.5–5.3)
POTASSIUM SERPL-MCNC: 3.6 MMOL/L — SIGNIFICANT CHANGE UP (ref 3.5–5.3)
POTASSIUM SERPL-SCNC: 3.5 MMOL/L — SIGNIFICANT CHANGE UP (ref 3.5–5.3)
POTASSIUM SERPL-SCNC: 3.6 MMOL/L — SIGNIFICANT CHANGE UP (ref 3.5–5.3)
RBC # BLD: 3.9 M/UL — LOW (ref 4.2–5.8)
RBC # FLD: 13.7 % — SIGNIFICANT CHANGE UP (ref 10.3–14.5)
S AUREUS DNA NOSE QL NAA+PROBE: SIGNIFICANT CHANGE UP
SODIUM SERPL-SCNC: 138 MMOL/L — SIGNIFICANT CHANGE UP (ref 135–145)
SODIUM SERPL-SCNC: 140 MMOL/L — SIGNIFICANT CHANGE UP (ref 135–145)
VANCOMYCIN TROUGH SERPL-MCNC: 27.2 UG/ML — CRITICAL HIGH (ref 10–20)
WBC # BLD: 7.8 K/UL — SIGNIFICANT CHANGE UP (ref 3.8–10.5)
WBC # FLD AUTO: 7.8 K/UL — SIGNIFICANT CHANGE UP (ref 3.8–10.5)

## 2018-10-30 PROCEDURE — 71045 X-RAY EXAM CHEST 1 VIEW: CPT | Mod: 26

## 2018-10-30 PROCEDURE — 99233 SBSQ HOSP IP/OBS HIGH 50: CPT | Mod: GC

## 2018-10-30 RX ORDER — AMPICILLIN SODIUM AND SULBACTAM SODIUM 250; 125 MG/ML; MG/ML
3 INJECTION, POWDER, FOR SUSPENSION INTRAMUSCULAR; INTRAVENOUS EVERY 6 HOURS
Qty: 0 | Refills: 0 | Status: DISCONTINUED | OUTPATIENT
Start: 2018-10-30 | End: 2018-11-02

## 2018-10-30 RX ORDER — SODIUM CHLORIDE 9 MG/ML
1000 INJECTION, SOLUTION INTRAVENOUS
Qty: 0 | Refills: 0 | Status: DISCONTINUED | OUTPATIENT
Start: 2018-10-30 | End: 2018-11-07

## 2018-10-30 RX ADMIN — Medication 1: at 06:19

## 2018-10-30 RX ADMIN — HEPARIN SODIUM 5000 UNIT(S): 5000 INJECTION INTRAVENOUS; SUBCUTANEOUS at 23:14

## 2018-10-30 RX ADMIN — Medication 5 MILLIGRAM(S): at 06:18

## 2018-10-30 RX ADMIN — INSULIN GLARGINE 11 UNIT(S): 100 INJECTION, SOLUTION SUBCUTANEOUS at 23:14

## 2018-10-30 RX ADMIN — Medication 5 MILLIGRAM(S): at 09:00

## 2018-10-30 RX ADMIN — AMPICILLIN SODIUM AND SULBACTAM SODIUM 200 GRAM(S): 250; 125 INJECTION, POWDER, FOR SUSPENSION INTRAMUSCULAR; INTRAVENOUS at 23:15

## 2018-10-30 RX ADMIN — PIPERACILLIN AND TAZOBACTAM 25 GRAM(S): 4; .5 INJECTION, POWDER, LYOPHILIZED, FOR SOLUTION INTRAVENOUS at 01:15

## 2018-10-30 RX ADMIN — Medication 1: at 18:42

## 2018-10-30 RX ADMIN — HEPARIN SODIUM 5000 UNIT(S): 5000 INJECTION INTRAVENOUS; SUBCUTANEOUS at 15:00

## 2018-10-30 RX ADMIN — Medication 5 MILLIGRAM(S): at 01:15

## 2018-10-30 RX ADMIN — HEPARIN SODIUM 5000 UNIT(S): 5000 INJECTION INTRAVENOUS; SUBCUTANEOUS at 06:19

## 2018-10-30 RX ADMIN — Medication 1: at 23:14

## 2018-10-30 RX ADMIN — Medication 5 MILLIGRAM(S): at 23:27

## 2018-10-30 RX ADMIN — AMPICILLIN SODIUM AND SULBACTAM SODIUM 200 GRAM(S): 250; 125 INJECTION, POWDER, FOR SUSPENSION INTRAMUSCULAR; INTRAVENOUS at 18:52

## 2018-10-30 RX ADMIN — Medication 5 MILLIGRAM(S): at 18:43

## 2018-10-30 RX ADMIN — LEVETIRACETAM 400 MILLIGRAM(S): 250 TABLET, FILM COATED ORAL at 18:52

## 2018-10-30 RX ADMIN — PIPERACILLIN AND TAZOBACTAM 25 GRAM(S): 4; .5 INJECTION, POWDER, LYOPHILIZED, FOR SOLUTION INTRAVENOUS at 11:44

## 2018-10-30 RX ADMIN — LEVETIRACETAM 400 MILLIGRAM(S): 250 TABLET, FILM COATED ORAL at 06:18

## 2018-10-30 NOTE — DIETITIAN INITIAL EVALUATION ADULT. - ADHERENCE
good/Discharged on a DYS1NC diet from hospital on 10/23 and transferred straight to rehab facility and changed to DYS1HC diet (also on no added salt and no concentrated sweets diet). NKFA noted. Took ferrous sulfate, vitamin B12, vitamin D3, ensure plus twice daily, lantus, novolog at rehab facility.

## 2018-10-30 NOTE — PROGRESS NOTE ADULT - PROBLEM SELECTOR PLAN 3
Significant oropharyngeal dysphagia. Patient left the hospital to nursing home on 10/23 and was initially doing well. Abruptly bcame lethargic with inability to take PO.   - Speech and swallow eval: NPO + aspiration precaution.  - ENT consulted for aphonia - VC injury likely from intubation. No acute intervention  - continue to monitor improvements with ABx.

## 2018-10-30 NOTE — PROGRESS NOTE ADULT - SUBJECTIVE AND OBJECTIVE BOX
Nolan Granados Vickie  PGY-1  Pager: [phone]    Patient is a 77y old  Male who presents with a chief complaint of altered mental status, cough (29 Oct 2018 15:47)      SUBJECTIVE / OVERNIGHT EVENTS:  no acute event overnight.  Patient wakes up and answers few questions with nods and goes back to sleep.  He is doing fine, sleeping well. Yet he shakes his head indicating he does not have appetite.    Rest of ROS was not obtained for patient's comfort.        MEDICATIONS  (STANDING):  dextrose 5%. 1000 milliLiter(s) (50 mL/Hr) IV Continuous <Continuous>  dextrose 50% Injectable 12.5 Gram(s) IV Push once  dextrose 50% Injectable 25 Gram(s) IV Push once  dextrose 50% Injectable 25 Gram(s) IV Push once  heparin  Injectable 5000 Unit(s) SubCutaneous every 8 hours  insulin glargine Injectable (LANTUS) 11 Unit(s) SubCutaneous at bedtime  insulin lispro (HumaLOG) corrective regimen sliding scale   SubCutaneous every 6 hours  levETIRAcetam  IVPB 500 milliGRAM(s) IV Intermittent every 12 hours  metoprolol tartrate Injectable 5 milliGRAM(s) IV Push every 6 hours  piperacillin/tazobactam IVPB. 3.375 Gram(s) IV Intermittent every 8 hours    MEDICATIONS  (PRN):  dextrose 40% Gel 15 Gram(s) Oral once PRN Blood Glucose LESS THAN 70 milliGRAM(s)/deciliter  glucagon  Injectable 1 milliGRAM(s) IntraMuscular once PRN Glucose LESS THAN 70 milligrams/deciliter      T(C): 36.5 (10-30-18 @ 05:17), Max: 36.8 (10-29-18 @ 13:07)  HR: 82 (10-30-18 @ 05:17) (73 - 92)  BP: 145/77 (10-30-18 @ 05:17) (113/66 - 145/77)  RR: 18 (10-30-18 @ 05:17) (18 - 18)  SpO2: 94% (10-30-18 @ 05:17) (92% - 97%)  CAPILLARY BLOOD GLUCOSE      POCT Blood Glucose.: 167 mg/dL (30 Oct 2018 06:07)  POCT Blood Glucose.: 182 mg/dL (29 Oct 2018 22:44)  POCT Blood Glucose.: 140 mg/dL (29 Oct 2018 18:01)  POCT Blood Glucose.: 208 mg/dL (29 Oct 2018 12:50)    I&O's Summary    29 Oct 2018 07:01  -  30 Oct 2018 07:00  --------------------------------------------------------  IN: 1250 mL / OUT: 0 mL / NET: 1250 mL        PHYSICAL EXAM:  GENERAL: NAD, sleeping well upon entry.  HEENT:  Atraumatic, Old cranial surgical site bilaterally (clean dry intact). Neck supple. No conjunctival icterus.  CHEST/LUNG: Clear to auscultation bilaterally in anterior  HEART: Irregularly irregular. S1 S2, No murmurs, rubs, or gallops. 2+ peripheral pulses. Cap refill ~ 3 seconds.  ABDOMEN: Soft, Nontender, Nondistended; Bowel sounds present  EXTREMITIES:  2+ Peripheral Pulses, No cyanosis or edema. bilateral distal LE skin changes c/w venous stasis.  NEUROLOGY: Hard to assess orientation due to limited communication. Patient responds to name.   PSYCH: flat affect on face.        LABS:                        11.5   7.8   )-----------( 316      ( 30 Oct 2018 07:03 )             35.9     10-30    138  |  99  |  20  ----------------------------<  163<H>  3.6   |  26  |  1.07    Ca    8.7      30 Oct 2018 07:03  Phos  2.6     10-30  Mg     1.7     10-30    TPro  6.2  /  Alb  2.6<L>  /  TBili  1.9<H>  /  DBili  x   /  AST  18  /  ALT  11  /  AlkPhos  92  10-29              RADIOLOGY & ADDITIONAL TESTS:    Imaging Personally Reviewed: TYLER    Consultant(s) Notes Reviewed:  Swallow, ENT    Care Discussed with Consultants/Other Providers: TYLER

## 2018-10-30 NOTE — CHART NOTE - NSCHARTNOTEFT_GEN_A_CORE
Upon Nutritional Assessment by the Registered Dietitian your patient was determined to meet criteria / has evidence of the following diagnosis/diagnoses:          [ ]  Mild Protein Calorie Malnutrition        [ ]  Moderate Protein Calorie Malnutrition        [x] Severe Protein Calorie Malnutrition        [ ] Unspecified Protein Calorie Malnutrition        [ ] Underweight / BMI <19        [ ] Morbid Obesity / BMI > 40      Findings as based on:  [x] Comprehensive nutrition assessment   [ ] Nutrition Focused Physical Exam  [x] Other: unintentional 5% Wt loss in 2 weeks, eating <75% of estimated nutrient needs for 2 weeks, NPO x4 days      Nutrition Plan/Recommendations:    When NGT placed and positioning confirmed, initiate Glucerna 1.2 @ 30mL/hr increase by 10mL Q6 hrs until goal of 70mL/hr x 24hrs. Goal provides 2016kcal, 101g protein (~25kcal/lg, 1.2g protein/kg/actual Wt)      PROVIDER Section:     By signing this assessment you are acknowledging and agree with the diagnosis/diagnoses assigned by the Registered Dietitian    Comments:

## 2018-10-30 NOTE — DIETITIAN INITIAL EVALUATION ADULT. - PROBLEM SELECTOR PLAN 2
Pt meets severe sepsis criteria on admission with WBC of 18.9, fever to 101.4, tachycardia to 112 with CXR showing R basilar pna and grossly positive UA. Ddx includes HCAP, VAP, aspiration pna. Received vanc, zosyn x1 and 2.5L NS bolus in ED.   - c/w zosyn and vanc by level for pna and UTI  - f/u blood and urine cultures  - CT chest for further evaluation of probably pna  - NPO pending formal speech and swallow  - c/w mIVF: NS at 100

## 2018-10-30 NOTE — DIETITIAN INITIAL EVALUATION ADULT. - NS AS NUTRI INTERV ENTERAL NUTRITION
Composition/Route/When NGT placed and positioning confirmed, initiate Glucerna 1.2 @ 30mL/hr increase by 10mL Q6 hrs until goal of 70mL/hr x 24hrs. Goal provides 2016kcal, 101g protein (~25kcal/lg, 1.2g protein/kg/actual Wt)/Concentration/Rate/Volume/Schedule

## 2018-10-30 NOTE — PROGRESS NOTE ADULT - ASSESSMENT
Mr. 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 Patient course is complicated by acute decline in PO tolerance with other symptoms, and now with significant oropharyngeal dysphagia. NPO for now.

## 2018-10-30 NOTE — PROGRESS NOTE ADULT - PROBLEM SELECTOR PLAN 2
Now Resolved.  - Sepsis with CXR showing R basilar pna and UCx positive for E. faecalis.  - BCx (10/26): NGTD  - c/w ABx as above.   - CT chest significant for HAYDEN lesion that is increased in size compared to prior scans, no new focal consolidations.  - HOB elevated 30-45  - Chest PT   - aspiration precaution

## 2018-10-30 NOTE — DIETITIAN INITIAL EVALUATION ADULT. - PROBLEM SELECTOR PLAN 1
Pt minimally responsive and interactive, acutely changed from discharge on 10/23 per wife at bedside. 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 Pt also has hx of hospital/ICU associated delirium requiring haldol, physical restraints. Per wife, pt did not have witnessed seizure activity at home.  - continue infectious workup as noted in problem 2  - f/u CT head  - currently not agitated, consider haldol PRN  - c/w antiepileptics  - fall precautions  - bedrest for now

## 2018-10-30 NOTE — PROGRESS NOTE ADULT - PROBLEM SELECTOR PLAN 1
- minimally responsive.   - Vanco trough 27 this AM. Vanco discontinued after MRSA PCR negative.  - Zosyn narrowed to Unasyn   - CT head without signs of acute ICH  - hypernatremia on admission, now resolved s/p IVF.  - currently not agitated, consider haldol PRN  - c/w antiepileptics  - fall precautions  - bedrest for now

## 2018-10-30 NOTE — DIETITIAN INITIAL EVALUATION ADULT. - ENERGY NEEDS
ht: 70 inches, wt: 179.4 pounds, BMI: 25.7 kg/m2, IBW: 166 pounds (+/- 10%), 108 %IBW  Edema: none noted. Skin per nursing documentation: intact.  Other pertinent information: 78y/o male who presents with a chief complaint of altered mental status, cough.

## 2018-10-30 NOTE — DIETITIAN INITIAL EVALUATION ADULT. - OTHER INFO
RD received verbal consult from attending physician for tube feed recommendations. Pt recently admitted to [facility] requiring SICU stay, intubation, NGT feeds and then progressed to DYS1NC diet which continued at rehab. Per wife team was going to change him to honey-thick liquids at rehab and then he was transferred back to the hospital for lethargy. Pt now s/p failed swallow evaluation and NPO since admission. Per MD plan is to place NGT for nutrition while GOC are ongoing with family regarding PEG vs pleasure feeds vs repeat swallow evaluation if mental status improves. No GI distress noted. Per previous RD note Pt weighed 190lbs on 10/15/18 current weight 179.4lbs.

## 2018-10-30 NOTE — PROGRESS NOTE ADULT - ATTENDING COMMENTS
Agree.  Seen and examined.   D/w wife and family.   NG, appreciate nutrition input. Re-eval swallowing capacity and thus ?need for PG once medically optimized though is not currently confused or delirious and thus has no immediately obvious correctable etiologies underpinning his dysphagia. Nephew concerned that there might be focal seizures causing swallow dysfunction. I doubt this though will gladly ask neurology to eval per his request.   Keppra level.   Watch electrolytes, Na normalized over several days now at 140. Anemia stable. HD stable. Vanc off w/ neg MRSA.   Continue to closely monitor.

## 2018-10-30 NOTE — PROGRESS NOTE ADULT - PROBLEM SELECTOR PLAN 10
DVT ppx: SQH  Diet: NPO for now.  GOC: per discussion with wife, pt wishes are to be FULL CODE DVT ppx: SQH  Diet: NPO for now, NG tube. Re-address ?need for PG after trial of PG and re-evaluation of potentially reversible etiologies of dysphagia.   GOC: per discussion with wife, pt wishes are to be FULL CODE

## 2018-10-30 NOTE — DIETITIAN INITIAL EVALUATION ADULT. - PROBLEM SELECTOR PLAN 6
Patient was found to have hepatic lesion on last admission that was suspected to be the cause of acute hemoperitoneum, and pt was recommended follow up with surgical oncology for diagnostic purposes.   Will require further hepatic workup

## 2018-10-30 NOTE — PROGRESS NOTE ADULT - PROBLEM SELECTOR PLAN 4
on metoprolol 75mg BID. Not on AC 2/2 multiple bleeds.   - Has hx of RVR inpatient in setting of septic and hypotensive. Currently rate controlled  - will c/w decreased dose of 50mg BID, will titrate up as tolerated.

## 2018-10-31 LAB
ANION GAP SERPL CALC-SCNC: 12 MMOL/L — SIGNIFICANT CHANGE UP (ref 5–17)
BUN SERPL-MCNC: 19 MG/DL — SIGNIFICANT CHANGE UP (ref 7–23)
CALCIUM SERPL-MCNC: 8.9 MG/DL — SIGNIFICANT CHANGE UP (ref 8.4–10.5)
CHLORIDE SERPL-SCNC: 102 MMOL/L — SIGNIFICANT CHANGE UP (ref 96–108)
CO2 SERPL-SCNC: 28 MMOL/L — SIGNIFICANT CHANGE UP (ref 22–31)
CREAT SERPL-MCNC: 0.98 MG/DL — SIGNIFICANT CHANGE UP (ref 0.5–1.3)
CULTURE RESULTS: SIGNIFICANT CHANGE UP
CULTURE RESULTS: SIGNIFICANT CHANGE UP
GLUCOSE BLDC GLUCOMTR-MCNC: 188 MG/DL — HIGH (ref 70–99)
GLUCOSE BLDC GLUCOMTR-MCNC: 192 MG/DL — HIGH (ref 70–99)
GLUCOSE BLDC GLUCOMTR-MCNC: 197 MG/DL — HIGH (ref 70–99)
GLUCOSE BLDC GLUCOMTR-MCNC: 205 MG/DL — HIGH (ref 70–99)
GLUCOSE SERPL-MCNC: 196 MG/DL — HIGH (ref 70–99)
HCT VFR BLD CALC: 34.5 % — LOW (ref 39–50)
HGB BLD-MCNC: 11.6 G/DL — LOW (ref 13–17)
MAGNESIUM SERPL-MCNC: 1.7 MG/DL — SIGNIFICANT CHANGE UP (ref 1.6–2.6)
MCHC RBC-ENTMCNC: 30.9 PG — SIGNIFICANT CHANGE UP (ref 27–34)
MCHC RBC-ENTMCNC: 33.6 GM/DL — SIGNIFICANT CHANGE UP (ref 32–36)
MCV RBC AUTO: 91.8 FL — SIGNIFICANT CHANGE UP (ref 80–100)
PHOSPHATE SERPL-MCNC: 2.7 MG/DL — SIGNIFICANT CHANGE UP (ref 2.5–4.5)
PLATELET # BLD AUTO: 335 K/UL — SIGNIFICANT CHANGE UP (ref 150–400)
POTASSIUM SERPL-MCNC: 3.4 MMOL/L — LOW (ref 3.5–5.3)
POTASSIUM SERPL-SCNC: 3.4 MMOL/L — LOW (ref 3.5–5.3)
RBC # BLD: 3.76 M/UL — LOW (ref 4.2–5.8)
RBC # FLD: 14.4 % — SIGNIFICANT CHANGE UP (ref 10.3–14.5)
SODIUM SERPL-SCNC: 142 MMOL/L — SIGNIFICANT CHANGE UP (ref 135–145)
SPECIMEN SOURCE: SIGNIFICANT CHANGE UP
SPECIMEN SOURCE: SIGNIFICANT CHANGE UP
WBC # BLD: 6.7 K/UL — SIGNIFICANT CHANGE UP (ref 3.8–10.5)
WBC # FLD AUTO: 6.7 K/UL — SIGNIFICANT CHANGE UP (ref 3.8–10.5)

## 2018-10-31 PROCEDURE — 99233 SBSQ HOSP IP/OBS HIGH 50: CPT | Mod: GC

## 2018-10-31 PROCEDURE — 99223 1ST HOSP IP/OBS HIGH 75: CPT

## 2018-10-31 RX ORDER — POTASSIUM CHLORIDE 20 MEQ
10 PACKET (EA) ORAL ONCE
Qty: 0 | Refills: 0 | Status: COMPLETED | OUTPATIENT
Start: 2018-10-31 | End: 2018-10-31

## 2018-10-31 RX ADMIN — AMPICILLIN SODIUM AND SULBACTAM SODIUM 200 GRAM(S): 250; 125 INJECTION, POWDER, FOR SUSPENSION INTRAMUSCULAR; INTRAVENOUS at 23:04

## 2018-10-31 RX ADMIN — AMPICILLIN SODIUM AND SULBACTAM SODIUM 200 GRAM(S): 250; 125 INJECTION, POWDER, FOR SUSPENSION INTRAMUSCULAR; INTRAVENOUS at 18:05

## 2018-10-31 RX ADMIN — LEVETIRACETAM 400 MILLIGRAM(S): 250 TABLET, FILM COATED ORAL at 17:15

## 2018-10-31 RX ADMIN — LEVETIRACETAM 400 MILLIGRAM(S): 250 TABLET, FILM COATED ORAL at 06:14

## 2018-10-31 RX ADMIN — Medication 5 MILLIGRAM(S): at 17:15

## 2018-10-31 RX ADMIN — Medication 5 MILLIGRAM(S): at 06:15

## 2018-10-31 RX ADMIN — Medication 1: at 12:46

## 2018-10-31 RX ADMIN — Medication 5 MILLIGRAM(S): at 12:46

## 2018-10-31 RX ADMIN — INSULIN GLARGINE 11 UNIT(S): 100 INJECTION, SOLUTION SUBCUTANEOUS at 23:02

## 2018-10-31 RX ADMIN — HEPARIN SODIUM 5000 UNIT(S): 5000 INJECTION INTRAVENOUS; SUBCUTANEOUS at 23:02

## 2018-10-31 RX ADMIN — Medication 100 MILLIEQUIVALENT(S): at 11:23

## 2018-10-31 RX ADMIN — SODIUM CHLORIDE 75 MILLILITER(S): 9 INJECTION, SOLUTION INTRAVENOUS at 12:45

## 2018-10-31 RX ADMIN — HEPARIN SODIUM 5000 UNIT(S): 5000 INJECTION INTRAVENOUS; SUBCUTANEOUS at 06:15

## 2018-10-31 RX ADMIN — Medication 1: at 18:04

## 2018-10-31 RX ADMIN — Medication 1: at 06:15

## 2018-10-31 RX ADMIN — Medication 5 MILLIGRAM(S): at 23:03

## 2018-10-31 RX ADMIN — Medication 2: at 23:03

## 2018-10-31 RX ADMIN — AMPICILLIN SODIUM AND SULBACTAM SODIUM 200 GRAM(S): 250; 125 INJECTION, POWDER, FOR SUSPENSION INTRAMUSCULAR; INTRAVENOUS at 13:13

## 2018-10-31 RX ADMIN — HEPARIN SODIUM 5000 UNIT(S): 5000 INJECTION INTRAVENOUS; SUBCUTANEOUS at 13:13

## 2018-10-31 RX ADMIN — AMPICILLIN SODIUM AND SULBACTAM SODIUM 200 GRAM(S): 250; 125 INJECTION, POWDER, FOR SUSPENSION INTRAMUSCULAR; INTRAVENOUS at 06:14

## 2018-10-31 NOTE — PROGRESS NOTE ADULT - PROBLEM SELECTOR PLAN 10
DVT ppx: SQH  Diet: NPO for now, NG tube. Re-address ?need for PG after trial of PG and re-evaluation of potentially reversible etiologies of dysphagia.   GOC: per discussion with wife, pt wishes are to be FULL CODE DVT ppx: SQH  Diet: NPO for now, NG tube. Re-address ?need for PG after trial of PG and re-evaluation of potentially reversible etiologies of dysphagia.   electrolytes; K+ replete today. 1x IV Potassium Chloride 10.  GOC: per discussion with wife, pt wishes are to be FULL CODE

## 2018-10-31 NOTE — PROGRESS NOTE ADULT - SUBJECTIVE AND OBJECTIVE BOX
Nolan Granados Vickie  PGY-1  Pager: [phone]    Patient is a 77y old  Male who presents with a chief complaint of altered mental status, cough (30 Oct 2018 10:43)      SUBJECTIVE / OVERNIGHT EVENTS:  No acute events overnight.  Patient responds to voice and answers yes or no if questions are asked repeatedly.  Patient is difficult to assess orientation due to communication limitation, yet likely AOx2 today (responds to the name. understand that he is in hospital)   Reports that there is no acute complaints, and wants to drink some water. Explained that he has high aspiration risk, but will try to get swabs to moist his mouth.    ROS:  Review of system limited due to communication limitation: shakes his head and/or noding.  CONSTITUTIONAL: No fever  RESPIRATORY: No shortness of breath  CARDIOVASCULAR: No chest pain, palpitations, dizziness, or leg pain  GASTROINTESTINAL: No abdominal or epigastric pain. No nausea, vomiting,  NEUROLOGICAL: No headaches    MEDICATIONS  (STANDING):  ampicillin/sulbactam  IVPB 3 Gram(s) IV Intermittent every 6 hours  dextrose 5% + sodium chloride 0.45%. 1000 milliLiter(s) (75 mL/Hr) IV Continuous <Continuous>  dextrose 5%. 1000 milliLiter(s) (50 mL/Hr) IV Continuous <Continuous>  dextrose 50% Injectable 12.5 Gram(s) IV Push once  dextrose 50% Injectable 25 Gram(s) IV Push once  dextrose 50% Injectable 25 Gram(s) IV Push once  heparin  Injectable 5000 Unit(s) SubCutaneous every 8 hours  insulin glargine Injectable (LANTUS) 11 Unit(s) SubCutaneous at bedtime  insulin lispro (HumaLOG) corrective regimen sliding scale   SubCutaneous every 6 hours  levETIRAcetam  IVPB 500 milliGRAM(s) IV Intermittent every 12 hours  metoprolol tartrate Injectable 5 milliGRAM(s) IV Push every 6 hours    MEDICATIONS  (PRN):  dextrose 40% Gel 15 Gram(s) Oral once PRN Blood Glucose LESS THAN 70 milliGRAM(s)/deciliter  glucagon  Injectable 1 milliGRAM(s) IntraMuscular once PRN Glucose LESS THAN 70 milligrams/deciliter      T(C): 36.6 (10-31-18 @ 05:26), Max: 36.9 (10-30-18 @ 15:05)  HR: 81 (10-31-18 @ 05:26) (81 - 85)  BP: 158/89 (10-31-18 @ 05:26) (91/58 - 158/89)  RR: 18 (10-31-18 @ 05:26) (18 - 18)  SpO2: 96% (10-31-18 @ 05:26) (95% - 97%)  CAPILLARY BLOOD GLUCOSE      POCT Blood Glucose.: 188 mg/dL (31 Oct 2018 05:53)  POCT Blood Glucose.: 152 mg/dL (30 Oct 2018 22:39)  POCT Blood Glucose.: 151 mg/dL (30 Oct 2018 17:48)  POCT Blood Glucose.: 147 mg/dL (30 Oct 2018 12:32)    I&O's Summary    30 Oct 2018 07:01  -  31 Oct 2018 07:00  --------------------------------------------------------  IN: 300 mL / OUT: 0 mL / NET: 300 mL        PHYSICAL EXAM:  GENERAL: NAD, awake upon entry.  HEENT:  Atraumatic, Old cranial surgical site bilaterally (clean dry intact). Neck supple. No conjunctival icterus.  CHEST/LUNG: Clear to auscultation bilaterally in anterior  HEART: Irregularly irregular. S1 S2, No murmurs, rubs, or gallops. 2+ peripheral pulses. Cap refill ~ 3 seconds.  ABDOMEN: Soft, Nontender, Nondistended; Bowel sounds present  EXTREMITIES:  2+ Peripheral Pulses, No cyanosis or edema. bilateral distal LE skin changes c/w venous stasis.  NEUROLOGY: AOx2. Limited communication. Patient responds to name and touch. Patient looks for name on the coat in initial encounter, moving his right UE freely against the gravity. Able to communicate his wish (wishes to drink water).       LABS:                        11.6   6.7   )-----------( 335      ( 31 Oct 2018 06:49 )             34.5     10-31    142  |  102  |  19  ----------------------------<  196<H>  3.4<L>   |  28  |  0.98    Ca    8.9      31 Oct 2018 06:47  Phos  2.7     10-31  Mg     1.7     10-31                RADIOLOGY & ADDITIONAL TESTS:    Imaging Personally Reviewed: TYLER    Consultant(s) Notes Reviewed:  Nutrition, ENT, S/S    Care Discussed with Consultants/Other Providers: TYLER

## 2018-10-31 NOTE — PROGRESS NOTE ADULT - ATTENDING COMMENTS
Agree.  Seen and examined.  Able to say few words, comprehends though with continued confusion.   Replace NG tube, prolonged discussion w/ patient and wife today who are on board.   Place soft mittens this evening to prevent him from pulling out again, in past when he had hemorrhagic shock he pulled out IVs giving him blood per wife.   Appreciate neuro input, I think we should get an MRI w/ and w/ out contrast to assess for any tissue damage after complicated prior hospitalization. Agree.  Seen and examined.  Able to say few words, comprehends though with continued confusion.   Replace NG tube, prolonged discussion w/ patient and wife today who are on board.   Place soft mittens this evening to prevent him from pulling out again, in past when he had hemorrhagic shock he pulled out IVs giving him blood per wife.   Appreciate neuro input, I think we should get an MRI head to assess for any tissue damage after complicated prior hospitalization. Further imaging +/- EEG as per neuro

## 2018-10-31 NOTE — PROGRESS NOTE ADULT - PROBLEM SELECTOR PLAN 1
- awake. Responding to questions. Able to communicate his wishes.   - c/w Unasyn (Day 6)   - CT head without signs of acute ICH  - hypernatremia on admission, now resolved s/p IVF.  - currently not agitated, consider haldol PRN  - c/w antiepileptics  - fall precautions  - bedrest for now

## 2018-10-31 NOTE — PROGRESS NOTE ADULT - PROBLEM SELECTOR PLAN 3
Significant oropharyngeal dysphagia. Patient left the hospital to nursing home on 10/23 and was initially doing well. Abruptly became lethargic with inability to take PO.   - Speech and swallow eval: NPO + aspiration precaution.  - ENT consulted for aphonia - VC injury likely from intubation. No acute intervention  - continue to monitor improvements with ABx.  - will acquire Neuro consult to r/o seizure as cause of dysphagia per family request.  - Keppra level pending.

## 2018-10-31 NOTE — CONSULT NOTE ADULT - SUBJECTIVE AND OBJECTIVE BOX
TONI [id number] is a 77y old  Male who presents with a chief complaint of altered mental status, cough (31 Oct 2018 09:12)      HPI: Toni Smith is a 78 y/o man with a history of b/l traumatic SDH s/p craniotomy in 2015, Afib not on AC, HTN, DMII, liver mass and baseline cognitive decline who is admitted for severe sepsis with UTI and PNA/toxic metabolic encephalopathy; neuro consulted for worsening dysphagia. Per his wife, the pt has been having dysphagia at baseline for the past ~2 years at his assisted living facility, and tolerates nectar or honey consistencies at baseline. In the past ~week since he has been admitted, however, his dysphagia has worsened and he is now NPO as per speech and swallow. His mentation has also been altered from baseline since his admission. Family is concerned that possibly seizures could be causing the dysphagia, but he has not had any seizure activity since the day of his trauma in 2015 and has been on Keppra since that time.    MEDICATIONS  (STANDING):  ampicillin/sulbactam  IVPB 3 Gram(s) IV Intermittent every 6 hours  dextrose 5% + sodium chloride 0.45%. 1000 milliLiter(s) (75 mL/Hr) IV Continuous <Continuous>  dextrose 5%. 1000 milliLiter(s) (50 mL/Hr) IV Continuous <Continuous>  dextrose 50% Injectable 12.5 Gram(s) IV Push once  dextrose 50% Injectable 25 Gram(s) IV Push once  dextrose 50% Injectable 25 Gram(s) IV Push once  heparin  Injectable 5000 Unit(s) SubCutaneous every 8 hours  insulin glargine Injectable (LANTUS) 11 Unit(s) SubCutaneous at bedtime  insulin lispro (HumaLOG) corrective regimen sliding scale   SubCutaneous every 6 hours  levETIRAcetam  IVPB 500 milliGRAM(s) IV Intermittent every 12 hours  metoprolol tartrate Injectable 5 milliGRAM(s) IV Push every 6 hours    MEDICATIONS  (PRN):  dextrose 40% Gel 15 Gram(s) Oral once PRN Blood Glucose LESS THAN 70 milliGRAM(s)/deciliter  glucagon  Injectable 1 milliGRAM(s) IntraMuscular once PRN Glucose LESS THAN 70 milligrams/deciliter    PAST MEDICAL & SURGICAL HISTORY:  BPH (benign prostatic hyperplasia)  Atrial fibrillation  Seizure  Diabetes mellitus  Hypertension  H/O craniotomy: bilateral, for ICH after fall  S/P inguinal hernia repair  ASD (atrial septal defect): closure    FAMILY HISTORY:  Family history of glioblastoma (Sibling): brother    Allergies    Aleve (Unknown)    Intolerances        SHx - No smoking, No ETOH, No drug abuse      Review of Systems:    see hpi        Vital Signs Last 24 Hrs  T(C): 36.8 (31 Oct 2018 12:09), Max: 36.8 (31 Oct 2018 12:09)  T(F): 98.2 (31 Oct 2018 12:09), Max: 98.2 (31 Oct 2018 12:09)  HR: 82 (31 Oct 2018 12:51) (81 - 84)  BP: 138/82 (31 Oct 2018 12:51) (91/58 - 158/89)  BP(mean): --  RR: 17 (31 Oct 2018 12:09) (17 - 18)  SpO2: 95% (31 Oct 2018 12:09) (95% - 97%)    Neurological Exam:  Mental Status: minimal if any verbal output, but able to follow some commands (to participate in EOM exam, holds up both arms)  Cranial Nerves: EOMI, no nystagmus.  No facial asymmetry.           Strength: no drift b/l UE. moves b/l LE.               Dysmetria: None to finger-nose-finger  No truncal ataxia.    Tremor: No resting, postural or action tremor.  No myoclonus.  Sensation: intact to light touch  Deep Tendon Reflexes:     Biceps          Triceps      BR        Patellar        Ankle         Babinski                                         R       2+                   2+           2+            1+               1+           downgoing                                         L        2+                  2+           2+            1+               1+           downgoing    Gait: deferred.      Other:    10-31    142  |  102  |  19  ----------------------------<  196<H>  3.4<L>   |  28  |  0.98    Ca    8.9      31 Oct 2018 06:47  Phos  2.7     10-31  Mg     1.7     10-31                              11.6   6.7   )-----------( 335      ( 31 Oct 2018 06:49 )             34.5       Radiology    CT: < from: CT Head No Cont (10.26.18 @ 19:44) >  IMPRESSION: No acute intracranial hemorrhage, mass effect, or shift of   the midline structures.    Similar-appearing microvascular disease.    < end of copied text >    MRI  EKG:  tele:  TTE:  EEG:

## 2018-10-31 NOTE — CONSULT NOTE ADULT - ASSESSMENT
Impression: Worsening dysphagia could be in the setting of AMS 2/2 hospital acquired delirium and current infection. Stroke and seizure are on differential, but history makes this less likely. Would be important to r/o however.    Plan:  -MRI brain w/o contrast  -routine EEG  -MR C-spine to r/o structural pathology causing dysphagia, though less likely  -c/w keppra 500 BID Impression: Worsening dysphagia could be in the setting of AMS 2/2 hospital acquired delirium and current infection. Stroke and seizure are on differential, but history makes this less likely. Would be important to r/o however.    Plan:  -MRI brain w/o contrast  -MR C-spine to r/o structural pathology causing dysphagia, though less likely  -routine EEG if above is unrevealing  -c/w keppra 500 BID

## 2018-11-01 LAB
ANION GAP SERPL CALC-SCNC: 11 MMOL/L — SIGNIFICANT CHANGE UP (ref 5–17)
BUN SERPL-MCNC: 14 MG/DL — SIGNIFICANT CHANGE UP (ref 7–23)
CALCIUM SERPL-MCNC: 8.5 MG/DL — SIGNIFICANT CHANGE UP (ref 8.4–10.5)
CHLORIDE SERPL-SCNC: 102 MMOL/L — SIGNIFICANT CHANGE UP (ref 96–108)
CO2 SERPL-SCNC: 27 MMOL/L — SIGNIFICANT CHANGE UP (ref 22–31)
CREAT SERPL-MCNC: 0.77 MG/DL — SIGNIFICANT CHANGE UP (ref 0.5–1.3)
GLUCOSE BLDC GLUCOMTR-MCNC: 175 MG/DL — HIGH (ref 70–99)
GLUCOSE BLDC GLUCOMTR-MCNC: 176 MG/DL — HIGH (ref 70–99)
GLUCOSE BLDC GLUCOMTR-MCNC: 209 MG/DL — HIGH (ref 70–99)
GLUCOSE BLDC GLUCOMTR-MCNC: 218 MG/DL — HIGH (ref 70–99)
GLUCOSE BLDC GLUCOMTR-MCNC: 222 MG/DL — HIGH (ref 70–99)
GLUCOSE SERPL-MCNC: 217 MG/DL — HIGH (ref 70–99)
HCT VFR BLD CALC: 33.9 % — LOW (ref 39–50)
HGB BLD-MCNC: 11.3 G/DL — LOW (ref 13–17)
LEVETIRACETAM SERPL-MCNC: 21.4 MCG/ML — SIGNIFICANT CHANGE UP (ref 12–46)
MAGNESIUM SERPL-MCNC: 1.5 MG/DL — LOW (ref 1.6–2.6)
MCHC RBC-ENTMCNC: 30.5 PG — SIGNIFICANT CHANGE UP (ref 27–34)
MCHC RBC-ENTMCNC: 33.3 GM/DL — SIGNIFICANT CHANGE UP (ref 32–36)
MCV RBC AUTO: 91.5 FL — SIGNIFICANT CHANGE UP (ref 80–100)
PHOSPHATE SERPL-MCNC: 2.2 MG/DL — LOW (ref 2.5–4.5)
PLATELET # BLD AUTO: 302 K/UL — SIGNIFICANT CHANGE UP (ref 150–400)
POTASSIUM SERPL-MCNC: 3.5 MMOL/L — SIGNIFICANT CHANGE UP (ref 3.5–5.3)
POTASSIUM SERPL-SCNC: 3.5 MMOL/L — SIGNIFICANT CHANGE UP (ref 3.5–5.3)
RBC # BLD: 3.71 M/UL — LOW (ref 4.2–5.8)
RBC # FLD: 14.4 % — SIGNIFICANT CHANGE UP (ref 10.3–14.5)
SODIUM SERPL-SCNC: 140 MMOL/L — SIGNIFICANT CHANGE UP (ref 135–145)
WBC # BLD: 6.8 K/UL — SIGNIFICANT CHANGE UP (ref 3.8–10.5)
WBC # FLD AUTO: 6.8 K/UL — SIGNIFICANT CHANGE UP (ref 3.8–10.5)

## 2018-11-01 PROCEDURE — 99233 SBSQ HOSP IP/OBS HIGH 50: CPT | Mod: GC

## 2018-11-01 PROCEDURE — 99231 SBSQ HOSP IP/OBS SF/LOW 25: CPT | Mod: GC

## 2018-11-01 RX ORDER — IPRATROPIUM/ALBUTEROL SULFATE 18-103MCG
3 AEROSOL WITH ADAPTER (GRAM) INHALATION EVERY 6 HOURS
Qty: 0 | Refills: 0 | Status: DISCONTINUED | OUTPATIENT
Start: 2018-11-01 | End: 2018-11-12

## 2018-11-01 RX ORDER — POTASSIUM PHOSPHATE, MONOBASIC POTASSIUM PHOSPHATE, DIBASIC 236; 224 MG/ML; MG/ML
15 INJECTION, SOLUTION INTRAVENOUS ONCE
Qty: 0 | Refills: 0 | Status: COMPLETED | OUTPATIENT
Start: 2018-11-01 | End: 2018-11-01

## 2018-11-01 RX ORDER — MAGNESIUM SULFATE 500 MG/ML
2 VIAL (ML) INJECTION ONCE
Qty: 0 | Refills: 0 | Status: COMPLETED | OUTPATIENT
Start: 2018-11-01 | End: 2018-11-01

## 2018-11-01 RX ADMIN — Medication 3 MILLILITER(S): at 11:25

## 2018-11-01 RX ADMIN — HEPARIN SODIUM 5000 UNIT(S): 5000 INJECTION INTRAVENOUS; SUBCUTANEOUS at 05:30

## 2018-11-01 RX ADMIN — AMPICILLIN SODIUM AND SULBACTAM SODIUM 200 GRAM(S): 250; 125 INJECTION, POWDER, FOR SUSPENSION INTRAMUSCULAR; INTRAVENOUS at 17:15

## 2018-11-01 RX ADMIN — SODIUM CHLORIDE 75 MILLILITER(S): 9 INJECTION, SOLUTION INTRAVENOUS at 05:31

## 2018-11-01 RX ADMIN — Medication 2: at 17:52

## 2018-11-01 RX ADMIN — POTASSIUM PHOSPHATE, MONOBASIC POTASSIUM PHOSPHATE, DIBASIC 62.5 MILLIMOLE(S): 236; 224 INJECTION, SOLUTION INTRAVENOUS at 17:03

## 2018-11-01 RX ADMIN — LEVETIRACETAM 400 MILLIGRAM(S): 250 TABLET, FILM COATED ORAL at 17:21

## 2018-11-01 RX ADMIN — Medication 3 MILLILITER(S): at 17:21

## 2018-11-01 RX ADMIN — Medication 2: at 13:00

## 2018-11-01 RX ADMIN — HEPARIN SODIUM 5000 UNIT(S): 5000 INJECTION INTRAVENOUS; SUBCUTANEOUS at 13:00

## 2018-11-01 RX ADMIN — SODIUM CHLORIDE 75 MILLILITER(S): 9 INJECTION, SOLUTION INTRAVENOUS at 21:32

## 2018-11-01 RX ADMIN — Medication 3 MILLILITER(S): at 23:22

## 2018-11-01 RX ADMIN — LEVETIRACETAM 400 MILLIGRAM(S): 250 TABLET, FILM COATED ORAL at 05:30

## 2018-11-01 RX ADMIN — Medication 2: at 07:22

## 2018-11-01 RX ADMIN — INSULIN GLARGINE 11 UNIT(S): 100 INJECTION, SOLUTION SUBCUTANEOUS at 21:32

## 2018-11-01 RX ADMIN — SODIUM CHLORIDE 75 MILLILITER(S): 9 INJECTION, SOLUTION INTRAVENOUS at 17:15

## 2018-11-01 RX ADMIN — AMPICILLIN SODIUM AND SULBACTAM SODIUM 200 GRAM(S): 250; 125 INJECTION, POWDER, FOR SUSPENSION INTRAMUSCULAR; INTRAVENOUS at 12:47

## 2018-11-01 RX ADMIN — Medication 5 MILLIGRAM(S): at 12:58

## 2018-11-01 RX ADMIN — Medication 50 GRAM(S): at 17:02

## 2018-11-01 RX ADMIN — HEPARIN SODIUM 5000 UNIT(S): 5000 INJECTION INTRAVENOUS; SUBCUTANEOUS at 21:32

## 2018-11-01 RX ADMIN — Medication 5 MILLIGRAM(S): at 17:14

## 2018-11-01 RX ADMIN — AMPICILLIN SODIUM AND SULBACTAM SODIUM 200 GRAM(S): 250; 125 INJECTION, POWDER, FOR SUSPENSION INTRAMUSCULAR; INTRAVENOUS at 05:30

## 2018-11-01 RX ADMIN — Medication 5 MILLIGRAM(S): at 05:31

## 2018-11-01 NOTE — PROGRESS NOTE ADULT - PROBLEM SELECTOR PLAN 3
Significant oropharyngeal dysphagia. Patient left the hospital to nursing home on 10/23 and was initially doing well. Abruptly became lethargic with inability to take PO.   - Speech and swallow eval: NPO + aspiration precaution.  - ENT consulted for aphonia - VC injury likely from intubation. No acute intervention  - continue to monitor improvements with ABx.  - Neuro on board. MRI ordered.  - Keppra level pending.

## 2018-11-01 NOTE — MEDICAL STUDENT PROGRESS NOTE(EDUCATION) - NS MD HP STUD SUPERVISOR COMMENTS FT
Case reviewed and patient examined along with the medical student and the neurology consult team on rounds. Details of history and physical exam as above. Case discussed with his outpatient neurologist, Dr. Sánchez Pratt, and with his medical attending Dr. Donovan Reece. MRI of brain and cervical spine have been ordered.

## 2018-11-01 NOTE — MEDICAL STUDENT PROGRESS NOTE(EDUCATION) - SUBJECTIVE AND OBJECTIVE BOX
Subjective: No complaints and no episodes overnight. Pt denies acute pain and nods that he is feeling about the same as yesterday.   ROS: unable to obtain due to AMS    Vitals: T(F) 97.7, HR 83, /72, RR 18, SpO2 94% on RA  General: Somnolent, in NAD  Neuro Exam:   ·	Mental Status: Alert yet more somnolent and less responsive than yesterday. Arousable to light shaking and loud voice. Not verbal, but not oriented to place (nodded that he was in a library). Intermittently able to follow simple commands and nod to questions.   ·	CNs: PERRL, facial sensation and motor intact; unable to assess all else due to mental status.   ·	Sensation: Pt nodded that sensation to light touch is grossly intact in upper and lower extremities b/l  ·	Reflexes: not assessed  ·	Motor: Able to lift arms and legs against gravity intermittently when prompted, unable to further assess due to mental status.   ·	Coordination: unable to assess due to mental status.   ·	Gait: not assessed Neurology Follow up note    Patient is a 77y old  Male who presents with a chief complaint of altered mental status, cough (31 Oct 2018 17:11)    Subjective: No complaints and no episodes overnight. Pt denies acute pain and nods that he is feeling about the same as yesterday.   ROS: unable to obtain due to AMS    Objective:   Vital Signs Last 24 Hrs  T(C): 36.5 (01 Nov 2018 09:30), Max: 37.5 (31 Oct 2018 20:45)  T(F): 97.7 (01 Nov 2018 09:30), Max: 99.5 (31 Oct 2018 20:45)  HR: 83 (01 Nov 2018 09:30) (82 - 94)  BP: 146/72 (01 Nov 2018 09:30) (116/61 - 148/80)  BP(mean): --  RR: 18 (01 Nov 2018 09:30) (17 - 18)  SpO2: 94% (01 Nov 2018 09:30) (94% - 96%)    Vitals: T(F) 97.7, HR 83, /72, RR 18, SpO2 94% on RA    General: Somnolent, in NAD  Neuro Exam:   ·	Mental Status: Alert yet more somnolent and less responsive than yesterday. Arousable to light shaking and loud voice. Not verbal, but not oriented to place (nodded that he was in a library). Intermittently able to follow simple commands and nod to questions.   ·	CNs: PERRL, facial sensation and motor intact; unable to assess all else due to mental status.   ·	Sensation: Pt nodded that sensation to light touch is grossly intact in upper and lower extremities b/l  ·	Reflexes: not assessed  ·	Motor: Able to lift arms and legs against gravity intermittently when prompted, unable to further assess due to mental status.   ·	Coordination: unable to assess due to mental status.   Gait: not assessed    Other:    11-01    140  |  102  |  14  ----------------------------<  217<H>  3.5   |  27  |  0.77    Ca    8.5      01 Nov 2018 07:14  Phos  2.2     11-01  Mg     1.5     11-01 11-01    140  |  102  |  14  ----------------------------<  217<H>  3.5   |  27  |  0.77    Ca    8.5      01 Nov 2018 07:14  Phos  2.2     11-01  Mg     1.5     11-01                              11.3   6.8   )-----------( 302      ( 01 Nov 2018 07:14 )             33.9     Radiology    EKG:  tele:  TTE:  EEG:

## 2018-11-01 NOTE — PROGRESS NOTE ADULT - PROBLEM SELECTOR PLAN 10
DVT ppx: SQH  Diet: NPO for now, multiple failure of NG tube trial due to patient's discomfort and self-discontinuation. Will need restraints to stop, yet will wait for neurology eval for reversible etiologies of dysphagia and S/S re-eval tomorrow (Friday).   GOC: per discussion with wife, pt wishes are to be FULL CODE. Pt's wife's primary concern at the moment is nutrition.

## 2018-11-01 NOTE — MEDICAL STUDENT PROGRESS NOTE(EDUCATION) - NS MD HP STUD ASPLAN PLAN FT
Plan:  -MRI brain w/o contrast  -MR C-spine to r/o structural pathology causing dysphagia, though less likely  -routine EEG if above is unrevealing  -c/w keppra 500 BID Plan:  -MRI brain w/o contrast  -MR C-spine to r/o structural pathology causing dysphagia, though less likely  -c/w keppra 500 BID

## 2018-11-01 NOTE — PROGRESS NOTE ADULT - PROBLEM SELECTOR PLAN 1
- awake. Responding to questions. Able to communicate his wishes.   - Last day of Unasyn (completed 7 day course of ABx)  - CT head without signs of acute ICH  - Neuro on board: will come back for reassessment in the afternoon.  - MRI head per neuro rec  - hypernatremia on admission, now resolved s/p IVF.  - currently not agitated, consider haldol PRN  - c/w antiepileptics  - fall precautions  - bedrest for now

## 2018-11-01 NOTE — MEDICAL STUDENT PROGRESS NOTE(EDUCATION) - NS MD HP STUD ASPLAN ASSES FT
· Assessment	  Impression: Worsening dysphagia could be in the setting of AMS 2/2 hospital acquired delirium and current infection. Stroke and seizure are on differential, but history makes this less likely. Would be important to r/o however.    Plan:  -MRI brain w/o contrast  -MR C-spine to r/o structural pathology causing dysphagia, though less likely  -routine EEG if above is unrevealing  -c/w keppra 500 BID Impression: Worsening dysphagia could be in the setting of AMS 2/2 hospital acquired delirium and current infection. Stroke and seizure are on differential, but history makes this less likely. Would be important to r/o however.

## 2018-11-01 NOTE — PROGRESS NOTE ADULT - ATTENDING COMMENTS
Agree.   Prolonged conversation w/ wife this evening. Patient not tolerating NG tube, pulled out x3. He's interactive this afternoon and clearly prefers not to have it in. He'd like to eat and drink. Repeat speech and swallow tmrw. If patient passes then will cont w/ dysphagia diet, otherwise will need to discuss alternative feeding methods. I believe G tube is not a safe option for him as he persistently pulls out lines/tubes (hx of pulling out IVs when getting blood in setting of hemmorhage), now w/ NG tube. Family also asked about TPN, really not sure he's a candidate for this (he could pull this line out, too) and is high risk for bacteremia w/ recurrent aspiration. Will re-address tmrw pending swallow re-eval.  MRI head/c-spine if he can tolerate, wife does not want to intubate w/ anesthesia for MRI.   I believe he has suffered acute on chronic neurologic decline in setting of large bleed last hospitalization and has likely suffered anoxic brain injury superimposed on notable microvascular ischemic disease. GOC ongoing as above.

## 2018-11-01 NOTE — PROGRESS NOTE ADULT - SUBJECTIVE AND OBJECTIVE BOX
Nolan Granados Vickie  PGY-1  Pager: [phone]    Patient is a 77y old  Male who presents with a chief complaint of altered mental status, cough (31 Oct 2018 17:11)      SUBJECTIVE / OVERNIGHT EVENTS:  No acute events overnight. Patient slept well. Sleepy in the morning yet perks up at the lunch time.   Wife at bedside.    ROS:  Review of system limited due to communication limitation: shakes his head and/or noding.  CONSTITUTIONAL: No fever  RESPIRATORY: No shortness of breath  CARDIOVASCULAR: No chest pain, palpitations  GASTROINTESTINAL: No abdominal pain. No nausea, vomiting,  NEUROLOGICAL: No headaches      MEDICATIONS  (STANDING):  ALBUTerol/ipratropium for Nebulization 3 milliLiter(s) Nebulizer every 6 hours  ampicillin/sulbactam  IVPB 3 Gram(s) IV Intermittent every 6 hours  dextrose 5% + sodium chloride 0.45%. 1000 milliLiter(s) (75 mL/Hr) IV Continuous <Continuous>  dextrose 5%. 1000 milliLiter(s) (50 mL/Hr) IV Continuous <Continuous>  dextrose 50% Injectable 12.5 Gram(s) IV Push once  dextrose 50% Injectable 25 Gram(s) IV Push once  dextrose 50% Injectable 25 Gram(s) IV Push once  heparin  Injectable 5000 Unit(s) SubCutaneous every 8 hours  insulin glargine Injectable (LANTUS) 11 Unit(s) SubCutaneous at bedtime  insulin lispro (HumaLOG) corrective regimen sliding scale   SubCutaneous every 6 hours  levETIRAcetam  IVPB 500 milliGRAM(s) IV Intermittent every 12 hours  metoprolol tartrate Injectable 5 milliGRAM(s) IV Push every 6 hours    MEDICATIONS  (PRN):  dextrose 40% Gel 15 Gram(s) Oral once PRN Blood Glucose LESS THAN 70 milliGRAM(s)/deciliter  glucagon  Injectable 1 milliGRAM(s) IntraMuscular once PRN Glucose LESS THAN 70 milligrams/deciliter      T(C): 36.5 (11-01-18 @ 09:30), Max: 37.5 (10-31-18 @ 20:45)  HR: 84 (11-01-18 @ 11:29) (82 - 94)  BP: 146/72 (11-01-18 @ 09:30) (116/61 - 148/80)  RR: 18 (11-01-18 @ 09:30) (18 - 18)  SpO2: 96% (11-01-18 @ 11:29) (94% - 96%)  CAPILLARY BLOOD GLUCOSE      POCT Blood Glucose.: 218 mg/dL (01 Nov 2018 12:39)  POCT Blood Glucose.: 222 mg/dL (01 Nov 2018 07:05)  POCT Blood Glucose.: 205 mg/dL (31 Oct 2018 22:33)  POCT Blood Glucose.: 192 mg/dL (31 Oct 2018 17:31)    I&O's Summary    31 Oct 2018 07:01  -  01 Nov 2018 07:00  --------------------------------------------------------  IN: 1350 mL / OUT: 0 mL / NET: 1350 mL        PHYSICAL EXAM:  GENERAL: NAD, well-developed  HEAD:  Atraumatic, Normocephalic  EYES: EOMI, PERRLA, conjunctiva and sclera clear  NECK: Supple, No JVD  CHEST/LUNG: Clear to auscultation bilaterally; No wheeze  HEART: Regular rate and rhythm; No murmurs, rubs, or gallops  ABDOMEN: Soft, Nontender, Nondistended; Bowel sounds present  EXTREMITIES:  2+ Peripheral Pulses, No clubbing, cyanosis, or edema  PSYCH: AAOx3  NEUROLOGY: non-focal  SKIN: No rashes or lesions    LABS:                        11.3   6.8   )-----------( 302      ( 01 Nov 2018 07:14 )             33.9     11-01    140  |  102  |  14  ----------------------------<  217<H>  3.5   |  27  |  0.77    Ca    8.5      01 Nov 2018 07:14  Phos  2.2     11-01  Mg     1.5     11-01                RADIOLOGY & ADDITIONAL TESTS:    Imaging Personally Reviewed: TYLER    Consultant(s) Notes Reviewed:  Neurology    Care Discussed with Consultants/Other Providers: Neurology

## 2018-11-02 LAB
ANION GAP SERPL CALC-SCNC: 10 MMOL/L — SIGNIFICANT CHANGE UP (ref 5–17)
BUN SERPL-MCNC: 11 MG/DL — SIGNIFICANT CHANGE UP (ref 7–23)
CALCIUM SERPL-MCNC: 8.8 MG/DL — SIGNIFICANT CHANGE UP (ref 8.4–10.5)
CHLORIDE SERPL-SCNC: 104 MMOL/L — SIGNIFICANT CHANGE UP (ref 96–108)
CO2 SERPL-SCNC: 29 MMOL/L — SIGNIFICANT CHANGE UP (ref 22–31)
CREAT SERPL-MCNC: 0.83 MG/DL — SIGNIFICANT CHANGE UP (ref 0.5–1.3)
GLUCOSE BLDC GLUCOMTR-MCNC: 150 MG/DL — HIGH (ref 70–99)
GLUCOSE BLDC GLUCOMTR-MCNC: 170 MG/DL — HIGH (ref 70–99)
GLUCOSE BLDC GLUCOMTR-MCNC: 175 MG/DL — HIGH (ref 70–99)
GLUCOSE BLDC GLUCOMTR-MCNC: 200 MG/DL — HIGH (ref 70–99)
GLUCOSE BLDC GLUCOMTR-MCNC: 205 MG/DL — HIGH (ref 70–99)
GLUCOSE SERPL-MCNC: 153 MG/DL — HIGH (ref 70–99)
HCT VFR BLD CALC: 34.5 % — LOW (ref 39–50)
HGB BLD-MCNC: 11.4 G/DL — LOW (ref 13–17)
MAGNESIUM SERPL-MCNC: 1.9 MG/DL — SIGNIFICANT CHANGE UP (ref 1.6–2.6)
MCHC RBC-ENTMCNC: 30.3 PG — SIGNIFICANT CHANGE UP (ref 27–34)
MCHC RBC-ENTMCNC: 33.1 GM/DL — SIGNIFICANT CHANGE UP (ref 32–36)
MCV RBC AUTO: 91.4 FL — SIGNIFICANT CHANGE UP (ref 80–100)
PHOSPHATE SERPL-MCNC: 2.6 MG/DL — SIGNIFICANT CHANGE UP (ref 2.5–4.5)
PLATELET # BLD AUTO: 284 K/UL — SIGNIFICANT CHANGE UP (ref 150–400)
POTASSIUM SERPL-MCNC: 3.9 MMOL/L — SIGNIFICANT CHANGE UP (ref 3.5–5.3)
POTASSIUM SERPL-SCNC: 3.9 MMOL/L — SIGNIFICANT CHANGE UP (ref 3.5–5.3)
RBC # BLD: 3.77 M/UL — LOW (ref 4.2–5.8)
RBC # FLD: 14.7 % — HIGH (ref 10.3–14.5)
SODIUM SERPL-SCNC: 143 MMOL/L — SIGNIFICANT CHANGE UP (ref 135–145)
WBC # BLD: 7.1 K/UL — SIGNIFICANT CHANGE UP (ref 3.8–10.5)
WBC # FLD AUTO: 7.1 K/UL — SIGNIFICANT CHANGE UP (ref 3.8–10.5)

## 2018-11-02 PROCEDURE — 99233 SBSQ HOSP IP/OBS HIGH 50: CPT | Mod: GC

## 2018-11-02 PROCEDURE — 99232 SBSQ HOSP IP/OBS MODERATE 35: CPT

## 2018-11-02 PROCEDURE — 72141 MRI NECK SPINE W/O DYE: CPT | Mod: 26

## 2018-11-02 PROCEDURE — 74230 X-RAY XM SWLNG FUNCJ C+: CPT | Mod: 26

## 2018-11-02 PROCEDURE — 70551 MRI BRAIN STEM W/O DYE: CPT | Mod: 26

## 2018-11-02 RX ADMIN — Medication 1: at 18:14

## 2018-11-02 RX ADMIN — Medication 5 MILLIGRAM(S): at 00:04

## 2018-11-02 RX ADMIN — LEVETIRACETAM 400 MILLIGRAM(S): 250 TABLET, FILM COATED ORAL at 07:47

## 2018-11-02 RX ADMIN — Medication 3 MILLILITER(S): at 23:49

## 2018-11-02 RX ADMIN — AMPICILLIN SODIUM AND SULBACTAM SODIUM 200 GRAM(S): 250; 125 INJECTION, POWDER, FOR SUSPENSION INTRAMUSCULAR; INTRAVENOUS at 07:18

## 2018-11-02 RX ADMIN — SODIUM CHLORIDE 75 MILLILITER(S): 9 INJECTION, SOLUTION INTRAVENOUS at 18:14

## 2018-11-02 RX ADMIN — HEPARIN SODIUM 5000 UNIT(S): 5000 INJECTION INTRAVENOUS; SUBCUTANEOUS at 07:47

## 2018-11-02 RX ADMIN — HEPARIN SODIUM 5000 UNIT(S): 5000 INJECTION INTRAVENOUS; SUBCUTANEOUS at 22:15

## 2018-11-02 RX ADMIN — Medication 5 MILLIGRAM(S): at 07:47

## 2018-11-02 RX ADMIN — LEVETIRACETAM 400 MILLIGRAM(S): 250 TABLET, FILM COATED ORAL at 18:14

## 2018-11-02 RX ADMIN — Medication 5 MILLIGRAM(S): at 18:14

## 2018-11-02 RX ADMIN — Medication 1: at 13:24

## 2018-11-02 RX ADMIN — Medication 3 MILLILITER(S): at 11:21

## 2018-11-02 RX ADMIN — Medication 5 MILLIGRAM(S): at 11:31

## 2018-11-02 RX ADMIN — INSULIN GLARGINE 11 UNIT(S): 100 INJECTION, SOLUTION SUBCUTANEOUS at 22:22

## 2018-11-02 RX ADMIN — HEPARIN SODIUM 5000 UNIT(S): 5000 INJECTION INTRAVENOUS; SUBCUTANEOUS at 13:25

## 2018-11-02 RX ADMIN — Medication 1: at 00:04

## 2018-11-02 RX ADMIN — Medication 3 MILLILITER(S): at 17:33

## 2018-11-02 RX ADMIN — AMPICILLIN SODIUM AND SULBACTAM SODIUM 200 GRAM(S): 250; 125 INJECTION, POWDER, FOR SUSPENSION INTRAMUSCULAR; INTRAVENOUS at 00:04

## 2018-11-02 NOTE — PROGRESS NOTE ADULT - PROBLEM SELECTOR PLAN 10
DVT ppx: SQH  Diet: NPO for now, multiple failure of NG tube trial due to patient's discomfort and self-discontinuation. Will need restraints to stop, yet will wait for neurology eval for reversible etiologies of dysphagia and S/S re-eval tomorrow (Friday).   GOC: per discussion with wife, pt wishes are to be FULL CODE. Pt's wife's primary concern at the moment is nutrition.   Spoke with patient wife today, and she has a specific place in her mind where she wants to send the patient to if he goes to long term nursing care. Discussed  the dysphagia problem with her and the institution, and the institution can take pleasure feeding patients since they have palliative service linked to them. Decided to get an official barium swallow study today given the information.

## 2018-11-02 NOTE — PROGRESS NOTE ADULT - PROBLEM SELECTOR PLAN 3
Significant oropharyngeal dysphagia. Patient left the hospital to nursing home on 10/23 and was initially doing well. Abruptly became lethargic with inability to take PO.   - Speech and swallow eval: NPO + aspiration precaution.  - ENT consulted for aphonia - VC injury likely from intubation. No acute intervention  - Neuro on board.  - Keppra level normal.  - Barium swallow study today.

## 2018-11-02 NOTE — PROGRESS NOTE ADULT - SUBJECTIVE AND OBJECTIVE BOX
Neurology Follow up note    Patient is a 77y old  Male who presents with a chief complaint of altered mental status, cough (01 Nov 2018 14:35)      Subjective: No episodes overnight. Pt denies any pain but complains of his continued coughing which is ongoing since his admission. Pt says he is feeling a bit better today.   ROS: unable to obtain due to AMS.     Objective:   Vital Signs Last 24 Hrs  T(C): 36.7 (02 Nov 2018 05:36), Max: 37.2 (01 Nov 2018 21:14)  T(F): 98.1 (02 Nov 2018 05:36), Max: 99 (01 Nov 2018 21:14)  HR: 83 (02 Nov 2018 05:36) (83 - 86)  BP: 145/76 (02 Nov 2018 05:36) (132/82 - 148/72)  BP(mean): --  RR: 18 (02 Nov 2018 05:36) (18 - 18)  SpO2: 96% (02 Nov 2018 05:36) (95% - 96%)    General: NAD, less somnolent and more responsive than prior.   Neuro Exam:   ·	Mental status: Alert and oriented to self and place but not year, month or day. He says that he remembers the team from the previous days, however he is intermittently disoriented (thinking he has spoken to family members who haven’t been there). He is following more commands as compared to yesterday and is verbal today unlike yesterday.   ·	CNs:   o	II: PEERLA. Visual fields not assessed.   o	III, IV, VI: EOMI.   o	V: Facial sensation grossly intact and symmetrical.   o	VII: Facial muscles grossly intact b/l (Pt is able to smile, close his eyes against resistance and raise his eyebrows)  o	VIII: hearing grossly intact and equal b/l  o	IX, X: not assessed; pt coughing every time he opens his mouth widely  o	XI: Pt able to shrug shoulders and turn head against resistance  o	XII: tongue protrusion midline  ·	Sensation: Grossly intact to light touch on upper and lower extremities b/l  ·	Reflexes: 1+ in Triceps, biceps, brachioradialis, patellar and Achilles b/l. Mute plantar b/l  ·	Motor:  symmetrical, unchanged from prior. Mild neck weakness ~4+ on flexion and extension. 5/5 strength in deltoids, triceps, biceps, wrist flexion and extension and interrosseus muscles b/l. 5/5 strength in hip flexion, extension, abduction and adduction. 5/5 strength in flexion and extension at the knee, as well as plantarflexion and dorsiflexion.   ·	Coordination: finger-nose testing normal b/l. Rapid repetitive movements and heel-shin testing not assessed.   ·	Gait: not assessed.       11-02    143  |  104  |  11  ----------------------------<  153<H>  3.9   |  29  |  0.83    Ca    8.8      02 Nov 2018 09:13  Phos  2.6     11-02  Mg     1.9     11-02 11-02    143  |  104  |  11  ----------------------------<  153<H>  3.9   |  29  |  0.83    Ca    8.8      02 Nov 2018 09:13  Phos  2.6     11-02  Mg     1.9     11-02                              11.4   7.1   )-----------( 284      ( 02 Nov 2018 09:13 )             34.5     Radiology    EKG:  tele:  TTE:  EEG:      MEDICATIONS  (STANDING):  ALBUTerol/ipratropium for Nebulization 3 milliLiter(s) Nebulizer every 6 hours  dextrose 5% + sodium chloride 0.45%. 1000 milliLiter(s) (75 mL/Hr) IV Continuous <Continuous>  dextrose 5%. 1000 milliLiter(s) (50 mL/Hr) IV Continuous <Continuous>  dextrose 50% Injectable 12.5 Gram(s) IV Push once  dextrose 50% Injectable 25 Gram(s) IV Push once  dextrose 50% Injectable 25 Gram(s) IV Push once  heparin  Injectable 5000 Unit(s) SubCutaneous every 8 hours  insulin glargine Injectable (LANTUS) 11 Unit(s) SubCutaneous at bedtime  insulin lispro (HumaLOG) corrective regimen sliding scale   SubCutaneous every 6 hours  levETIRAcetam  IVPB 500 milliGRAM(s) IV Intermittent every 12 hours  metoprolol tartrate Injectable 5 milliGRAM(s) IV Push every 6 hours    MEDICATIONS  (PRN):  dextrose 40% Gel 15 Gram(s) Oral once PRN Blood Glucose LESS THAN 70 milliGRAM(s)/deciliter  glucagon  Injectable 1 milliGRAM(s) IntraMuscular once PRN Glucose LESS THAN 70 milligrams/deciliter Neurology Follow up note    Patient is a 77y old  Male who presents with a chief complaint of altered mental status, cough (01 Nov 2018 14:35)      Subjective: No episodes overnight. Pt denies any pain but complains of his continued coughing which is ongoing since his admission. Pt says he is feeling a bit better today.   ROS: unable to obtain due to AMS.     Objective:   Vital Signs Last 24 Hrs  T(C): 36.7 (02 Nov 2018 05:36), Max: 37.2 (01 Nov 2018 21:14)  T(F): 98.1 (02 Nov 2018 05:36), Max: 99 (01 Nov 2018 21:14)  HR: 83 (02 Nov 2018 05:36) (83 - 86)  BP: 145/76 (02 Nov 2018 05:36) (132/82 - 148/72)  BP(mean): --  RR: 18 (02 Nov 2018 05:36) (18 - 18)  SpO2: 96% (02 Nov 2018 05:36) (95% - 96%)    General: NAD, less somnolent and more responsive than prior.   Neuro Exam:   ·	Mental status: Alert and oriented to self and place but not year, month or day. He says that he remembers the team from the previous days, however he is intermittently disoriented (thinking he has spoken to family members who haven’t been there). He is following more commands as compared to yesterday and is verbal today unlike yesterday.   ·	CNs:   o	II: PEERLA. Visual fields not assessed.   o	III, IV, VI: EOMI.   o	V: Facial sensation grossly intact and symmetrical.   o	VII: Facial muscles grossly intact b/l (Pt is able to smile, close his eyes against resistance and raise his eyebrows)  o	VIII: hearing grossly intact and equal b/l  o	IX, X: not assessed; pt coughing every time he opens his mouth widely  o	XI: Pt able to shrug shoulders and turn head against resistance  o	XII: tongue protrusion midline  ·	Sensation: Grossly intact to light touch on upper and lower extremities b/l  ·	Reflexes: 1+ in Triceps, biceps, brachioradialis, patellar and Achilles b/l. Mute plantar b/l  ·	Motor:  symmetrical, unchanged from prior. Mild neck weakness ~4+ on flexion, 5/5 pn extension. 5/5 strength in deltoids, triceps, biceps, wrist flexion and extension and interrosseus muscles b/l. 5/5 strength in hip flexion, extension, abduction and adduction. 5/5 strength in flexion and extension at the knee, as well as plantarflexion and dorsiflexion.   ·	Coordination: finger-nose testing normal b/l. Rapid repetitive movements and heel-shin testing not assessed.   ·	Gait: not assessed.       11-02    143  |  104  |  11  ----------------------------<  153<H>  3.9   |  29  |  0.83    Ca    8.8      02 Nov 2018 09:13  Phos  2.6     11-02  Mg     1.9     11-02 11-02    143  |  104  |  11  ----------------------------<  153<H>  3.9   |  29  |  0.83    Ca    8.8      02 Nov 2018 09:13  Phos  2.6     11-02  Mg     1.9     11-02                              11.4   7.1   )-----------( 284      ( 02 Nov 2018 09:13 )             34.5     Radiology    EKG:  tele:  TTE:  EEG:      MEDICATIONS  (STANDING):  ALBUTerol/ipratropium for Nebulization 3 milliLiter(s) Nebulizer every 6 hours  dextrose 5% + sodium chloride 0.45%. 1000 milliLiter(s) (75 mL/Hr) IV Continuous <Continuous>  dextrose 5%. 1000 milliLiter(s) (50 mL/Hr) IV Continuous <Continuous>  dextrose 50% Injectable 12.5 Gram(s) IV Push once  dextrose 50% Injectable 25 Gram(s) IV Push once  dextrose 50% Injectable 25 Gram(s) IV Push once  heparin  Injectable 5000 Unit(s) SubCutaneous every 8 hours  insulin glargine Injectable (LANTUS) 11 Unit(s) SubCutaneous at bedtime  insulin lispro (HumaLOG) corrective regimen sliding scale   SubCutaneous every 6 hours  levETIRAcetam  IVPB 500 milliGRAM(s) IV Intermittent every 12 hours  metoprolol tartrate Injectable 5 milliGRAM(s) IV Push every 6 hours    MEDICATIONS  (PRN):  dextrose 40% Gel 15 Gram(s) Oral once PRN Blood Glucose LESS THAN 70 milliGRAM(s)/deciliter  glucagon  Injectable 1 milliGRAM(s) IntraMuscular once PRN Glucose LESS THAN 70 milligrams/deciliter

## 2018-11-02 NOTE — SWALLOW VFSS/MBS ASSESSMENT ADULT - PHARYNGEAL PHASE COMMENTS
coughing post swallow; aspiration not r/o given intermittently obscured view of the laryngeal vestibule and subglottic area.

## 2018-11-02 NOTE — SWALLOW VFSS/MBS ASSESSMENT ADULT - ORAL PHASE
Delayed oral transit time/Reduced anterior - posterior transport/Residue in oral cavity/Incomplete tongue to palate contact/Uncontrolled bolus / spillover in [place]-pharynx significant oral holding; Pt requested to swallow and shook head to indicate "no"; Pt's wife present to encourage swallow although Pt continued to hold bolus in oral cavity./Delayed oral transit time ***significant oral holding; Pt requested to swallow and shook head to indicate "no"; Pt's wife present to encourage swallow although Pt continued to hold bolus in oral cavity./Delayed oral transit time Delayed oral transfer/Delayed oral transit time/Residue in oral cavity/Uncontrolled bolus / spillover in [place]-pharynx/Reduced anterior - posterior transport/Incomplete tongue to palate contact Delayed oral transfer; + oral holding with a-p spillage/Reduced anterior - posterior transport/Incomplete tongue to palate contact/Residue in oral cavity/Uncontrolled bolus / spillover in [place]-pharynx/Delayed oral transit time

## 2018-11-02 NOTE — PROGRESS NOTE ADULT - ATTENDING COMMENTS
Agree.  Failed NG tube x3, re-trialed speech and swallow today w/ modified barium, await results.   Patient ill appearing this evening after busy day of tests. MRI noted, no acute issues. Will send myasthenia gravis antibodies per neuro, lower suspicion.   D/w wife re: concern that patient would pull at G tube, J tube may be safer if they elect to go this route. TPN may be an option though would predispose to sepsis and he could pull this too, we discussed this at length. I discussed w/ house TPN Dr. Almazan as well who agreed. Pending results of MBS, if family is interested in alternative feeding methods will ask GI to comment on G vs. J tube despite the risk of pulling out.   poor prognosis unfortunately.

## 2018-11-02 NOTE — PROGRESS NOTE ADULT - SUBJECTIVE AND OBJECTIVE BOX
Nolan Granados Vickie  PGY-1  Pager: [phone]    Patient is a 77y old  Male who presents with a chief complaint of altered mental status, cough (02 Nov 2018 14:11)      SUBJECTIVE / OVERNIGHT EVENTS:    MEDICATIONS  (STANDING):  ALBUTerol/ipratropium for Nebulization 3 milliLiter(s) Nebulizer every 6 hours  dextrose 5% + sodium chloride 0.45%. 1000 milliLiter(s) (75 mL/Hr) IV Continuous <Continuous>  dextrose 5%. 1000 milliLiter(s) (50 mL/Hr) IV Continuous <Continuous>  dextrose 50% Injectable 12.5 Gram(s) IV Push once  dextrose 50% Injectable 25 Gram(s) IV Push once  dextrose 50% Injectable 25 Gram(s) IV Push once  heparin  Injectable 5000 Unit(s) SubCutaneous every 8 hours  insulin glargine Injectable (LANTUS) 11 Unit(s) SubCutaneous at bedtime  insulin lispro (HumaLOG) corrective regimen sliding scale   SubCutaneous every 6 hours  levETIRAcetam  IVPB 500 milliGRAM(s) IV Intermittent every 12 hours  metoprolol tartrate Injectable 5 milliGRAM(s) IV Push every 6 hours    MEDICATIONS  (PRN):  dextrose 40% Gel 15 Gram(s) Oral once PRN Blood Glucose LESS THAN 70 milliGRAM(s)/deciliter  glucagon  Injectable 1 milliGRAM(s) IntraMuscular once PRN Glucose LESS THAN 70 milligrams/deciliter      T(C): 36.6 (11-02-18 @ 14:20), Max: 37.2 (11-01-18 @ 21:14)  HR: 83 (11-02-18 @ 14:20) (83 - 86)  BP: 148/69 (11-02-18 @ 14:20) (132/82 - 148/72)  RR: 18 (11-02-18 @ 14:20) (18 - 18)  SpO2: 95% (11-02-18 @ 14:20) (95% - 96%)  CAPILLARY BLOOD GLUCOSE      POCT Blood Glucose.: 175 mg/dL (02 Nov 2018 12:31)  POCT Blood Glucose.: 150 mg/dL (02 Nov 2018 07:44)  POCT Blood Glucose.: 175 mg/dL (01 Nov 2018 23:44)  POCT Blood Glucose.: 176 mg/dL (01 Nov 2018 21:27)  POCT Blood Glucose.: 209 mg/dL (01 Nov 2018 17:38)    I&O's Summary    01 Nov 2018 07:01  -  02 Nov 2018 07:00  --------------------------------------------------------  IN: 2100 mL / OUT: 0 mL / NET: 2100 mL        PHYSICAL EXAM:  GENERAL: NAD, well-developed  HEAD:  Atraumatic, Normocephalic  EYES: EOMI, PERRLA, conjunctiva and sclera clear  NECK: Supple, No JVD  CHEST/LUNG: Clear to auscultation bilaterally; No wheeze  HEART: Regular rate and rhythm; No murmurs, rubs, or gallops  ABDOMEN: Soft, Nontender, Nondistended; Bowel sounds present  EXTREMITIES:  2+ Peripheral Pulses, No clubbing, cyanosis, or edema  PSYCH: AAOx3  NEUROLOGY: non-focal  SKIN: No rashes or lesions    LABS:                        11.4   7.1   )-----------( 284      ( 02 Nov 2018 09:13 )             34.5     11-02    143  |  104  |  11  ----------------------------<  153<H>  3.9   |  29  |  0.83    Ca    8.8      02 Nov 2018 09:13  Phos  2.6     11-02  Mg     1.9     11-02                RADIOLOGY & ADDITIONAL TESTS:    Imaging Personally Reviewed:    Consultant(s) Notes Reviewed:      Care Discussed with Consultants/Other Providers: Nolan Granados Vickie  PGY-1  Pager: [phone]    Patient is a 77y old  Male who presents with a chief complaint of altered mental status, cough (02 Nov 2018 14:11)      SUBJECTIVE / OVERNIGHT EVENTS:  No acute events overnight. Patient slept well.  Awake and having conversation this morning. Asked to sit him up and asked if he can drink some water. He was asking nursing staff for names and remembered talking to me.  He was able to clearly communicate his wishes.     Later in the afternoon,  Discussed with wife regarding Barium swallow study. Official documented objective study may hinder placement to certain institutions. Inquired the institution wife is wishing to place patient at. They have palliative service linked, so they will take the patient despite being on pleasure feeding. After confirmation from the institution and discussion with Speech/swallow specialist, came to a conclusion to get the barium swallow study done.    ROS:  Review of system limited due to communication limitation: shakes his head and/or noding.  CONSTITUTIONAL: No fever  RESPIRATORY: No shortness of breath  CARDIOVASCULAR: No chest pain, palpitations  GASTROINTESTINAL: No abdominal pain. No nausea, vomiting,  NEUROLOGICAL: No headaches    MEDICATIONS  (STANDING):  ALBUTerol/ipratropium for Nebulization 3 milliLiter(s) Nebulizer every 6 hours  dextrose 5% + sodium chloride 0.45%. 1000 milliLiter(s) (75 mL/Hr) IV Continuous <Continuous>  dextrose 5%. 1000 milliLiter(s) (50 mL/Hr) IV Continuous <Continuous>  dextrose 50% Injectable 12.5 Gram(s) IV Push once  dextrose 50% Injectable 25 Gram(s) IV Push once  dextrose 50% Injectable 25 Gram(s) IV Push once  heparin  Injectable 5000 Unit(s) SubCutaneous every 8 hours  insulin glargine Injectable (LANTUS) 11 Unit(s) SubCutaneous at bedtime  insulin lispro (HumaLOG) corrective regimen sliding scale   SubCutaneous every 6 hours  levETIRAcetam  IVPB 500 milliGRAM(s) IV Intermittent every 12 hours  metoprolol tartrate Injectable 5 milliGRAM(s) IV Push every 6 hours    MEDICATIONS  (PRN):  dextrose 40% Gel 15 Gram(s) Oral once PRN Blood Glucose LESS THAN 70 milliGRAM(s)/deciliter  glucagon  Injectable 1 milliGRAM(s) IntraMuscular once PRN Glucose LESS THAN 70 milligrams/deciliter      T(C): 36.6 (11-02-18 @ 14:20), Max: 37.2 (11-01-18 @ 21:14)  HR: 83 (11-02-18 @ 14:20) (83 - 86)  BP: 148/69 (11-02-18 @ 14:20) (132/82 - 148/72)  RR: 18 (11-02-18 @ 14:20) (18 - 18)  SpO2: 95% (11-02-18 @ 14:20) (95% - 96%)  CAPILLARY BLOOD GLUCOSE      POCT Blood Glucose.: 175 mg/dL (02 Nov 2018 12:31)  POCT Blood Glucose.: 150 mg/dL (02 Nov 2018 07:44)  POCT Blood Glucose.: 175 mg/dL (01 Nov 2018 23:44)  POCT Blood Glucose.: 176 mg/dL (01 Nov 2018 21:27)  POCT Blood Glucose.: 209 mg/dL (01 Nov 2018 17:38)    I&O's Summary    01 Nov 2018 07:01  -  02 Nov 2018 07:00  --------------------------------------------------------  IN: 2100 mL / OUT: 0 mL / NET: 2100 mL        PHYSICAL EXAM:  GENERAL: NAD, awake upon entry.  HEENT:  Atraumatic, Old cranial surgical site bilaterally (clean dry intact). Neck supple. No conjunctival icterus.  CHEST/LUNG: Clear to auscultation bilaterally in anterior  HEART: Irregularly irregular. S1 S2, No murmurs, rubs, or gallops. 2+ peripheral pulses. Cap refill ~ 3 seconds.  ABDOMEN: Soft, Nontender, Nondistended; Bowel sounds present  EXTREMITIES:  2+ Peripheral Pulses, No cyanosis or edema. bilateral distal LE skin changes c/w venous stasis.  NEUROLOGY: AOx2. Limited communication, yet able to have conversation in short phrases. Patient is able to move his UE freely against the gravity. Able to communicate his wish (wishes to drink water, ask what is aspiration, ask where are staff members from).       LABS:                        11.4   7.1   )-----------( 284      ( 02 Nov 2018 09:13 )             34.5     11-02    143  |  104  |  11  ----------------------------<  153<H>  3.9   |  29  |  0.83    Ca    8.8      02 Nov 2018 09:13  Phos  2.6     11-02  Mg     1.9     11-02                RADIOLOGY & ADDITIONAL TESTS:    Imaging Personally Reviewed: TYLER    Consultant(s) Notes Reviewed:  neuro    Care Discussed with Consultants/Other Providers: speech and swallow, neuro

## 2018-11-02 NOTE — PROGRESS NOTE ADULT - ATTENDING COMMENTS
Patient seen and examined. No stroke on MRI. Given the dysphagia and neck flexion weakness, recommend sending MG antibodies

## 2018-11-02 NOTE — SWALLOW VFSS/MBS ASSESSMENT ADULT - CONSISTENCIES ADMINISTERED
honey thick puree thin/puree thick nectar thick nectar thick/self administered sip utilized in attempt to facilitate oral clearing of puree textures as Pt evidenced significant oral holding on previous trial and Pt would not expel bolus from oral cavity.

## 2018-11-02 NOTE — SWALLOW VFSS/MBS ASSESSMENT ADULT - DIAGNOSTIC IMPRESSIONS
Pt presents with significant oropharyngeal dysphagia which appears to be superimposed upon by behavioral issues.  Significant oral holding with Pt shaking his head to indicate "no" when requested to swallow.  Despite encouragement by clinician, radiologist and Pt's wife, the Pt continued to hold bolus in oral cavity.  Shallow laryngeal penetration was evident with honey thickened liquids and purees.  Aspiration can not be r/o given limited visualization at times and limited PO trials.  Coughing was evident post swallow when Pt took sip of nectar thickened liquid to assist with oral transfer of puree bolus. Pt presents with significant oropharyngeal dysphagia which appears to be superimposed upon by behavioral issues.  Significant oral holding with Pt shaking his head to indicate "no" when requested to swallow.  Despite encouragement by clinician, radiologist and Pt's wife, the Pt continued to hold bolus in oral cavity.  Shallow laryngeal penetration was evident with various textures.  Aspiration can not be r/o given limited visualization at times and limited PO trials.  Coughing was evident post swallow when Pt took sip of nectar thickened liquid to assist with oral transfer of puree bolus.  Disorders: reduced lingual strength/ROM/Rate of motion, reduced BOT to posterior pharyngeal wall contact, delay in trigger of the swallow reflex, reduced hyo-laryngeal excursion, reduced laryngeal closure, reduced pharyngeal contractility, and reduced supraglottic sensation.

## 2018-11-02 NOTE — PROGRESS NOTE ADULT - PROBLEM SELECTOR PLAN 1
- awake. Responding to questions. Able to communicate his wishes.   - s/p 7 day of ABx.  - CT head without signs of acute ICH.  - MRI: Subcentimeter foci of abnormal susceptibility are seen in the posterior   fossa and supratentorial region; compatible with areas of old hemorrhage (possibly secondary to amyloid angiopathy)   - Neuro on board: MG workup recommended.  - hypernatremia on admission, now resolved s/p IVF.  - currently not agitated, consider haldol PRN  - c/w antiepileptics  - fall precautions  - bedrest for now

## 2018-11-02 NOTE — SWALLOW VFSS/MBS ASSESSMENT ADULT - ADDITIONAL INFORMATION
Pt awake, alert; impaired orientation x 1.  Pt accompanied by wife; Pt's shoulder intermittently obscured view of laryngeal vestibule and hypopharynx.

## 2018-11-02 NOTE — PROGRESS NOTE ADULT - ASSESSMENT
Impression: Most likely worsening dysphagia is likely in the setting of AMS 2/2 hospital acquired delirium and current infection. Stroke is less likely considering CT and MRI head revealed degenerative changes of the cervical spine but were otherwise unchanged from prior and would not explain the worsening dysphagia. Although seizure could still be on the differential, the history makes this less likely. As his mental status improves with his medical management, continue to monitor for improvement. MRI brain negative for acute stroke. MRI C-spine shows severe narrowing, but no etiology for dysphagia elicited.  -c/w Keppra 500 BID Impression: Most likely worsening dysphagia is likely in the setting of AMS 2/2 hospital acquired delirium and current infection. CT and MRI head revealed degenerative changes of the cervical spine and no acute stroke. Although seizure could still be on the differential, the history makes this less likely. Dysphagia could be 2/2 autoimmune disease such as myasthenia gravis. Would benefit from ruling out myasthenia gravis  Plan:  -c/w Keppra 500 BID  -recommend sending anti-AChR and anti-MuSK antibodies  -EMG as outpatient with Dr. Pee Tejada at [address] [place] [address]. Phone number is [phone] Impression: Most likely worsening dysphagia is likely in the setting of AMS 2/2 hospital acquired delirium and current infection. CT and MRI head revealed degenerative changes of the cervical spine and no acute stroke. Although seizure could still be on the differential, the history makes this less likely. Dysphagia could be 2/2 autoimmune disease such as myasthenia gravis. Would benefit from ruling out myasthenia gravis  Plan:  -c/w Keppra 500 BID  -recommend sending anti-AChR and anti-MuSK antibodies

## 2018-11-02 NOTE — CONSULT NOTE ADULT - SUBJECTIVE AND OBJECTIVE BOX
78 yo with subacute dementia, seizure disorder and S/P craniotomy for SDH.  He had prolonged hospitalization in 10/18 for hemorrhagic hepatic lesion requiring embolization.  He was readmitted on 10/26 with confusion and dysphagia.  His examination is notable for severe hypophonia, palatal weakness, and poor gag reflex.  MRI excluded acute stroke.   I am uncertain of the nature of the hepatic lesion, i.e., benign or malignant.  The bulbar palsy might be due to a local oropharyngeal process (traumatic) but a neuromuscular disorder is possible, e.g., paraneoplastic or autoimmune.  Suggestions:  MG panel  Feedings per S&S.  Continue parenteral levetiracetam.  Full consultation dictated  Sánchez Pratt M.D.  cell: [phone]

## 2018-11-02 NOTE — PROGRESS NOTE ADULT - ASSESSMENT
Mr. 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 Patient course is complicated by acute decline in PO tolerance with other symptoms, and now with significant oropharyngeal dysphagia. NPO for now, pending barium swallow study today.

## 2018-11-02 NOTE — SWALLOW VFSS/MBS ASSESSMENT ADULT - RECOMMENDED CONSISTENCY
Dietary decision deferred to MD based upon results of this examination; consider Palliative Care consult to determine the GOC with re: to provision of nutrition in this patient.

## 2018-11-02 NOTE — PROGRESS NOTE ADULT - PROBLEM SELECTOR PLAN 2
Now Resolved.  - Sepsis with CXR showing R basilar pna and UCx positive for E. faecalis.  - BCx (10/26): NGTD  - s/p ABx as above.   - CT chest significant for HAYDEN lesion that is increased in size compared to prior scans, no new focal consolidations.  - HOB elevated 30-45  - Chest PT   - aspiration precaution

## 2018-11-03 LAB
ANION GAP SERPL CALC-SCNC: 10 MMOL/L — SIGNIFICANT CHANGE UP (ref 5–17)
BUN SERPL-MCNC: 11 MG/DL — SIGNIFICANT CHANGE UP (ref 7–23)
CALCIUM SERPL-MCNC: 8.7 MG/DL — SIGNIFICANT CHANGE UP (ref 8.4–10.5)
CHLORIDE SERPL-SCNC: 102 MMOL/L — SIGNIFICANT CHANGE UP (ref 96–108)
CO2 SERPL-SCNC: 28 MMOL/L — SIGNIFICANT CHANGE UP (ref 22–31)
CREAT SERPL-MCNC: 0.76 MG/DL — SIGNIFICANT CHANGE UP (ref 0.5–1.3)
GLUCOSE BLDC GLUCOMTR-MCNC: 177 MG/DL — HIGH (ref 70–99)
GLUCOSE BLDC GLUCOMTR-MCNC: 214 MG/DL — HIGH (ref 70–99)
GLUCOSE BLDC GLUCOMTR-MCNC: 215 MG/DL — HIGH (ref 70–99)
GLUCOSE BLDC GLUCOMTR-MCNC: 228 MG/DL — HIGH (ref 70–99)
GLUCOSE SERPL-MCNC: 195 MG/DL — HIGH (ref 70–99)
MAGNESIUM SERPL-MCNC: 1.6 MG/DL — SIGNIFICANT CHANGE UP (ref 1.6–2.6)
PHOSPHATE SERPL-MCNC: 2.6 MG/DL — SIGNIFICANT CHANGE UP (ref 2.5–4.5)
POTASSIUM SERPL-MCNC: 3.6 MMOL/L — SIGNIFICANT CHANGE UP (ref 3.5–5.3)
POTASSIUM SERPL-SCNC: 3.6 MMOL/L — SIGNIFICANT CHANGE UP (ref 3.5–5.3)
SODIUM SERPL-SCNC: 140 MMOL/L — SIGNIFICANT CHANGE UP (ref 135–145)

## 2018-11-03 PROCEDURE — 99232 SBSQ HOSP IP/OBS MODERATE 35: CPT | Mod: GC

## 2018-11-03 PROCEDURE — 99497 ADVNCD CARE PLAN 30 MIN: CPT | Mod: GC

## 2018-11-03 RX ORDER — MAGNESIUM SULFATE 500 MG/ML
1 VIAL (ML) INJECTION ONCE
Qty: 0 | Refills: 0 | Status: COMPLETED | OUTPATIENT
Start: 2018-11-03 | End: 2018-11-03

## 2018-11-03 RX ADMIN — SODIUM CHLORIDE 75 MILLILITER(S): 9 INJECTION, SOLUTION INTRAVENOUS at 05:55

## 2018-11-03 RX ADMIN — LEVETIRACETAM 400 MILLIGRAM(S): 250 TABLET, FILM COATED ORAL at 17:44

## 2018-11-03 RX ADMIN — Medication 2: at 23:16

## 2018-11-03 RX ADMIN — Medication 2: at 12:34

## 2018-11-03 RX ADMIN — Medication 5 MILLIGRAM(S): at 12:34

## 2018-11-03 RX ADMIN — Medication 5 MILLIGRAM(S): at 05:56

## 2018-11-03 RX ADMIN — Medication 5 MILLIGRAM(S): at 17:44

## 2018-11-03 RX ADMIN — Medication 2: at 00:04

## 2018-11-03 RX ADMIN — Medication 3 MILLILITER(S): at 23:30

## 2018-11-03 RX ADMIN — Medication 2: at 17:44

## 2018-11-03 RX ADMIN — Medication 3 MILLILITER(S): at 13:39

## 2018-11-03 RX ADMIN — Medication 5 MILLIGRAM(S): at 23:16

## 2018-11-03 RX ADMIN — INSULIN GLARGINE 11 UNIT(S): 100 INJECTION, SOLUTION SUBCUTANEOUS at 23:14

## 2018-11-03 RX ADMIN — HEPARIN SODIUM 5000 UNIT(S): 5000 INJECTION INTRAVENOUS; SUBCUTANEOUS at 21:34

## 2018-11-03 RX ADMIN — Medication 1: at 06:05

## 2018-11-03 RX ADMIN — HEPARIN SODIUM 5000 UNIT(S): 5000 INJECTION INTRAVENOUS; SUBCUTANEOUS at 12:33

## 2018-11-03 RX ADMIN — Medication 3 MILLILITER(S): at 17:28

## 2018-11-03 RX ADMIN — LEVETIRACETAM 400 MILLIGRAM(S): 250 TABLET, FILM COATED ORAL at 05:56

## 2018-11-03 RX ADMIN — Medication 100 GRAM(S): at 12:48

## 2018-11-03 RX ADMIN — HEPARIN SODIUM 5000 UNIT(S): 5000 INJECTION INTRAVENOUS; SUBCUTANEOUS at 05:56

## 2018-11-03 RX ADMIN — Medication 5 MILLIGRAM(S): at 00:04

## 2018-11-03 RX ADMIN — Medication 3 MILLILITER(S): at 06:07

## 2018-11-03 NOTE — PROGRESS NOTE ADULT - PROBLEM SELECTOR PLAN 1
Mostly resolved. awake. Responding to questions. Able to communicate his wishes. Continues to have dysphagia.  - s/p 7 day of ABx.  - CT head without signs of acute ICH.  - MRI: Subcentimeter foci of abnormal susceptibility are seen in the posterior   fossa and supratentorial region; compatible with areas of old hemorrhage (possibly secondary to amyloid angiopathy)   - Neuro on board: MG workup recommended for his dysphagia.  - hypernatremia on admission, now resolved s/p IVF.  - currently not agitated, consider haldol PRN  - c/w antiepileptics  - fall precautions  - bedrest for now

## 2018-11-03 NOTE — PROGRESS NOTE ADULT - SUBJECTIVE AND OBJECTIVE BOX
Nolan Granados Vickie  PGY-1  Pager: [phone]    Patient is a 77y old  Male who presents with a chief complaint of altered mental status, cough (02 Nov 2018 18:33)      SUBJECTIVE / OVERNIGHT EVENTS:  No acute events overnight. Patient slept well.   Patient has no acute concerns and wishes to sleep more.  Discussed with wife regarding Barium swallow study result. She understands current situation and wishes to explore various options. She wishes to speak to GI regarding feeding via G-tube or J-tube.     ROS:  Review of system limited due to communication limitation. Shaking his head or short words.  CONSTITUTIONAL: No fever  RESPIRATORY: No shortness of breath  CARDIOVASCULAR: No chest pain, palpitations  GASTROINTESTINAL: No abdominal pain. No nausea, vomiting,  NEUROLOGICAL: No headaches    MEDICATIONS  (STANDING):  ALBUTerol/ipratropium for Nebulization 3 milliLiter(s) Nebulizer every 6 hours  dextrose 5% + sodium chloride 0.45%. 1000 milliLiter(s) (75 mL/Hr) IV Continuous <Continuous>  dextrose 5%. 1000 milliLiter(s) (50 mL/Hr) IV Continuous <Continuous>  dextrose 50% Injectable 12.5 Gram(s) IV Push once  dextrose 50% Injectable 25 Gram(s) IV Push once  dextrose 50% Injectable 25 Gram(s) IV Push once  heparin  Injectable 5000 Unit(s) SubCutaneous every 8 hours  insulin glargine Injectable (LANTUS) 11 Unit(s) SubCutaneous at bedtime  insulin lispro (HumaLOG) corrective regimen sliding scale   SubCutaneous every 6 hours  levETIRAcetam  IVPB 500 milliGRAM(s) IV Intermittent every 12 hours  metoprolol tartrate Injectable 5 milliGRAM(s) IV Push every 6 hours    MEDICATIONS  (PRN):  dextrose 40% Gel 15 Gram(s) Oral once PRN Blood Glucose LESS THAN 70 milliGRAM(s)/deciliter  glucagon  Injectable 1 milliGRAM(s) IntraMuscular once PRN Glucose LESS THAN 70 milligrams/deciliter      T(C): 36.7 (11-03-18 @ 12:44), Max: 36.7 (11-03-18 @ 12:44)  HR: 84 (11-03-18 @ 12:44) (72 - 818)  BP: 130/73 (11-03-18 @ 12:44) (130/72 - 159/88)  RR: 18 (11-03-18 @ 12:44) (18 - 18)  SpO2: 94% (11-03-18 @ 12:44) (93% - 94%)  CAPILLARY BLOOD GLUCOSE      POCT Blood Glucose.: 214 mg/dL (03 Nov 2018 12:25)  POCT Blood Glucose.: 177 mg/dL (03 Nov 2018 06:03)  POCT Blood Glucose.: 205 mg/dL (02 Nov 2018 23:47)  POCT Blood Glucose.: 200 mg/dL (02 Nov 2018 22:17)  POCT Blood Glucose.: 170 mg/dL (02 Nov 2018 17:41)    I&O's Summary    02 Nov 2018 07:01  -  03 Nov 2018 07:00  --------------------------------------------------------  IN: 100 mL / OUT: 0 mL / NET: 100 mL        PHYSICAL EXAM:  GENERAL: NAD, awake upon entry.  HEENT:  Atraumatic, Old cranial surgical site bilaterally (clean dry intact). Neck supple. No conjunctival icterus.  CHEST/LUNG: Clear to auscultation bilaterally in anterior  HEART: Irregularly irregular. S1 S2, No murmurs, rubs, or gallops. 2+ peripheral pulses. Cap refill ~ 3 seconds.  ABDOMEN: Soft, Nontender, Nondistended; Bowel sounds present  EXTREMITIES:  2+ Peripheral Pulses, No cyanosis or edema. bilateral distal LE skin changes c/w venous stasis.  NEUROLOGY: AOx2. Limited communication, yet able to have conversation in short phrases. Patient is able to move his UE freely against the gravity. Able to communicate his wish (wishes to drink water, ask what is aspiration, ask where are staff members from).     LABS:                        11.4   7.1   )-----------( 284      ( 02 Nov 2018 09:13 )             34.5     11-03    140  |  102  |  11  ----------------------------<  195<H>  3.6   |  28  |  0.76    Ca    8.7      03 Nov 2018 07:01  Phos  2.6     11-03  Mg     1.6     11-03                RADIOLOGY & ADDITIONAL TESTS:    Imaging Personally Reviewed: Barium swallow    Consultant(s) Notes Reviewed:  S/S    Care Discussed with Consultants/Other Providers: S/S

## 2018-11-03 NOTE — PROGRESS NOTE ADULT - PROBLEM SELECTOR PLAN 9
Seizure  - c/w keppra 500mg BID  - keppra level pending. Seizure  - c/w keppra 500mg BID  - keppra level wnl.

## 2018-11-03 NOTE — PROGRESS NOTE ADULT - PROBLEM SELECTOR PLAN 3
Significant oropharyngeal dysphagia. Patient left the hospital to nursing home on 10/23 and was initially doing well. Abruptly became lethargic with inability to take PO.   - Speech and swallow eval: NPO + aspiration precaution.  - ENT consulted for aphonia - VC injury likely from intubation. No acute intervention  - Neuro on board: MG workup.  - Keppra level normal.  - Barium swallow study shows continuation of dysphagia.  - GOC discussion with family.

## 2018-11-03 NOTE — PROGRESS NOTE ADULT - PROBLEM SELECTOR PLAN 2
Now Resolved.  - CXR showing R basilar pna and UCx positive for E. faecalis.  - BCx (10/26): NGTD  - s/p ABx as above.   - CT chest significant for HAYDEN lesion that is increased in size compared to prior scans, no new focal consolidations.  - HOB elevated 30-45  - Chest PT   - aspiration precaution

## 2018-11-03 NOTE — PROGRESS NOTE ADULT - ATTENDING COMMENTS
Agree.  21 minutes spent face to face w/ wife and niece by bedside re-addressing GOC. Patient failed 2nd swallow test. Wife reports son, physician, doesn't think his father would want a PEG tube. We again discussed the dangers of a G or J tube in patient who pulls at lines, also w/ PICC for TPN, discussed risks of continued aspiration w/ PEG and sepsis w/ TPN in patient who continually aspirates. Discussed code status again, does not sound as though full resuscitation is consistent with patient's wishes though wife wishes to keep as is and will continue to discuss with family. I consulted GI today per their preferences to discuss PEG moving forward, they will see her tmrw.   So long as goal is not comfort but rather medical recovery, despite multiple negative swallow studies, will keep NPO. Can re-address pleasure feeds after family discussion w/ GI.   If questions remain regarding direction of care (hospice w/ pleasure feeds vs. G tube) after discussion w/ GI, will ask for palliative care guidance.  All questions concerns addressed of niece and wife.

## 2018-11-04 LAB
ANION GAP SERPL CALC-SCNC: 10 MMOL/L — SIGNIFICANT CHANGE UP (ref 5–17)
BUN SERPL-MCNC: 9 MG/DL — SIGNIFICANT CHANGE UP (ref 7–23)
CALCIUM SERPL-MCNC: 8.6 MG/DL — SIGNIFICANT CHANGE UP (ref 8.4–10.5)
CHLORIDE SERPL-SCNC: 101 MMOL/L — SIGNIFICANT CHANGE UP (ref 96–108)
CO2 SERPL-SCNC: 27 MMOL/L — SIGNIFICANT CHANGE UP (ref 22–31)
CREAT SERPL-MCNC: 0.69 MG/DL — SIGNIFICANT CHANGE UP (ref 0.5–1.3)
GLUCOSE BLDC GLUCOMTR-MCNC: 149 MG/DL — HIGH (ref 70–99)
GLUCOSE BLDC GLUCOMTR-MCNC: 194 MG/DL — HIGH (ref 70–99)
GLUCOSE BLDC GLUCOMTR-MCNC: 233 MG/DL — HIGH (ref 70–99)
GLUCOSE BLDC GLUCOMTR-MCNC: 246 MG/DL — HIGH (ref 70–99)
GLUCOSE SERPL-MCNC: 233 MG/DL — HIGH (ref 70–99)
MAGNESIUM SERPL-MCNC: 1.7 MG/DL — SIGNIFICANT CHANGE UP (ref 1.6–2.6)
PHOSPHATE SERPL-MCNC: 2.3 MG/DL — LOW (ref 2.5–4.5)
POTASSIUM SERPL-MCNC: 3.6 MMOL/L — SIGNIFICANT CHANGE UP (ref 3.5–5.3)
POTASSIUM SERPL-SCNC: 3.6 MMOL/L — SIGNIFICANT CHANGE UP (ref 3.5–5.3)
SODIUM SERPL-SCNC: 138 MMOL/L — SIGNIFICANT CHANGE UP (ref 135–145)

## 2018-11-04 PROCEDURE — 99232 SBSQ HOSP IP/OBS MODERATE 35: CPT | Mod: GC

## 2018-11-04 RX ORDER — INSULIN LISPRO 100/ML
VIAL (ML) SUBCUTANEOUS EVERY 6 HOURS
Qty: 0 | Refills: 0 | Status: DISCONTINUED | OUTPATIENT
Start: 2018-11-04 | End: 2018-11-08

## 2018-11-04 RX ORDER — INSULIN GLARGINE 100 [IU]/ML
13 INJECTION, SOLUTION SUBCUTANEOUS AT BEDTIME
Qty: 0 | Refills: 0 | Status: DISCONTINUED | OUTPATIENT
Start: 2018-11-04 | End: 2018-11-07

## 2018-11-04 RX ADMIN — Medication 5 MILLIGRAM(S): at 23:44

## 2018-11-04 RX ADMIN — SODIUM CHLORIDE 75 MILLILITER(S): 9 INJECTION, SOLUTION INTRAVENOUS at 17:57

## 2018-11-04 RX ADMIN — Medication 2: at 17:57

## 2018-11-04 RX ADMIN — Medication 5 MILLIGRAM(S): at 12:48

## 2018-11-04 RX ADMIN — Medication 2: at 06:10

## 2018-11-04 RX ADMIN — HEPARIN SODIUM 5000 UNIT(S): 5000 INJECTION INTRAVENOUS; SUBCUTANEOUS at 22:38

## 2018-11-04 RX ADMIN — Medication 62.5 MILLIMOLE(S): at 12:47

## 2018-11-04 RX ADMIN — Medication 3 MILLILITER(S): at 12:00

## 2018-11-04 RX ADMIN — LEVETIRACETAM 400 MILLIGRAM(S): 250 TABLET, FILM COATED ORAL at 06:10

## 2018-11-04 RX ADMIN — Medication 2: at 12:48

## 2018-11-04 RX ADMIN — INSULIN GLARGINE 13 UNIT(S): 100 INJECTION, SOLUTION SUBCUTANEOUS at 22:37

## 2018-11-04 RX ADMIN — Medication 5 MILLIGRAM(S): at 06:09

## 2018-11-04 RX ADMIN — Medication 5 MILLIGRAM(S): at 17:57

## 2018-11-04 RX ADMIN — HEPARIN SODIUM 5000 UNIT(S): 5000 INJECTION INTRAVENOUS; SUBCUTANEOUS at 12:49

## 2018-11-04 RX ADMIN — Medication 3 MILLILITER(S): at 23:07

## 2018-11-04 RX ADMIN — HEPARIN SODIUM 5000 UNIT(S): 5000 INJECTION INTRAVENOUS; SUBCUTANEOUS at 06:09

## 2018-11-04 RX ADMIN — SODIUM CHLORIDE 75 MILLILITER(S): 9 INJECTION, SOLUTION INTRAVENOUS at 06:11

## 2018-11-04 RX ADMIN — LEVETIRACETAM 400 MILLIGRAM(S): 250 TABLET, FILM COATED ORAL at 17:57

## 2018-11-04 RX ADMIN — Medication 3 MILLILITER(S): at 17:38

## 2018-11-04 NOTE — PROGRESS NOTE ADULT - PROBLEM SELECTOR PLAN 10
DVT ppx: SQH  Diet: NPO for now, multiple failure of NG tube trial due to patient's discomfort and self-discontinuation. MBS with noted inability to swallow; recommendation to continue strict NPO  GOC: per discussion with wife, pt wishes are to be FULL CODE. Pt's wife's primary concern at the moment is nutrition.   Spoke with patient wife today, and she has a specific place in her mind where she wants to send the patient to if he goes to long term nursing care. Discussed  the dysphagia problem with her and the institution, and the institution can take pleasure feeding patients since they have palliative service linked to them. Decided to get an official barium swallow study given the information.  Patient's wife understands the barium swallow study result and wishes to speak to GI regarding surgical tube placement for nutrition for more information.

## 2018-11-04 NOTE — PROGRESS NOTE ADULT - ATTENDING COMMENTS
agree.  seen and examined multiple times.   d/w family at length today. addressed how g tube would ameloriate malnutrition in part, though aspiration risk would remain. also high risk for pulling tube out, and he wouldn't be able to eat or drink which seems important to him.   patient's wish initially was to not move forward w/ G tube. This seems to have changed after prolonged discussion w/ multiple members of family.   d/w GI, he will see family this week.   also will d/w Dr. Aponte from palliative, would appreciate him discussing w/ family prior to moving forward. Very complicated scenario.   very supportive family. patient is still engaged, too. agree.  seen and examined multiple times.   escalate insulins.   cbc tmrw.   d/w family at length today. addressed how g tube would ameloriate malnutrition in part, though aspiration risk would remain. also high risk for pulling tube out, and he wouldn't be able to eat or drink which seems important to him.   patient's wish initially was to not move forward w/ G tube. This seems to have changed after prolonged discussion w/ multiple members of family.   d/w GI, he will see family this week.   also will d/w Dr. Aponte from palliative, would appreciate him discussing w/ family prior to moving forward. Very complicated scenario.   very supportive family. patient is still engaged, too.

## 2018-11-04 NOTE — PROGRESS NOTE ADULT - ASSESSMENT
Mr. 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 Patient course is complicated by acute decline in PO tolerance with other symptoms, and now with significant oropharyngeal dysphagia. Continue strict NPO.

## 2018-11-04 NOTE — CONSULT NOTE ADULT - SUBJECTIVE AND OBJECTIVE BOX
Chief Complaint:  Patient is a 77y old  Male who presents with a chief complaint of altered mental status, cough Pt is a 77M with PMHx of afib not on anticoagulation, HTN, DMII, brain bleed in 2015 on coumadin requiring bilateral craniotomy who presents with lethargy, cough, and shortness of breath. Pt was recently hospitalized from 10/11 - 10/23 for abd pain, found to have nontraumatic hemoperitoneum requiring massive transfusion protocol activation, intubation and prolonged SICU stay. Pt was found to have a liver mass and underwent selective celiac, common hepatic, proper hepatic, right hepatic and inferior right hepatic segmental and subsegmental arteriography and embolization of a segment 5 mass with marked reduction in tumor vascularity. Pt left the hospital to nursing home on 10/23 and originally was doing well, was able to recognize and interact with wife, was eating. Pt then abruptly became lethargic with increased weakness, inability to take PO, worsening cough. Wife states that the cough first developed prior to discharge from the hospital on 10/23, but has become worse over the past several days. Pt was placed on honey thickened diet yesterday, but had been eating a regular diet on discharge. Pt minimally interactive on exam, able to nod "yes" and "no." Denies pain, SOB.     In ED, initial VS were T101.4  /60 RR19 Sa 97% on 4L NC. Labs significant for WBC of 18.9, creatinine of 1.87 (elevated from 1.03 on 10/23). Chest X-ray showed right basilar opacity which may represent atelectasis vs pna. UA had moderate bacteria, +leuk esterase     Review of Systems:  Review of Systems: Unable to obtain full ROS 2/2 clinical condition. Pt denies pain.	      Allergies:  Aleve (Unknown)      Medications:  ALBUTerol/ipratropium for Nebulization 3 milliLiter(s) Nebulizer every 6 hours  dextrose 40% Gel 15 Gram(s) Oral once PRN  dextrose 5% + sodium chloride 0.45%. 1000 milliLiter(s) IV Continuous <Continuous>  dextrose 5%. 1000 milliLiter(s) IV Continuous <Continuous>  dextrose 50% Injectable 12.5 Gram(s) IV Push once  dextrose 50% Injectable 25 Gram(s) IV Push once  dextrose 50% Injectable 25 Gram(s) IV Push once  glucagon  Injectable 1 milliGRAM(s) IntraMuscular once PRN  heparin  Injectable 5000 Unit(s) SubCutaneous every 8 hours  insulin glargine Injectable (LANTUS) 13 Unit(s) SubCutaneous at bedtime  insulin lispro (HumaLOG) corrective regimen sliding scale   SubCutaneous every 6 hours  levETIRAcetam  IVPB 500 milliGRAM(s) IV Intermittent every 12 hours  metoprolol tartrate Injectable 5 milliGRAM(s) IV Push every 6 hours      PMHX/PSHX:  BPH (benign prostatic hyperplasia)  Atrial fibrillation  Seizure  Diabetes mellitus  Hypothyroidism  Hypertension  H/O craniotomy  S/P inguinal hernia repair  ASD (atrial septal defect)  No significant past surgical history      Family history:  Family history of glioblastoma (Sibling)  No pertinent family history in first degree relatives      Social History: lives at home no etoh noc igas no ivda     ROS:     General:  No wt loss, fevers, chills, night sweats, fatigue,   Eyes:  Good vision, no reported pain  ENT:  No sore throat, pain, runny nose, dysphagia  CV:  No pain, palpitations, hypo/hypertension  Resp:  No dyspnea, cough, tachypnea, wheezing  GI:  No pain, No nausea, No vomiting, No diarrhea, No constipation, No weight loss, No fever, No pruritis, No rectal bleeding, No tarry stools, No dysphagia,  :  No pain, bleeding, incontinence, nocturia  Muscle:  No pain, weakness  Neuro:  No weakness, tingling, memory problems  Psych:  No fatigue, insomnia, mood problems, depression  Endocrine:  No polyuria, polydipsia, cold/heat intolerance  Heme:  No petechiae, ecchymosis, easy bruisability  Skin:  No rash, tattoos, scars, edema      PHYSICAL EXAM:   Vital Signs:  Vital Signs Last 24 Hrs  T(C): 36.6 (04 Nov 2018 05:00), Max: 36.6 (03 Nov 2018 23:15)  T(F): 97.9 (04 Nov 2018 05:00), Max: 97.9 (03 Nov 2018 23:15)  HR: 82 (04 Nov 2018 12:03) (81 - 86)  BP: 148/81 (04 Nov 2018 06:37) (129/75 - 161/77)  BP(mean): --  RR: 18 (04 Nov 2018 05:00) (18 - 18)  SpO2: 96% (04 Nov 2018 12:03) (94% - 97%)  Daily     Daily     GENERAL:  Appears stated age, well-groomed, well-nourished, no distress  HEENT:  NC/AT,  conjunctivae clear and pink, no thyromegaly, nodules, adenopathy, no JVD, sclera -anicteric  CHEST:  Full & symmetric excursion, no increased effort, breath sounds clear  HEART:  Regular rhythm, S1, S2, no murmur/rub/S3/S4, no abdominal bruit, no edema  ABDOMEN:  Soft, non-tender, non-distended, normoactive bowel sounds,  no masses ,no hepato-splenomegaly, no signs of chronic liver disease  EXTEREMITIES:  no cyanosis,clubbing or edema  SKIN:  No rash/erythema/ecchymoses/petechiae/wounds/abscess/warm/dry  NEURO:  Alert, oriented, no asterixis, no tremor, no encephalopathy    LABS:    11-04    138  |  101  |  9   ----------------------------<  233<H>  3.6   |  27  |  0.69    Ca    8.6      04 Nov 2018 08:05  Phos  2.3     11-04  Mg     1.7     11-04                Imaging:

## 2018-11-04 NOTE — PROGRESS NOTE ADULT - PROBLEM SELECTOR PLAN 1
Mostly resolved. awake. Responding to questions. Able to communicate his wishes. Continues to have dysphagia.  - s/p 7 days of ABx.  - CT head without signs of acute ICH.  - MRI: Subcentimeter foci of abnormal susceptibility are seen in the posterior   fossa and supratentorial region; compatible with areas of old hemorrhage (possibly secondary to amyloid angiopathy)   - Neuro on board: MG workup recommended for his dysphagia.  - hypernatremia on admission, now resolved s/p IVF.  - currently not agitated, consider haldol PRN  - c/w antiepileptics  - fall precautions  - bedrest for now

## 2018-11-04 NOTE — PROGRESS NOTE ADULT - SUBJECTIVE AND OBJECTIVE BOX
Patient is a 77y old  Male who presents with a chief complaint of altered mental status, cough (04 Nov 2018 16:36)      Overnight Events:  No acute events ON.     REVIEW OF SYSTEMS:  Review of system limited due to communication limitation. Shaking his head or short words.    CONSTITUTIONAL: No fever  RESPIRATORY: No shortness of breath  CARDIOVASCULAR: No chest pain, palpitations  GASTROINTESTINAL: No abdominal pain. No nausea, vomiting,  NEUROLOGICAL: No headaches    VITAL SIGNS:  Vital Signs Last 24 Hrs  T(C): 36.5 (04 Nov 2018 14:33), Max: 36.6 (03 Nov 2018 23:15)  T(F): 97.7 (04 Nov 2018 14:33), Max: 97.9 (03 Nov 2018 23:15)  HR: 81 (04 Nov 2018 14:33) (81 - 86)  BP: 131/82 (04 Nov 2018 14:33) (129/75 - 161/77)  BP(mean): --  RR: 18 (04 Nov 2018 14:33) (18 - 18)  SpO2: 95% (04 Nov 2018 14:33) (94% - 97%)    PHYSICAL EXAM:   GENERAL: no acute distress  HEENT: NC/AT, EOMI, neck supple, MMM  RESPIRATORY: LCTAB/L, no rhonchi, rales, or wheezing  CARDIOVASCULAR: RRR, no murmurs, gallops, rubs  ABDOMINAL: soft, non-tender, non-distended, positive bowel sounds   EXTREMITIES: no clubbing, cyanosis, or edema  NEUROLOGICAL: alert and oriented x 1, non-focal  SKIN: no rashes or lesions   MUSCULOSKELETAL: no gross joint deformity      11-04    138  |  101  |  9   ----------------------------<  233<H>  3.6   |  27  |  0.69    Ca    8.6      04 Nov 2018 08:05  Phos  2.3     11-04  Mg     1.7     11-04          CAPILLARY BLOOD GLUCOSE      POCT Blood Glucose.: 246 mg/dL (04 Nov 2018 12:43)  POCT Blood Glucose.: 233 mg/dL (04 Nov 2018 05:52)  POCT Blood Glucose.: 228 mg/dL (03 Nov 2018 23:10)  POCT Blood Glucose.: 215 mg/dL (03 Nov 2018 17:35)    I&O's Summary    03 Nov 2018 08:01  -  04 Nov 2018 07:00  --------------------------------------------------------  IN: 1000 mL / OUT: 0 mL / NET: 1000 mL        MEDICATIONS  (STANDING):  ALBUTerol/ipratropium for Nebulization 3 milliLiter(s) Nebulizer every 6 hours  dextrose 5% + sodium chloride 0.45%. 1000 milliLiter(s) (75 mL/Hr) IV Continuous <Continuous>  dextrose 5%. 1000 milliLiter(s) (50 mL/Hr) IV Continuous <Continuous>  dextrose 50% Injectable 12.5 Gram(s) IV Push once  dextrose 50% Injectable 25 Gram(s) IV Push once  dextrose 50% Injectable 25 Gram(s) IV Push once  heparin  Injectable 5000 Unit(s) SubCutaneous every 8 hours  insulin glargine Injectable (LANTUS) 13 Unit(s) SubCutaneous at bedtime  insulin lispro (HumaLOG) corrective regimen sliding scale   SubCutaneous every 6 hours  levETIRAcetam  IVPB 500 milliGRAM(s) IV Intermittent every 12 hours  metoprolol tartrate Injectable 5 milliGRAM(s) IV Push every 6 hours

## 2018-11-04 NOTE — PROGRESS NOTE ADULT - PROBLEM SELECTOR PLAN 4
on IV lopressor. Not on AC 2/2 multiple bleeds.   - Has hx of RVR inpatient in setting of septic and hypotensive. Currently rate controlled  - will c/w decreased dose of 50mg BID, will titrate up as tolerated.

## 2018-11-05 DIAGNOSIS — Z51.5 ENCOUNTER FOR PALLIATIVE CARE: ICD-10-CM

## 2018-11-05 LAB
ANION GAP SERPL CALC-SCNC: 8 MMOL/L — SIGNIFICANT CHANGE UP (ref 5–17)
BUN SERPL-MCNC: 8 MG/DL — SIGNIFICANT CHANGE UP (ref 7–23)
CALCIUM SERPL-MCNC: 8.5 MG/DL — SIGNIFICANT CHANGE UP (ref 8.4–10.5)
CHLORIDE SERPL-SCNC: 100 MMOL/L — SIGNIFICANT CHANGE UP (ref 96–108)
CO2 SERPL-SCNC: 29 MMOL/L — SIGNIFICANT CHANGE UP (ref 22–31)
CREAT SERPL-MCNC: 0.63 MG/DL — SIGNIFICANT CHANGE UP (ref 0.5–1.3)
GLUCOSE BLDC GLUCOMTR-MCNC: 155 MG/DL — HIGH (ref 70–99)
GLUCOSE BLDC GLUCOMTR-MCNC: 165 MG/DL — HIGH (ref 70–99)
GLUCOSE BLDC GLUCOMTR-MCNC: 170 MG/DL — HIGH (ref 70–99)
GLUCOSE BLDC GLUCOMTR-MCNC: 192 MG/DL — HIGH (ref 70–99)
GLUCOSE BLDC GLUCOMTR-MCNC: 195 MG/DL — HIGH (ref 70–99)
GLUCOSE SERPL-MCNC: 198 MG/DL — HIGH (ref 70–99)
HCT VFR BLD CALC: 34.1 % — LOW (ref 39–50)
HGB BLD-MCNC: 11.7 G/DL — LOW (ref 13–17)
MAGNESIUM SERPL-MCNC: 1.5 MG/DL — LOW (ref 1.6–2.6)
MCHC RBC-ENTMCNC: 30.7 PG — SIGNIFICANT CHANGE UP (ref 27–34)
MCHC RBC-ENTMCNC: 34.1 GM/DL — SIGNIFICANT CHANGE UP (ref 32–36)
MCV RBC AUTO: 90.1 FL — SIGNIFICANT CHANGE UP (ref 80–100)
PHOSPHATE SERPL-MCNC: 2.9 MG/DL — SIGNIFICANT CHANGE UP (ref 2.5–4.5)
PLATELET # BLD AUTO: 212 K/UL — SIGNIFICANT CHANGE UP (ref 150–400)
POTASSIUM SERPL-MCNC: 3.4 MMOL/L — LOW (ref 3.5–5.3)
POTASSIUM SERPL-SCNC: 3.4 MMOL/L — LOW (ref 3.5–5.3)
RBC # BLD: 3.79 M/UL — LOW (ref 4.2–5.8)
RBC # FLD: 15.2 % — HIGH (ref 10.3–14.5)
SODIUM SERPL-SCNC: 137 MMOL/L — SIGNIFICANT CHANGE UP (ref 135–145)
WBC # BLD: 6.6 K/UL — SIGNIFICANT CHANGE UP (ref 3.8–10.5)
WBC # FLD AUTO: 6.6 K/UL — SIGNIFICANT CHANGE UP (ref 3.8–10.5)

## 2018-11-05 PROCEDURE — 99223 1ST HOSP IP/OBS HIGH 75: CPT

## 2018-11-05 PROCEDURE — 99233 SBSQ HOSP IP/OBS HIGH 50: CPT | Mod: GC

## 2018-11-05 RX ORDER — MAGNESIUM SULFATE 500 MG/ML
2 VIAL (ML) INJECTION ONCE
Qty: 0 | Refills: 0 | Status: COMPLETED | OUTPATIENT
Start: 2018-11-05 | End: 2018-11-05

## 2018-11-05 RX ORDER — POTASSIUM CHLORIDE 20 MEQ
10 PACKET (EA) ORAL ONCE
Qty: 0 | Refills: 0 | Status: COMPLETED | OUTPATIENT
Start: 2018-11-05 | End: 2018-11-05

## 2018-11-05 RX ADMIN — Medication 3 MILLILITER(S): at 11:14

## 2018-11-05 RX ADMIN — Medication 50 GRAM(S): at 08:21

## 2018-11-05 RX ADMIN — HEPARIN SODIUM 5000 UNIT(S): 5000 INJECTION INTRAVENOUS; SUBCUTANEOUS at 12:58

## 2018-11-05 RX ADMIN — Medication 100 MILLIEQUIVALENT(S): at 08:21

## 2018-11-05 RX ADMIN — LEVETIRACETAM 400 MILLIGRAM(S): 250 TABLET, FILM COATED ORAL at 05:48

## 2018-11-05 RX ADMIN — Medication 3 MILLILITER(S): at 05:04

## 2018-11-05 RX ADMIN — INSULIN GLARGINE 13 UNIT(S): 100 INJECTION, SOLUTION SUBCUTANEOUS at 22:10

## 2018-11-05 RX ADMIN — Medication 5 MILLIGRAM(S): at 17:39

## 2018-11-05 RX ADMIN — Medication 5 MILLIGRAM(S): at 12:58

## 2018-11-05 RX ADMIN — LEVETIRACETAM 400 MILLIGRAM(S): 250 TABLET, FILM COATED ORAL at 17:41

## 2018-11-05 RX ADMIN — Medication 2: at 05:48

## 2018-11-05 RX ADMIN — Medication 2: at 17:39

## 2018-11-05 RX ADMIN — Medication 5 MILLIGRAM(S): at 05:49

## 2018-11-05 RX ADMIN — Medication 2: at 12:58

## 2018-11-05 RX ADMIN — HEPARIN SODIUM 5000 UNIT(S): 5000 INJECTION INTRAVENOUS; SUBCUTANEOUS at 05:50

## 2018-11-05 NOTE — PROGRESS NOTE ADULT - PROBLEM SELECTOR PLAN 10
DVT ppx: SQH  Diet: NPO for now, multiple failure of NG tube trial due to patient's discomfort and self-discontinuation. MBS with noted inability to swallow; recommendation to continue strict NPO  GOC: per discussion with wife, pt wishes are to be FULL CODE. Pt's wife's primary concern at the moment is nutrition.   Spoke with patient wife today, and she has a specific place in her mind where she wants to send the patient to if he goes to long term nursing care. Discussed  the dysphagia problem with her and the institution, and the institution can take pleasure feeding patients since they have palliative service linked to them. Decided to get an official barium swallow study given the information.  Patient's wife understands the barium swallow study result and wishes to speak to GI regarding surgical tube placement for nutrition for more information. DVT ppx: SQH  Diet: NPO for now, multiple failure of NG tube trial due to patient's discomfort and self-discontinuation. MBS with noted inability to swallow; recommendation to continue strict NPO. Replete electrolytes as appropriate.  GOC: per discussion with wife, pt wishes are to be FULL CODE. Pt's wife's primary concern at the moment is nutrition.   Spoke with patient wife today, and she has a specific place in her mind where she wants to send the patient to if he goes to long term nursing care. Discussed  the dysphagia problem with her and the institution, and the institution can take pleasure feeding patients since they have palliative service linked to them. Decided to get an official barium swallow study given the information.  Patient's wife understands the barium swallow study result and wishes to speak to GI regarding surgical tube placement for nutrition for more information. DVT ppx: SQH  Diet: NPO for now, multiple failure of NG tube trial due to patient's discomfort and self-discontinuation. MBS with noted inability to swallow; recommendation to continue strict NPO. Replete electrolytes as appropriate.  GOC: per discussion with wife, pt wishes are to be FULL CODE. Pt's wife's primary concern at the moment is nutrition.   Spoke with patient wife today, and she has a specific place in her mind where she wants to send the patient to if he goes to long term nursing care. Discussed  the dysphagia problem with her and the institution, and the institution can take pleasure feeding patients since they have palliative service linked to them. Decided to get an official barium swallow study given the information.  Patient's wife understands the barium swallow study result and wishes to speak to GI regarding surgical tube placement for nutrition for more information.  Spoke with wife and Mr. Kingsley 11/5. They want PEG tube. Was not able to reach GI group. Wishes to use PEG tube as a temporary measure if possible in case his swallowing function recovers. She understands that PEG tube could be the permanent measure if his swallowing function doesn't recover. DVT ppx: SQH - held for possible PEG placement tomorrow.  Diet: NPO for now, multiple failure of NG tube trial due to patient's discomfort and self-discontinuation. MBS with noted inability to swallow; recommendation to continue strict NPO. Replete electrolytes as appropriate.  GOC: per discussion with wife, pt wishes are to be FULL CODE. Pt's wife's primary concern at the moment is nutrition.   Spoke with patient wife today, and she has a specific place in her mind where she wants to send the patient to if he goes to long term nursing care. Discussed  the dysphagia problem with her and the institution, and the institution can take pleasure feeding patients since they have palliative service linked to them. Decided to get an official barium swallow study given the information.  Patient's wife understands the barium swallow study result and wishes to speak to GI regarding surgical tube placement for nutrition for more information.  Spoke with wife and Mr. Kingsley 11/5. They want PEG tube. Was not able to reach GI group. Wishes to use PEG tube as a temporary measure if possible in case his swallowing function recovers. She understands that PEG tube could be the permanent measure if his swallowing function doesn't recover.

## 2018-11-05 NOTE — PROGRESS NOTE ADULT - ASSESSMENT
Mr. 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 Patient course is complicated by acute decline in PO tolerance with other symptoms, and now with significant oropharyngeal dysphagia. Continue strict NPO. Mr. 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 Patient course is complicated by acute decline in PO tolerance with other symptoms, and now with significant oropharyngeal dysphagia. Continue strict NPO, now discussing GOC and long term feeding modality with patient family.

## 2018-11-05 NOTE — CONSULT NOTE ADULT - SUBJECTIVE AND OBJECTIVE BOX
full consult to follow HPI: 77M with PMH of AF (no AC), HTN, DM2, brain bleed (2015 s/p bilateral craniotomy) here with lethargy, cough, and shortness of breath after recent hospitalization from nontraumatic hemoperitoneum and SICU stay. 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 Found here with severe sepsis from aspiration PNA and encephalopathy. Complicated by oropharyngeal dysphagia. Myasthenia gravis workup underway with neurology. Patient has had NGT thrice, but self-discontinued it. Family has been deciding between palliative measures vs PEG/PEJ placement; palliative care called to help discuss GOC in regards to nutrition.    PERTINENT PM/SXH:   BPH (benign prostatic hyperplasia)  Atrial fibrillation  Seizure  Diabetes mellitus  Hypothyroidism  Hypertension    H/O craniotomy  S/P inguinal hernia repair  ASD (atrial septal defect)  No significant past surgical history    FAMILY HISTORY:  Family history of glioblastoma (Sibling): brother    ITEMS NOT CHECKED ARE NOT PRESENT    SOCIAL HISTORY:   Significant other/partner:  [X ]    Children:  [ ]    Mormonism/Spirituality: Taoism  Substance hx:  [ ]   Tobacco hx:  [ ]   Alcohol hx: [ ] Drug use hx: [ ] none  Home Opioid hx:  [ ] (I-Stop Reference No: [id number])  Living Situation: [ ]Home  [ ]Long term care  [ ]Rehab [ ]Other  Occupation: former     ADVANCE DIRECTIVES:    DNR [ ]  MOLST  [ ]    Living Will  [ ]   DECISION MAKER(s):  [ ] Health Care Proxy(s)  [ ] Surrogate(s)  [ ] Guardian           Name(s) and Contact(s): wife Emily [phone]  Jigar Stalin- nephew (nephrologist): [phone]    BASELINE (I)ADL(s) (prior to admission):  [place]: [ ]Total  [ ] Moderate [ ]Dependent    Allergies    Aleve (Unknown)    Intolerances    MEDICATIONS  (STANDING):  ALBUTerol/ipratropium for Nebulization 3 milliLiter(s) Nebulizer every 6 hours  dextrose 5% + sodium chloride 0.45%. 1000 milliLiter(s) (75 mL/Hr) IV Continuous <Continuous>  dextrose 5%. 1000 milliLiter(s) (50 mL/Hr) IV Continuous <Continuous>  dextrose 50% Injectable 12.5 Gram(s) IV Push once  dextrose 50% Injectable 25 Gram(s) IV Push once  dextrose 50% Injectable 25 Gram(s) IV Push once  heparin  Injectable 5000 Unit(s) SubCutaneous every 8 hours  insulin glargine Injectable (LANTUS) 13 Unit(s) SubCutaneous at bedtime  insulin lispro (HumaLOG) corrective regimen sliding scale   SubCutaneous every 6 hours  levETIRAcetam  IVPB 500 milliGRAM(s) IV Intermittent every 12 hours  metoprolol tartrate Injectable 5 milliGRAM(s) IV Push every 6 hours    MEDICATIONS  (PRN):  dextrose 40% Gel 15 Gram(s) Oral once PRN Blood Glucose LESS THAN 70 milliGRAM(s)/deciliter  glucagon  Injectable 1 milliGRAM(s) IntraMuscular once PRN Glucose LESS THAN 70 milligrams/deciliter    PRESENT SYMPTOMS:   Source if other than patient:  [ ]Family   [ ]Team     Pain (Impact on QOL):    Location -         Minimal acceptable level (0-10 scale):                    Aggravating factors -  Quality -  Radiation -  Severity (0-10 scale) -    Timing -    PAIN AD Score:     [email or web address] (press ctrl +  left click to view)    Dyspnea:                           [ ]Mild [ ]Moderate [ ]Severe  Anxiety:                             [ ]Mild [ ]Moderate [ ]Severe  Fatigue:                             [ ]Mild [ ]Moderate [ ]Severe  Nausea:                             [ ]Mild [ ]Moderate [ ]Severe  Loss of appetite:              [ ]Mild [ ]Moderate [ ]Severe  Constipation:                    [ ]Mild [ ]Moderate [ ]Severe    Other Symptoms:  [X ]All other review of systems negative   [ ]Unable to obtain due to poor mentation     Karnofsky Performance Score/Palliative Performance Status Version 2:         %    PHYSICAL EXAM:  Vital Signs Last 24 Hrs  T(C): 36.5 (05 Nov 2018 05:32), Max: 36.5 (04 Nov 2018 14:33)  T(F): 97.7 (05 Nov 2018 05:32), Max: 97.7 (04 Nov 2018 14:33)  HR: 100 (05 Nov 2018 13:03) (74 - 100)  BP: 126/78 (05 Nov 2018 13:03) (126/78 - 178/79)  BP(mean): --  RR: 18 (05 Nov 2018 05:32) (18 - 18)  SpO2: 94% (05 Nov 2018 11:14) (94% - 98%) I&O's Summary      GENERAL:  [X ]Alert  [ ]Oriented x   [ ]Lethargic  [ ]Cachexia  [ ]Unarousable  [X ]Verbal but difficult to understand [ ]Non-Verbal    Behavioral:   [ ] Anxiety  [ ] Delirium [ ] Agitation [ ] Other    HEENT:  [X ]Normal   [ ]Dry mouth   [ ]ET Tube/Trach  [ ]Oral lesions    PULMONARY:   [X ]Clear [ ]Tachypnea  [ ]Audible excessive secretions   [ ]Rhonchi        [ ]Right [ ]Left [ ]Bilateral  [ ]Crackles        [ ]Right [ ]Left [ ]Bilateral  [ ]Wheezing     [ ]Right [ ]Left [ ]Bilateral    CARDIOVASCULAR:    [X ]Regular [ ]Irregular [ ]Tachy  [ ]Shahid [ ]Murmur [ ]Other    GASTROINTESTINAL:  [X ]Soft  [ ]Distended   [X ]+BS  [X ]Non tender [ ]Tender  [ ]PEG [ ]OGT/ NGT  Last BM: 10/29/18    GENITOURINARY:  [ ]Normal [ ] Incontinent   [ ]Oliguria/Anuria   [ ]Lares    MUSCULOSKELETAL:   [ ]Normal   [ ]Weakness  [ ]Bed/Wheelchair bound [ ]Edema    NEUROLOGIC:   [ ]No focal deficits  [ ] Cognitive impairment  [ ] Dysphagia [ ]Dysarthria [ ] Paresis [ ]Other     SKIN:   [ ]Normal   [ ]Pressure ulcer(s)  [ ]Rash    CRITICAL CARE:  [ ] Shock Present  [ ]Septic [ ]Cardiogenic [ ]Neurologic [ ]Hypovolemic  [ ]  Vasopressors [ ]  Inotropes   [ ] Respiratory failure present  [ ] Acute  [ ] Chronic [ ] Hypoxic  [ ] Hypercarbic [ ] Other  [ ] Other organ failure     LABS: reviewed                        11.7   6.6   )-----------( 212      ( 05 Nov 2018 07:18 )             34.1   11-05    137  |  100  |  8   ----------------------------<  198<H>  3.4<L>   |  29  |  0.63    Ca    8.5      05 Nov 2018 07:18  Phos  2.9     11-05  Mg     1.5     11-05          RADIOLOGY & ADDITIONAL STUDIES:  MRI C-Spine 11/2/18  Areas of abnormal susceptibility as described above.    MRI of the cervical spine was performed using sagittal T1-T2 STIR and   T2-weighted sequence with fat suppression. Axial T2 3-D fiesta and   T1-weighted sequences were performed.    Scoliosis is seen.    There is evidence of abnormal areas of increased signal seen involving   the endplates adjacent to the C3-4 C5-6 and C6-7 disc spaces. These   findings are compatible with Modic type II degenerative changes.    Disc desiccation is seen throughout the cervical spine region.    C2-3: Disc bulge and bilateral hypertrophic facet joint changes are seen.   Moderate narrowing of both neural foramen is seen.    C3-4: Disc bulge and bilateral hypertrophic facet joint changes are seen.   Mild to moderate narrowing spinal canal seen. Severe narrowing both   neural foramen.    C4-5: Disc bulge and bilateral hypertrophic facet joint changes are seen.   Moderate narrowing spinal canal. Moderate seen narrowing right neural   foramen and severe narrowing left neural foramen.    C5-6: Disc bulge and bilateral hypertrophic facet joint changes are seen.   Mild narrowing spinal canal and severe narrowing both neural foramen.    C6-7: Disc bulge and bilateral hypertrophic facet joint changes are seen.   Mild to moderate narrowing of the spinal canal and both neural foramen    C7-T1: Normal    The spinal cord cord demonstrates normal signal and caliber.    There is evidence of a peripheral area of abnormal signal seen involving   the right upper lobe, this finding corresponds to opacification seen on   the prior CT scan the chest performed on October 26, 2018.    Impression: Degenerative change involving the cervical spine as described   above.    PROTEIN CALORIE MALNUTRITION:   [ ] PPSV2 < or = to 30% [ ] significant weight loss  [ ] poor nutritional intake [ ] catabolic state [ ] anasarca     Albumin, Serum: 2.6 g/dL (10-29-18 @ 07:59)  Artificial Nutrition [ ]     REFERRALS:   [ ]Chaplaincy  [ ] Hospice  [ ]Child Life  [ ]Social Work  [ ]Case management [ ]Holistic Therapy     Goals of Care Discussion Document:     John Aponte MD  Palliative Medicine  office: [phone]  pager: [phone]

## 2018-11-05 NOTE — CONSULT NOTE ADULT - PROBLEM SELECTOR RECOMMENDATION 9
may need peg  ngt to feed  will d/w family and see if they want   ppi once a day
- no further ENT intervention at this time   f/u speech eval   - call ENT for any issues.
- currently NPO  - discussion with family pending  - GI also on board

## 2018-11-05 NOTE — PROGRESS NOTE ADULT - PROBLEM SELECTOR PLAN 1
Mostly resolved. awake. Responding to questions. Able to communicate his wishes. Continues to have dysphagia.  - s/p 7 days of ABx.  - CT head without signs of acute ICH.  - MRI: Subcentimeter foci of abnormal susceptibility are seen in the posterior   fossa and supratentorial region; compatible with areas of old hemorrhage (possibly secondary to amyloid angiopathy)   - Neuro on board: MG workup recommended for his dysphagia.  - hypernatremia on admission, now resolved s/p IVF.  - currently not agitated, consider haldol PRN  - c/w antiepileptics  - fall precautions  - bedrest for now Significant oropharyngeal dysphagia. Patient left the hospital to nursing home on 10/23 and was initially doing well. Abruptly became lethargic with inability to take PO.   - Speech and swallow eval: NPO + aspiration precaution.  - ENT consulted for aphonia - VC injury likely from intubation. No acute intervention  - Neuro on board: MG workup.  - Keppra level normal.  - Barium swallow study shows continuation of dysphagia.  - Discussed with family and GI: PEG tube placement tomorrow - [facility] held, labs ordered. NPO status.

## 2018-11-05 NOTE — PROGRESS NOTE ADULT - SUBJECTIVE AND OBJECTIVE BOX
Nolan Granados Vickie  PGY-1  Pager: [phone]    Patient is a 77y old  Male who presents with a chief complaint of altered mental status, cough (04 Nov 2018 16:36)      SUBJECTIVE / OVERNIGHT EVENTS:    MEDICATIONS  (STANDING):  ALBUTerol/ipratropium for Nebulization 3 milliLiter(s) Nebulizer every 6 hours  dextrose 5% + sodium chloride 0.45%. 1000 milliLiter(s) (75 mL/Hr) IV Continuous <Continuous>  dextrose 5%. 1000 milliLiter(s) (50 mL/Hr) IV Continuous <Continuous>  dextrose 50% Injectable 12.5 Gram(s) IV Push once  dextrose 50% Injectable 25 Gram(s) IV Push once  dextrose 50% Injectable 25 Gram(s) IV Push once  heparin  Injectable 5000 Unit(s) SubCutaneous every 8 hours  insulin glargine Injectable (LANTUS) 13 Unit(s) SubCutaneous at bedtime  insulin lispro (HumaLOG) corrective regimen sliding scale   SubCutaneous every 6 hours  levETIRAcetam  IVPB 500 milliGRAM(s) IV Intermittent every 12 hours  magnesium sulfate  IVPB 2 Gram(s) IV Intermittent once  metoprolol tartrate Injectable 5 milliGRAM(s) IV Push every 6 hours  potassium chloride  10 mEq/100 mL IVPB 10 milliEquivalent(s) IV Intermittent once    MEDICATIONS  (PRN):  dextrose 40% Gel 15 Gram(s) Oral once PRN Blood Glucose LESS THAN 70 milliGRAM(s)/deciliter  glucagon  Injectable 1 milliGRAM(s) IntraMuscular once PRN Glucose LESS THAN 70 milligrams/deciliter      T(C): 36.5 (11-05-18 @ 05:32), Max: 36.5 (11-04-18 @ 14:33)  HR: 83 (11-05-18 @ 06:49) (74 - 91)  BP: 134/76 (11-05-18 @ 06:49) (131/82 - 178/79)  RR: 18 (11-05-18 @ 05:32) (18 - 18)  SpO2: 98% (11-05-18 @ 05:32) (95% - 98%)  CAPILLARY BLOOD GLUCOSE      POCT Blood Glucose.: 192 mg/dL (05 Nov 2018 05:47)  POCT Blood Glucose.: 149 mg/dL (04 Nov 2018 22:36)  POCT Blood Glucose.: 194 mg/dL (04 Nov 2018 17:26)  POCT Blood Glucose.: 246 mg/dL (04 Nov 2018 12:43)    I&O's Summary      PHYSICAL EXAM:  GENERAL: NAD, well-developed  HEAD:  Atraumatic, Normocephalic  EYES: EOMI, PERRLA, conjunctiva and sclera clear  NECK: Supple, No JVD  CHEST/LUNG: Clear to auscultation bilaterally; No wheeze  HEART: Regular rate and rhythm; No murmurs, rubs, or gallops  ABDOMEN: Soft, Nontender, Nondistended; Bowel sounds present  EXTREMITIES:  2+ Peripheral Pulses, No clubbing, cyanosis, or edema  PSYCH: AAOx3  NEUROLOGY: non-focal  SKIN: No rashes or lesions    LABS:                        11.7   6.6   )-----------( 212      ( 05 Nov 2018 07:18 )             34.1     11-05    137  |  100  |  8   ----------------------------<  198<H>  3.4<L>   |  29  |  0.63    Ca    8.5      05 Nov 2018 07:18  Phos  2.9     11-05  Mg     1.5     11-05                RADIOLOGY & ADDITIONAL TESTS:    Imaging Personally Reviewed:    Consultant(s) Notes Reviewed:      Care Discussed with Consultants/Other Providers: Nolan Granados Vickie  PGY-1  Pager: [phone]    Patient is a 77y old  Male who presents with a chief complaint of altered mental status, cough (04 Nov 2018 16:36)      SUBJECTIVE / OVERNIGHT EVENTS:  No acute events overnight. Patient slept well.   Patient has no acute concerns and wishes to sleep more.      ROS:  Review of system limited due to communication limitation. Shaking his head or short words.  CONSTITUTIONAL: No fever  RESPIRATORY: No shortness of breath  CARDIOVASCULAR: No chest pain, palpitations  GASTROINTESTINAL: No abdominal pain. No nausea, vomiting,  NEUROLOGICAL: No headaches    MEDICATIONS  (STANDING):  ALBUTerol/ipratropium for Nebulization 3 milliLiter(s) Nebulizer every 6 hours  dextrose 5% + sodium chloride 0.45%. 1000 milliLiter(s) (75 mL/Hr) IV Continuous <Continuous>  dextrose 5%. 1000 milliLiter(s) (50 mL/Hr) IV Continuous <Continuous>  dextrose 50% Injectable 12.5 Gram(s) IV Push once  dextrose 50% Injectable 25 Gram(s) IV Push once  dextrose 50% Injectable 25 Gram(s) IV Push once  heparin  Injectable 5000 Unit(s) SubCutaneous every 8 hours  insulin glargine Injectable (LANTUS) 13 Unit(s) SubCutaneous at bedtime  insulin lispro (HumaLOG) corrective regimen sliding scale   SubCutaneous every 6 hours  levETIRAcetam  IVPB 500 milliGRAM(s) IV Intermittent every 12 hours  magnesium sulfate  IVPB 2 Gram(s) IV Intermittent once  metoprolol tartrate Injectable 5 milliGRAM(s) IV Push every 6 hours  potassium chloride  10 mEq/100 mL IVPB 10 milliEquivalent(s) IV Intermittent once    MEDICATIONS  (PRN):  dextrose 40% Gel 15 Gram(s) Oral once PRN Blood Glucose LESS THAN 70 milliGRAM(s)/deciliter  glucagon  Injectable 1 milliGRAM(s) IntraMuscular once PRN Glucose LESS THAN 70 milligrams/deciliter      T(C): 36.5 (11-05-18 @ 05:32), Max: 36.5 (11-04-18 @ 14:33)  HR: 83 (11-05-18 @ 06:49) (74 - 91)  BP: 134/76 (11-05-18 @ 06:49) (131/82 - 178/79)  RR: 18 (11-05-18 @ 05:32) (18 - 18)  SpO2: 98% (11-05-18 @ 05:32) (95% - 98%)  CAPILLARY BLOOD GLUCOSE      POCT Blood Glucose.: 192 mg/dL (05 Nov 2018 05:47)  POCT Blood Glucose.: 149 mg/dL (04 Nov 2018 22:36)  POCT Blood Glucose.: 194 mg/dL (04 Nov 2018 17:26)  POCT Blood Glucose.: 246 mg/dL (04 Nov 2018 12:43)    I&O's Summary      PHYSICAL EXAM:  GENERAL: NAD, sleeping upon entry.   HEENT:  Atraumatic, Old cranial surgical site bilaterally (clean dry intact). Neck supple. No conjunctival icterus.  CHEST/LUNG: Clear to auscultation bilaterally in anterior  HEART: Irregularly irregular. S1 S2, No murmurs, rubs, or gallops. 2+ peripheral pulses. Cap refill ~ 3 seconds.  ABDOMEN: Soft, Nontender, Nondistended; Bowel sounds present  EXTREMITIES:  2+ Peripheral Pulses, No cyanosis or edema. bilateral distal LE skin changes c/w venous stasis.  NEUROLOGY: AOx2. Limited communication, yet able to have conversation in short phrases. Patient is able to move his UE freely against the gravity. Able to communicate his wishes (e.g. wishes to drink water, ask what is aspiration, ask where are staff members from).       LABS:                        11.7   6.6   )-----------( 212      ( 05 Nov 2018 07:18 )             34.1     11-05    137  |  100  |  8   ----------------------------<  198<H>  3.4<L>   |  29  |  0.63    Ca    8.5      05 Nov 2018 07:18  Phos  2.9     11-05  Mg     1.5     11-05                RADIOLOGY & ADDITIONAL TESTS:    Imaging Personally Reviewed: TYLER    Consultant(s) Notes Reviewed:  GI    Care Discussed with Consultants/Other Providers: TYLER Nolan Granados Vickie  PGY-1  Pager: [phone]    Patient is a 77y old  Male who presents with a chief complaint of altered mental status, cough (04 Nov 2018 16:36)      SUBJECTIVE / OVERNIGHT EVENTS:  No acute events overnight. Patient slept well.   Patient has no acute concerns and wishes to sleep more.      ROS:  Review of system limited due to communication limitation. Shaking his head or short words.  CONSTITUTIONAL: No fever  RESPIRATORY: No shortness of breath  CARDIOVASCULAR: No chest pain, palpitations  GASTROINTESTINAL: No abdominal pain. No nausea, vomiting,  NEUROLOGICAL: No headaches    MEDICATIONS  (STANDING):  ALBUTerol/ipratropium for Nebulization 3 milliLiter(s) Nebulizer every 6 hours  dextrose 5% + sodium chloride 0.45%. 1000 milliLiter(s) (75 mL/Hr) IV Continuous <Continuous>  dextrose 5%. 1000 milliLiter(s) (50 mL/Hr) IV Continuous <Continuous>  dextrose 50% Injectable 12.5 Gram(s) IV Push once  dextrose 50% Injectable 25 Gram(s) IV Push once  dextrose 50% Injectable 25 Gram(s) IV Push once  heparin  Injectable 5000 Unit(s) SubCutaneous every 8 hours  insulin glargine Injectable (LANTUS) 13 Unit(s) SubCutaneous at bedtime  insulin lispro (HumaLOG) corrective regimen sliding scale   SubCutaneous every 6 hours  levETIRAcetam  IVPB 500 milliGRAM(s) IV Intermittent every 12 hours  magnesium sulfate  IVPB 2 Gram(s) IV Intermittent once  metoprolol tartrate Injectable 5 milliGRAM(s) IV Push every 6 hours  potassium chloride  10 mEq/100 mL IVPB 10 milliEquivalent(s) IV Intermittent once    MEDICATIONS  (PRN):  dextrose 40% Gel 15 Gram(s) Oral once PRN Blood Glucose LESS THAN 70 milliGRAM(s)/deciliter  glucagon  Injectable 1 milliGRAM(s) IntraMuscular once PRN Glucose LESS THAN 70 milligrams/deciliter      T(C): 36.5 (11-05-18 @ 05:32), Max: 36.5 (11-04-18 @ 14:33)  HR: 83 (11-05-18 @ 06:49) (74 - 91)  BP: 134/76 (11-05-18 @ 06:49) (131/82 - 178/79)  RR: 18 (11-05-18 @ 05:32) (18 - 18)  SpO2: 98% (11-05-18 @ 05:32) (95% - 98%)  CAPILLARY BLOOD GLUCOSE      POCT Blood Glucose.: 192 mg/dL (05 Nov 2018 05:47)  POCT Blood Glucose.: 149 mg/dL (04 Nov 2018 22:36)  POCT Blood Glucose.: 194 mg/dL (04 Nov 2018 17:26)  POCT Blood Glucose.: 246 mg/dL (04 Nov 2018 12:43)    I&O's Summary      PHYSICAL EXAM:  GENERAL: NAD, sleeping upon entry.   HEENT:  Atraumatic, Old cranial surgical site bilaterally (clean dry intact). Neck supple. No conjunctival icterus.  CHEST/LUNG: Clear to auscultation bilaterally in anterior during inspiratory phase, but coarse breath sounds on expiratory phase. No increased WOB.  HEART: Irregularly irregular. S1 S2, No murmurs, rubs, or gallops. 2+ peripheral pulses. Cap refill ~ 3 seconds.  ABDOMEN: Soft, Nontender, Nondistended; Bowel sounds present  EXTREMITIES:  2+ Peripheral Pulses, No cyanosis or edema. bilateral distal LE skin changes c/w venous stasis.  NEUROLOGY: AOx2. Limited communication, yet able to have conversation in short phrases. Patient is able to move his UE freely against the gravity. Able to communicate his wishes (e.g. wishes to drink water, ask what is aspiration, ask where are staff members from).       LABS:                        11.7   6.6   )-----------( 212      ( 05 Nov 2018 07:18 )             34.1     11-05    137  |  100  |  8   ----------------------------<  198<H>  3.4<L>   |  29  |  0.63    Ca    8.5      05 Nov 2018 07:18  Phos  2.9     11-05  Mg     1.5     11-05                RADIOLOGY & ADDITIONAL TESTS:    Imaging Personally Reviewed: TYLER    Consultant(s) Notes Reviewed:  GI    Care Discussed with Consultants/Other Providers: TYLER

## 2018-11-05 NOTE — CONSULT NOTE ADULT - REASON FOR ADMISSION
altered mental status, cough

## 2018-11-05 NOTE — PROGRESS NOTE ADULT - PROBLEM SELECTOR PLAN 2
Now Resolved.  - CXR showing R basilar pna and UCx positive for E. faecalis.  - BCx (10/26): NGTD  - s/p ABx as above.   - CT chest significant for HAYDEN lesion that is increased in size compared to prior scans, no new focal consolidations.  - HOB elevated 30-45  - Chest PT   - aspiration precaution 2/2 Aspiration PNA. Now Resolved.  - CXR showing R basilar pna and UCx positive for E. faecalis.  - BCx (10/26): NGTD  - s/p ABx as above.   - CT chest significant for HAYDEN lesion that is increased in size compared to prior scans, no new focal consolidations.  - HOB elevated 30-45  - Chest PT   - aspiration precaution Mostly resolved. awake. Responding to questions. Able to communicate his wishes. Continues to have dysphagia.  - s/p 7 days of ABx.  - CT head without signs of acute ICH.  - MRI: Subcentimeter foci of abnormal susceptibility are seen in the posterior   fossa and supratentorial region; compatible with areas of old hemorrhage (possibly secondary to amyloid angiopathy)   - Neuro on board: MG workup recommended for his dysphagia.  - hypernatremia on admission, now resolved s/p IVF.  - currently not agitated, consider haldol PRN  - c/w antiepileptics  - fall precautions  - bedrest for now

## 2018-11-05 NOTE — CONSULT NOTE ADULT - ASSESSMENT
77M with PMH of AF (no AC), HTN, DM2, brain bleed (2015 s/p bilateral craniotomy) here with lethargy, cough, and shortness of breath after recent hospitalization from nontraumatic hemoperitoneum and SICU stay. 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 Found here with severe sepsis from aspiration PNA and encephalopathy. Complicated by oropharyngeal dysphagia. Myasthenia gravis workup underway with neurology. Patient has had NGT thrice, but self-discontinued it. Family has been deciding between palliative measures vs PEG/PEJ placement; palliative care called to help discuss GOC in regards to nutrition.

## 2018-11-05 NOTE — PROGRESS NOTE ADULT - PROBLEM SELECTOR PLAN 3
Significant oropharyngeal dysphagia. Patient left the hospital to nursing home on 10/23 and was initially doing well. Abruptly became lethargic with inability to take PO.   - Speech and swallow eval: NPO + aspiration precaution.  - ENT consulted for aphonia - VC injury likely from intubation. No acute intervention  - Neuro on board: MG workup.  - Keppra level normal.  - Barium swallow study shows continuation of dysphagia.  - GOC discussion with family. Significant oropharyngeal dysphagia. Patient left the hospital to nursing home on 10/23 and was initially doing well. Abruptly became lethargic with inability to take PO.   - Speech and swallow eval: NPO + aspiration precaution.  - ENT consulted for aphonia - VC injury likely from intubation. No acute intervention  - Neuro on board: MG workup.  - Keppra level normal.  - Barium swallow study shows continuation of dysphagia.  - Discussed with family and GI: PEG tube placement tomorrow - [facility] held, labs ordered. NPO status. 2/2 Aspiration PNA. Now Resolved.  - CXR showing R basilar pna and UCx positive for E. faecalis.  - BCx (10/26): NGTD  - s/p ABx as above.   - CT chest significant for HAYDEN lesion that is increased in size compared to prior scans, no new focal consolidations.  - HOB elevated 30-45  - Chest PT   - aspiration precaution

## 2018-11-06 LAB
ANION GAP SERPL CALC-SCNC: 10 MMOL/L — SIGNIFICANT CHANGE UP (ref 5–17)
APTT BLD: 31.6 SEC — SIGNIFICANT CHANGE UP (ref 27.5–36.3)
BLD GP AB SCN SERPL QL: NEGATIVE — SIGNIFICANT CHANGE UP
BUN SERPL-MCNC: 8 MG/DL — SIGNIFICANT CHANGE UP (ref 7–23)
CALCIUM SERPL-MCNC: 8.9 MG/DL — SIGNIFICANT CHANGE UP (ref 8.4–10.5)
CHLORIDE SERPL-SCNC: 99 MMOL/L — SIGNIFICANT CHANGE UP (ref 96–108)
CO2 SERPL-SCNC: 28 MMOL/L — SIGNIFICANT CHANGE UP (ref 22–31)
CREAT SERPL-MCNC: 0.66 MG/DL — SIGNIFICANT CHANGE UP (ref 0.5–1.3)
GLUCOSE BLDC GLUCOMTR-MCNC: 144 MG/DL — HIGH (ref 70–99)
GLUCOSE BLDC GLUCOMTR-MCNC: 152 MG/DL — HIGH (ref 70–99)
GLUCOSE BLDC GLUCOMTR-MCNC: 162 MG/DL — HIGH (ref 70–99)
GLUCOSE BLDC GLUCOMTR-MCNC: 162 MG/DL — HIGH (ref 70–99)
GLUCOSE BLDC GLUCOMTR-MCNC: 166 MG/DL — HIGH (ref 70–99)
GLUCOSE SERPL-MCNC: 153 MG/DL — HIGH (ref 70–99)
HCT VFR BLD CALC: 34.1 % — LOW (ref 39–50)
HGB BLD-MCNC: 11.3 G/DL — LOW (ref 13–17)
INR BLD: 1.21 RATIO — HIGH (ref 0.88–1.16)
MAGNESIUM SERPL-MCNC: 1.7 MG/DL — SIGNIFICANT CHANGE UP (ref 1.6–2.6)
MCHC RBC-ENTMCNC: 29.8 PG — SIGNIFICANT CHANGE UP (ref 27–34)
MCHC RBC-ENTMCNC: 33.2 GM/DL — SIGNIFICANT CHANGE UP (ref 32–36)
MCV RBC AUTO: 89.8 FL — SIGNIFICANT CHANGE UP (ref 80–100)
PHOSPHATE SERPL-MCNC: 2.6 MG/DL — SIGNIFICANT CHANGE UP (ref 2.5–4.5)
PLATELET # BLD AUTO: 205 K/UL — SIGNIFICANT CHANGE UP (ref 150–400)
POTASSIUM SERPL-MCNC: 3.3 MMOL/L — LOW (ref 3.5–5.3)
POTASSIUM SERPL-SCNC: 3.3 MMOL/L — LOW (ref 3.5–5.3)
PROTHROM AB SERPL-ACNC: 13.9 SEC — HIGH (ref 10–12.9)
RBC # BLD: 3.8 M/UL — LOW (ref 4.2–5.8)
RBC # FLD: 14.7 % — HIGH (ref 10.3–14.5)
RH IG SCN BLD-IMP: POSITIVE — SIGNIFICANT CHANGE UP
SODIUM SERPL-SCNC: 137 MMOL/L — SIGNIFICANT CHANGE UP (ref 135–145)
WBC # BLD: 6.5 K/UL — SIGNIFICANT CHANGE UP (ref 3.8–10.5)
WBC # FLD AUTO: 6.5 K/UL — SIGNIFICANT CHANGE UP (ref 3.8–10.5)

## 2018-11-06 PROCEDURE — 99233 SBSQ HOSP IP/OBS HIGH 50: CPT | Mod: GC

## 2018-11-06 RX ORDER — PANTOPRAZOLE SODIUM 20 MG/1
40 TABLET, DELAYED RELEASE ORAL
Qty: 0 | Refills: 0 | Status: DISCONTINUED | OUTPATIENT
Start: 2018-11-06 | End: 2018-11-12

## 2018-11-06 RX ORDER — LEVETIRACETAM 250 MG/1
500 TABLET, FILM COATED ORAL
Qty: 0 | Refills: 0 | Status: DISCONTINUED | OUTPATIENT
Start: 2018-11-06 | End: 2018-11-12

## 2018-11-06 RX ORDER — POTASSIUM CHLORIDE 20 MEQ
10 PACKET (EA) ORAL
Qty: 0 | Refills: 0 | Status: COMPLETED | OUTPATIENT
Start: 2018-11-06 | End: 2018-11-06

## 2018-11-06 RX ORDER — HEPARIN SODIUM 5000 [USP'U]/ML
5000 INJECTION INTRAVENOUS; SUBCUTANEOUS EVERY 8 HOURS
Qty: 0 | Refills: 0 | Status: DISCONTINUED | OUTPATIENT
Start: 2018-11-06 | End: 2018-11-12

## 2018-11-06 RX ORDER — TAMSULOSIN HYDROCHLORIDE 0.4 MG/1
0.4 CAPSULE ORAL AT BEDTIME
Qty: 0 | Refills: 0 | Status: DISCONTINUED | OUTPATIENT
Start: 2018-11-06 | End: 2018-11-06

## 2018-11-06 RX ORDER — METOPROLOL TARTRATE 50 MG
50 TABLET ORAL
Qty: 0 | Refills: 0 | Status: DISCONTINUED | OUTPATIENT
Start: 2018-11-06 | End: 2018-11-12

## 2018-11-06 RX ADMIN — HEPARIN SODIUM 5000 UNIT(S): 5000 INJECTION INTRAVENOUS; SUBCUTANEOUS at 21:35

## 2018-11-06 RX ADMIN — Medication 2: at 00:42

## 2018-11-06 RX ADMIN — Medication 5 MILLIGRAM(S): at 00:41

## 2018-11-06 RX ADMIN — SODIUM CHLORIDE 75 MILLILITER(S): 9 INJECTION, SOLUTION INTRAVENOUS at 09:23

## 2018-11-06 RX ADMIN — LEVETIRACETAM 500 MILLIGRAM(S): 250 TABLET, FILM COATED ORAL at 18:04

## 2018-11-06 RX ADMIN — PANTOPRAZOLE SODIUM 40 MILLIGRAM(S): 20 TABLET, DELAYED RELEASE ORAL at 18:04

## 2018-11-06 RX ADMIN — Medication 2: at 18:04

## 2018-11-06 RX ADMIN — Medication 3 MILLILITER(S): at 17:31

## 2018-11-06 RX ADMIN — Medication 100 MILLIEQUIVALENT(S): at 09:23

## 2018-11-06 RX ADMIN — LEVETIRACETAM 400 MILLIGRAM(S): 250 TABLET, FILM COATED ORAL at 05:32

## 2018-11-06 RX ADMIN — Medication 100 MILLIEQUIVALENT(S): at 08:45

## 2018-11-06 RX ADMIN — Medication 3 MILLILITER(S): at 00:48

## 2018-11-06 RX ADMIN — Medication 5 MILLIGRAM(S): at 11:41

## 2018-11-06 RX ADMIN — Medication 5 MILLIGRAM(S): at 05:33

## 2018-11-06 RX ADMIN — Medication 3 MILLILITER(S): at 11:15

## 2018-11-06 RX ADMIN — INSULIN GLARGINE 13 UNIT(S): 100 INJECTION, SOLUTION SUBCUTANEOUS at 22:34

## 2018-11-06 RX ADMIN — Medication 2: at 06:28

## 2018-11-06 RX ADMIN — Medication 3 MILLILITER(S): at 06:41

## 2018-11-06 RX ADMIN — Medication 50 MILLIGRAM(S): at 18:04

## 2018-11-06 NOTE — PROGRESS NOTE ADULT - PROBLEM SELECTOR PLAN 3
2/2 Aspiration PNA. Now Resolved.  - CXR showing R basilar pna and UCx positive for E. faecalis.  - BCx (10/26): NGTD  - s/p ABx as above.   - CT chest significant for HAYDEN lesion that is increased in size compared to prior scans, no new focal consolidations.  - HOB elevated 30-45  - Chest PT   - aspiration precaution

## 2018-11-06 NOTE — PROGRESS NOTE ADULT - PROBLEM SELECTOR PLAN 1
Significant oropharyngeal dysphagia. Patient left the hospital to nursing home on 10/23 and was initially doing well. Abruptly became lethargic with inability to take PO.   - Speech and swallow eval: NPO + aspiration precaution.  - ENT consulted for aphonia - VC injury likely from intubation. No acute intervention  - Neuro on board: MG workup.  - Keppra level normal.  - Barium swallow study shows continuation of dysphagia.  - Discussed with family and GI: PEG tube placement today - [facility] held, labs ordered. NPO status.

## 2018-11-06 NOTE — PROGRESS NOTE ADULT - PROBLEM SELECTOR PLAN 10
DVT ppx: SQH - held for possible PEG placement today.  Diet: Strict NPO for now, multiple failure of NG tube trial due to patient's discomfort and self-discontinuation. MBS with noted inability to swallow; recommendation to continue strict NPO. Replete electrolytes as appropriate.  GOC: per discussion with wife, pt wishes are to be FULL CODE. Prolonged GOC discussion - now pending PEG tube placement. Mrs. Kingsley wishes to use PEG tube as a temporary measure if possible in case his swallowing function recovers. She understands that PEG tube could be the permanent measure if his swallowing function doesn't recover.

## 2018-11-06 NOTE — CHART NOTE - NSCHARTNOTEFT_GEN_A_CORE
Nutrition Follow Up Note  Patient seen for: Length of stay.     78y/o male who presents with a chief complaint of altered mental status, cough complicated by acute decline in PO tolerance with other symptoms, and now with significant oropharyngeal dysphagia. SLP unable to make Nutritional recommendations based on MBS, wife wanted to pursue PEG and hopeful swallowing function will return.     Source: comprehensive chart review, wife, RN    Diet : NPO    Patient lethargic. Per RN Pt recently got back from PEG placement and has been sleepy. No GI complaints at this time.       Enteral /Parenteral Nutrition: N/A, previously ordered for glucerna 1.2 @ 70mL/hr x 24hrs but pulled out NGT numerous times.      No new Wt to address. Daily Weight in k.6 (10-31)    Pertinent Medications: MEDICATIONS  (STANDING):  ALBUTerol/ipratropium for Nebulization 3 milliLiter(s) Nebulizer every 6 hours  dextrose 5% + sodium chloride 0.45%. 1000 milliLiter(s) (75 mL/Hr) IV Continuous <Continuous>  dextrose 5%. 1000 milliLiter(s) (50 mL/Hr) IV Continuous <Continuous>  dextrose 50% Injectable 12.5 Gram(s) IV Push once  dextrose 50% Injectable 25 Gram(s) IV Push once  dextrose 50% Injectable 25 Gram(s) IV Push once  insulin glargine Injectable (LANTUS) 13 Unit(s) SubCutaneous at bedtime  insulin lispro (HumaLOG) corrective regimen sliding scale   SubCutaneous every 6 hours  levETIRAcetam 500 milliGRAM(s) Oral two times a day  metoprolol tartrate 50 milliGRAM(s) Enteral Tube two times a day  pantoprazole   Suspension 40 milliGRAM(s) Enteral Tube two times a day before meals    MEDICATIONS  (PRN):  dextrose 40% Gel 15 Gram(s) Oral once PRN Blood Glucose LESS THAN 70 milliGRAM(s)/deciliter  glucagon  Injectable 1 milliGRAM(s) IntraMuscular once PRN Glucose LESS THAN 70 milligrams/deciliter    Pertinent Labs:  @ 06:35: Na 137, BUN 8, Cr 0.66, <H>, K+ 3.3<L>, Phos 2.6, Mg 1.7, Alk Phos --, ALT/SGPT --, AST/SGOT --, HbA1c --    Finger Sticks:  POCT Blood Glucose.: 166 mg/dL ( @ 12:14)  POCT Blood Glucose.: 152 mg/dL ( @ 06:16)  POCT Blood Glucose.: 165 mg/dL ( @ 23:46)  POCT Blood Glucose.: 155 mg/dL ( @ 21:26)  POCT Blood Glucose.: 170 mg/dL ( @ 17:33)      Skin per nursing documentation: no pressure ulcers.  Edema: 1+ generalized edema    Estimated Needs:   [x] no change since previous assessment  [ ] recalculated:     Previous Nutrition Diagnosis: Severe Malnutrition  Nutrition Diagnosis is: ongoing, to be addressed once able to start enteral feeds via PEG.    New Nutrition Diagnosis: N/A       Interventions:     Recommend  1) When medically feasible and approved by GI, initiate Glucerna 1.2 @ 30mL/hr increase by 10mL Q6 hrs until goal of 70mL/hr x 24hrs. At goal, feed provides 2016 kcal, 101 g protein (~26kcal/kg, 1.3g protein/kg/actual Wt)    Monitoring and Evaluation:     Continue to monitor Nutritional intake, Tolerance to diet prescription, weights, labs, skin integrity    RD remains available upon request and will follow up per protocol  Da Carter RD, CDN, CDE. Pager: [phone]

## 2018-11-06 NOTE — PROGRESS NOTE ADULT - SUBJECTIVE AND OBJECTIVE BOX
Pre-Endoscopy Evaluation      Referring Physician:  dr. flavio warren                                  Procedure:  upper gastrointestinal endoscopy/peg placement    Indication for Procedure: dysphagia    Pertinent History: 77y male with PMHx of Afib, HTN, DMII, ICH (while on coumadin) requiring bilateral craniotomy who initially presented with severe sepsis and now with oropharyngeal dysphagia      Sedation by Anesthesia [X]    PAST MEDICAL & SURGICAL HISTORY:  BPH (benign prostatic hyperplasia)  Atrial fibrillation  Seizure  Diabetes mellitus  Hypertension  H/O craniotomy: bilateral, for ICH after fall  S/P inguinal hernia repair  ASD (atrial septal defect): closure      PMH of Gastroparesis [ ]  Gastric Surgery [ ]  Gastric Outlet Obstruction [ ]    Allergies    Aleve (Unknown)    Intolerances    Latex allergy: [ ] yes [X] no    Medications:MEDICATIONS  (STANDING):  ALBUTerol/ipratropium for Nebulization 3 milliLiter(s) Nebulizer every 6 hours  dextrose 5% + sodium chloride 0.45%. 1000 milliLiter(s) (75 mL/Hr) IV Continuous <Continuous>  dextrose 5%. 1000 milliLiter(s) (50 mL/Hr) IV Continuous <Continuous>  dextrose 50% Injectable 12.5 Gram(s) IV Push once  dextrose 50% Injectable 25 Gram(s) IV Push once  dextrose 50% Injectable 25 Gram(s) IV Push once  insulin glargine Injectable (LANTUS) 13 Unit(s) SubCutaneous at bedtime  insulin lispro (HumaLOG) corrective regimen sliding scale   SubCutaneous every 6 hours  levETIRAcetam  IVPB 500 milliGRAM(s) IV Intermittent every 12 hours  metoprolol tartrate Injectable 5 milliGRAM(s) IV Push every 6 hours    MEDICATIONS  (PRN):  dextrose 40% Gel 15 Gram(s) Oral once PRN Blood Glucose LESS THAN 70 milliGRAM(s)/deciliter  glucagon  Injectable 1 milliGRAM(s) IntraMuscular once PRN Glucose LESS THAN 70 milligrams/deciliter      Smoking: [ ] yes  [X] no    AICD/PPM: [ ] yes   [X] no    Pertinent lab data:                        11.3   6.5   )-----------( 205      ( 06 Nov 2018 06:40 )             34.1     11-06    137  |  99  |  8   ----------------------------<  153<H>  3.3<L>   |  28  |  0.66    Ca    8.9      06 Nov 2018 06:35  Phos  2.6     11-06  Mg     1.7     11-06      PT/INR - ( 06 Nov 2018 06:35 )   PT: 13.9 sec;   INR: 1.21 ratio         PTT - ( 06 Nov 2018 06:35 )  PTT:31.6 sec    CAPILLARY BLOOD GLUCOSE  POCT Blood Glucose.: 166 mg/dL (06 Nov 2018 12:14)      Physical Examination:      Daily   Vital Signs Last 24 Hrs  T(C): 37 (06 Nov 2018 04:50), Max: 37 (06 Nov 2018 04:50)  T(F): 98.6 (06 Nov 2018 04:50), Max: 98.6 (06 Nov 2018 04:50)  HR: 77 (06 Nov 2018 11:16) (77 - 100)  BP: 147/75 (06 Nov 2018 04:50) (120/72 - 161/86)  BP(mean): --  RR: 18 (06 Nov 2018 04:50) (18 - 19)  SpO2: 93% (06 Nov 2018 11:16) (93% - 98%)    Drug Dosing Weight  Height (cm): 177.8 (11 Oct 2018 02:45)  Weight (kg): 81.4 (26 Oct 2018 22:14)  BMI (kg/m2): 25.7 (26 Oct 2018 22:14)  BSA (m2): 1.99 (26 Oct 2018 22:14)    Constitutional: NAD     Neck:  No JVD    Respiratory: CTAB/L    Cardiovascular: S1 and S2    Gastrointestinal: BS+, soft, NT/ND    Extremities: No peripheral edema    Neurological: A/O x 3    : No Lares    Skin: No rashes    Comments:    ASA Class: I [ ]  II [ ]  III [ ]  IV [X]    The patient is a suitable candidate for the planned procedure unless box checked [ ]  No, explain:

## 2018-11-06 NOTE — PROGRESS NOTE ADULT - ATTENDING COMMENTS
Pt to get PEG placement done today.  Discussed with patient's wife who was at bedside.  Would monitor for refeeding syndrome when starting tube feeds.

## 2018-11-06 NOTE — PROGRESS NOTE ADULT - SUBJECTIVE AND OBJECTIVE BOX
Nolan Granados Vickie  PGY-1  Pager: [phone]    Patient is a 77y old  Male who presents with a chief complaint of altered mental status, cough (05 Nov 2018 11:57)      SUBJECTIVE / OVERNIGHT EVENTS:  No acute events overnight. Patient slept well. No pain or SOB.   Patient has no acute concerns. Patient gives handshake to greet and waves his hands upon the exit.       ROS:  Review of system limited due to communication limitation. Shaking his head or short words.  CONSTITUTIONAL: No fever  RESPIRATORY: No shortness of breath  CARDIOVASCULAR: No chest pain, palpitations  GASTROINTESTINAL: No abdominal pain. No nausea, vomiting,  NEUROLOGICAL: No headaches    MEDICATIONS  (STANDING):  ALBUTerol/ipratropium for Nebulization 3 milliLiter(s) Nebulizer every 6 hours  dextrose 5% + sodium chloride 0.45%. 1000 milliLiter(s) (75 mL/Hr) IV Continuous <Continuous>  dextrose 5%. 1000 milliLiter(s) (50 mL/Hr) IV Continuous <Continuous>  dextrose 50% Injectable 12.5 Gram(s) IV Push once  dextrose 50% Injectable 25 Gram(s) IV Push once  dextrose 50% Injectable 25 Gram(s) IV Push once  insulin glargine Injectable (LANTUS) 13 Unit(s) SubCutaneous at bedtime  insulin lispro (HumaLOG) corrective regimen sliding scale   SubCutaneous every 6 hours  levETIRAcetam  IVPB 500 milliGRAM(s) IV Intermittent every 12 hours  metoprolol tartrate Injectable 5 milliGRAM(s) IV Push every 6 hours  potassium chloride  10 mEq/100 mL IVPB 10 milliEquivalent(s) IV Intermittent every 1 hour    MEDICATIONS  (PRN):  dextrose 40% Gel 15 Gram(s) Oral once PRN Blood Glucose LESS THAN 70 milliGRAM(s)/deciliter  glucagon  Injectable 1 milliGRAM(s) IntraMuscular once PRN Glucose LESS THAN 70 milligrams/deciliter      T(C): 37 (11-06-18 @ 04:50), Max: 37 (11-06-18 @ 04:50)  HR: 77 (11-06-18 @ 06:42) (77 - 100)  BP: 147/75 (11-06-18 @ 04:50) (120/72 - 161/86)  RR: 18 (11-06-18 @ 04:50) (18 - 19)  SpO2: 96% (11-06-18 @ 06:42) (94% - 98%)  CAPILLARY BLOOD GLUCOSE      POCT Blood Glucose.: 152 mg/dL (06 Nov 2018 06:16)  POCT Blood Glucose.: 165 mg/dL (05 Nov 2018 23:46)  POCT Blood Glucose.: 155 mg/dL (05 Nov 2018 21:26)  POCT Blood Glucose.: 170 mg/dL (05 Nov 2018 17:33)  POCT Blood Glucose.: 195 mg/dL (05 Nov 2018 12:41)    I&O's Summary    05 Nov 2018 07:01  -  06 Nov 2018 07:00  --------------------------------------------------------  IN: 1000 mL / OUT: 0 mL / NET: 1000 mL        PHYSICAL EXAM:  GENERAL: NAD, awake upon entry.   HEENT:  Atraumatic, Old cranial surgical site bilaterally (clean dry intact). Neck supple. No conjunctival icterus.  CHEST/LUNG: Clear to auscultation bilaterally in anterior during inspiratory phase, but coarse breath sounds on expiratory phase. No increased WOB.  HEART: Irregularly irregular. S1 S2, No murmurs, rubs, or gallops. 2+ peripheral pulses. Cap refill ~ 3 seconds.  ABDOMEN: Soft, Nontender, Nondistended; Bowel sounds present  EXTREMITIES:  2+ Peripheral Pulses, No cyanosis or edema. bilateral distal LE skin changes c/w venous stasis.  NEUROLOGY: AOx2. Limited communication, yet able to have conversation in short phrases. Patient is able to move his both UE freely against the gravity, but mostly uses his right hand. Able to communicate his wishes (e.g. wishes to drink water, ask what is aspiration, ask where are staff members from).       LABS:                        11.3   6.5   )-----------( 205      ( 06 Nov 2018 06:40 )             34.1     11-06    137  |  99  |  8   ----------------------------<  153<H>  3.3<L>   |  28  |  0.66    Ca    8.9      06 Nov 2018 06:35  Phos  2.6     11-06  Mg     1.7     11-06      PT/INR - ( 06 Nov 2018 06:35 )   PT: 13.9 sec;   INR: 1.21 ratio         PTT - ( 06 Nov 2018 06:35 )  PTT:31.6 sec          RADIOLOGY & ADDITIONAL TESTS:    Imaging Personally Reviewed: NA    Consultant(s) Notes Reviewed:  GI    Care Discussed with Consultants/Other Providers: GI

## 2018-11-06 NOTE — CHART NOTE - NSCHARTNOTEFT_GEN_A_CORE
Chart note review; primary team had discussion with wife and patient re: PEG tube, they would like to proceed with hopes of eventual restoration of his swallowing ability. Will sign-off; please reconsult PRN.

## 2018-11-06 NOTE — PROGRESS NOTE ADULT - PROBLEM SELECTOR PLAN 4
on IV lopressor. Not on AC 2/2 multiple bleeds.   - Has hx of RVR inpatient in setting of septic and hypotensive. Currently rate controlled  - will c/w decreased dose of 50mg BID, will titrate up as needed.

## 2018-11-07 LAB
ANION GAP SERPL CALC-SCNC: 10 MMOL/L — SIGNIFICANT CHANGE UP (ref 5–17)
ANION GAP SERPL CALC-SCNC: 12 MMOL/L — SIGNIFICANT CHANGE UP (ref 5–17)
ANION GAP SERPL CALC-SCNC: 8 MMOL/L — SIGNIFICANT CHANGE UP (ref 5–17)
BUN SERPL-MCNC: 10 MG/DL — SIGNIFICANT CHANGE UP (ref 7–23)
BUN SERPL-MCNC: 7 MG/DL — SIGNIFICANT CHANGE UP (ref 7–23)
BUN SERPL-MCNC: 8 MG/DL — SIGNIFICANT CHANGE UP (ref 7–23)
CALCIUM SERPL-MCNC: 8.6 MG/DL — SIGNIFICANT CHANGE UP (ref 8.4–10.5)
CALCIUM SERPL-MCNC: 8.8 MG/DL — SIGNIFICANT CHANGE UP (ref 8.4–10.5)
CALCIUM SERPL-MCNC: 8.9 MG/DL — SIGNIFICANT CHANGE UP (ref 8.4–10.5)
CHLORIDE SERPL-SCNC: 100 MMOL/L — SIGNIFICANT CHANGE UP (ref 96–108)
CHLORIDE SERPL-SCNC: 98 MMOL/L — SIGNIFICANT CHANGE UP (ref 96–108)
CHLORIDE SERPL-SCNC: 99 MMOL/L — SIGNIFICANT CHANGE UP (ref 96–108)
CO2 SERPL-SCNC: 27 MMOL/L — SIGNIFICANT CHANGE UP (ref 22–31)
CO2 SERPL-SCNC: 28 MMOL/L — SIGNIFICANT CHANGE UP (ref 22–31)
CO2 SERPL-SCNC: 29 MMOL/L — SIGNIFICANT CHANGE UP (ref 22–31)
CREAT SERPL-MCNC: 0.63 MG/DL — SIGNIFICANT CHANGE UP (ref 0.5–1.3)
CREAT SERPL-MCNC: 0.65 MG/DL — SIGNIFICANT CHANGE UP (ref 0.5–1.3)
CREAT SERPL-MCNC: 0.67 MG/DL — SIGNIFICANT CHANGE UP (ref 0.5–1.3)
GLUCOSE BLDC GLUCOMTR-MCNC: 126 MG/DL — HIGH (ref 70–99)
GLUCOSE BLDC GLUCOMTR-MCNC: 163 MG/DL — HIGH (ref 70–99)
GLUCOSE BLDC GLUCOMTR-MCNC: 171 MG/DL — HIGH (ref 70–99)
GLUCOSE BLDC GLUCOMTR-MCNC: 173 MG/DL — HIGH (ref 70–99)
GLUCOSE BLDC GLUCOMTR-MCNC: 175 MG/DL — HIGH (ref 70–99)
GLUCOSE SERPL-MCNC: 139 MG/DL — HIGH (ref 70–99)
GLUCOSE SERPL-MCNC: 162 MG/DL — HIGH (ref 70–99)
GLUCOSE SERPL-MCNC: 184 MG/DL — HIGH (ref 70–99)
HCT VFR BLD CALC: 35.9 % — LOW (ref 39–50)
HGB BLD-MCNC: 12.2 G/DL — LOW (ref 13–17)
MAGNESIUM SERPL-MCNC: 1.5 MG/DL — LOW (ref 1.6–2.6)
MAGNESIUM SERPL-MCNC: 1.6 MG/DL — SIGNIFICANT CHANGE UP (ref 1.6–2.6)
MAGNESIUM SERPL-MCNC: 1.9 MG/DL — SIGNIFICANT CHANGE UP (ref 1.6–2.6)
MCHC RBC-ENTMCNC: 30.9 PG — SIGNIFICANT CHANGE UP (ref 27–34)
MCHC RBC-ENTMCNC: 34 GM/DL — SIGNIFICANT CHANGE UP (ref 32–36)
MCV RBC AUTO: 90.7 FL — SIGNIFICANT CHANGE UP (ref 80–100)
PHOSPHATE SERPL-MCNC: 2.1 MG/DL — LOW (ref 2.5–4.5)
PHOSPHATE SERPL-MCNC: 2.6 MG/DL — SIGNIFICANT CHANGE UP (ref 2.5–4.5)
PHOSPHATE SERPL-MCNC: 2.7 MG/DL — SIGNIFICANT CHANGE UP (ref 2.5–4.5)
PLATELET # BLD AUTO: 203 K/UL — SIGNIFICANT CHANGE UP (ref 150–400)
POTASSIUM SERPL-MCNC: 3.9 MMOL/L — SIGNIFICANT CHANGE UP (ref 3.5–5.3)
POTASSIUM SERPL-MCNC: 3.9 MMOL/L — SIGNIFICANT CHANGE UP (ref 3.5–5.3)
POTASSIUM SERPL-MCNC: 4 MMOL/L — SIGNIFICANT CHANGE UP (ref 3.5–5.3)
POTASSIUM SERPL-SCNC: 3.9 MMOL/L — SIGNIFICANT CHANGE UP (ref 3.5–5.3)
POTASSIUM SERPL-SCNC: 3.9 MMOL/L — SIGNIFICANT CHANGE UP (ref 3.5–5.3)
POTASSIUM SERPL-SCNC: 4 MMOL/L — SIGNIFICANT CHANGE UP (ref 3.5–5.3)
RBC # BLD: 3.96 M/UL — LOW (ref 4.2–5.8)
RBC # FLD: 15.3 % — HIGH (ref 10.3–14.5)
SODIUM SERPL-SCNC: 136 MMOL/L — SIGNIFICANT CHANGE UP (ref 135–145)
SODIUM SERPL-SCNC: 137 MMOL/L — SIGNIFICANT CHANGE UP (ref 135–145)
SODIUM SERPL-SCNC: 138 MMOL/L — SIGNIFICANT CHANGE UP (ref 135–145)
WBC # BLD: 7.7 K/UL — SIGNIFICANT CHANGE UP (ref 3.8–10.5)
WBC # FLD AUTO: 7.7 K/UL — SIGNIFICANT CHANGE UP (ref 3.8–10.5)

## 2018-11-07 PROCEDURE — 99233 SBSQ HOSP IP/OBS HIGH 50: CPT | Mod: GC

## 2018-11-07 RX ORDER — MAGNESIUM SULFATE 500 MG/ML
2 VIAL (ML) INJECTION ONCE
Qty: 0 | Refills: 0 | Status: COMPLETED | OUTPATIENT
Start: 2018-11-07 | End: 2018-11-07

## 2018-11-07 RX ORDER — SODIUM,POTASSIUM PHOSPHATES 278-250MG
1 POWDER IN PACKET (EA) ORAL
Qty: 0 | Refills: 0 | Status: COMPLETED | OUTPATIENT
Start: 2018-11-07 | End: 2018-11-08

## 2018-11-07 RX ORDER — INSULIN GLARGINE 100 [IU]/ML
10 INJECTION, SOLUTION SUBCUTANEOUS AT BEDTIME
Qty: 0 | Refills: 0 | Status: DISCONTINUED | OUTPATIENT
Start: 2018-11-07 | End: 2018-11-08

## 2018-11-07 RX ADMIN — LEVETIRACETAM 500 MILLIGRAM(S): 250 TABLET, FILM COATED ORAL at 05:59

## 2018-11-07 RX ADMIN — Medication 50 MILLIGRAM(S): at 17:08

## 2018-11-07 RX ADMIN — Medication 1 PACKET(S): at 12:56

## 2018-11-07 RX ADMIN — Medication 1 PACKET(S): at 17:08

## 2018-11-07 RX ADMIN — Medication 2: at 06:59

## 2018-11-07 RX ADMIN — PANTOPRAZOLE SODIUM 40 MILLIGRAM(S): 20 TABLET, DELAYED RELEASE ORAL at 17:08

## 2018-11-07 RX ADMIN — LEVETIRACETAM 500 MILLIGRAM(S): 250 TABLET, FILM COATED ORAL at 17:08

## 2018-11-07 RX ADMIN — INSULIN GLARGINE 10 UNIT(S): 100 INJECTION, SOLUTION SUBCUTANEOUS at 21:56

## 2018-11-07 RX ADMIN — HEPARIN SODIUM 5000 UNIT(S): 5000 INJECTION INTRAVENOUS; SUBCUTANEOUS at 12:56

## 2018-11-07 RX ADMIN — Medication 3 MILLILITER(S): at 05:44

## 2018-11-07 RX ADMIN — Medication 50 MILLIGRAM(S): at 05:58

## 2018-11-07 RX ADMIN — Medication 50 GRAM(S): at 16:47

## 2018-11-07 RX ADMIN — Medication 3 MILLILITER(S): at 11:30

## 2018-11-07 RX ADMIN — HEPARIN SODIUM 5000 UNIT(S): 5000 INJECTION INTRAVENOUS; SUBCUTANEOUS at 05:59

## 2018-11-07 RX ADMIN — Medication 2: at 12:55

## 2018-11-07 RX ADMIN — HEPARIN SODIUM 5000 UNIT(S): 5000 INJECTION INTRAVENOUS; SUBCUTANEOUS at 21:56

## 2018-11-07 RX ADMIN — PANTOPRAZOLE SODIUM 40 MILLIGRAM(S): 20 TABLET, DELAYED RELEASE ORAL at 05:59

## 2018-11-07 NOTE — PROGRESS NOTE ADULT - PROBLEM SELECTOR PLAN 1
Significant oropharyngeal dysphagia. Patient left the hospital to nursing home on 10/23 and was initially doing well. Abruptly became lethargic with inability to take PO.   - Speech and swallow eval: NPO + aspiration precaution.  - ENT consulted for aphonia - VC injury likely from intubation. No acute intervention  - Neuro on board: MG workup.  - Keppra level normal.  - Barium swallow study shows continuation of dysphagia.  - Discussed with family and GI: PEG tube placement today - [facility] held, labs ordered. NPO status. Significant oropharyngeal dysphagia. Patient left the hospital to nursing home on 10/23 and was initially doing well. Abruptly became lethargic with inability to take PO.   - Speech and swallow eval: NPO + aspiration precaution.  - ENT consulted for aphonia - VC injury likely from intubation. No acute intervention  - Neuro on board: MG workup.  - Keppra level normal.  - Barium swallow study shows continuation of dysphagia.  - s/p removable PEG tube placement. Significant oropharyngeal dysphagia. Patient left the hospital to nursing home on 10/23 and was initially doing well. Abruptly became lethargic with inability to take PO.   - Speech and swallow eval: NPO + aspiration precaution.  - ENT consulted for aphonia - VC injury likely from intubation. No acute intervention  - Neuro on board: MG workup.  - Keppra level normal.  - Barium swallow study shows continuation of dysphagia.  - s/p removable PEG tube placement.  - Started tube feed today.   - Check for refeeding syndrome via BMP/Mg/Phos q12h.

## 2018-11-07 NOTE — PROGRESS NOTE ADULT - ASSESSMENT
Mr. 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 Patient course is complicated by acute decline in PO tolerance with other symptoms, and now with significant oropharyngeal dysphagia. Waiting for PEG tube placement. Mr. 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 Patient course is complicated by acute decline in PO tolerance with other symptoms, and now with significant oropharyngeal dysphagia. s/p removable PEG tube placement.

## 2018-11-07 NOTE — PROGRESS NOTE ADULT - PROBLEM SELECTOR PLAN 8
-unable to continue with tamsulosin given strict NPO status -unable to continue with tamsulosin since tamsulosin is not crushable for PEG tube.

## 2018-11-07 NOTE — PROGRESS NOTE ADULT - SUBJECTIVE AND OBJECTIVE BOX
Nolan Granados Vickie  PGY-1  Pager: [phone]    Patient is a 77y old  Male who presents with a chief complaint of altered mental status, cough (06 Nov 2018 12:20)      SUBJECTIVE / OVERNIGHT EVENTS:    MEDICATIONS  (STANDING):  ALBUTerol/ipratropium for Nebulization 3 milliLiter(s) Nebulizer every 6 hours  dextrose 5% + sodium chloride 0.45%. 1000 milliLiter(s) (75 mL/Hr) IV Continuous <Continuous>  dextrose 5%. 1000 milliLiter(s) (50 mL/Hr) IV Continuous <Continuous>  dextrose 50% Injectable 12.5 Gram(s) IV Push once  dextrose 50% Injectable 25 Gram(s) IV Push once  dextrose 50% Injectable 25 Gram(s) IV Push once  heparin  Injectable 5000 Unit(s) SubCutaneous every 8 hours  insulin glargine Injectable (LANTUS) 13 Unit(s) SubCutaneous at bedtime  insulin lispro (HumaLOG) corrective regimen sliding scale   SubCutaneous every 6 hours  levETIRAcetam 500 milliGRAM(s) Oral two times a day  metoprolol tartrate 50 milliGRAM(s) Enteral Tube two times a day  pantoprazole   Suspension 40 milliGRAM(s) Enteral Tube two times a day before meals  sodium phosphate IVPB 15 milliMole(s) IV Intermittent once    MEDICATIONS  (PRN):  dextrose 40% Gel 15 Gram(s) Oral once PRN Blood Glucose LESS THAN 70 milliGRAM(s)/deciliter  glucagon  Injectable 1 milliGRAM(s) IntraMuscular once PRN Glucose LESS THAN 70 milligrams/deciliter      T(C): 36.6 (11-07-18 @ 04:49), Max: 36.6 (11-07-18 @ 04:49)  HR: 71 (11-07-18 @ 05:44) (71 - 86)  BP: 169/73 (11-07-18 @ 04:49) (126/78 - 169/73)  RR: 18 (11-07-18 @ 04:49) (18 - 18)  SpO2: 96% (11-07-18 @ 05:44) (93% - 98%)  CAPILLARY BLOOD GLUCOSE      POCT Blood Glucose.: 175 mg/dL (07 Nov 2018 06:50)  POCT Blood Glucose.: 144 mg/dL (06 Nov 2018 23:54)  POCT Blood Glucose.: 162 mg/dL (06 Nov 2018 22:16)  POCT Blood Glucose.: 162 mg/dL (06 Nov 2018 17:54)  POCT Blood Glucose.: 166 mg/dL (06 Nov 2018 12:14)    I&O's Summary    06 Nov 2018 07:01  -  07 Nov 2018 07:00  --------------------------------------------------------  IN: 950 mL / OUT: 0 mL / NET: 950 mL        PHYSICAL EXAM:  GENERAL: NAD, well-developed  HEAD:  Atraumatic, Normocephalic  EYES: EOMI, PERRLA, conjunctiva and sclera clear  NECK: Supple, No JVD  CHEST/LUNG: Clear to auscultation bilaterally; No wheeze  HEART: Regular rate and rhythm; No murmurs, rubs, or gallops  ABDOMEN: Soft, Nontender, Nondistended; Bowel sounds present  EXTREMITIES:  2+ Peripheral Pulses, No clubbing, cyanosis, or edema  PSYCH: AAOx3  NEUROLOGY: non-focal  SKIN: No rashes or lesions    LABS:                        12.2   7.7   )-----------( 203      ( 07 Nov 2018 07:08 )             35.9     11-07    138  |  100  |  8   ----------------------------<  162<H>  3.9   |  28  |  0.65    Ca    8.8      07 Nov 2018 07:06  Phos  2.1     11-07  Mg     1.6     11-07      PT/INR - ( 06 Nov 2018 06:35 )   PT: 13.9 sec;   INR: 1.21 ratio         PTT - ( 06 Nov 2018 06:35 )  PTT:31.6 sec          RADIOLOGY & ADDITIONAL TESTS:    Imaging Personally Reviewed:    Consultant(s) Notes Reviewed:      Care Discussed with Consultants/Other Providers: Nolan Granados Vickie  PGY-1  Pager: [phone]    Patient is a 77y old  Male who presents with a chief complaint of altered mental status, cough (06 Nov 2018 12:20)      SUBJECTIVE / OVERNIGHT EVENTS:  No acute event overnight.  IV was pulled by patient and was replaced.  Patient has no acute concerns.     ROS:  CONSTITUTIONAL: No fever  RESPIRATORY: No shortness of breath  CARDIOVASCULAR: No chest pain, palpitations  GASTROINTESTINAL: No abdominal pain. No nausea, vomiting,  NEUROLOGICAL: No headaches      MEDICATIONS  (STANDING):  ALBUTerol/ipratropium for Nebulization 3 milliLiter(s) Nebulizer every 6 hours  dextrose 5% + sodium chloride 0.45%. 1000 milliLiter(s) (75 mL/Hr) IV Continuous <Continuous>  dextrose 5%. 1000 milliLiter(s) (50 mL/Hr) IV Continuous <Continuous>  dextrose 50% Injectable 12.5 Gram(s) IV Push once  dextrose 50% Injectable 25 Gram(s) IV Push once  dextrose 50% Injectable 25 Gram(s) IV Push once  heparin  Injectable 5000 Unit(s) SubCutaneous every 8 hours  insulin glargine Injectable (LANTUS) 13 Unit(s) SubCutaneous at bedtime  insulin lispro (HumaLOG) corrective regimen sliding scale   SubCutaneous every 6 hours  levETIRAcetam 500 milliGRAM(s) Oral two times a day  metoprolol tartrate 50 milliGRAM(s) Enteral Tube two times a day  pantoprazole   Suspension 40 milliGRAM(s) Enteral Tube two times a day before meals  sodium phosphate IVPB 15 milliMole(s) IV Intermittent once    MEDICATIONS  (PRN):  dextrose 40% Gel 15 Gram(s) Oral once PRN Blood Glucose LESS THAN 70 milliGRAM(s)/deciliter  glucagon  Injectable 1 milliGRAM(s) IntraMuscular once PRN Glucose LESS THAN 70 milligrams/deciliter      T(C): 36.6 (11-07-18 @ 04:49), Max: 36.6 (11-07-18 @ 04:49)  HR: 71 (11-07-18 @ 05:44) (71 - 86)  BP: 169/73 (11-07-18 @ 04:49) (126/78 - 169/73)  RR: 18 (11-07-18 @ 04:49) (18 - 18)  SpO2: 96% (11-07-18 @ 05:44) (93% - 98%)  CAPILLARY BLOOD GLUCOSE      POCT Blood Glucose.: 175 mg/dL (07 Nov 2018 06:50)  POCT Blood Glucose.: 144 mg/dL (06 Nov 2018 23:54)  POCT Blood Glucose.: 162 mg/dL (06 Nov 2018 22:16)  POCT Blood Glucose.: 162 mg/dL (06 Nov 2018 17:54)  POCT Blood Glucose.: 166 mg/dL (06 Nov 2018 12:14)    I&O's Summary    06 Nov 2018 07:01  -  07 Nov 2018 07:00  --------------------------------------------------------  IN: 950 mL / OUT: 0 mL / NET: 950 mL        PHYSICAL EXAM:  GENERAL: NAD, awake upon entry.   HEENT:  Atraumatic, Old cranial surgical site bilaterally (clean dry intact). Neck supple. No conjunctival icterus.  CHEST/LUNG: Clear to auscultation bilaterally in anterior during inspiratory phase, but coarse breath sounds on expiratory phase. No increased WOB.  HEART: Irregularly irregular. S1 S2, No murmurs, rubs, or gallops. 2+ peripheral pulses. Cap refill ~ 3 seconds.  ABDOMEN: Soft, Nontender, Nondistended; Bowel sounds present  EXTREMITIES:  2+ Peripheral Pulses, No cyanosis or edema. bilateral distal LE skin changes c/w venous stasis.  NEUROLOGY: Limited communication, yet able to have conversation in short phrases. Patient is able to move his both UE freely against the gravity, but mostly uses his right hand. Able to communicate his wishes (e.g. wishes to drink water, ask what is aspiration, ask where are staff members from).       LABS:                        12.2   7.7   )-----------( 203      ( 07 Nov 2018 07:08 )             35.9     11-07    138  |  100  |  8   ----------------------------<  162<H>  3.9   |  28  |  0.65    Ca    8.8      07 Nov 2018 07:06  Phos  2.1     11-07  Mg     1.6     11-07      PT/INR - ( 06 Nov 2018 06:35 )   PT: 13.9 sec;   INR: 1.21 ratio         PTT - ( 06 Nov 2018 06:35 )  PTT:31.6 sec          RADIOLOGY & ADDITIONAL TESTS:    Imaging Personally Reviewed:    Consultant(s) Notes Reviewed:      Care Discussed with Consultants/Other Providers: Nolan Granados Vickie  PGY-1  Pager: [phone]    Patient is a 77y old  Male who presents with a chief complaint of altered mental status, cough (06 Nov 2018 12:20)      SUBJECTIVE / OVERNIGHT EVENTS:  No acute event overnight.  IV was pulled by patient and was replaced.  Patient has no acute concerns.     ROS:  CONSTITUTIONAL: No fever  RESPIRATORY: No shortness of breath  CARDIOVASCULAR: No chest pain, palpitations  GASTROINTESTINAL: No abdominal pain. No nausea, vomiting,  NEUROLOGICAL: No headaches      MEDICATIONS  (STANDING):  ALBUTerol/ipratropium for Nebulization 3 milliLiter(s) Nebulizer every 6 hours  dextrose 5% + sodium chloride 0.45%. 1000 milliLiter(s) (75 mL/Hr) IV Continuous <Continuous>  dextrose 5%. 1000 milliLiter(s) (50 mL/Hr) IV Continuous <Continuous>  dextrose 50% Injectable 12.5 Gram(s) IV Push once  dextrose 50% Injectable 25 Gram(s) IV Push once  dextrose 50% Injectable 25 Gram(s) IV Push once  heparin  Injectable 5000 Unit(s) SubCutaneous every 8 hours  insulin glargine Injectable (LANTUS) 13 Unit(s) SubCutaneous at bedtime  insulin lispro (HumaLOG) corrective regimen sliding scale   SubCutaneous every 6 hours  levETIRAcetam 500 milliGRAM(s) Oral two times a day  metoprolol tartrate 50 milliGRAM(s) Enteral Tube two times a day  pantoprazole   Suspension 40 milliGRAM(s) Enteral Tube two times a day before meals  sodium phosphate IVPB 15 milliMole(s) IV Intermittent once    MEDICATIONS  (PRN):  dextrose 40% Gel 15 Gram(s) Oral once PRN Blood Glucose LESS THAN 70 milliGRAM(s)/deciliter  glucagon  Injectable 1 milliGRAM(s) IntraMuscular once PRN Glucose LESS THAN 70 milligrams/deciliter      T(C): 36.6 (11-07-18 @ 04:49), Max: 36.6 (11-07-18 @ 04:49)  HR: 71 (11-07-18 @ 05:44) (71 - 86)  BP: 169/73 (11-07-18 @ 04:49) (126/78 - 169/73)  RR: 18 (11-07-18 @ 04:49) (18 - 18)  SpO2: 96% (11-07-18 @ 05:44) (93% - 98%)  CAPILLARY BLOOD GLUCOSE      POCT Blood Glucose.: 175 mg/dL (07 Nov 2018 06:50)  POCT Blood Glucose.: 144 mg/dL (06 Nov 2018 23:54)  POCT Blood Glucose.: 162 mg/dL (06 Nov 2018 22:16)  POCT Blood Glucose.: 162 mg/dL (06 Nov 2018 17:54)  POCT Blood Glucose.: 166 mg/dL (06 Nov 2018 12:14)    I&O's Summary    06 Nov 2018 07:01  -  07 Nov 2018 07:00  --------------------------------------------------------  IN: 950 mL / OUT: 0 mL / NET: 950 mL        PHYSICAL EXAM:  GENERAL: NAD, awake upon entry.   HEENT:  Atraumatic, Old cranial surgical site bilaterally (clean dry intact). Neck supple. No conjunctival icterus.  CHEST/LUNG: Clear to auscultation bilaterally in anterior during inspiratory phase, but coarse breath sounds on expiratory phase. No increased WOB.  HEART: Irregularly irregular. S1 S2, No murmurs, rubs, or gallops. 2+ peripheral pulses. Cap refill ~ 3 seconds.  ABDOMEN: Soft, Nontender, Nondistended; Bowel sounds present. PEG tube in place. Clean/dry/intact.  EXTREMITIES:  2+ Peripheral Pulses, No cyanosis or edema. bilateral distal LE skin changes c/w venous stasis.  NEUROLOGY: Limited communication, yet able to have conversation in short phrases. Patient is able to move his both UE freely against the gravity, but mostly uses his right hand. Able to communicate his wishes.       LABS:                        12.2   7.7   )-----------( 203      ( 07 Nov 2018 07:08 )             35.9     11-07    138  |  100  |  8   ----------------------------<  162<H>  3.9   |  28  |  0.65    Ca    8.8      07 Nov 2018 07:06  Phos  2.1     11-07  Mg     1.6     11-07      PT/INR - ( 06 Nov 2018 06:35 )   PT: 13.9 sec;   INR: 1.21 ratio         PTT - ( 06 Nov 2018 06:35 )  PTT:31.6 sec          RADIOLOGY & ADDITIONAL TESTS:    Imaging Personally Reviewed: NA    Consultant(s) Notes Reviewed:   GI    Care Discussed with Consultants/Other Providers: GI

## 2018-11-07 NOTE — PROGRESS NOTE ADULT - PROBLEM SELECTOR PLAN 10
DVT ppx: SQH - held for possible PEG placement today.  Diet: Strict NPO for now, multiple failure of NG tube trial due to patient's discomfort and self-discontinuation. MBS with noted inability to swallow; recommendation to continue strict NPO. Replete electrolytes as appropriate.  GOC: per discussion with wife, pt wishes are to be FULL CODE. Prolonged GOC discussion - now pending PEG tube placement. Mrs. Kingsley wishes to use PEG tube as a temporary measure if possible in case his swallowing function recovers. She understands that PEG tube could be the permanent measure if his swallowing function doesn't recover. DVT ppx: SQH restarted.  Diet: NPO with Tube feed. Check for refeeding syndrome. replete electrolyte as appropriate.

## 2018-11-07 NOTE — PROGRESS NOTE ADULT - SUBJECTIVE AND OBJECTIVE BOX
[facility]  Hector Marquez PA-C  [address] [place] [address]  [phone]      INTERVAL HPI/OVERNIGHT EVENTS:    no new events  s/p peg    MEDICATIONS  (STANDING):  ALBUTerol/ipratropium for Nebulization 3 milliLiter(s) Nebulizer every 6 hours  dextrose 5% + sodium chloride 0.45%. 1000 milliLiter(s) (75 mL/Hr) IV Continuous <Continuous>  dextrose 5%. 1000 milliLiter(s) (50 mL/Hr) IV Continuous <Continuous>  dextrose 50% Injectable 12.5 Gram(s) IV Push once  dextrose 50% Injectable 25 Gram(s) IV Push once  dextrose 50% Injectable 25 Gram(s) IV Push once  heparin  Injectable 5000 Unit(s) SubCutaneous every 8 hours  insulin glargine Injectable (LANTUS) 13 Unit(s) SubCutaneous at bedtime  insulin lispro (HumaLOG) corrective regimen sliding scale   SubCutaneous every 6 hours  levETIRAcetam  IVPB 500 milliGRAM(s) IV Intermittent every 12 hours  metoprolol tartrate Injectable 5 milliGRAM(s) IV Push every 6 hours    MEDICATIONS  (PRN):  dextrose 40% Gel 15 Gram(s) Oral once PRN Blood Glucose LESS THAN 70 milliGRAM(s)/deciliter  glucagon  Injectable 1 milliGRAM(s) IntraMuscular once PRN Glucose LESS THAN 70 milligrams/deciliter      Allergies    Aleve (Unknown)    Intolerances        ROS:   unable to obtain       PHYSICAL EXAM:   Vital Signs:  Vital Signs Last 24 Hrs  T(C): 36.5 (05 Nov 2018 05:32), Max: 36.5 (04 Nov 2018 14:33)  T(F): 97.7 (05 Nov 2018 05:32), Max: 97.7 (04 Nov 2018 14:33)  HR: 83 (05 Nov 2018 06:49) (74 - 91)  BP: 134/76 (05 Nov 2018 06:49) (131/82 - 178/79)  BP(mean): --  RR: 18 (05 Nov 2018 05:32) (18 - 18)  SpO2: 98% (05 Nov 2018 05:32) (95% - 98%)  Daily     Daily     nad  confused  frail  non toxic  soft, nt peg cdi  no edema        LABS:                        11.7   6.6   )-----------( 212      ( 05 Nov 2018 07:18 )             34.1     11-05    137  |  100  |  8   ----------------------------<  198<H>  3.4<L>   |  29  |  0.63    Ca    8.5      05 Nov 2018 07:18  Phos  2.9     11-05  Mg     1.5     11-05            RADIOLOGY & ADDITIONAL TESTS:

## 2018-11-08 LAB
ACRM MODULATING ANTIBODY: 0 NMOL/L — SIGNIFICANT CHANGE UP
ANION GAP SERPL CALC-SCNC: 10 MMOL/L — SIGNIFICANT CHANGE UP (ref 5–17)
ANION GAP SERPL CALC-SCNC: 12 MMOL/L — SIGNIFICANT CHANGE UP (ref 5–17)
BUN SERPL-MCNC: 10 MG/DL — SIGNIFICANT CHANGE UP (ref 7–23)
BUN SERPL-MCNC: 12 MG/DL — SIGNIFICANT CHANGE UP (ref 7–23)
CALCIUM SERPL-MCNC: 8.6 MG/DL — SIGNIFICANT CHANGE UP (ref 8.4–10.5)
CALCIUM SERPL-MCNC: 8.7 MG/DL — SIGNIFICANT CHANGE UP (ref 8.4–10.5)
CHLORIDE SERPL-SCNC: 97 MMOL/L — SIGNIFICANT CHANGE UP (ref 96–108)
CHLORIDE SERPL-SCNC: 99 MMOL/L — SIGNIFICANT CHANGE UP (ref 96–108)
CO2 SERPL-SCNC: 26 MMOL/L — SIGNIFICANT CHANGE UP (ref 22–31)
CO2 SERPL-SCNC: 27 MMOL/L — SIGNIFICANT CHANGE UP (ref 22–31)
CREAT SERPL-MCNC: 0.62 MG/DL — SIGNIFICANT CHANGE UP (ref 0.5–1.3)
CREAT SERPL-MCNC: 0.69 MG/DL — SIGNIFICANT CHANGE UP (ref 0.5–1.3)
GLUCOSE BLDC GLUCOMTR-MCNC: 169 MG/DL — HIGH (ref 70–99)
GLUCOSE BLDC GLUCOMTR-MCNC: 183 MG/DL — HIGH (ref 70–99)
GLUCOSE BLDC GLUCOMTR-MCNC: 185 MG/DL — HIGH (ref 70–99)
GLUCOSE BLDC GLUCOMTR-MCNC: 212 MG/DL — HIGH (ref 70–99)
GLUCOSE SERPL-MCNC: 178 MG/DL — HIGH (ref 70–99)
GLUCOSE SERPL-MCNC: 236 MG/DL — HIGH (ref 70–99)
HCT VFR BLD CALC: 35.2 % — LOW (ref 39–50)
HGB BLD-MCNC: 12 G/DL — LOW (ref 13–17)
MAGNESIUM SERPL-MCNC: 1.8 MG/DL — SIGNIFICANT CHANGE UP (ref 1.6–2.6)
MAGNESIUM SERPL-MCNC: 1.9 MG/DL — SIGNIFICANT CHANGE UP (ref 1.6–2.6)
MCHC RBC-ENTMCNC: 30.9 PG — SIGNIFICANT CHANGE UP (ref 27–34)
MCHC RBC-ENTMCNC: 34.2 GM/DL — SIGNIFICANT CHANGE UP (ref 32–36)
MCV RBC AUTO: 90.5 FL — SIGNIFICANT CHANGE UP (ref 80–100)
PHOSPHATE SERPL-MCNC: 2.9 MG/DL — SIGNIFICANT CHANGE UP (ref 2.5–4.5)
PHOSPHATE SERPL-MCNC: 3.5 MG/DL — SIGNIFICANT CHANGE UP (ref 2.5–4.5)
PLATELET # BLD AUTO: 220 K/UL — SIGNIFICANT CHANGE UP (ref 150–400)
POTASSIUM SERPL-MCNC: 3.8 MMOL/L — SIGNIFICANT CHANGE UP (ref 3.5–5.3)
POTASSIUM SERPL-MCNC: 4.6 MMOL/L — SIGNIFICANT CHANGE UP (ref 3.5–5.3)
POTASSIUM SERPL-SCNC: 3.8 MMOL/L — SIGNIFICANT CHANGE UP (ref 3.5–5.3)
POTASSIUM SERPL-SCNC: 4.6 MMOL/L — SIGNIFICANT CHANGE UP (ref 3.5–5.3)
RBC # BLD: 3.89 M/UL — LOW (ref 4.2–5.8)
RBC # FLD: 15.3 % — HIGH (ref 10.3–14.5)
SODIUM SERPL-SCNC: 133 MMOL/L — LOW (ref 135–145)
SODIUM SERPL-SCNC: 138 MMOL/L — SIGNIFICANT CHANGE UP (ref 135–145)
WBC # BLD: 8.6 K/UL — SIGNIFICANT CHANGE UP (ref 3.8–10.5)
WBC # FLD AUTO: 8.6 K/UL — SIGNIFICANT CHANGE UP (ref 3.8–10.5)

## 2018-11-08 PROCEDURE — 99233 SBSQ HOSP IP/OBS HIGH 50: CPT | Mod: GC

## 2018-11-08 RX ORDER — HUMAN INSULIN 100 [IU]/ML
3 INJECTION, SUSPENSION SUBCUTANEOUS EVERY 6 HOURS
Qty: 0 | Refills: 0 | Status: DISCONTINUED | OUTPATIENT
Start: 2018-11-08 | End: 2018-11-09

## 2018-11-08 RX ORDER — INSULIN LISPRO 100/ML
2 VIAL (ML) SUBCUTANEOUS ONCE
Qty: 0 | Refills: 0 | Status: COMPLETED | OUTPATIENT
Start: 2018-11-08 | End: 2018-11-08

## 2018-11-08 RX ORDER — SODIUM,POTASSIUM PHOSPHATES 278-250MG
1 POWDER IN PACKET (EA) ORAL ONCE
Qty: 0 | Refills: 0 | Status: COMPLETED | OUTPATIENT
Start: 2018-11-08 | End: 2018-11-08

## 2018-11-08 RX ORDER — INSULIN LISPRO 100/ML
VIAL (ML) SUBCUTANEOUS EVERY 6 HOURS
Qty: 0 | Refills: 0 | Status: DISCONTINUED | OUTPATIENT
Start: 2018-11-08 | End: 2018-11-11

## 2018-11-08 RX ADMIN — HUMAN INSULIN 3 UNIT(S): 100 INJECTION, SUSPENSION SUBCUTANEOUS at 18:33

## 2018-11-08 RX ADMIN — Medication 2: at 12:36

## 2018-11-08 RX ADMIN — PANTOPRAZOLE SODIUM 40 MILLIGRAM(S): 20 TABLET, DELAYED RELEASE ORAL at 18:04

## 2018-11-08 RX ADMIN — LEVETIRACETAM 500 MILLIGRAM(S): 250 TABLET, FILM COATED ORAL at 18:05

## 2018-11-08 RX ADMIN — PANTOPRAZOLE SODIUM 40 MILLIGRAM(S): 20 TABLET, DELAYED RELEASE ORAL at 05:49

## 2018-11-08 RX ADMIN — Medication 50 MILLIGRAM(S): at 05:49

## 2018-11-08 RX ADMIN — HEPARIN SODIUM 5000 UNIT(S): 5000 INJECTION INTRAVENOUS; SUBCUTANEOUS at 05:49

## 2018-11-08 RX ADMIN — Medication 1 PACKET(S): at 05:50

## 2018-11-08 RX ADMIN — HUMAN INSULIN 3 UNIT(S): 100 INJECTION, SUSPENSION SUBCUTANEOUS at 23:35

## 2018-11-08 RX ADMIN — HEPARIN SODIUM 5000 UNIT(S): 5000 INJECTION INTRAVENOUS; SUBCUTANEOUS at 14:47

## 2018-11-08 RX ADMIN — Medication 50 MILLIGRAM(S): at 18:05

## 2018-11-08 RX ADMIN — LEVETIRACETAM 500 MILLIGRAM(S): 250 TABLET, FILM COATED ORAL at 05:49

## 2018-11-08 RX ADMIN — Medication 2: at 18:33

## 2018-11-08 RX ADMIN — Medication 1 PACKET(S): at 01:37

## 2018-11-08 RX ADMIN — Medication 3 MILLILITER(S): at 17:16

## 2018-11-08 RX ADMIN — Medication 3 MILLILITER(S): at 11:01

## 2018-11-08 RX ADMIN — Medication 2 UNIT(S): at 01:36

## 2018-11-08 RX ADMIN — Medication 3 MILLILITER(S): at 06:34

## 2018-11-08 RX ADMIN — Medication 2: at 05:49

## 2018-11-08 RX ADMIN — HEPARIN SODIUM 5000 UNIT(S): 5000 INJECTION INTRAVENOUS; SUBCUTANEOUS at 21:35

## 2018-11-08 NOTE — PROGRESS NOTE ADULT - PROBLEM SELECTOR PLAN 1
Significant oropharyngeal dysphagia. Patient left the hospital to nursing home on 10/23 and was initially doing well. Abruptly became lethargic with inability to take PO.   - Speech and swallow eval: NPO + aspiration precaution.  - ENT consulted for aphonia - VC injury likely from intubation. No acute intervention  - Neuro on board: MG workup.  - Keppra level normal.  - Barium swallow study shows continuation of dysphagia.  - s/p removable PEG tube placement.  - Started tube feed today.   - Check for refeeding syndrome via BMP/Mg/Phos q12h. Significant oropharyngeal dysphagia. Patient left the hospital to nursing home on 10/23 and was initially doing well. Abruptly became lethargic with inability to take PO.   - Speech and swallow eval: NPO + aspiration precaution.  - ENT consulted for aphonia - VC injury likely from intubation. No acute intervention  - Neuro on board: MG workup.  - Keppra level normal.  - Barium swallow study shows continuation of dysphagia.  - s/p removable PEG tube placement.  - c/w tube feed.  - Check for refeeding syndrome via BMP/Mg/Phos q12h.

## 2018-11-08 NOTE — PROGRESS NOTE ADULT - SUBJECTIVE AND OBJECTIVE BOX
[facility]  Hector Marquez PA-C  [address] [place] [address]  [phone]      INTERVAL HPI/OVERNIGHT EVENTS:    no new events  tolerating tf    MEDICATIONS  (STANDING):  ALBUTerol/ipratropium for Nebulization 3 milliLiter(s) Nebulizer every 6 hours  dextrose 5% + sodium chloride 0.45%. 1000 milliLiter(s) (75 mL/Hr) IV Continuous <Continuous>  dextrose 5%. 1000 milliLiter(s) (50 mL/Hr) IV Continuous <Continuous>  dextrose 50% Injectable 12.5 Gram(s) IV Push once  dextrose 50% Injectable 25 Gram(s) IV Push once  dextrose 50% Injectable 25 Gram(s) IV Push once  heparin  Injectable 5000 Unit(s) SubCutaneous every 8 hours  insulin glargine Injectable (LANTUS) 13 Unit(s) SubCutaneous at bedtime  insulin lispro (HumaLOG) corrective regimen sliding scale   SubCutaneous every 6 hours  levETIRAcetam  IVPB 500 milliGRAM(s) IV Intermittent every 12 hours  metoprolol tartrate Injectable 5 milliGRAM(s) IV Push every 6 hours    MEDICATIONS  (PRN):  dextrose 40% Gel 15 Gram(s) Oral once PRN Blood Glucose LESS THAN 70 milliGRAM(s)/deciliter  glucagon  Injectable 1 milliGRAM(s) IntraMuscular once PRN Glucose LESS THAN 70 milligrams/deciliter      Allergies    Aleve (Unknown)    Intolerances        ROS:   unable to obtain       PHYSICAL EXAM:   Vital Signs:  Vital Signs Last 24 Hrs  T(C): 36.5 (05 Nov 2018 05:32), Max: 36.5 (04 Nov 2018 14:33)  T(F): 97.7 (05 Nov 2018 05:32), Max: 97.7 (04 Nov 2018 14:33)  HR: 83 (05 Nov 2018 06:49) (74 - 91)  BP: 134/76 (05 Nov 2018 06:49) (131/82 - 178/79)  BP(mean): --  RR: 18 (05 Nov 2018 05:32) (18 - 18)  SpO2: 98% (05 Nov 2018 05:32) (95% - 98%)  Daily     Daily     nad  confused  frail  non toxic  soft, nt peg cdi  no edema        LABS:                        11.7   6.6   )-----------( 212      ( 05 Nov 2018 07:18 )             34.1     11-05    137  |  100  |  8   ----------------------------<  198<H>  3.4<L>   |  29  |  0.63    Ca    8.5      05 Nov 2018 07:18  Phos  2.9     11-05  Mg     1.5     11-05            RADIOLOGY & ADDITIONAL TESTS:

## 2018-11-08 NOTE — PROGRESS NOTE ADULT - ASSESSMENT
Mr. 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 Patient course is complicated by acute decline in PO tolerance with other symptoms, and now with significant oropharyngeal dysphagia. s/p removable PEG tube placement. Mr. 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 Patient course is complicated by acute decline in PO tolerance with other symptoms, and now with significant oropharyngeal dysphagia. s/p removable PEG tube placement and now on tube feed.

## 2018-11-08 NOTE — PROGRESS NOTE ADULT - PROBLEM SELECTOR PLAN 10
DVT ppx: SQH restarted.  Diet: NPO with Tube feed. Check for refeeding syndrome. replete electrolyte as appropriate. DVT ppx: SQH restarted.  Diet: NPO with Tube feed. Check for refeeding syndrome. replete electrolyte as appropriate.  Dispo: will speak with patient and patient family regarding possible dispo later this week or earlier next week. DVT ppx: SQH restarted.  Diet: NPO with Tube feed. Check for refeeding syndrome. replete electrolyte as appropriate.  Dispo: will speak with patient and patient family regarding possible dispo later this week or earlier next week.  Discussed with patient's wife. Possibly discharge this Saturday but may happen next Week.

## 2018-11-08 NOTE — PROGRESS NOTE ADULT - SUBJECTIVE AND OBJECTIVE BOX
Nolan Granados Vickie  PGY-1  Pager: [phone]    Patient is a 77y old  Male who presents with a chief complaint of altered mental status, cough (07 Nov 2018 21:00)      SUBJECTIVE / OVERNIGHT EVENTS:    MEDICATIONS  (STANDING):  ALBUTerol/ipratropium for Nebulization 3 milliLiter(s) Nebulizer every 6 hours  dextrose 5%. 1000 milliLiter(s) (50 mL/Hr) IV Continuous <Continuous>  dextrose 50% Injectable 12.5 Gram(s) IV Push once  dextrose 50% Injectable 25 Gram(s) IV Push once  dextrose 50% Injectable 25 Gram(s) IV Push once  heparin  Injectable 5000 Unit(s) SubCutaneous every 8 hours  insulin glargine Injectable (LANTUS) 10 Unit(s) SubCutaneous at bedtime  insulin lispro (HumaLOG) corrective regimen sliding scale   SubCutaneous every 6 hours  levETIRAcetam 500 milliGRAM(s) Oral two times a day  metoprolol tartrate 50 milliGRAM(s) Enteral Tube two times a day  pantoprazole   Suspension 40 milliGRAM(s) Enteral Tube two times a day before meals    MEDICATIONS  (PRN):  dextrose 40% Gel 15 Gram(s) Oral once PRN Blood Glucose LESS THAN 70 milliGRAM(s)/deciliter  glucagon  Injectable 1 milliGRAM(s) IntraMuscular once PRN Glucose LESS THAN 70 milligrams/deciliter      T(C): 36.3 (11-08-18 @ 04:40), Max: 37.3 (11-07-18 @ 17:30)  HR: 84 (11-08-18 @ 06:35) (73 - 86)  BP: 160/75 (11-08-18 @ 04:40) (117/75 - 160/75)  RR: 18 (11-08-18 @ 04:40) (18 - 18)  SpO2: 94% (11-08-18 @ 06:35) (94% - 98%)  CAPILLARY BLOOD GLUCOSE      POCT Blood Glucose.: 169 mg/dL (08 Nov 2018 05:37)  POCT Blood Glucose.: 185 mg/dL (08 Nov 2018 01:21)  POCT Blood Glucose.: 173 mg/dL (07 Nov 2018 23:58)  POCT Blood Glucose.: 171 mg/dL (07 Nov 2018 21:55)  POCT Blood Glucose.: 126 mg/dL (07 Nov 2018 17:53)  POCT Blood Glucose.: 163 mg/dL (07 Nov 2018 12:51)    I&O's Summary    07 Nov 2018 07:01  -  08 Nov 2018 07:00  --------------------------------------------------------  IN: 740 mL / OUT: 950 mL / NET: -210 mL        PHYSICAL EXAM:  GENERAL: NAD, well-developed  HEAD:  Atraumatic, Normocephalic  EYES: EOMI, PERRLA, conjunctiva and sclera clear  NECK: Supple, No JVD  CHEST/LUNG: Clear to auscultation bilaterally; No wheeze  HEART: Regular rate and rhythm; No murmurs, rubs, or gallops  ABDOMEN: Soft, Nontender, Nondistended; Bowel sounds present  EXTREMITIES:  2+ Peripheral Pulses, No clubbing, cyanosis, or edema  PSYCH: AAOx3  NEUROLOGY: non-focal  SKIN: No rashes or lesions    LABS:                        12.0   8.6   )-----------( 220      ( 08 Nov 2018 06:40 )             35.2     11-08    133<L>  |  97  |  10  ----------------------------<  178<H>  3.8   |  26  |  0.62    Ca    8.6      08 Nov 2018 06:40  Phos  2.9     11-08  Mg     1.9     11-08                RADIOLOGY & ADDITIONAL TESTS:    Imaging Personally Reviewed:    Consultant(s) Notes Reviewed:      Care Discussed with Consultants/Other Providers: Nolan Granados Vickie  PGY-1  Pager: [phone]    Patient is a 77y old  Male who presents with a chief complaint of altered mental status, cough (07 Nov 2018 21:00)      SUBJECTIVE / OVERNIGHT EVENTS:  No acute event overnight.  Patient slept well and has no acute concerns.   Wishes to sleep more.    ROS:  CONSTITUTIONAL: No fever  RESPIRATORY: No shortness of breath  CARDIOVASCULAR: No chest pain, palpitations  GASTROINTESTINAL: No abdominal pain. No nausea, vomiting,  NEUROLOGICAL: No headaches    MEDICATIONS  (STANDING):  ALBUTerol/ipratropium for Nebulization 3 milliLiter(s) Nebulizer every 6 hours  dextrose 5%. 1000 milliLiter(s) (50 mL/Hr) IV Continuous <Continuous>  dextrose 50% Injectable 12.5 Gram(s) IV Push once  dextrose 50% Injectable 25 Gram(s) IV Push once  dextrose 50% Injectable 25 Gram(s) IV Push once  heparin  Injectable 5000 Unit(s) SubCutaneous every 8 hours  insulin glargine Injectable (LANTUS) 10 Unit(s) SubCutaneous at bedtime  insulin lispro (HumaLOG) corrective regimen sliding scale   SubCutaneous every 6 hours  levETIRAcetam 500 milliGRAM(s) Oral two times a day  metoprolol tartrate 50 milliGRAM(s) Enteral Tube two times a day  pantoprazole   Suspension 40 milliGRAM(s) Enteral Tube two times a day before meals    MEDICATIONS  (PRN):  dextrose 40% Gel 15 Gram(s) Oral once PRN Blood Glucose LESS THAN 70 milliGRAM(s)/deciliter  glucagon  Injectable 1 milliGRAM(s) IntraMuscular once PRN Glucose LESS THAN 70 milligrams/deciliter      T(C): 36.3 (11-08-18 @ 04:40), Max: 37.3 (11-07-18 @ 17:30)  HR: 84 (11-08-18 @ 06:35) (73 - 86)  BP: 160/75 (11-08-18 @ 04:40) (117/75 - 160/75)  RR: 18 (11-08-18 @ 04:40) (18 - 18)  SpO2: 94% (11-08-18 @ 06:35) (94% - 98%)  CAPILLARY BLOOD GLUCOSE      POCT Blood Glucose.: 169 mg/dL (08 Nov 2018 05:37)  POCT Blood Glucose.: 185 mg/dL (08 Nov 2018 01:21)  POCT Blood Glucose.: 173 mg/dL (07 Nov 2018 23:58)  POCT Blood Glucose.: 171 mg/dL (07 Nov 2018 21:55)  POCT Blood Glucose.: 126 mg/dL (07 Nov 2018 17:53)  POCT Blood Glucose.: 163 mg/dL (07 Nov 2018 12:51)    I&O's Summary    07 Nov 2018 07:01  -  08 Nov 2018 07:00  --------------------------------------------------------  IN: 740 mL / OUT: 950 mL / NET: -210 mL        PHYSICAL EXAM:  GENERAL: NAD, sleeping comfortably upon entry.  HEENT:  Atraumatic, Old cranial surgical site bilaterally (clean dry intact). Neck supple. No conjunctival icterus.  CHEST/LUNG: Clear to auscultation bilaterally in anterior during inspiratory phase, but coarse breath sounds on expiratory phase. No increased WOB.  HEART: Irregularly irregular. S1 S2, No murmurs, rubs, or gallops. 2+ peripheral pulses. Cap refill ~ 3 seconds.  ABDOMEN: Soft, Nontender, Nondistended; Bowel sounds present. PEG tube in place. Clean/dry/intact.  EXTREMITIES:  2+ Peripheral Pulses, No cyanosis or edema. bilateral distal LE skin changes c/w venous stasis.  NEUROLOGY: Limited communication, yet able to have conversation in short phrases. Patient is able to move his both UE freely against the gravity, but mostly uses his right hand. Able to communicate his wishes.     LABS:                        12.0   8.6   )-----------( 220      ( 08 Nov 2018 06:40 )             35.2     11-08    133<L>  |  97  |  10  ----------------------------<  178<H>  3.8   |  26  |  0.62    Ca    8.6      08 Nov 2018 06:40  Phos  2.9     11-08  Mg     1.9     11-08                RADIOLOGY & ADDITIONAL TESTS:    Imaging Personally Reviewed: NA     Consultant(s) Notes Reviewed:  GI    Care Discussed with Consultants/Other Providers: TYLER

## 2018-11-08 NOTE — PROGRESS NOTE ADULT - PROBLEM SELECTOR PLAN 5
- c./w lantus 11 mg qHS.   - will check FS Q6H while NPO with ISS coverage - c/w lantus at decreased dose of 10mg qHS.   - will check FS Q6H while NPO/Tube feed with ISS coverage

## 2018-11-09 LAB
ACHR BIND AB SER-ACNC: <0.3 NMOL/L — SIGNIFICANT CHANGE UP
ANION GAP SERPL CALC-SCNC: 13 MMOL/L — SIGNIFICANT CHANGE UP (ref 5–17)
BUN SERPL-MCNC: 14 MG/DL — SIGNIFICANT CHANGE UP (ref 7–23)
CALCIUM SERPL-MCNC: 8.7 MG/DL — SIGNIFICANT CHANGE UP (ref 8.4–10.5)
CHLORIDE SERPL-SCNC: 99 MMOL/L — SIGNIFICANT CHANGE UP (ref 96–108)
CO2 SERPL-SCNC: 26 MMOL/L — SIGNIFICANT CHANGE UP (ref 22–31)
CREAT SERPL-MCNC: 0.63 MG/DL — SIGNIFICANT CHANGE UP (ref 0.5–1.3)
GLUCOSE BLDC GLUCOMTR-MCNC: 191 MG/DL — HIGH (ref 70–99)
GLUCOSE BLDC GLUCOMTR-MCNC: 215 MG/DL — HIGH (ref 70–99)
GLUCOSE BLDC GLUCOMTR-MCNC: 248 MG/DL — HIGH (ref 70–99)
GLUCOSE BLDC GLUCOMTR-MCNC: 272 MG/DL — HIGH (ref 70–99)
GLUCOSE SERPL-MCNC: 213 MG/DL — HIGH (ref 70–99)
HCT VFR BLD CALC: 36.6 % — LOW (ref 39–50)
HGB BLD-MCNC: 12.2 G/DL — LOW (ref 13–17)
MAGNESIUM SERPL-MCNC: 1.6 MG/DL — SIGNIFICANT CHANGE UP (ref 1.6–2.6)
MCHC RBC-ENTMCNC: 30.2 PG — SIGNIFICANT CHANGE UP (ref 27–34)
MCHC RBC-ENTMCNC: 33.2 GM/DL — SIGNIFICANT CHANGE UP (ref 32–36)
MCV RBC AUTO: 90.9 FL — SIGNIFICANT CHANGE UP (ref 80–100)
PHOSPHATE SERPL-MCNC: 2.9 MG/DL — SIGNIFICANT CHANGE UP (ref 2.5–4.5)
PLATELET # BLD AUTO: 215 K/UL — SIGNIFICANT CHANGE UP (ref 150–400)
POTASSIUM SERPL-MCNC: 4.1 MMOL/L — SIGNIFICANT CHANGE UP (ref 3.5–5.3)
POTASSIUM SERPL-SCNC: 4.1 MMOL/L — SIGNIFICANT CHANGE UP (ref 3.5–5.3)
RBC # BLD: 4.03 M/UL — LOW (ref 4.2–5.8)
RBC # FLD: 15.6 % — HIGH (ref 10.3–14.5)
SODIUM SERPL-SCNC: 138 MMOL/L — SIGNIFICANT CHANGE UP (ref 135–145)
WBC # BLD: 9.9 K/UL — SIGNIFICANT CHANGE UP (ref 3.8–10.5)
WBC # FLD AUTO: 9.9 K/UL — SIGNIFICANT CHANGE UP (ref 3.8–10.5)

## 2018-11-09 PROCEDURE — 99233 SBSQ HOSP IP/OBS HIGH 50: CPT | Mod: GC

## 2018-11-09 RX ORDER — HUMAN INSULIN 100 [IU]/ML
4 INJECTION, SUSPENSION SUBCUTANEOUS
Qty: 0 | Refills: 0 | Status: DISCONTINUED | OUTPATIENT
Start: 2018-11-09 | End: 2018-11-09

## 2018-11-09 RX ORDER — MAGNESIUM SULFATE 500 MG/ML
1 VIAL (ML) INJECTION ONCE
Qty: 0 | Refills: 0 | Status: COMPLETED | OUTPATIENT
Start: 2018-11-09 | End: 2018-11-09

## 2018-11-09 RX ORDER — HUMAN INSULIN 100 [IU]/ML
4 INJECTION, SUSPENSION SUBCUTANEOUS EVERY 6 HOURS
Qty: 0 | Refills: 0 | Status: DISCONTINUED | OUTPATIENT
Start: 2018-11-09 | End: 2018-11-10

## 2018-11-09 RX ADMIN — PANTOPRAZOLE SODIUM 40 MILLIGRAM(S): 20 TABLET, DELAYED RELEASE ORAL at 18:04

## 2018-11-09 RX ADMIN — LEVETIRACETAM 500 MILLIGRAM(S): 250 TABLET, FILM COATED ORAL at 06:18

## 2018-11-09 RX ADMIN — PANTOPRAZOLE SODIUM 40 MILLIGRAM(S): 20 TABLET, DELAYED RELEASE ORAL at 06:19

## 2018-11-09 RX ADMIN — Medication 1: at 06:17

## 2018-11-09 RX ADMIN — LEVETIRACETAM 500 MILLIGRAM(S): 250 TABLET, FILM COATED ORAL at 18:03

## 2018-11-09 RX ADMIN — Medication 3 MILLILITER(S): at 05:48

## 2018-11-09 RX ADMIN — Medication 100 GRAM(S): at 12:42

## 2018-11-09 RX ADMIN — HUMAN INSULIN 4 UNIT(S): 100 INJECTION, SUSPENSION SUBCUTANEOUS at 18:03

## 2018-11-09 RX ADMIN — Medication 3 MILLILITER(S): at 11:46

## 2018-11-09 RX ADMIN — Medication 2: at 12:46

## 2018-11-09 RX ADMIN — Medication 50 MILLIGRAM(S): at 06:18

## 2018-11-09 RX ADMIN — HEPARIN SODIUM 5000 UNIT(S): 5000 INJECTION INTRAVENOUS; SUBCUTANEOUS at 06:24

## 2018-11-09 RX ADMIN — HEPARIN SODIUM 5000 UNIT(S): 5000 INJECTION INTRAVENOUS; SUBCUTANEOUS at 23:00

## 2018-11-09 RX ADMIN — Medication 3: at 18:03

## 2018-11-09 RX ADMIN — HEPARIN SODIUM 5000 UNIT(S): 5000 INJECTION INTRAVENOUS; SUBCUTANEOUS at 12:46

## 2018-11-09 RX ADMIN — HUMAN INSULIN 3 UNIT(S): 100 INJECTION, SUSPENSION SUBCUTANEOUS at 06:18

## 2018-11-09 RX ADMIN — HUMAN INSULIN 3 UNIT(S): 100 INJECTION, SUSPENSION SUBCUTANEOUS at 12:46

## 2018-11-09 RX ADMIN — Medication 3 MILLILITER(S): at 17:42

## 2018-11-09 RX ADMIN — Medication 50 MILLIGRAM(S): at 18:03

## 2018-11-09 RX ADMIN — Medication 2: at 00:06

## 2018-11-09 NOTE — PROGRESS NOTE ADULT - PROBLEM SELECTOR PLAN 5
- c/w lantus at decreased dose of 10mg qHS.   - will check FS Q6H while NPO/Tube feed with ISS coverage - c/w insulin NPH for constant coverage  - will check FS Q6H while NPO/Tube feed with ISS coverage

## 2018-11-09 NOTE — PROGRESS NOTE ADULT - SUBJECTIVE AND OBJECTIVE BOX
Nolan Granados Vickie  PGY-1  Pager: [phone]    Patient is a 78y old  Male who presents with a chief complaint of altered mental status, cough (08 Nov 2018 16:26)      SUBJECTIVE / OVERNIGHT EVENTS:    MEDICATIONS  (STANDING):  ALBUTerol/ipratropium for Nebulization 3 milliLiter(s) Nebulizer every 6 hours  dextrose 5%. 1000 milliLiter(s) (50 mL/Hr) IV Continuous <Continuous>  dextrose 50% Injectable 12.5 Gram(s) IV Push once  dextrose 50% Injectable 25 Gram(s) IV Push once  dextrose 50% Injectable 25 Gram(s) IV Push once  heparin  Injectable 5000 Unit(s) SubCutaneous every 8 hours  insulin lispro (HumaLOG) corrective regimen sliding scale   SubCutaneous every 6 hours  insulin NPH human recombinant 3 Unit(s) SubCutaneous every 6 hours  levETIRAcetam 500 milliGRAM(s) Oral two times a day  magnesium sulfate  IVPB 1 Gram(s) IV Intermittent once  metoprolol tartrate 50 milliGRAM(s) Enteral Tube two times a day  pantoprazole   Suspension 40 milliGRAM(s) Enteral Tube two times a day before meals    MEDICATIONS  (PRN):  dextrose 40% Gel 15 Gram(s) Oral once PRN Blood Glucose LESS THAN 70 milliGRAM(s)/deciliter  glucagon  Injectable 1 milliGRAM(s) IntraMuscular once PRN Glucose LESS THAN 70 milligrams/deciliter      T(C): 36.4 (11-09-18 @ 04:59), Max: 36.6 (11-08-18 @ 21:29)  HR: 80 (11-09-18 @ 05:49) (71 - 97)  BP: 161/75 (11-09-18 @ 04:59) (106/68 - 161/75)  RR: 18 (11-09-18 @ 04:59) (18 - 18)  SpO2: 93% (11-09-18 @ 05:49) (93% - 98%)  CAPILLARY BLOOD GLUCOSE      POCT Blood Glucose.: 191 mg/dL (09 Nov 2018 06:00)  POCT Blood Glucose.: 215 mg/dL (08 Nov 2018 23:31)  POCT Blood Glucose.: 212 mg/dL (08 Nov 2018 17:47)  POCT Blood Glucose.: 183 mg/dL (08 Nov 2018 12:09)    I&O's Summary    08 Nov 2018 07:01  -  09 Nov 2018 07:00  --------------------------------------------------------  IN: 1940 mL / OUT: 600 mL / NET: 1340 mL        PHYSICAL EXAM:  GENERAL: NAD, well-developed  HEAD:  Atraumatic, Normocephalic  EYES: EOMI, PERRLA, conjunctiva and sclera clear  NECK: Supple, No JVD  CHEST/LUNG: Clear to auscultation bilaterally; No wheeze  HEART: Regular rate and rhythm; No murmurs, rubs, or gallops  ABDOMEN: Soft, Nontender, Nondistended; Bowel sounds present  EXTREMITIES:  2+ Peripheral Pulses, No clubbing, cyanosis, or edema  PSYCH: AAOx3  NEUROLOGY: non-focal  SKIN: No rashes or lesions    LABS:                        12.2   9.9   )-----------( 215      ( 09 Nov 2018 06:50 )             36.6     11-09    138  |  99  |  14  ----------------------------<  213<H>  4.1   |  26  |  0.63    Ca    8.7      09 Nov 2018 06:50  Phos  2.9     11-09  Mg     1.6     11-09                RADIOLOGY & ADDITIONAL TESTS:    Imaging Personally Reviewed:    Consultant(s) Notes Reviewed:      Care Discussed with Consultants/Other Providers: Nolan Granados Vickie  PGY-1  Pager: [phone]    Patient is a 78y old  Male who presents with a chief complaint of altered mental status, cough (08 Nov 2018 16:26)      SUBJECTIVE / OVERNIGHT EVENTS:  No acute event overnight.  Patient slept well and has no acute concerns.   Wishes to sleep more.  Bedside SpO2 98% on RA. HR 86    ROS:  CONSTITUTIONAL: No fever  RESPIRATORY: No shortness of breath  CARDIOVASCULAR: No chest pain, palpitations  GASTROINTESTINAL: No abdominal pain. No nausea, vomiting,  NEUROLOGICAL: No headaches    MEDICATIONS  (STANDING):  ALBUTerol/ipratropium for Nebulization 3 milliLiter(s) Nebulizer every 6 hours  dextrose 5%. 1000 milliLiter(s) (50 mL/Hr) IV Continuous <Continuous>  dextrose 50% Injectable 12.5 Gram(s) IV Push once  dextrose 50% Injectable 25 Gram(s) IV Push once  dextrose 50% Injectable 25 Gram(s) IV Push once  heparin  Injectable 5000 Unit(s) SubCutaneous every 8 hours  insulin lispro (HumaLOG) corrective regimen sliding scale   SubCutaneous every 6 hours  insulin NPH human recombinant 3 Unit(s) SubCutaneous every 6 hours  levETIRAcetam 500 milliGRAM(s) Oral two times a day  magnesium sulfate  IVPB 1 Gram(s) IV Intermittent once  metoprolol tartrate 50 milliGRAM(s) Enteral Tube two times a day  pantoprazole   Suspension 40 milliGRAM(s) Enteral Tube two times a day before meals    MEDICATIONS  (PRN):  dextrose 40% Gel 15 Gram(s) Oral once PRN Blood Glucose LESS THAN 70 milliGRAM(s)/deciliter  glucagon  Injectable 1 milliGRAM(s) IntraMuscular once PRN Glucose LESS THAN 70 milligrams/deciliter      T(C): 36.4 (11-09-18 @ 04:59), Max: 36.6 (11-08-18 @ 21:29)  HR: 80 (11-09-18 @ 05:49) (71 - 97)  BP: 161/75 (11-09-18 @ 04:59) (106/68 - 161/75)  RR: 18 (11-09-18 @ 04:59) (18 - 18)  SpO2: 93% (11-09-18 @ 05:49) (93% - 98%)  CAPILLARY BLOOD GLUCOSE      POCT Blood Glucose.: 191 mg/dL (09 Nov 2018 06:00)  POCT Blood Glucose.: 215 mg/dL (08 Nov 2018 23:31)  POCT Blood Glucose.: 212 mg/dL (08 Nov 2018 17:47)  POCT Blood Glucose.: 183 mg/dL (08 Nov 2018 12:09)    I&O's Summary    08 Nov 2018 07:01  -  09 Nov 2018 07:00  --------------------------------------------------------  IN: 1940 mL / OUT: 600 mL / NET: 1340 mL        PHYSICAL EXAM:  GENERAL: NAD, sleeping comfortably upon entry.  HEENT:  Atraumatic, Old cranial surgical site bilaterally (clean dry intact). Neck supple. No conjunctival icterus.  CHEST/LUNG: Clear to auscultation bilaterally in anterior during inspiratory phase, but coarse breath sounds on expiratory phase. No increased WOB.  HEART: Irregularly irregular. S1 S2, No murmurs, rubs, or gallops. 2+ peripheral pulses. Cap refill ~ 3 seconds.  ABDOMEN: Soft, Nontender, Nondistended; Bowel sounds present. PEG tube in place. Clean/dry/intact.  EXTREMITIES:  2+ Peripheral Pulses, No cyanosis or edema. bilateral distal LE skin changes c/w venous stasis.  NEUROLOGY: Limited communication, yet able to have conversation in short phrases. Patient is able to move his both UE freely against the gravity, but mostly uses his right hand. Able to communicate his wishes.       LABS:                        12.2   9.9   )-----------( 215      ( 09 Nov 2018 06:50 )             36.6     11-09    138  |  99  |  14  ----------------------------<  213<H>  4.1   |  26  |  0.63    Ca    8.7      09 Nov 2018 06:50  Phos  2.9     11-09  Mg     1.6     11-09                RADIOLOGY & ADDITIONAL TESTS:    Imaging Personally Reviewed: TYLER    Consultant(s) Notes Reviewed:  TYLER    Care Discussed with Consultants/Other Providers: TYLER

## 2018-11-09 NOTE — PHYSICAL THERAPY INITIAL EVALUATION ADULT - ADDITIONAL COMMENTS
Pt unable to provide detailed past history/social. as per care coordination pt was at a rehab/long term facility prior and required assistance with all ADL's and all mobility. min ambulatory.

## 2018-11-09 NOTE — PROGRESS NOTE ADULT - ASSESSMENT
Mr. 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 Patient course is complicated by acute decline in PO tolerance with other symptoms, and now with significant oropharyngeal dysphagia. s/p removable PEG tube placement and now on tube feed.

## 2018-11-09 NOTE — PROGRESS NOTE ADULT - PROBLEM SELECTOR PLAN 10
DVT ppx: SQH restarted.  Diet: NPO with Tube feed. Check for refeeding syndrome. replete electrolyte as appropriate.  Dispo: will speak with patient and patient family regarding possible dispo later this week or earlier next week.  Discussed with patient's wife. Possibly discharge this Saturday but may happen next Week.

## 2018-11-09 NOTE — PHYSICAL THERAPY INITIAL EVALUATION ADULT - PRECAUTIONS/LIMITATIONS, REHAB EVAL
11/2: Swallow study unremarkable. S/S cannot make PO recommendation based on the study., 11/6: s/p PEG tube placement today. PO meds ordered

## 2018-11-09 NOTE — PROGRESS NOTE ADULT - PROBLEM SELECTOR PLAN 1
Significant oropharyngeal dysphagia. Patient left the hospital to nursing home on 10/23 and was initially doing well. Abruptly became lethargic with inability to take PO.   - Speech and swallow eval: NPO + aspiration precaution.  - ENT consulted for aphonia - VC injury likely from intubation. No acute intervention  - Neuro on board: MG workup.  - Keppra level normal.  - Barium swallow study shows continuation of dysphagia.  - s/p removable PEG tube placement.  - c/w tube feed.  - Check for refeeding syndrome via BMP/Mg/Phos q12h. Significant oropharyngeal dysphagia. Patient left the hospital to nursing home on 10/23 and was initially doing well. Abruptly became lethargic with inability to take PO.   - Speech and swallow eval: NPO + aspiration precaution.  - ENT consulted for aphonia - VC injury likely from intubation. No acute intervention  - Neuro on board: MG workup pending.  - Keppra level normal.  - Barium swallow study shows continuation of dysphagia.  - s/p removable PEG tube placement.  - c/w tube feed.  - Check for refeeding syndrome via BMP/Mg/Phos q24hrs now given stable elctrolytes.

## 2018-11-09 NOTE — PROGRESS NOTE ADULT - SUBJECTIVE AND OBJECTIVE BOX
[facility]  Hector Marquez PA-C  [address] [place] [address]  [phone]      INTERVAL HPI/OVERNIGHT EVENTS:    no new events  tolerating tf    MEDICATIONS  (STANDING):  ALBUTerol/ipratropium for Nebulization 3 milliLiter(s) Nebulizer every 6 hours  dextrose 5% + sodium chloride 0.45%. 1000 milliLiter(s) (75 mL/Hr) IV Continuous <Continuous>  dextrose 5%. 1000 milliLiter(s) (50 mL/Hr) IV Continuous <Continuous>  dextrose 50% Injectable 12.5 Gram(s) IV Push once  dextrose 50% Injectable 25 Gram(s) IV Push once  dextrose 50% Injectable 25 Gram(s) IV Push once  heparin  Injectable 5000 Unit(s) SubCutaneous every 8 hours  insulin glargine Injectable (LANTUS) 13 Unit(s) SubCutaneous at bedtime  insulin lispro (HumaLOG) corrective regimen sliding scale   SubCutaneous every 6 hours  levETIRAcetam  IVPB 500 milliGRAM(s) IV Intermittent every 12 hours  metoprolol tartrate Injectable 5 milliGRAM(s) IV Push every 6 hours    MEDICATIONS  (PRN):  dextrose 40% Gel 15 Gram(s) Oral once PRN Blood Glucose LESS THAN 70 milliGRAM(s)/deciliter  glucagon  Injectable 1 milliGRAM(s) IntraMuscular once PRN Glucose LESS THAN 70 milligrams/deciliter      Allergies    Aleve (Unknown)    Intolerances        ROS:   unable to obtain       PHYSICAL EXAM:   Vital Signs:  Vital Signs Last 24 Hrs  T(C): 36.5 (05 Nov 2018 05:32), Max: 36.5 (04 Nov 2018 14:33)  T(F): 97.7 (05 Nov 2018 05:32), Max: 97.7 (04 Nov 2018 14:33)  HR: 83 (05 Nov 2018 06:49) (74 - 91)  BP: 134/76 (05 Nov 2018 06:49) (131/82 - 178/79)  BP(mean): --  RR: 18 (05 Nov 2018 05:32) (18 - 18)  SpO2: 98% (05 Nov 2018 05:32) (95% - 98%)  Daily     Daily     nad  confused  frail  non toxic  soft, nt peg cdi  no edema        LABS:                        11.7   6.6   )-----------( 212      ( 05 Nov 2018 07:18 )             34.1     11-05    137  |  100  |  8   ----------------------------<  198<H>  3.4<L>   |  29  |  0.63    Ca    8.5      05 Nov 2018 07:18  Phos  2.9     11-05  Mg     1.5     11-05            RADIOLOGY & ADDITIONAL TESTS:

## 2018-11-09 NOTE — PHYSICAL THERAPY INITIAL EVALUATION ADULT - CRITERIA FOR SKILLED THERAPEUTIC INTERVENTIONS
impairments found/rehab potential/therapy frequency/anticipated equipment needs at discharge/anticipated discharge recommendation/predicted duration of therapy intervention/functional limitations in following categories/risk reduction/prevention

## 2018-11-10 LAB
ACHR BLOCK AB SER-ACNC: <15 — SIGNIFICANT CHANGE UP
ACHR MOD AB SER-ACNC: 12 — SIGNIFICANT CHANGE UP
ANION GAP SERPL CALC-SCNC: 12 MMOL/L — SIGNIFICANT CHANGE UP (ref 5–17)
BUN SERPL-MCNC: 17 MG/DL — SIGNIFICANT CHANGE UP (ref 7–23)
CALCIUM SERPL-MCNC: 9 MG/DL — SIGNIFICANT CHANGE UP (ref 8.4–10.5)
CHLORIDE SERPL-SCNC: 98 MMOL/L — SIGNIFICANT CHANGE UP (ref 96–108)
CO2 SERPL-SCNC: 29 MMOL/L — SIGNIFICANT CHANGE UP (ref 22–31)
CREAT SERPL-MCNC: 0.61 MG/DL — SIGNIFICANT CHANGE UP (ref 0.5–1.3)
GAS PNL BLDA: SIGNIFICANT CHANGE UP
GLUCOSE BLDC GLUCOMTR-MCNC: 220 MG/DL — HIGH (ref 70–99)
GLUCOSE BLDC GLUCOMTR-MCNC: 276 MG/DL — HIGH (ref 70–99)
GLUCOSE BLDC GLUCOMTR-MCNC: 308 MG/DL — HIGH (ref 70–99)
GLUCOSE BLDC GLUCOMTR-MCNC: 316 MG/DL — HIGH (ref 70–99)
GLUCOSE BLDC GLUCOMTR-MCNC: 319 MG/DL — HIGH (ref 70–99)
GLUCOSE SERPL-MCNC: 284 MG/DL — HIGH (ref 70–99)
HCT VFR BLD CALC: 36.7 % — LOW (ref 39–50)
HGB BLD-MCNC: 12.2 G/DL — LOW (ref 13–17)
MAGNESIUM SERPL-MCNC: 1.8 MG/DL — SIGNIFICANT CHANGE UP (ref 1.6–2.6)
MCHC RBC-ENTMCNC: 30.3 PG — SIGNIFICANT CHANGE UP (ref 27–34)
MCHC RBC-ENTMCNC: 33.2 GM/DL — SIGNIFICANT CHANGE UP (ref 32–36)
MCV RBC AUTO: 91.2 FL — SIGNIFICANT CHANGE UP (ref 80–100)
PHOSPHATE SERPL-MCNC: 2.9 MG/DL — SIGNIFICANT CHANGE UP (ref 2.5–4.5)
PLATELET # BLD AUTO: 218 K/UL — SIGNIFICANT CHANGE UP (ref 150–400)
POTASSIUM SERPL-MCNC: 4.3 MMOL/L — SIGNIFICANT CHANGE UP (ref 3.5–5.3)
POTASSIUM SERPL-SCNC: 4.3 MMOL/L — SIGNIFICANT CHANGE UP (ref 3.5–5.3)
RBC # BLD: 4.02 M/UL — LOW (ref 4.2–5.8)
RBC # FLD: 15.7 % — HIGH (ref 10.3–14.5)
SODIUM SERPL-SCNC: 139 MMOL/L — SIGNIFICANT CHANGE UP (ref 135–145)
WBC # BLD: 9.9 K/UL — SIGNIFICANT CHANGE UP (ref 3.8–10.5)
WBC # FLD AUTO: 9.9 K/UL — SIGNIFICANT CHANGE UP (ref 3.8–10.5)

## 2018-11-10 PROCEDURE — 99233 SBSQ HOSP IP/OBS HIGH 50: CPT | Mod: GC

## 2018-11-10 PROCEDURE — 71045 X-RAY EXAM CHEST 1 VIEW: CPT | Mod: 26

## 2018-11-10 RX ORDER — HUMAN INSULIN 100 [IU]/ML
5 INJECTION, SUSPENSION SUBCUTANEOUS
Qty: 0 | Refills: 0 | COMMUNITY
Start: 2018-11-10

## 2018-11-10 RX ORDER — HUMAN INSULIN 100 [IU]/ML
8 INJECTION, SUSPENSION SUBCUTANEOUS EVERY 6 HOURS
Qty: 0 | Refills: 0 | Status: DISCONTINUED | OUTPATIENT
Start: 2018-11-10 | End: 2018-11-11

## 2018-11-10 RX ORDER — HUMAN INSULIN 100 [IU]/ML
5 INJECTION, SUSPENSION SUBCUTANEOUS EVERY 6 HOURS
Qty: 0 | Refills: 0 | Status: DISCONTINUED | OUTPATIENT
Start: 2018-11-10 | End: 2018-11-10

## 2018-11-10 RX ORDER — METOPROLOL TARTRATE 50 MG
1 TABLET ORAL
Qty: 0 | Refills: 0 | COMMUNITY
Start: 2018-11-10

## 2018-11-10 RX ORDER — METOPROLOL TARTRATE 50 MG
1.5 TABLET ORAL
Qty: 0 | Refills: 0 | COMMUNITY

## 2018-11-10 RX ORDER — PANTOPRAZOLE SODIUM 20 MG/1
40 TABLET, DELAYED RELEASE ORAL
Qty: 0 | Refills: 0 | COMMUNITY
Start: 2018-11-10

## 2018-11-10 RX ORDER — LEVETIRACETAM 250 MG/1
1 TABLET, FILM COATED ORAL
Qty: 0 | Refills: 0 | COMMUNITY

## 2018-11-10 RX ORDER — INSULIN ASPART 100 [IU]/ML
11 INJECTION, SOLUTION SUBCUTANEOUS
Qty: 0 | Refills: 0 | COMMUNITY

## 2018-11-10 RX ORDER — PREGABALIN 225 MG/1
1 CAPSULE ORAL
Qty: 0 | Refills: 0 | COMMUNITY

## 2018-11-10 RX ORDER — HUMAN INSULIN 100 [IU]/ML
15 INJECTION, SUSPENSION SUBCUTANEOUS
Qty: 0 | Refills: 0 | COMMUNITY
Start: 2018-11-10

## 2018-11-10 RX ORDER — FERROUS SULFATE 325(65) MG
1 TABLET ORAL
Qty: 0 | Refills: 0 | COMMUNITY

## 2018-11-10 RX ORDER — HUMAN INSULIN 100 [IU]/ML
6 INJECTION, SUSPENSION SUBCUTANEOUS EVERY 6 HOURS
Qty: 0 | Refills: 0 | Status: DISCONTINUED | OUTPATIENT
Start: 2018-11-10 | End: 2018-11-10

## 2018-11-10 RX ORDER — AMLODIPINE BESYLATE 2.5 MG/1
1 TABLET ORAL
Qty: 0 | Refills: 0 | COMMUNITY

## 2018-11-10 RX ORDER — HEPARIN SODIUM 5000 [USP'U]/ML
5000 INJECTION INTRAVENOUS; SUBCUTANEOUS
Qty: 0 | Refills: 0 | COMMUNITY
Start: 2018-11-10

## 2018-11-10 RX ORDER — ALBUTEROL 90 UG/1
3 AEROSOL, METERED ORAL
Qty: 0 | Refills: 0 | COMMUNITY

## 2018-11-10 RX ORDER — INSULIN GLARGINE 100 [IU]/ML
40 INJECTION, SOLUTION SUBCUTANEOUS
Qty: 0 | Refills: 0 | COMMUNITY

## 2018-11-10 RX ORDER — CHOLECALCIFEROL (VITAMIN D3) 125 MCG
1 CAPSULE ORAL
Qty: 0 | Refills: 0 | COMMUNITY

## 2018-11-10 RX ORDER — IPRATROPIUM/ALBUTEROL SULFATE 18-103MCG
3 AEROSOL WITH ADAPTER (GRAM) INHALATION
Qty: 0 | Refills: 0 | COMMUNITY
Start: 2018-11-10

## 2018-11-10 RX ADMIN — LEVETIRACETAM 500 MILLIGRAM(S): 250 TABLET, FILM COATED ORAL at 18:14

## 2018-11-10 RX ADMIN — Medication 4: at 18:13

## 2018-11-10 RX ADMIN — PANTOPRAZOLE SODIUM 40 MILLIGRAM(S): 20 TABLET, DELAYED RELEASE ORAL at 18:14

## 2018-11-10 RX ADMIN — Medication 3 MILLILITER(S): at 17:20

## 2018-11-10 RX ADMIN — Medication 3 MILLILITER(S): at 11:29

## 2018-11-10 RX ADMIN — HEPARIN SODIUM 5000 UNIT(S): 5000 INJECTION INTRAVENOUS; SUBCUTANEOUS at 06:10

## 2018-11-10 RX ADMIN — Medication 4: at 12:32

## 2018-11-10 RX ADMIN — Medication 3 MILLILITER(S): at 06:33

## 2018-11-10 RX ADMIN — Medication 50 MILLIGRAM(S): at 18:14

## 2018-11-10 RX ADMIN — LEVETIRACETAM 500 MILLIGRAM(S): 250 TABLET, FILM COATED ORAL at 06:10

## 2018-11-10 RX ADMIN — Medication 2: at 00:25

## 2018-11-10 RX ADMIN — Medication 3: at 06:11

## 2018-11-10 RX ADMIN — HUMAN INSULIN 5 UNIT(S): 100 INJECTION, SUSPENSION SUBCUTANEOUS at 12:31

## 2018-11-10 RX ADMIN — HUMAN INSULIN 6 UNIT(S): 100 INJECTION, SUSPENSION SUBCUTANEOUS at 18:14

## 2018-11-10 RX ADMIN — HEPARIN SODIUM 5000 UNIT(S): 5000 INJECTION INTRAVENOUS; SUBCUTANEOUS at 12:32

## 2018-11-10 RX ADMIN — HUMAN INSULIN 4 UNIT(S): 100 INJECTION, SUSPENSION SUBCUTANEOUS at 00:27

## 2018-11-10 RX ADMIN — Medication 50 MILLIGRAM(S): at 06:10

## 2018-11-10 RX ADMIN — HUMAN INSULIN 4 UNIT(S): 100 INJECTION, SUSPENSION SUBCUTANEOUS at 06:10

## 2018-11-10 RX ADMIN — HEPARIN SODIUM 5000 UNIT(S): 5000 INJECTION INTRAVENOUS; SUBCUTANEOUS at 21:04

## 2018-11-10 RX ADMIN — PANTOPRAZOLE SODIUM 40 MILLIGRAM(S): 20 TABLET, DELAYED RELEASE ORAL at 06:10

## 2018-11-10 NOTE — PROGRESS NOTE ADULT - ATTENDING COMMENTS
Patient was set up for discharge today, however patient became dyspneic with tachypnea noted. Likely due to decreased mucous clearance. Improved with suctioning. However wife is very concerned that his mental status has declined acutely. Patient opens his eyes and responds to his name, however he is not answering questions. Per nursing staff, this has been his baseline and he typically becomes more alert later on in the day, during the evening time. Patient's wife denies this, states mental status worsened after starting tube feeds. Ideally, would have liked to delay transport until evening time to monitor for interval improvement. However, since this is a weekend day, transport cannot be rescheduled for evening time or even for tomorrow. Was discussed with . Given this situation, will defer d/c until Monday. Will work up encephalopathy: check ABG, r/o potential infection, ensure adequate suctioning, and aspiration precautions. Will discuss keppra dosing with neuro. Patient's wife is overwhelmed with current situation, however, patient may be declining overall.

## 2018-11-10 NOTE — PROGRESS NOTE ADULT - PROBLEM SELECTOR PLAN 1
Significant oropharyngeal dysphagia. Patient left the hospital to nursing home on 10/23 and was initially doing well. Abruptly became lethargic with inability to take PO.   - Speech and swallow eval: NPO + aspiration precaution.  - ENT consulted for aphonia - VC injury likely from intubation. No acute intervention  - Neuro on board: MG workup pending.  - Keppra level normal.  - Barium swallow study shows continuation of dysphagia.  - s/p removable PEG tube placement.  - c/w tube feed.  - Check for refeeding syndrome via BMP/Mg/Phos q24hrs now given stable elctrolytes. Significant oropharyngeal dysphagia. Patient left the hospital to nursing home on 10/23 and was initially doing well. Abruptly became lethargic with inability to take PO.   - Speech and swallow eval: NPO + aspiration precaution.  - ENT consulted for aphonia - VC injury likely from intubation. No acute intervention  - Neuro on board: MG workup pending.  - Keppra level normal.  - Barium swallow study shows continuation of dysphagia.  - s/p removable PEG tube placement.  - c/w tube feed.

## 2018-11-10 NOTE — PROGRESS NOTE ADULT - SUBJECTIVE AND OBJECTIVE BOX
Nolan Granados Vickie  PGY-1  Pager: [phone]    Patient is a 78y old  Male who presents with a chief complaint of altered mental status, cough (09 Nov 2018 19:03)      SUBJECTIVE / OVERNIGHT EVENTS:    MEDICATIONS  (STANDING):  ALBUTerol/ipratropium for Nebulization 3 milliLiter(s) Nebulizer every 6 hours  dextrose 5%. 1000 milliLiter(s) (50 mL/Hr) IV Continuous <Continuous>  dextrose 50% Injectable 12.5 Gram(s) IV Push once  dextrose 50% Injectable 25 Gram(s) IV Push once  dextrose 50% Injectable 25 Gram(s) IV Push once  heparin  Injectable 5000 Unit(s) SubCutaneous every 8 hours  insulin lispro (HumaLOG) corrective regimen sliding scale   SubCutaneous every 6 hours  insulin NPH human recombinant 5 Unit(s) SubCutaneous every 6 hours  levETIRAcetam 500 milliGRAM(s) Oral two times a day  metoprolol tartrate 50 milliGRAM(s) Enteral Tube two times a day  pantoprazole   Suspension 40 milliGRAM(s) Enteral Tube two times a day before meals    MEDICATIONS  (PRN):  dextrose 40% Gel 15 Gram(s) Oral once PRN Blood Glucose LESS THAN 70 milliGRAM(s)/deciliter  glucagon  Injectable 1 milliGRAM(s) IntraMuscular once PRN Glucose LESS THAN 70 milligrams/deciliter      T(C): 37 (11-10-18 @ 04:15), Max: 37 (11-10-18 @ 04:15)  HR: 74 (11-10-18 @ 06:33) (74 - 90)  BP: 135/61 (11-10-18 @ 04:15) (135/61 - 144/76)  RR: 1 (11-10-18 @ 04:15) (1 - 18)  SpO2: 95% (11-10-18 @ 06:33) (94% - 97%)  CAPILLARY BLOOD GLUCOSE      POCT Blood Glucose.: 276 mg/dL (10 Nov 2018 06:04)  POCT Blood Glucose.: 220 mg/dL (10 Nov 2018 00:15)  POCT Blood Glucose.: 272 mg/dL (09 Nov 2018 17:10)  POCT Blood Glucose.: 248 mg/dL (09 Nov 2018 12:40)    I&O's Summary      PHYSICAL EXAM:  GENERAL: NAD, well-developed  HEAD:  Atraumatic, Normocephalic  EYES: EOMI, PERRLA, conjunctiva and sclera clear  NECK: Supple, No JVD  CHEST/LUNG: Clear to auscultation bilaterally; No wheeze  HEART: Regular rate and rhythm; No murmurs, rubs, or gallops  ABDOMEN: Soft, Nontender, Nondistended; Bowel sounds present  EXTREMITIES:  2+ Peripheral Pulses, No clubbing, cyanosis, or edema  PSYCH: AAOx3  NEUROLOGY: non-focal  SKIN: No rashes or lesions    LABS:                        12.2   9.9   )-----------( 218      ( 10 Nov 2018 06:48 )             36.7     11-10    139  |  98  |  17  ----------------------------<  284<H>  4.3   |  29  |  0.61    Ca    9.0      10 Nov 2018 06:48  Phos  2.9     11-10  Mg     1.8     11-10                RADIOLOGY & ADDITIONAL TESTS:    Imaging Personally Reviewed:    Consultant(s) Notes Reviewed:      Care Discussed with Consultants/Other Providers: Nolan Johnston  PGY-1  Pager: [phone]    Patient is a 78y old  Male who presents with a chief complaint of altered mental status, cough (09 Nov 2018 19:03)      SUBJECTIVE / OVERNIGHT EVENTS:  No acute event overnight.  Patient slept well and has no acute concerns.   Continues to show weak coughing.     ROS:  CONSTITUTIONAL: No fever  RESPIRATORY: No shortness of breath  CARDIOVASCULAR: No chest pain, palpitations  GASTROINTESTINAL: No abdominal pain. No nausea, vomiting,  NEUROLOGICAL: No headaches    MEDICATIONS  (STANDING):  ALBUTerol/ipratropium for Nebulization 3 milliLiter(s) Nebulizer every 6 hours  dextrose 5%. 1000 milliLiter(s) (50 mL/Hr) IV Continuous <Continuous>  dextrose 50% Injectable 12.5 Gram(s) IV Push once  dextrose 50% Injectable 25 Gram(s) IV Push once  dextrose 50% Injectable 25 Gram(s) IV Push once  heparin  Injectable 5000 Unit(s) SubCutaneous every 8 hours  insulin lispro (HumaLOG) corrective regimen sliding scale   SubCutaneous every 6 hours  insulin NPH human recombinant 5 Unit(s) SubCutaneous every 6 hours  levETIRAcetam 500 milliGRAM(s) Oral two times a day  metoprolol tartrate 50 milliGRAM(s) Enteral Tube two times a day  pantoprazole   Suspension 40 milliGRAM(s) Enteral Tube two times a day before meals    MEDICATIONS  (PRN):  dextrose 40% Gel 15 Gram(s) Oral once PRN Blood Glucose LESS THAN 70 milliGRAM(s)/deciliter  glucagon  Injectable 1 milliGRAM(s) IntraMuscular once PRN Glucose LESS THAN 70 milligrams/deciliter      T(C): 37 (11-10-18 @ 04:15), Max: 37 (11-10-18 @ 04:15)  HR: 74 (11-10-18 @ 06:33) (74 - 90)  BP: 135/61 (11-10-18 @ 04:15) (135/61 - 144/76)  RR: 1 (11-10-18 @ 04:15) (1 - 18)  SpO2: 95% (11-10-18 @ 06:33) (94% - 97%)  CAPILLARY BLOOD GLUCOSE      POCT Blood Glucose.: 276 mg/dL (10 Nov 2018 06:04)  POCT Blood Glucose.: 220 mg/dL (10 Nov 2018 00:15)  POCT Blood Glucose.: 272 mg/dL (09 Nov 2018 17:10)  POCT Blood Glucose.: 248 mg/dL (09 Nov 2018 12:40)    I&O's Summary      PHYSICAL EXAM:  GENERAL: NAD, awake upon entry.  HEENT:  Atraumatic, Old cranial surgical site bilaterally (clean dry intact). Neck supple. No conjunctival icterus.  CHEST/LUNG: Clear to auscultation bilaterally in anterior during inspiratory phase, but coarse breath sounds on expiratory phase. No increased WOB. Weak coughing with incomplete clearance sound.  HEART: Irregularly irregular. S1 S2, No murmurs, rubs, or gallops. 2+ peripheral pulses. Cap refill ~ 3 seconds.  ABDOMEN: Soft, Nontender, Nondistended; Bowel sounds present. PEG tube in place. Clean/dry/intact.  EXTREMITIES:  2+ Peripheral Pulses, No cyanosis or edema. bilateral distal LE skin changes c/w venous stasis.  NEUROLOGY: Limited communication, yet able to have conversation in short phrases. Patient is able to move his both UE freely against the gravity, but mostly uses his right hand. Able to communicate his wishes.       LABS:                        12.2   9.9   )-----------( 218      ( 10 Nov 2018 06:48 )             36.7     11-10    139  |  98  |  17  ----------------------------<  284<H>  4.3   |  29  |  0.61    Ca    9.0      10 Nov 2018 06:48  Phos  2.9     11-10  Mg     1.8     11-10                RADIOLOGY & ADDITIONAL TESTS:    Imaging Personally Reviewed: TYLER    Consultant(s) Notes Reviewed:  GI    Care Discussed with Consultants/Other Providers: TYLER Nolan Granados Vickie  PGY-1  Pager: [phone]    Patient is a 78y old  Male who presents with a chief complaint of altered mental status, cough (09 Nov 2018 19:03)      SUBJECTIVE / OVERNIGHT EVENTS:  No acute event overnight.  Patient slept well and has no acute concerns.   Continues to show weak coughing.     Per wife, patient look more lethargic today. Suctioned mucous and hydrated mouth. CXR acquired. Repeat vitals normal. Morning labs normal.  Patient denies any SOB, CP, palpitation, dysuria, pain.     ROS:  CONSTITUTIONAL: No fever  RESPIRATORY: No shortness of breath  CARDIOVASCULAR: No chest pain, palpitations  GASTROINTESTINAL: No abdominal pain. No nausea, vomiting,  NEUROLOGICAL: No headaches    MEDICATIONS  (STANDING):  ALBUTerol/ipratropium for Nebulization 3 milliLiter(s) Nebulizer every 6 hours  dextrose 5%. 1000 milliLiter(s) (50 mL/Hr) IV Continuous <Continuous>  dextrose 50% Injectable 12.5 Gram(s) IV Push once  dextrose 50% Injectable 25 Gram(s) IV Push once  dextrose 50% Injectable 25 Gram(s) IV Push once  heparin  Injectable 5000 Unit(s) SubCutaneous every 8 hours  insulin lispro (HumaLOG) corrective regimen sliding scale   SubCutaneous every 6 hours  insulin NPH human recombinant 5 Unit(s) SubCutaneous every 6 hours  levETIRAcetam 500 milliGRAM(s) Oral two times a day  metoprolol tartrate 50 milliGRAM(s) Enteral Tube two times a day  pantoprazole   Suspension 40 milliGRAM(s) Enteral Tube two times a day before meals    MEDICATIONS  (PRN):  dextrose 40% Gel 15 Gram(s) Oral once PRN Blood Glucose LESS THAN 70 milliGRAM(s)/deciliter  glucagon  Injectable 1 milliGRAM(s) IntraMuscular once PRN Glucose LESS THAN 70 milligrams/deciliter      T(C): 37 (11-10-18 @ 04:15), Max: 37 (11-10-18 @ 04:15)  HR: 74 (11-10-18 @ 06:33) (74 - 90)  BP: 135/61 (11-10-18 @ 04:15) (135/61 - 144/76)  RR: 1 (11-10-18 @ 04:15) (1 - 18)  SpO2: 95% (11-10-18 @ 06:33) (94% - 97%)  CAPILLARY BLOOD GLUCOSE      POCT Blood Glucose.: 276 mg/dL (10 Nov 2018 06:04)  POCT Blood Glucose.: 220 mg/dL (10 Nov 2018 00:15)  POCT Blood Glucose.: 272 mg/dL (09 Nov 2018 17:10)  POCT Blood Glucose.: 248 mg/dL (09 Nov 2018 12:40)    I&O's Summary      PHYSICAL EXAM:  GENERAL: NAD, awake upon entry.  HEENT:  Atraumatic, Old cranial surgical site bilaterally (clean dry intact). Neck supple. No conjunctival icterus.  CHEST/LUNG: Clear to auscultation bilaterally in anterior during inspiratory phase, but coarse breath sounds on expiratory phase. No increased WOB. Weak coughing with incomplete clearance sound.  HEART: Irregularly irregular. S1 S2, No murmurs, rubs, or gallops. 2+ peripheral pulses. Cap refill ~ 3 seconds.  ABDOMEN: Soft, Nontender, Nondistended; Bowel sounds present. PEG tube in place. Clean/dry/intact.  EXTREMITIES:  2+ Peripheral Pulses, No cyanosis or edema. bilateral distal LE skin changes c/w venous stasis.  NEUROLOGY: Limited communication, yet able to have conversation in short phrases. Patient is able to move his both UE freely against the gravity, but mostly uses his right hand. Able to communicate his wishes.       LABS:                        12.2   9.9   )-----------( 218      ( 10 Nov 2018 06:48 )             36.7     11-10    139  |  98  |  17  ----------------------------<  284<H>  4.3   |  29  |  0.61    Ca    9.0      10 Nov 2018 06:48  Phos  2.9     11-10  Mg     1.8     11-10                RADIOLOGY & ADDITIONAL TESTS:    Imaging Personally Reviewed: TYLER    Consultant(s) Notes Reviewed:  GI    Care Discussed with Consultants/Other Providers: TYLER

## 2018-11-10 NOTE — PROGRESS NOTE ADULT - PROBLEM SELECTOR PLAN 10
DVT ppx: SQH restarted.  Diet: NPO with Tube feed. Check for refeeding syndrome. replete electrolyte as appropriate.  Dispo: will speak with patient and patient family regarding possible dispo later this week or earlier next week.  Discussed with patient's wife. Possibly discharge this Saturday but may happen next Week. DVT ppx: SQH restarted.  Diet: NPO with Tube feed. Check for refeeding syndrome. replete electrolyte as appropriate.  Dispo: Discharge today 1PM to long term care facility. DVT ppx: SQH restarted.  Diet: NPO with Tube feed. Check for refeeding syndrome. replete electrolyte as appropriate.  Dispo: No discharge today. Will monitor patient for now. possible discharge next Monday.

## 2018-11-10 NOTE — PROGRESS NOTE ADULT - PROBLEM SELECTOR PLAN 5
- c/w insulin NPH for constant coverage  - will check FS Q6H while NPO/Tube feed with ISS coverage - c/w insulin NPH for constant coverage - icnreased to 6 q6h today.  - will check FS Q6H while NPO/Tube feed with ISS coverage

## 2018-11-10 NOTE — PROGRESS NOTE ADULT - ASSESSMENT
Mr. 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 Patient course is complicated by acute decline in PO tolerance with other symptoms, and now with significant oropharyngeal dysphagia. s/p removable PEG tube placement and now on tube feed.

## 2018-11-10 NOTE — CHART NOTE - NSCHARTNOTEFT_GEN_A_CORE
Mr. Kingsley continues to be lethargic more than usual.  ABG drawn this afternoon. Result noted.  Acute Metabolic alkalosis in setting of increased BM from tube feed. Patient's BM has been becoming more loose.  Possibly metabolic alkalosis from Cl- loss in diarrhea.  Lactate of mild elevation 2.2. Despite SpO2 being normal, ABG O2 is below normal range. Will put supplement oxygen.  Continue to monitor the patient.  BG is over 300. Will increase NPH to 8mg. D/w Dr. Das.    Nolan Johnston  PGY-1.

## 2018-11-11 LAB
ALBUMIN SERPL ELPH-MCNC: 3 G/DL — LOW (ref 3.3–5)
ALP SERPL-CCNC: 109 U/L — SIGNIFICANT CHANGE UP (ref 40–120)
ALT FLD-CCNC: 10 U/L — SIGNIFICANT CHANGE UP (ref 10–45)
ANION GAP SERPL CALC-SCNC: 13 MMOL/L — SIGNIFICANT CHANGE UP (ref 5–17)
AST SERPL-CCNC: 15 U/L — SIGNIFICANT CHANGE UP (ref 10–40)
BILIRUB SERPL-MCNC: 1.1 MG/DL — SIGNIFICANT CHANGE UP (ref 0.2–1.2)
BUN SERPL-MCNC: 24 MG/DL — HIGH (ref 7–23)
CALCIUM SERPL-MCNC: 9.5 MG/DL — SIGNIFICANT CHANGE UP (ref 8.4–10.5)
CHLORIDE SERPL-SCNC: 99 MMOL/L — SIGNIFICANT CHANGE UP (ref 96–108)
CO2 SERPL-SCNC: 28 MMOL/L — SIGNIFICANT CHANGE UP (ref 22–31)
CREAT SERPL-MCNC: 0.62 MG/DL — SIGNIFICANT CHANGE UP (ref 0.5–1.3)
GLUCOSE BLDC GLUCOMTR-MCNC: 278 MG/DL — HIGH (ref 70–99)
GLUCOSE BLDC GLUCOMTR-MCNC: 296 MG/DL — HIGH (ref 70–99)
GLUCOSE BLDC GLUCOMTR-MCNC: 297 MG/DL — HIGH (ref 70–99)
GLUCOSE BLDC GLUCOMTR-MCNC: 305 MG/DL — HIGH (ref 70–99)
GLUCOSE BLDC GLUCOMTR-MCNC: 312 MG/DL — HIGH (ref 70–99)
GLUCOSE SERPL-MCNC: 291 MG/DL — HIGH (ref 70–99)
HCT VFR BLD CALC: 36.3 % — LOW (ref 39–50)
HGB BLD-MCNC: 12.2 G/DL — LOW (ref 13–17)
MAGNESIUM SERPL-MCNC: 1.9 MG/DL — SIGNIFICANT CHANGE UP (ref 1.6–2.6)
MCHC RBC-ENTMCNC: 30.8 PG — SIGNIFICANT CHANGE UP (ref 27–34)
MCHC RBC-ENTMCNC: 33.5 GM/DL — SIGNIFICANT CHANGE UP (ref 32–36)
MCV RBC AUTO: 91.9 FL — SIGNIFICANT CHANGE UP (ref 80–100)
PHOSPHATE SERPL-MCNC: 3.6 MG/DL — SIGNIFICANT CHANGE UP (ref 2.5–4.5)
PLATELET # BLD AUTO: 244 K/UL — SIGNIFICANT CHANGE UP (ref 150–400)
POTASSIUM SERPL-MCNC: 4.8 MMOL/L — SIGNIFICANT CHANGE UP (ref 3.5–5.3)
POTASSIUM SERPL-SCNC: 4.8 MMOL/L — SIGNIFICANT CHANGE UP (ref 3.5–5.3)
PROT SERPL-MCNC: 6.6 G/DL — SIGNIFICANT CHANGE UP (ref 6–8.3)
RBC # BLD: 3.95 M/UL — LOW (ref 4.2–5.8)
RBC # FLD: 15.7 % — HIGH (ref 10.3–14.5)
SODIUM SERPL-SCNC: 140 MMOL/L — SIGNIFICANT CHANGE UP (ref 135–145)
WBC # BLD: 8.8 K/UL — SIGNIFICANT CHANGE UP (ref 3.8–10.5)
WBC # FLD AUTO: 8.8 K/UL — SIGNIFICANT CHANGE UP (ref 3.8–10.5)

## 2018-11-11 PROCEDURE — C1887: CPT

## 2018-11-11 PROCEDURE — 94640 AIRWAY INHALATION TREATMENT: CPT

## 2018-11-11 PROCEDURE — 80074 ACUTE HEPATITIS PANEL: CPT

## 2018-11-11 PROCEDURE — 82105 ALPHA-FETOPROTEIN SERUM: CPT

## 2018-11-11 PROCEDURE — 99285 EMERGENCY DEPT VISIT HI MDM: CPT | Mod: 25

## 2018-11-11 PROCEDURE — 76937 US GUIDE VASCULAR ACCESS: CPT

## 2018-11-11 PROCEDURE — 71275 CT ANGIOGRAPHY CHEST: CPT

## 2018-11-11 PROCEDURE — P9016: CPT

## 2018-11-11 PROCEDURE — 80048 BASIC METABOLIC PNL TOTAL CA: CPT

## 2018-11-11 PROCEDURE — 85730 THROMBOPLASTIN TIME PARTIAL: CPT

## 2018-11-11 PROCEDURE — 84132 ASSAY OF SERUM POTASSIUM: CPT

## 2018-11-11 PROCEDURE — 82947 ASSAY GLUCOSE BLOOD QUANT: CPT

## 2018-11-11 PROCEDURE — 82140 ASSAY OF AMMONIA: CPT

## 2018-11-11 PROCEDURE — 94003 VENT MGMT INPAT SUBQ DAY: CPT

## 2018-11-11 PROCEDURE — 94002 VENT MGMT INPAT INIT DAY: CPT

## 2018-11-11 PROCEDURE — C1889: CPT

## 2018-11-11 PROCEDURE — 86923 COMPATIBILITY TEST ELECTRIC: CPT

## 2018-11-11 PROCEDURE — P9011: CPT

## 2018-11-11 PROCEDURE — 97110 THERAPEUTIC EXERCISES: CPT

## 2018-11-11 PROCEDURE — 82330 ASSAY OF CALCIUM: CPT

## 2018-11-11 PROCEDURE — P9059: CPT

## 2018-11-11 PROCEDURE — 80076 HEPATIC FUNCTION PANEL: CPT

## 2018-11-11 PROCEDURE — 97530 THERAPEUTIC ACTIVITIES: CPT

## 2018-11-11 PROCEDURE — 97162 PT EVAL MOD COMPLEX 30 MIN: CPT

## 2018-11-11 PROCEDURE — 82272 OCCULT BLD FECES 1-3 TESTS: CPT

## 2018-11-11 PROCEDURE — 82962 GLUCOSE BLOOD TEST: CPT

## 2018-11-11 PROCEDURE — 97112 NEUROMUSCULAR REEDUCATION: CPT

## 2018-11-11 PROCEDURE — C1894: CPT

## 2018-11-11 PROCEDURE — 74176 CT ABD & PELVIS W/O CONTRAST: CPT

## 2018-11-11 PROCEDURE — 80053 COMPREHEN METABOLIC PANEL: CPT

## 2018-11-11 PROCEDURE — 93005 ELECTROCARDIOGRAM TRACING: CPT

## 2018-11-11 PROCEDURE — 85384 FIBRINOGEN ACTIVITY: CPT

## 2018-11-11 PROCEDURE — 83735 ASSAY OF MAGNESIUM: CPT

## 2018-11-11 PROCEDURE — 74018 RADEX ABDOMEN 1 VIEW: CPT

## 2018-11-11 PROCEDURE — 86301 IMMUNOASSAY TUMOR CA 19-9: CPT

## 2018-11-11 PROCEDURE — 86900 BLOOD TYPING SEROLOGIC ABO: CPT

## 2018-11-11 PROCEDURE — 83935 ASSAY OF URINE OSMOLALITY: CPT

## 2018-11-11 PROCEDURE — 82803 BLOOD GASES ANY COMBINATION: CPT

## 2018-11-11 PROCEDURE — 71045 X-RAY EXAM CHEST 1 VIEW: CPT

## 2018-11-11 PROCEDURE — 81001 URINALYSIS AUTO W/SCOPE: CPT

## 2018-11-11 PROCEDURE — 99232 SBSQ HOSP IP/OBS MODERATE 35: CPT | Mod: GC

## 2018-11-11 PROCEDURE — 36430 TRANSFUSION BLD/BLD COMPNT: CPT

## 2018-11-11 PROCEDURE — 94660 CPAP INITIATION&MGMT: CPT

## 2018-11-11 PROCEDURE — 83605 ASSAY OF LACTIC ACID: CPT

## 2018-11-11 PROCEDURE — 74174 CTA ABD&PLVS W/CONTRAST: CPT

## 2018-11-11 PROCEDURE — 82565 ASSAY OF CREATININE: CPT

## 2018-11-11 PROCEDURE — 85027 COMPLETE CBC AUTOMATED: CPT

## 2018-11-11 PROCEDURE — 36247 INS CATH ABD/L-EXT ART 3RD: CPT

## 2018-11-11 PROCEDURE — P9037: CPT

## 2018-11-11 PROCEDURE — 85014 HEMATOCRIT: CPT

## 2018-11-11 PROCEDURE — 82435 ASSAY OF BLOOD CHLORIDE: CPT

## 2018-11-11 PROCEDURE — 83036 HEMOGLOBIN GLYCOSYLATED A1C: CPT

## 2018-11-11 PROCEDURE — 37243 VASC EMBOLIZE/OCCLUDE ORGAN: CPT

## 2018-11-11 PROCEDURE — 84100 ASSAY OF PHOSPHORUS: CPT

## 2018-11-11 PROCEDURE — 84295 ASSAY OF SERUM SODIUM: CPT

## 2018-11-11 PROCEDURE — 85610 PROTHROMBIN TIME: CPT

## 2018-11-11 PROCEDURE — 84484 ASSAY OF TROPONIN QUANT: CPT

## 2018-11-11 PROCEDURE — 86901 BLOOD TYPING SEROLOGIC RH(D): CPT

## 2018-11-11 PROCEDURE — C1769: CPT

## 2018-11-11 PROCEDURE — 86850 RBC ANTIBODY SCREEN: CPT

## 2018-11-11 RX ORDER — HUMAN INSULIN 100 [IU]/ML
10 INJECTION, SUSPENSION SUBCUTANEOUS EVERY 6 HOURS
Qty: 0 | Refills: 0 | Status: DISCONTINUED | OUTPATIENT
Start: 2018-11-11 | End: 2018-11-12

## 2018-11-11 RX ORDER — INSULIN LISPRO 100/ML
VIAL (ML) SUBCUTANEOUS EVERY 6 HOURS
Qty: 0 | Refills: 0 | Status: DISCONTINUED | OUTPATIENT
Start: 2018-11-11 | End: 2018-11-12

## 2018-11-11 RX ADMIN — PANTOPRAZOLE SODIUM 40 MILLIGRAM(S): 20 TABLET, DELAYED RELEASE ORAL at 06:51

## 2018-11-11 RX ADMIN — Medication 4: at 00:20

## 2018-11-11 RX ADMIN — HEPARIN SODIUM 5000 UNIT(S): 5000 INJECTION INTRAVENOUS; SUBCUTANEOUS at 13:03

## 2018-11-11 RX ADMIN — PANTOPRAZOLE SODIUM 40 MILLIGRAM(S): 20 TABLET, DELAYED RELEASE ORAL at 18:40

## 2018-11-11 RX ADMIN — HEPARIN SODIUM 5000 UNIT(S): 5000 INJECTION INTRAVENOUS; SUBCUTANEOUS at 21:48

## 2018-11-11 RX ADMIN — HUMAN INSULIN 8 UNIT(S): 100 INJECTION, SUSPENSION SUBCUTANEOUS at 00:20

## 2018-11-11 RX ADMIN — Medication 3 MILLILITER(S): at 11:23

## 2018-11-11 RX ADMIN — HUMAN INSULIN 8 UNIT(S): 100 INJECTION, SUSPENSION SUBCUTANEOUS at 06:53

## 2018-11-11 RX ADMIN — Medication 3 MILLILITER(S): at 17:16

## 2018-11-11 RX ADMIN — Medication 6: at 13:05

## 2018-11-11 RX ADMIN — LEVETIRACETAM 500 MILLIGRAM(S): 250 TABLET, FILM COATED ORAL at 18:40

## 2018-11-11 RX ADMIN — HUMAN INSULIN 10 UNIT(S): 100 INJECTION, SUSPENSION SUBCUTANEOUS at 13:04

## 2018-11-11 RX ADMIN — LEVETIRACETAM 500 MILLIGRAM(S): 250 TABLET, FILM COATED ORAL at 06:51

## 2018-11-11 RX ADMIN — HUMAN INSULIN 10 UNIT(S): 100 INJECTION, SUSPENSION SUBCUTANEOUS at 18:39

## 2018-11-11 RX ADMIN — HUMAN INSULIN 10 UNIT(S): 100 INJECTION, SUSPENSION SUBCUTANEOUS at 23:55

## 2018-11-11 RX ADMIN — Medication 3: at 06:53

## 2018-11-11 RX ADMIN — Medication 50 MILLIGRAM(S): at 06:51

## 2018-11-11 RX ADMIN — Medication 50 MILLIGRAM(S): at 18:40

## 2018-11-11 RX ADMIN — Medication 3 MILLILITER(S): at 00:30

## 2018-11-11 RX ADMIN — Medication 3 MILLILITER(S): at 06:08

## 2018-11-11 RX ADMIN — HEPARIN SODIUM 5000 UNIT(S): 5000 INJECTION INTRAVENOUS; SUBCUTANEOUS at 06:51

## 2018-11-11 RX ADMIN — Medication 6: at 18:39

## 2018-11-11 RX ADMIN — Medication 8: at 23:55

## 2018-11-11 NOTE — PROGRESS NOTE ADULT - PROBLEM SELECTOR PLAN 1
Significant oropharyngeal dysphagia.  - Barium swallow study shows continuation of dysphagia.  - s/p removable PEG tube placement.  - c/w tube feed.

## 2018-11-11 NOTE — PROGRESS NOTE ADULT - SUBJECTIVE AND OBJECTIVE BOX
Patient is a 78y old  Male who presents with a chief complaint of altered mental status, cough (10 Nov 2018 09:14)      Overnight Events:      REVIEW OF SYSTEMS:  CONSTITUTIONAL: No weakness, fevers or chills  EYES/ENT: No visual changes, no throat pain   RESPIRATORY: No cough, wheezing, hemoptysis; No shortness of breath  CARDIOVASCULAR: No chest pain or palpitations  GASTROINTESTINAL: No abdominal, nausea, vomiting, or hematemesis; No diarrhea or constipation. No melena or hematochezia.  GENITOURINARY: No dysuria, frequency or hematuria  NEUROLOGICAL: No dizziness, numbness, or weakness  SKIN: No itching, burning, rashes, or lesions   All other review of systems is negative unless indicated above.    VITAL SIGNS:  Vital Signs Last 24 Hrs  T(C): 37.2 (11 Nov 2018 05:43), Max: 37.6 (10 Nov 2018 20:12)  T(F): 99 (11 Nov 2018 05:43), Max: 99.6 (10 Nov 2018 20:12)  HR: 70 (11 Nov 2018 11:24) (70 - 99)  BP: 127/75 (11 Nov 2018 05:43) (123/69 - 145/76)  BP(mean): --  RR: 18 (11 Nov 2018 05:43) (18 - 18)  SpO2: 98% (11 Nov 2018 11:24) (94% - 100%)    PHYSICAL EXAM:   GENERAL: no acute distress  HEENT: NC/AT, EOMI, neck supple, MMM  RESPIRATORY: LCTAB/L, no rhonchi, rales, or wheezing  CARDIOVASCULAR: RRR, no murmurs, gallops, rubs  ABDOMINAL: soft, non-tender, non-distended, positive bowel sounds   EXTREMITIES: no clubbing, cyanosis, or edema  NEUROLOGICAL: alert and oriented x 3, non-focal  SKIN: no rashes or lesions   MUSCULOSKELETAL: no gross joint deformity                          12.2   8.8   )-----------( 244      ( 11 Nov 2018 07:24 )             36.3     11-11    140  |  99  |  24<H>  ----------------------------<  291<H>  4.8   |  28  |  0.62    Ca    9.5      11 Nov 2018 07:24  Phos  3.6     11-11  Mg     1.9     11-11    TPro  6.6  /  Alb  3.0<L>  /  TBili  1.1  /  DBili  x   /  AST  15  /  ALT  10  /  AlkPhos  109  11-11        CAPILLARY BLOOD GLUCOSE      POCT Blood Glucose.: 296 mg/dL (11 Nov 2018 12:53)  POCT Blood Glucose.: 278 mg/dL (11 Nov 2018 06:47)  POCT Blood Glucose.: 312 mg/dL (11 Nov 2018 00:16)  POCT Blood Glucose.: 308 mg/dL (10 Nov 2018 20:11)  POCT Blood Glucose.: 316 mg/dL (10 Nov 2018 17:53)    I&O's Summary      MEDICATIONS  (STANDING):  ALBUTerol/ipratropium for Nebulization 3 milliLiter(s) Nebulizer every 6 hours  dextrose 5%. 1000 milliLiter(s) (50 mL/Hr) IV Continuous <Continuous>  dextrose 50% Injectable 12.5 Gram(s) IV Push once  dextrose 50% Injectable 25 Gram(s) IV Push once  dextrose 50% Injectable 25 Gram(s) IV Push once  heparin  Injectable 5000 Unit(s) SubCutaneous every 8 hours  insulin lispro (HumaLOG) corrective regimen sliding scale   SubCutaneous every 6 hours  insulin NPH human recombinant 10 Unit(s) SubCutaneous every 6 hours  levETIRAcetam 500 milliGRAM(s) Oral two times a day  metoprolol tartrate 50 milliGRAM(s) Enteral Tube two times a day  pantoprazole   Suspension 40 milliGRAM(s) Enteral Tube two times a day before meals Patient is a 78y old  Male who presents with a chief complaint of altered mental status, cough (10 Nov 2018 09:14)      Overnight Events:  Pt noted to be more lethargic ON.      REVIEW OF SYSTEMS:  Unable to obtain as patient was somnolent.     VITAL SIGNS:  Vital Signs Last 24 Hrs  T(C): 37.2 (11 Nov 2018 05:43), Max: 37.6 (10 Nov 2018 20:12)  T(F): 99 (11 Nov 2018 05:43), Max: 99.6 (10 Nov 2018 20:12)  HR: 70 (11 Nov 2018 11:24) (70 - 99)  BP: 127/75 (11 Nov 2018 05:43) (123/69 - 145/76)  BP(mean): --  RR: 18 (11 Nov 2018 05:43) (18 - 18)  SpO2: 98% (11 Nov 2018 11:24) (94% - 100%)    PHYSICAL EXAM:   GENERAL: no acute distress  HEENT: NC/AT, EOMI, neck supple, MMM  RESPIRATORY: Coarse breath sounds diffusely  CARDIOVASCULAR: RRR, no murmurs, gallops, rubs  ABDOMINAL: soft, non-tender, non-distended, positive bowel sounds. PEG in place.   EXTREMITIES: no clubbing, cyanosis, or edema  NEUROLOGICAL:  non-focal  SKIN: no rashes or lesions   MUSCULOSKELETAL: no gross joint deformity                          12.2   8.8   )-----------( 244      ( 11 Nov 2018 07:24 )             36.3     11-11    140  |  99  |  24<H>  ----------------------------<  291<H>  4.8   |  28  |  0.62    Ca    9.5      11 Nov 2018 07:24  Phos  3.6     11-11  Mg     1.9     11-11    TPro  6.6  /  Alb  3.0<L>  /  TBili  1.1  /  DBili  x   /  AST  15  /  ALT  10  /  AlkPhos  109  11-11        CAPILLARY BLOOD GLUCOSE      POCT Blood Glucose.: 296 mg/dL (11 Nov 2018 12:53)  POCT Blood Glucose.: 278 mg/dL (11 Nov 2018 06:47)  POCT Blood Glucose.: 312 mg/dL (11 Nov 2018 00:16)  POCT Blood Glucose.: 308 mg/dL (10 Nov 2018 20:11)  POCT Blood Glucose.: 316 mg/dL (10 Nov 2018 17:53)    I&O's Summary      MEDICATIONS  (STANDING):  ALBUTerol/ipratropium for Nebulization 3 milliLiter(s) Nebulizer every 6 hours  dextrose 5%. 1000 milliLiter(s) (50 mL/Hr) IV Continuous <Continuous>  dextrose 50% Injectable 12.5 Gram(s) IV Push once  dextrose 50% Injectable 25 Gram(s) IV Push once  dextrose 50% Injectable 25 Gram(s) IV Push once  heparin  Injectable 5000 Unit(s) SubCutaneous every 8 hours  insulin lispro (HumaLOG) corrective regimen sliding scale   SubCutaneous every 6 hours  insulin NPH human recombinant 10 Unit(s) SubCutaneous every 6 hours  levETIRAcetam 500 milliGRAM(s) Oral two times a day  metoprolol tartrate 50 milliGRAM(s) Enteral Tube two times a day  pantoprazole   Suspension 40 milliGRAM(s) Enteral Tube two times a day before meals

## 2018-11-11 NOTE — PROGRESS NOTE ADULT - PROBLEM SELECTOR PLAN 5
- c/w insulin NPH for constant coverage - icnreased to 6 q6h today.  - will check FS Q6H while NPO/Tube feed with ISS coverage

## 2018-11-11 NOTE — PROGRESS NOTE ADULT - PROBLEM SELECTOR PLAN 10
DVT ppx: SQH restarted.  Diet: NPO with Tube feed.   Dispo: No discharge today. Will monitor patient for now. possible discharge next Monday.

## 2018-11-11 NOTE — PROGRESS NOTE ADULT - ASSESSMENT
Mr. 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 Patient course is complicated by acute decline in PO tolerance with other symptoms, and now with significant oropharyngeal dysphagia. s/p removable PEG tube placement and now on tube feeds.

## 2018-11-12 VITALS
RESPIRATION RATE: 18 BRPM | SYSTOLIC BLOOD PRESSURE: 143 MMHG | OXYGEN SATURATION: 97 % | DIASTOLIC BLOOD PRESSURE: 73 MMHG | TEMPERATURE: 99 F | HEART RATE: 96 BPM

## 2018-11-12 LAB
ANION GAP SERPL CALC-SCNC: 12 MMOL/L — SIGNIFICANT CHANGE UP (ref 5–17)
BUN SERPL-MCNC: 23 MG/DL — SIGNIFICANT CHANGE UP (ref 7–23)
CALCIUM SERPL-MCNC: 9.5 MG/DL — SIGNIFICANT CHANGE UP (ref 8.4–10.5)
CHLORIDE SERPL-SCNC: 100 MMOL/L — SIGNIFICANT CHANGE UP (ref 96–108)
CO2 SERPL-SCNC: 28 MMOL/L — SIGNIFICANT CHANGE UP (ref 22–31)
CREAT SERPL-MCNC: 0.6 MG/DL — SIGNIFICANT CHANGE UP (ref 0.5–1.3)
GLUCOSE BLDC GLUCOMTR-MCNC: 266 MG/DL — HIGH (ref 70–99)
GLUCOSE BLDC GLUCOMTR-MCNC: 269 MG/DL — HIGH (ref 70–99)
GLUCOSE SERPL-MCNC: 297 MG/DL — HIGH (ref 70–99)
HCT VFR BLD CALC: 36.6 % — LOW (ref 39–50)
HGB BLD-MCNC: 12.3 G/DL — LOW (ref 13–17)
MAGNESIUM SERPL-MCNC: 1.8 MG/DL — SIGNIFICANT CHANGE UP (ref 1.6–2.6)
MCHC RBC-ENTMCNC: 31.1 PG — SIGNIFICANT CHANGE UP (ref 27–34)
MCHC RBC-ENTMCNC: 33.6 GM/DL — SIGNIFICANT CHANGE UP (ref 32–36)
MCV RBC AUTO: 92.5 FL — SIGNIFICANT CHANGE UP (ref 80–100)
PHOSPHATE SERPL-MCNC: 2.7 MG/DL — SIGNIFICANT CHANGE UP (ref 2.5–4.5)
PLATELET # BLD AUTO: 264 K/UL — SIGNIFICANT CHANGE UP (ref 150–400)
POTASSIUM SERPL-MCNC: 4.9 MMOL/L — SIGNIFICANT CHANGE UP (ref 3.5–5.3)
POTASSIUM SERPL-SCNC: 4.9 MMOL/L — SIGNIFICANT CHANGE UP (ref 3.5–5.3)
RBC # BLD: 3.95 M/UL — LOW (ref 4.2–5.8)
RBC # FLD: 15.4 % — HIGH (ref 10.3–14.5)
SODIUM SERPL-SCNC: 140 MMOL/L — SIGNIFICANT CHANGE UP (ref 135–145)
WBC # BLD: 9.6 K/UL — SIGNIFICANT CHANGE UP (ref 3.8–10.5)
WBC # FLD AUTO: 9.6 K/UL — SIGNIFICANT CHANGE UP (ref 3.8–10.5)

## 2018-11-12 PROCEDURE — 99231 SBSQ HOSP IP/OBS SF/LOW 25: CPT

## 2018-11-12 PROCEDURE — 96375 TX/PRO/DX INJ NEW DRUG ADDON: CPT

## 2018-11-12 PROCEDURE — 86042 ACETYLCHOLN RCPTR BLCKG ANTB: CPT

## 2018-11-12 PROCEDURE — 81001 URINALYSIS AUTO W/SCOPE: CPT

## 2018-11-12 PROCEDURE — 85610 PROTHROMBIN TIME: CPT

## 2018-11-12 PROCEDURE — 92611 MOTION FLUOROSCOPY/SWALLOW: CPT

## 2018-11-12 PROCEDURE — 96366 THER/PROPH/DIAG IV INF ADDON: CPT

## 2018-11-12 PROCEDURE — 93005 ELECTROCARDIOGRAM TRACING: CPT

## 2018-11-12 PROCEDURE — 84132 ASSAY OF SERUM POTASSIUM: CPT

## 2018-11-12 PROCEDURE — 85014 HEMATOCRIT: CPT

## 2018-11-12 PROCEDURE — 70450 CT HEAD/BRAIN W/O DYE: CPT

## 2018-11-12 PROCEDURE — 80053 COMPREHEN METABOLIC PANEL: CPT

## 2018-11-12 PROCEDURE — 86901 BLOOD TYPING SEROLOGIC RH(D): CPT

## 2018-11-12 PROCEDURE — 97163 PT EVAL HIGH COMPLEX 45 MIN: CPT

## 2018-11-12 PROCEDURE — 82435 ASSAY OF BLOOD CHLORIDE: CPT

## 2018-11-12 PROCEDURE — 83615 LACTATE (LD) (LDH) ENZYME: CPT

## 2018-11-12 PROCEDURE — 82803 BLOOD GASES ANY COMBINATION: CPT

## 2018-11-12 PROCEDURE — 85730 THROMBOPLASTIN TIME PARTIAL: CPT

## 2018-11-12 PROCEDURE — 87581 M.PNEUMON DNA AMP PROBE: CPT

## 2018-11-12 PROCEDURE — 80202 ASSAY OF VANCOMYCIN: CPT

## 2018-11-12 PROCEDURE — 82962 GLUCOSE BLOOD TEST: CPT

## 2018-11-12 PROCEDURE — 74230 X-RAY XM SWLNG FUNCJ C+: CPT

## 2018-11-12 PROCEDURE — 86043 ACETYLCHOLN RCPTR MODLG ANTB: CPT

## 2018-11-12 PROCEDURE — 72141 MRI NECK SPINE W/O DYE: CPT

## 2018-11-12 PROCEDURE — 87640 STAPH A DNA AMP PROBE: CPT

## 2018-11-12 PROCEDURE — 99239 HOSP IP/OBS DSCHRG MGMT >30: CPT

## 2018-11-12 PROCEDURE — 87486 CHLMYD PNEUM DNA AMP PROBE: CPT

## 2018-11-12 PROCEDURE — 80048 BASIC METABOLIC PNL TOTAL CA: CPT

## 2018-11-12 PROCEDURE — 83605 ASSAY OF LACTIC ACID: CPT

## 2018-11-12 PROCEDURE — 86900 BLOOD TYPING SEROLOGIC ABO: CPT

## 2018-11-12 PROCEDURE — 71045 X-RAY EXAM CHEST 1 VIEW: CPT

## 2018-11-12 PROCEDURE — 82330 ASSAY OF CALCIUM: CPT

## 2018-11-12 PROCEDURE — 83735 ASSAY OF MAGNESIUM: CPT

## 2018-11-12 PROCEDURE — 99285 EMERGENCY DEPT VISIT HI MDM: CPT | Mod: 25

## 2018-11-12 PROCEDURE — 84238 ASSAY NONENDOCRINE RECEPTOR: CPT

## 2018-11-12 PROCEDURE — 87633 RESP VIRUS 12-25 TARGETS: CPT

## 2018-11-12 PROCEDURE — 92610 EVALUATE SWALLOWING FUNCTION: CPT

## 2018-11-12 PROCEDURE — 87186 SC STD MICRODIL/AGAR DIL: CPT

## 2018-11-12 PROCEDURE — 84295 ASSAY OF SERUM SODIUM: CPT

## 2018-11-12 PROCEDURE — 86850 RBC ANTIBODY SCREEN: CPT

## 2018-11-12 PROCEDURE — 87798 DETECT AGENT NOS DNA AMP: CPT

## 2018-11-12 PROCEDURE — 85027 COMPLETE CBC AUTOMATED: CPT

## 2018-11-12 PROCEDURE — 86366 MUSCLE-SPECIFIC KINASE ANTB: CPT

## 2018-11-12 PROCEDURE — L8699: CPT

## 2018-11-12 PROCEDURE — 96365 THER/PROPH/DIAG IV INF INIT: CPT

## 2018-11-12 PROCEDURE — 70551 MRI BRAIN STEM W/O DYE: CPT

## 2018-11-12 PROCEDURE — 80177 DRUG SCRN QUAN LEVETIRACETAM: CPT

## 2018-11-12 PROCEDURE — 94640 AIRWAY INHALATION TREATMENT: CPT

## 2018-11-12 PROCEDURE — 82947 ASSAY GLUCOSE BLOOD QUANT: CPT

## 2018-11-12 PROCEDURE — 87040 BLOOD CULTURE FOR BACTERIA: CPT

## 2018-11-12 PROCEDURE — 71250 CT THORAX DX C-: CPT

## 2018-11-12 PROCEDURE — 84100 ASSAY OF PHOSPHORUS: CPT

## 2018-11-12 PROCEDURE — 87641 MR-STAPH DNA AMP PROBE: CPT

## 2018-11-12 PROCEDURE — 87086 URINE CULTURE/COLONY COUNT: CPT

## 2018-11-12 PROCEDURE — 86041 ACETYLCHOLN RCPTR BNDNG ANTB: CPT

## 2018-11-12 RX ORDER — HUMAN INSULIN 100 [IU]/ML
15 INJECTION, SUSPENSION SUBCUTANEOUS EVERY 6 HOURS
Qty: 0 | Refills: 0 | Status: DISCONTINUED | OUTPATIENT
Start: 2018-11-12 | End: 2018-11-12

## 2018-11-12 RX ORDER — TAMSULOSIN HYDROCHLORIDE 0.4 MG/1
1 CAPSULE ORAL
Qty: 0 | Refills: 0 | COMMUNITY
Start: 2018-11-12

## 2018-11-12 RX ORDER — CHOLECALCIFEROL (VITAMIN D3) 125 MCG
1 CAPSULE ORAL
Qty: 0 | Refills: 0 | COMMUNITY
Start: 2018-11-12

## 2018-11-12 RX ADMIN — HUMAN INSULIN 15 UNIT(S): 100 INJECTION, SUSPENSION SUBCUTANEOUS at 11:58

## 2018-11-12 RX ADMIN — Medication 50 MILLIGRAM(S): at 06:10

## 2018-11-12 RX ADMIN — Medication 50 MILLIGRAM(S): at 16:42

## 2018-11-12 RX ADMIN — Medication 3 MILLILITER(S): at 06:00

## 2018-11-12 RX ADMIN — Medication 6: at 11:58

## 2018-11-12 RX ADMIN — LEVETIRACETAM 500 MILLIGRAM(S): 250 TABLET, FILM COATED ORAL at 06:09

## 2018-11-12 RX ADMIN — Medication 3 MILLILITER(S): at 11:17

## 2018-11-12 RX ADMIN — Medication 6: at 06:45

## 2018-11-12 RX ADMIN — Medication 3 MILLILITER(S): at 00:05

## 2018-11-12 RX ADMIN — HUMAN INSULIN 10 UNIT(S): 100 INJECTION, SUSPENSION SUBCUTANEOUS at 06:45

## 2018-11-12 RX ADMIN — HEPARIN SODIUM 5000 UNIT(S): 5000 INJECTION INTRAVENOUS; SUBCUTANEOUS at 06:09

## 2018-11-12 RX ADMIN — PANTOPRAZOLE SODIUM 40 MILLIGRAM(S): 20 TABLET, DELAYED RELEASE ORAL at 06:09

## 2018-11-12 RX ADMIN — HEPARIN SODIUM 5000 UNIT(S): 5000 INJECTION INTRAVENOUS; SUBCUTANEOUS at 13:58

## 2018-11-12 RX ADMIN — LEVETIRACETAM 500 MILLIGRAM(S): 250 TABLET, FILM COATED ORAL at 16:42

## 2018-11-12 RX ADMIN — PANTOPRAZOLE SODIUM 40 MILLIGRAM(S): 20 TABLET, DELAYED RELEASE ORAL at 16:42

## 2018-11-12 NOTE — PROGRESS NOTE ADULT - REASON FOR ADMISSION
altered mental status, cough

## 2018-11-12 NOTE — PROGRESS NOTE ADULT - ASSESSMENT
78 yo man with PMHx of afib not on anticoagulation, HTN, DMII, ICH (while on coumadin) requiring bilateral craniotomy who presents with severe sepsis mediated acute on chronic toxic metabolic encephalopathy and SOB likely 2/2 pneumonia. Patient with a 2 year history of dysphagia, now with PEG tube. Mental status and neurological exam grossly unchanged from prior examination. ACH blocking and modulating AB WNL.    AMS mostly resolved  Symptoms of dysphagia unlikely to be secondary to myasthenia gravis given that antibodies were negative    Recommend:  Would continue keppra 500 BID as AMS/lethargy unlikely to be a medication side effect at such a low dose and examination unchanged  No further neurological workup needed at this time  Continue management as per primary team 78 yo man with PMHx of afib not on anticoagulation, HTN, DMII, ICH (while on coumadin) requiring bilateral craniotomy who presents with severe sepsis mediated acute on chronic toxic metabolic encephalopathy and SOB likely 2/2 pneumonia. Patient with a 2 year history of dysphagia, now with PEG tube. Mental status and neurological exam grossly unchanged from prior examination. ACH blocking and modulating AB WNL.    AMS mostly resolved  Symptoms of dysphagia unlikely to be secondary to myasthenia gravis given that antibodies were negative    Recommend:  Would continue keppra 500 BID as AMS/lethargy unlikely to be a medication side effect at such a low dose and examination unchanged  No further neurological workup needed at this time  Consider psych eval as patient appears withdrawn  Continue management as per primary team

## 2018-11-12 NOTE — PROGRESS NOTE ADULT - PROBLEM SELECTOR PROBLEM 9
Seizure

## 2018-11-12 NOTE — PROGRESS NOTE ADULT - SUBJECTIVE AND OBJECTIVE BOX
Subjective: Interval History - No events overnight    Objective:   Vital Signs Last 24 Hrs  T(C): 36.6 (12 Nov 2018 06:02), Max: 37.3 (11 Nov 2018 13:50)  T(F): 97.9 (12 Nov 2018 06:02), Max: 99.1 (11 Nov 2018 13:50)  HR: 95 (12 Nov 2018 06:02) (70 - 111)  BP: 129/79 (12 Nov 2018 06:02) (115/66 - 132/80)  RR: 18 (12 Nov 2018 06:02) (18 - 18)  SpO2: 99% (12 Nov 2018 06:02) (90% - 99%)    General Exam:   General appearance: No acute distress                   Neurological Exam:  Mental Status: Orientated to self. Minimally verbal. Attentive. No dysarthria. Following simple midline commands.    Cranial Nerves: PERRL, EOMI, No facial asymmetry.  Hearing intact.     Motor:   Tone: normal.                  Strength: moving upper extremities equally    Other:    11-12    140  |  100  |  23  ----------------------------<  297<H>  4.9   |  28  |  0.60    Ca    9.5      12 Nov 2018 07:09  Phos  2.7     11-12  Mg     1.8     11-12    TPro  6.6  /  Alb  3.0<L>  /  TBili  1.1  /  DBili  x   /  AST  15  /  ALT  10  /  AlkPhos  109  11-11    LIVER FUNCTIONS - ( 11 Nov 2018 07:24 )  Alb: 3.0 g/dL / Pro: 6.6 g/dL / ALK PHOS: 109 U/L / ALT: 10 U/L / AST: 15 U/L / GGT: x                                 12.3   9.6   )-----------( 264      ( 12 Nov 2018 07:11 )             36.6       MEDICATIONS  (STANDING):  ALBUTerol/ipratropium for Nebulization 3 milliLiter(s) Nebulizer every 6 hours  dextrose 5%. 1000 milliLiter(s) (50 mL/Hr) IV Continuous <Continuous>  dextrose 50% Injectable 12.5 Gram(s) IV Push once  dextrose 50% Injectable 25 Gram(s) IV Push once  dextrose 50% Injectable 25 Gram(s) IV Push once  heparin  Injectable 5000 Unit(s) SubCutaneous every 8 hours  insulin lispro (HumaLOG) corrective regimen sliding scale   SubCutaneous every 6 hours  insulin NPH human recombinant 15 Unit(s) SubCutaneous every 6 hours  levETIRAcetam 500 milliGRAM(s) Oral two times a day  metoprolol tartrate 50 milliGRAM(s) Enteral Tube two times a day  pantoprazole   Suspension 40 milliGRAM(s) Enteral Tube two times a day before meals    MEDICATIONS  (PRN):  dextrose 40% Gel 15 Gram(s) Oral once PRN Blood Glucose LESS THAN 70 milliGRAM(s)/deciliter  glucagon  Injectable 1 milliGRAM(s) IntraMuscular once PRN Glucose LESS THAN 70 milligrams/deciliter

## 2018-11-12 NOTE — PROGRESS NOTE ADULT - ATTENDING COMMENTS
Agree.  Seen and examined.   HD stable afebrile no leukocytosis, tolerating feeds well. Patient answering basic questions, reports feeling "about the same."   Sugars still quite high in 200s, required 26U sliding scale in past 24 hrs. Will give 75% of this added to his insulin regimen: 15U NPH q6. Will need to continue to titrate at facility.   Patient remains at risk of aspiration, we continue to recommend NPO to minimize risk of aspiration.   Poor prognosis. High risk for returning to care w/ recurrent aspiration pneumonia or altered mental status.  Continue rate control. Myasthenia antibodies are negative.   Discussed with wife who herself is scheduled for surgical procedure next week. She's hanging in there, a lot happening all at once. Hopeful G-tube can help restore him to a higher quality of life.   35 minutes spent on discharge. See d/c sum for details.

## 2018-11-12 NOTE — PROGRESS NOTE ADULT - PROBLEM SELECTOR PLAN 5
- c/w insulin NPH for constant coverage - increased to 15q6 today.  - will check FS Q6H while NPO/Tube feed with ISS coverage

## 2018-11-12 NOTE — PROGRESS NOTE ADULT - PROBLEM SELECTOR PROBLEM 5
Type 2 diabetes mellitus

## 2018-11-12 NOTE — PROGRESS NOTE ADULT - PROBLEM SELECTOR PLAN 2
Mostly resolved. awake. Responding to questions. Able to communicate his wishes. Continues to have dysphagia. Appears to be doing well on 11/12.  - s/p 7 days of ABx.  - CT head without signs of acute ICH.  - MRI: Subcentimeter foci of abnormal susceptibility are seen in the posterior   fossa and supratentorial region; compatible with areas of old hemorrhage (possibly secondary to amyloid angiopathy)   - Neuro on board: MG workup recommended for his dysphagia.  - hypernatremia on admission, now resolved s/p IVF.  - currently not agitated, consider haldol PRN  - c/w antiepileptics  - fall precautions

## 2018-11-12 NOTE — PROGRESS NOTE ADULT - SUBJECTIVE AND OBJECTIVE BOX
Interval events: No acute overnight events.  Pt is tolerating PEG feeds    MEDICATIONS  (STANDING):  ALBUTerol/ipratropium for Nebulization 3 milliLiter(s) Nebulizer every 6 hours  dextrose 5%. 1000 milliLiter(s) (50 mL/Hr) IV Continuous <Continuous>  dextrose 50% Injectable 12.5 Gram(s) IV Push once  dextrose 50% Injectable 25 Gram(s) IV Push once  dextrose 50% Injectable 25 Gram(s) IV Push once  heparin  Injectable 5000 Unit(s) SubCutaneous every 8 hours  insulin lispro (HumaLOG) corrective regimen sliding scale   SubCutaneous every 6 hours  insulin NPH human recombinant 15 Unit(s) SubCutaneous every 6 hours  levETIRAcetam 500 milliGRAM(s) Oral two times a day  metoprolol tartrate 50 milliGRAM(s) Enteral Tube two times a day  pantoprazole   Suspension 40 milliGRAM(s) Enteral Tube two times a day before meals    MEDICATIONS  (PRN):  dextrose 40% Gel 15 Gram(s) Oral once PRN Blood Glucose LESS THAN 70 milliGRAM(s)/deciliter  glucagon  Injectable 1 milliGRAM(s) IntraMuscular once PRN Glucose LESS THAN 70 milligrams/deciliter      Allergies    Aleve (Unknown)    Intolerances          ROS:   unable to obtain     Vital Signs Last 24 Hrs  T(C): 36.6 (12 Nov 2018 06:02), Max: 37.3 (11 Nov 2018 13:50)  T(F): 97.9 (12 Nov 2018 06:02), Max: 99.1 (11 Nov 2018 13:50)  HR: 95 (12 Nov 2018 06:02) (70 - 111)  BP: 129/79 (12 Nov 2018 06:02) (115/66 - 132/80)  BP(mean): --  RR: 18 (12 Nov 2018 06:02) (18 - 18)  SpO2: 99% (12 Nov 2018 06:02) (90% - 99%)    PHYSICAL EXAM:    nad  confused  frail  non toxic  soft, nt peg cdi  no edema        LABS:                        12.3   9.6   )-----------( 264      ( 12 Nov 2018 07:11 )             36.6     11-12    140  |  100  |  23  ----------------------------<  297<H>  4.9   |  28  |  0.60    Ca    9.5      12 Nov 2018 07:09  Phos  2.7     11-12  Mg     1.8     11-12    TPro  6.6  /  Alb  3.0<L>  /  TBili  1.1  /  DBili  x   /  AST  15  /  ALT  10  /  AlkPhos  109  11-11          RADIOLOGY & ADDITIONAL TESTS:

## 2018-11-12 NOTE — CHART NOTE - NSCHARTNOTEFT_GEN_A_CORE
Nutrition Follow Up Note  Patient seen for: Length of stay.     78 y/o male presents with severe sepsis mediated acute on chronic toxic metabolic encephalopathy and SOB likely 2/2 pneumonia. Patient with a 2 year history of dysphagia, now with PEG tube. Per Neurology "Mental status and neurological exam grossly unchanged from prior examination."      Source: comprehensive chart review,     Diet : NPO/Enteral: Glucerna 1.2 @ 70mL/hr x 24hrs.    Per chart Pt had 1 loose BM yesterday (not diarrhea). MD has been increasing standing Humalin doses daily as needed.       Enteral /Parenteral Nutrition:  Glucerna 1.2 70mL/hr x 24hrs. At goal, feed provides 2016 kcal, 101 g protein (~26kcal/kg, 1.3g protein/kg/actual Wt 77kg)      Daily Weight in k (), 77.6 (10-31)  Of note feeds were not running from 10/31- as Pt pulled out NGT several times and was pending family decision on PEG placement.      Pertinent Medications: MEDICATIONS  (STANDING):  ALBUTerol/ipratropium for Nebulization 3 milliLiter(s) Nebulizer every 6 hours  dextrose 5%. 1000 milliLiter(s) (50 mL/Hr) IV Continuous <Continuous>  dextrose 50% Injectable 12.5 Gram(s) IV Push once  dextrose 50% Injectable 25 Gram(s) IV Push once  dextrose 50% Injectable 25 Gram(s) IV Push once  heparin  Injectable 5000 Unit(s) SubCutaneous every 8 hours  insulin lispro (HumaLOG) corrective regimen sliding scale   SubCutaneous every 6 hours  insulin NPH human recombinant 15 Unit(s) SubCutaneous every 6 hours  levETIRAcetam 500 milliGRAM(s) Oral two times a day  metoprolol tartrate 50 milliGRAM(s) Enteral Tube two times a day  pantoprazole   Suspension 40 milliGRAM(s) Enteral Tube two times a day before meals    MEDICATIONS  (PRN):  dextrose 40% Gel 15 Gram(s) Oral once PRN Blood Glucose LESS THAN 70 milliGRAM(s)/deciliter  glucagon  Injectable 1 milliGRAM(s) IntraMuscular once PRN Glucose LESS THAN 70 milligrams/deciliter    Pertinent Labs:  @ 07:09: Na 140, BUN 23, Cr 0.60, <H>, K+ 4.9, Phos 2.7, Mg 1.8    Finger Sticks:  POCT Blood Glucose.: 266 mg/dL ( @ 06:38)  POCT Blood Glucose.: 305 mg/dL ( @ 23:41)  POCT Blood Glucose.: 297 mg/dL ( @ 17:51)  POCT Blood Glucose.: 296 mg/dL ( @ 12:53)      Skin per nursing documentation: intact.   Edema: 1 + generalized edema.    Estimated Needs:   [x] no change since previous assessment  [ ] recalculated:     Previous Nutrition Diagnosis: Severe Malnutrition  Nutrition Diagnosis is: improving, now being addressed with enteral nutrition via PEG    New Nutrition Diagnosis: N/A     Interventions:     Recommend  1) Continue Glucerna to goal rate of 70 ml/hr x 24 hrs. At goal, feed provides 2016 kcal, 101 g protein (26kcal/kg, 1.3g protein/kg/actual Wt of 77kg). If Pt loses weight now that he has actually been receiving enteral nutrition for 6 days, I recommend increasing rate to 75mL/hr to provide 2160kcal, 108g protein (28kcal/kg, 1.4g protein/kg/actual Wt 77kg).    Monitoring and Evaluation:     Continue to monitor Nutritional intake, Tolerance to diet prescription, weights, labs, skin integrity    RD remains available upon request and will follow up per protocol  Da Carter RD, CDN, CDE. Pager: [phone] Nutrition Follow Up Note  Patient seen for: Length of stay.     76 y/o male presents with severe sepsis mediated acute on chronic toxic metabolic encephalopathy and SOB likely 2/2 pneumonia. Patient with a 2 year history of dysphagia, now with PEG tube. Per Neurology "Mental status and neurological exam grossly unchanged from prior examination."      Source: comprehensive chart review, wife     Diet : NPO/Enteral: Glucerna 1.2 @ 70mL/hr x 24hrs.    Per chart Pt had 1 loose BM yesterday (not diarrhea). Tolerating tube feeds. MD has been increasing standing Humalin doses daily as needed.       Enteral /Parenteral Nutrition:  Glucerna 1.2 70mL/hr x 24hrs. At goal, feed provides 2016 kcal, 101 g protein (~26kcal/kg, 1.3g protein/kg/actual Wt 77kg)      Daily Weight in k (), 77.6 (10-31)  Of note feeds were not running from 10/31- as Pt pulled out NGT several times and was pending family decision on PEG placement.      Pertinent Medications: MEDICATIONS  (STANDING):  ALBUTerol/ipratropium for Nebulization 3 milliLiter(s) Nebulizer every 6 hours  dextrose 5%. 1000 milliLiter(s) (50 mL/Hr) IV Continuous <Continuous>  dextrose 50% Injectable 12.5 Gram(s) IV Push once  dextrose 50% Injectable 25 Gram(s) IV Push once  dextrose 50% Injectable 25 Gram(s) IV Push once  heparin  Injectable 5000 Unit(s) SubCutaneous every 8 hours  insulin lispro (HumaLOG) corrective regimen sliding scale   SubCutaneous every 6 hours  insulin NPH human recombinant 15 Unit(s) SubCutaneous every 6 hours  levETIRAcetam 500 milliGRAM(s) Oral two times a day  metoprolol tartrate 50 milliGRAM(s) Enteral Tube two times a day  pantoprazole   Suspension 40 milliGRAM(s) Enteral Tube two times a day before meals    MEDICATIONS  (PRN):  dextrose 40% Gel 15 Gram(s) Oral once PRN Blood Glucose LESS THAN 70 milliGRAM(s)/deciliter  glucagon  Injectable 1 milliGRAM(s) IntraMuscular once PRN Glucose LESS THAN 70 milligrams/deciliter    Pertinent Labs:  @ 07:09: Na 140, BUN 23, Cr 0.60, <H>, K+ 4.9, Phos 2.7, Mg 1.8    Finger Sticks:  POCT Blood Glucose.: 266 mg/dL ( @ 06:38)  POCT Blood Glucose.: 305 mg/dL ( @ 23:41)  POCT Blood Glucose.: 297 mg/dL ( @ 17:51)  POCT Blood Glucose.: 296 mg/dL ( @ 12:53)      Skin per nursing documentation: intact.   Edema: 1 + generalized edema.    Estimated Needs:   [x] no change since previous assessment  [ ] recalculated:     Previous Nutrition Diagnosis: Severe Malnutrition  Nutrition Diagnosis is: improving, now being addressed with enteral nutrition via PEG    New Nutrition Diagnosis: N/A     Interventions:     Recommend  1) Continue Glucerna to goal rate of 70 ml/hr x 24 hrs. At goal, feed provides 2016 kcal, 101 g protein (26kcal/kg, 1.3g protein/kg/actual Wt of 77kg). If Pt loses weight now that he has actually been receiving enteral nutrition for 6 days, I recommend increasing rate to 75mL/hr to provide 2160kcal, 108g protein (28kcal/kg, 1.4g protein/kg/actual Wt 77kg).    Monitoring and Evaluation:     Continue to monitor Nutritional intake, Tolerance to diet prescription, weights, labs, skin integrity    RD remains available upon request and will follow up per protocol  Da Carter RD, CDN, CDE. Pager: [phone]

## 2018-11-12 NOTE — PROGRESS NOTE ADULT - PROBLEM SELECTOR PROBLEM 7
Hypertension

## 2018-11-12 NOTE — PROGRESS NOTE ADULT - PROBLEM SELECTOR PROBLEM 4
Atrial fibrillation

## 2018-11-12 NOTE — PROGRESS NOTE ADULT - PROVIDER SPECIALTY LIST ADULT
Gastroenterology
Internal Medicine
Neurology
Neurology
Internal Medicine
Internal Medicine

## 2018-11-12 NOTE — PROGRESS NOTE ADULT - PROBLEM SELECTOR PROBLEM 8
BPH (benign prostatic hyperplasia)

## 2018-11-12 NOTE — PROGRESS NOTE ADULT - PROBLEM SELECTOR PROBLEM 6
Hepatic lesion

## 2018-11-12 NOTE — PROGRESS NOTE ADULT - SUBJECTIVE AND OBJECTIVE BOX
Blade To [phone]    INTERVAL HPI/OVERNIGHT EVENTS:  Afebrile, saturating well overnight. NAEO. This AM, pt appears well, denies feelings of depression.     ALBUTerol/ipratropium for Nebulization: 3 milliLiter(s) Nebulizer (11-12-18 @ 06:00),  3 milliLiter(s) Nebulizer (11-12-18 @ 00:05)  heparin  Injectable: 5000 Unit(s) SubCutaneous (11-12-18 @ 06:09),  5000 Unit(s) SubCutaneous (11-11-18 @ 21:48)  insulin lispro (HumaLOG) corrective regimen sliding scale: 6 Unit(s) SubCutaneous (11-12-18 @ 06:45),  8 Unit(s) SubCutaneous (11-11-18 @ 23:55)  insulin NPH human recombinant: 10 Unit(s) SubCutaneous (11-12-18 @ 06:45),  10 Unit(s) SubCutaneous (11-11-18 @ 23:55)  levETIRAcetam: 500 milliGRAM(s) Oral (11-12-18 @ 06:09),  500 milliGRAM(s) Oral (11-11-18 @ 18:40)  metoprolol tartrate: 50 milliGRAM(s) Enteral Tube (11-12-18 @ 06:10),  50 milliGRAM(s) Enteral Tube (11-11-18 @ 18:40)  pantoprazole   Suspension: 40 milliGRAM(s) Enteral Tube (11-12-18 @ 06:09),  40 milliGRAM(s) Enteral Tube (11-11-18 @ 18:40)        REVIEW OF SYSTEMS  Constitutional: [ ] negative [ ] fevers [ ] chills [ ] weight loss [ ] weight gain  HEENT: [ ] negative [ ] dry eyes [ ] eye irritation [ ] postnasal drip [ ] nasal congestion  CV: [ ] negative  [ ] chest pain [ ] orthopnea [ ] palpitations [ ] murmur  Resp: [ ] negative [ ] cough [ ] shortness of breath [ ] dyspnea [ ] wheezing [ ] sputum [ ] hemoptysis  GI: [ ] negative [ ] nausea [ ] vomiting [ ] diarrhea [ ] constipation [ ] abd pain [ ] dysphagia   : [ ] negative [ ] dysuria [ ] nocturia [ ] hematuria [ ] increased urinary frequency  Musculoskeletal: [ ] negative [ ] back pain [ ] myalgias [ ] arthralgias [ ] fracture  Skin: [ ] negative [ ] rash [ ] itch  Neurological: [ ] negative [ ] headache [ ] dizziness [ ] syncope [ ] weakness [ ] numbness  Psychiatric: [ ] negative [ ] anxiety [ ] depression  Endocrine: [ ] negative [ ] diabetes [ ] thyroid problem  Hematologic/Lymphatic: [ ] negative [ ] anemia [ ] coagulopathy  Allergic/Immunologic: [ ] negative [ ] itchy eyes [ ] nasal discharge [ ] hives [ ] angioedema  [ ] All other systems negative  [ ] Unable to assess ROS because ________      MEDICATIONS  (STANDING):  ALBUTerol/ipratropium for Nebulization 3 milliLiter(s) Nebulizer every 6 hours  dextrose 5%. 1000 milliLiter(s) (50 mL/Hr) IV Continuous <Continuous>  dextrose 50% Injectable 12.5 Gram(s) IV Push once  dextrose 50% Injectable 25 Gram(s) IV Push once  dextrose 50% Injectable 25 Gram(s) IV Push once  heparin  Injectable 5000 Unit(s) SubCutaneous every 8 hours  insulin lispro (HumaLOG) corrective regimen sliding scale   SubCutaneous every 6 hours  insulin NPH human recombinant 15 Unit(s) SubCutaneous every 6 hours  levETIRAcetam 500 milliGRAM(s) Oral two times a day  metoprolol tartrate 50 milliGRAM(s) Enteral Tube two times a day  pantoprazole   Suspension 40 milliGRAM(s) Enteral Tube two times a day before meals    MEDICATIONS  (PRN):  dextrose 40% Gel 15 Gram(s) Oral once PRN Blood Glucose LESS THAN 70 milliGRAM(s)/deciliter  glucagon  Injectable 1 milliGRAM(s) IntraMuscular once PRN Glucose LESS THAN 70 milligrams/deciliter      Allergies: Aleve (Unknown)    Intolerances:     PAST MEDICAL & SURGICAL HISTORY:  BPH (benign prostatic hyperplasia)  Atrial fibrillation  Seizure  Diabetes mellitus  Hypertension  H/O craniotomy: bilateral, for ICH after fall  S/P inguinal hernia repair  ASD (atrial septal defect): closure      Vital Signs Last 24 Hrs  T(C): 36.6 (12 Nov 2018 06:02), Max: 37.3 (11 Nov 2018 13:50)  T(F): 97.9 (12 Nov 2018 06:02), Max: 99.1 (11 Nov 2018 13:50)  HR: 95 (12 Nov 2018 06:02) (70 - 111)  BP: 129/79 (12 Nov 2018 06:02) (115/66 - 132/80)  BP(mean): --  RR: 18 (12 Nov 2018 06:02) (18 - 18)  SpO2: 99% (12 Nov 2018 06:02) (90% - 99%)      In's and Outs:   11-11-18 @ 07:01  -  11-12-18 @ 07:00  --------------------------------------------------------  IN: 1000 mL / OUT: 0 mL / NET: 1000 mL            PHYSICAL EXAMINATION:  GENERAL: The patient is awake and alert in no apparent distress.   HEENT: NCAT. EOMI. Mucous membranes are moist.  NECK: Supple. No JVD.  LUNGS: [x] Clear to auscultation b/l   [x] Unlabored breathing   [ ] Wheezing  [ ] Rales  [ ] Rhonchi  HEART: [x] Normal  [ ] Irregular rhythm  [ ] Tachycardia  [ ] Bradycardia   [ ] Systolic murmur  [ ] Diastolic murmur  [ ] Gallop   ABDOMEN: [x] Normal  [ ] Hard  [ ] Tender  [ ] Distended [ ] Bowel sounds  GENITOURINARY: [x] Normal  [ ] Incontinent   [ ] Oliguria/Anuria   [ ] Lares  EXTREMITIES: [x] Normal  [ ] Cyanosis  [ ] Edema  NEUROLOGIC: [x] Grossly intact  [ ] Focal neurologic deficits  SKIN: [x] Normal  [ ] Rash   [ ] Ulcers  Musculoskeletal:  [x] Normal   [ ] Generalized weakness  [ ] Bedbound      LABS:                        12.3   9.6   )-----------( 264      ( 12 Nov 2018 07:11 )             36.6     Hgb Trend: 12.3<--, 12.2<--, 12.2<--, 12.2<--, 12.0<--  11-12    140  |  100  |  23  ----------------------------<  297<H>  4.9   |  28  |  0.60    Ca    9.5      12 Nov 2018 07:09  Phos  2.7     11-12  Mg     1.8     11-12    TPro  6.6  /  Alb  3.0<L>  /  TBili  1.1  /  DBili  x   /  AST  15  /  ALT  10  /  AlkPhos  109  11-11    Creatinine Trend: 0.60<--, 0.62<--, 0.61<--, 0.63<--, 0.69<--, 0.62<--      ABG - ( 10 Nov 2018 18:20 )  pH, Arterial: 7.51  pH, Blood: x     /  pCO2: 39    /  pO2: 67    / HCO3: 31    / Base Excess: 7.6   /  SaO2: 96                            CAPILLARY BLOOD GLUCOSE      POCT Blood Glucose.: 266 mg/dL (12 Nov 2018 06:38)  POCT Blood Glucose.: 305 mg/dL (11 Nov 2018 23:41)  POCT Blood Glucose.: 297 mg/dL (11 Nov 2018 17:51)  POCT Blood Glucose.: 296 mg/dL (11 Nov 2018 12:53)      EKG:       MICROBIOLOGY:      Blood cultures:      RADIOLOGY & ADDITIONAL STUDIES: [ ] Reviewed by me Blade To [phone]    INTERVAL HPI/OVERNIGHT EVENTS:  Afebrile, saturating well overnight. NAEO. This AM, pt appears well, denies feelings of depression. Denies chest pain, SOB, abdominal pain. States that his wife helps him at home.      MEDICATIONS  (STANDING):  ALBUTerol/ipratropium for Nebulization 3 milliLiter(s) Nebulizer every 6 hours  dextrose 5%. 1000 milliLiter(s) (50 mL/Hr) IV Continuous <Continuous>  dextrose 50% Injectable 12.5 Gram(s) IV Push once  dextrose 50% Injectable 25 Gram(s) IV Push once  dextrose 50% Injectable 25 Gram(s) IV Push once  heparin  Injectable 5000 Unit(s) SubCutaneous every 8 hours  insulin lispro (HumaLOG) corrective regimen sliding scale   SubCutaneous every 6 hours  insulin NPH human recombinant 15 Unit(s) SubCutaneous every 6 hours  levETIRAcetam 500 milliGRAM(s) Oral two times a day  metoprolol tartrate 50 milliGRAM(s) Enteral Tube two times a day  pantoprazole   Suspension 40 milliGRAM(s) Enteral Tube two times a day before meals    MEDICATIONS  (PRN):  dextrose 40% Gel 15 Gram(s) Oral once PRN Blood Glucose LESS THAN 70 milliGRAM(s)/deciliter  glucagon  Injectable 1 milliGRAM(s) IntraMuscular once PRN Glucose LESS THAN 70 milligrams/deciliter      Allergies: Aleve (Unknown)    Intolerances:     PAST MEDICAL & SURGICAL HISTORY:  BPH (benign prostatic hyperplasia)  Atrial fibrillation  Seizure  Diabetes mellitus  Hypertension  H/O craniotomy: bilateral, for ICH after fall  S/P inguinal hernia repair  ASD (atrial septal defect): closure      Vital Signs Last 24 Hrs  T(C): 36.6 (12 Nov 2018 06:02), Max: 37.3 (11 Nov 2018 13:50)  T(F): 97.9 (12 Nov 2018 06:02), Max: 99.1 (11 Nov 2018 13:50)  HR: 95 (12 Nov 2018 06:02) (70 - 111)  BP: 129/79 (12 Nov 2018 06:02) (115/66 - 132/80)  BP(mean): --  RR: 18 (12 Nov 2018 06:02) (18 - 18)  SpO2: 99% (12 Nov 2018 06:02) (90% - 99%)      In's and Outs:   11-11-18 @ 07:01  -  11-12-18 @ 07:00  --------------------------------------------------------  IN: 1000 mL / OUT: 0 mL / NET: 1000 mL      PHYSICAL EXAMINATION:  GENERAL: The patient is awake and alert in no apparent distress.   HEENT: NCAT. EOMI. Mucous membranes are moist.  NECK: Supple. No JVD.  LUNGS: [x] Clear to auscultation b/l   [x] Unlabored breathing   [ ] Wheezing  [ ] Rales  [ ] Rhonchi; upper respiratory transmitted sounds  HEART: [x] Normal  [ ] Irregular rhythm  [ ] Tachycardia  [ ] Bradycardia   [ ] Systolic murmur  [ ] Diastolic murmur  [ ] Gallop   ABDOMEN: [x] Normal  [ ] Hard  [ ] Tender  [ ] Distended [ ] Bowel sounds; PEG tube in place  GENITOURINARY: [ ] Normal  [ ] Incontinent   [ ] Oliguria/Anuria   [ ] Lares  EXTREMITIES: [x] Normal  [ ] Cyanosis  [ ] Edema  NEUROLOGIC: [x] Grossly intact  [ ] Focal neurologic deficits  SKIN: [x] Normal  [ ] Rash   [ ] Ulcers  Musculoskeletal:  [ ] Normal   [x] Generalized weakness  [ ] Bedbound      LABS:                        12.3   9.6   )-----------( 264      ( 12 Nov 2018 07:11 )             36.6     Hgb Trend: 12.3<--, 12.2<--, 12.2<--, 12.2<--, 12.0<--  11-12      140  |  100  |  23  ----------------------------<  297<H>  4.9   |  28  |  0.60    Ca    9.5      12 Nov 2018 07:09  Phos  2.7     11-12  Mg     1.8     11-12    TPro  6.6  /  Alb  3.0<L>  /  TBili  1.1  /  DBili  x   /  AST  15  /  ALT  10  /  AlkPhos  109  11-11    Creatinine Trend: 0.60<--, 0.62<--, 0.61<--, 0.63<--, 0.69<--, 0.62<--      ABG - ( 10 Nov 2018 18:20 )  pH, Arterial: 7.51  pH, Blood: x     /  pCO2: 39    /  pO2: 67    / HCO3: 31    / Base Excess: 7.6   /  SaO2: 96            CAPILLARY BLOOD GLUCOSE  POCT Blood Glucose.: 266 mg/dL (12 Nov 2018 06:38)  POCT Blood Glucose.: 305 mg/dL (11 Nov 2018 23:41)  POCT Blood Glucose.: 297 mg/dL (11 Nov 2018 17:51)  POCT Blood Glucose.: 296 mg/dL (11 Nov 2018 12:53)      EKG:       MICROBIOLOGY:      Blood cultures:      RADIOLOGY & ADDITIONAL STUDIES: [ ] Reviewed by me

## 2018-11-14 LAB — MUSK IGG SER IA-MCNC: SIGNIFICANT CHANGE UP

## 2018-12-06 NOTE — PROGRESS NOTE ADULT - PROBLEM/PLAN-8
DISPLAY PLAN FREE TEXT

## 2018-12-28 ENCOUNTER — APPOINTMENT (OUTPATIENT)
Dept: CARDIOLOGY | Facility: CLINIC | Age: 78
End: 2018-12-28

## 2019-03-26 ENCOUNTER — APPOINTMENT (OUTPATIENT)
Dept: UROLOGY | Facility: CLINIC | Age: 79
End: 2019-03-26

## 2019-06-29 NOTE — PROGRESS NOTE ADULT - PROBLEM/PLAN-5
DISPLAY PLAN FREE TEXT
FH: coronary artery disease, father, brother     Mother  Still living? Unknown  FH: breast cancer, Age at diagnosis: Age Unknown     Sibling  Still living? Unknown  FH: colon cancer, Age at diagnosis: Age Unknown
DISPLAY PLAN FREE TEXT

## 2019-07-19 NOTE — PROGRESS NOTE ADULT - ASSESSMENT
Mr. 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 Patient course is complicated by acute decline in PO tolerance with other symptoms, and now with significant oropharyngeal dysphagia. Waiting for PEG tube placement. never used

## 2019-11-13 NOTE — ED ADULT NURSE NOTE - NSFALLRSKPASTHIST_ED_ALL_ED
[facility]  [phone]    ADD PROVIDER'S REPORT:    Date: 11/13/2019   Patient: Saúl Rodriguez    Med Record #:[id number]  Parents: Tiara and Ming    Allergies:  ALLERGIES:  No Known Allergies    Medications  Current Outpatient Medications   Medication Sig Dispense Refill   • methylpheniDATE (RITALIN) 10 MG tablet Take 1 tablet by mouth daily as needed (after school). 30 tablet 0   • methylpheniDATE (RITALIN LA) 20 MG 24 hr capsule Take 1 capsule by mouth every morning. 30 capsule 0     No current facility-administered medications for this visit.        Saúl is a 9 year old  male here for ADD/ADHD follow up.  He is accompanied by:mother    OTHER REPORTS:   Previous ADD/ADHD visit notes were reviewed.    SUBJECTIVE  HPI:   Age at diagnosis: 5 years  CONCERNS: none at this time. Overall he continues to do well at home and school. Parent teacher conferences were positive without any concerns. No associated problems with hyperactivity, impulsivity, mood changes, or peer or parental conflicts. Stimulant medication is helping with attention.   CONTEXT: no identified stressors  MODIFYING FACTORS: no extra services  STATUS: without change  Medication adherence:   Patient reports adherence to dosing schedules Yes  Side effects of medication: denies tics, dizziness, headache, stomachache, and appetite changes    SCHOOL:  Saúl is in 6th Grade at [employer].  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   Respiratory: Negative for shortness of breath.    Cardiovascular: Negative for chest pain and palpitations.   Gastrointestinal: Negative for abdominal pain, constipation and diarrhea.   Neurological: Negative for dizziness, light-headedness and headaches.       PHYSICAL EXAMINATION:  Visit Vitals  /62   Pulse 88   Ht 4' 8.75\" (1.441 m)   Wt 59.9 kg (132 lb)   BMI 28.82 kg/m²     Wt Readings from Last 4 Encounters:   08/14/19 57.5 kg (126 lb 12.8 oz) (97 %, Z= 1.83)*   05/22/19 53.2 kg (117 lb 3.2 oz) (95 %, Z= 1.65)*   05/18/19 52.2 kg (115 lb) (94 %, Z= 1.59)*   03/08/19 51.7 kg (114 lb) (95 %, Z= 1.65)*     * Growth percentiles are based on CDC (Boys, 2-20 Years) data.       Physical Exam   Constitutional: He appears well-developed and well-nourished. He is active.   HENT:   Mouth/Throat: Mucous membranes are moist. Oropharynx is clear.   Eyes: Pupils are equal, round, and reactive to light. Conjunctivae and EOM are normal.   Cardiovascular: Normal rate and regular rhythm.   No murmur heard.  Pulmonary/Chest: Effort normal and breath sounds normal.   Neurological: He is alert.   Psychiatric: He has a normal mood and affect. His behavior is normal.       ASSESSMENT/SUMMARY PLAN  1. ADHD (attention deficit hyperactivity disorder) - combined type - stable    PLAN: continue present medication and dosing schedule and make a to-do list/chart    2. SLEEP DISTURBANCE - stable    PLAN:   vigorous exercise in afternoon or evening, read before bed, regular wake/bed time every day and avoid daytime naps      Medication Concerns: none  Medication Changes:  continue present medication  Followup: ADHD FOLLOW-UP IN 3  months  Call: if having problems or concerns     15 minutes spent face to face with the patient, with more than 50% of that time spent in counseling about ADD progress and symptom management and behavior modification techniques.    Signed: Mecca Coon CNP         no

## 2020-02-04 NOTE — ED PROVIDER NOTE - NEURO NEGATIVE STATEMENT, MLM
right
no loss of consciousness, no gait abnormality, no headache, no sensory deficits, and no weakness.

## 2020-10-29 NOTE — ED ADULT NURSE NOTE - NS ED PATIENT SAFETY CONCERN
From: Amaris Lou  To: Delma Rock  Sent: 10/29/2020 11:21 AM CDT  Subject: Non-Urgent Medical Question    Hi , well, it all sound good but, it still not explained the pain i get between my fingers and hands. And i send you some kidney stones in one of the urine samples. Not, sure if you wanted that. I noticed that i am urinating a little bit of stone.I am not in pain thank god.Thank you for taking the time and calling me.     
No

## 2021-02-25 NOTE — H&P ADULT - ASSESSMENT
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
Clear

## 2021-08-06 NOTE — ED PROVIDER NOTE - PMH
Atrial fibrillation    BPH (benign prostatic hyperplasia)    Diabetes mellitus    Hypertension    Seizure Yes

## 2022-07-22 NOTE — CHART NOTE - NSCHARTNOTEFT_GEN_A_CORE
Nutrition Follow Up Note  Patient seen for: Length of stay.     78y/o male who presents with a chief complaint of altered mental status, cough complicated by acute decline in PO tolerance with other symptoms, and now with significant oropharyngeal dysphagia.   Per Care Model Team B Pt pulled out NGT 3 different times and Pt/wife hopeful Pt will pass MBS but if he fails there will be another GOC discussion regarding nutritional route but team suspects Pt/family will want pleasure feeds.    Source: Care Model Team B, comprehensive chart review     Diet : Glucerna 1.2 @ 70 ml/hr x 24 hrs but NGT is out.    Patient confused with garbled speech, being wheeled down for MBS.        Enteral /Parenteral Nutrition: N/A      Daily Weight in k.6 (10-31), Weight in k.3 (10-30)    Pertinent Medications: MEDICATIONS  (STANDING):  ALBUTerol/ipratropium for Nebulization 3 milliLiter(s) Nebulizer every 6 hours  dextrose 5% + sodium chloride 0.45%. 1000 milliLiter(s) (75 mL/Hr) IV Continuous <Continuous>  dextrose 5%. 1000 milliLiter(s) (50 mL/Hr) IV Continuous <Continuous>  dextrose 50% Injectable 12.5 Gram(s) IV Push once  dextrose 50% Injectable 25 Gram(s) IV Push once  dextrose 50% Injectable 25 Gram(s) IV Push once  heparin  Injectable 5000 Unit(s) SubCutaneous every 8 hours  insulin glargine Injectable (LANTUS) 11 Unit(s) SubCutaneous at bedtime  insulin lispro (HumaLOG) corrective regimen sliding scale   SubCutaneous every 6 hours  levETIRAcetam  IVPB 500 milliGRAM(s) IV Intermittent every 12 hours  metoprolol tartrate Injectable 5 milliGRAM(s) IV Push every 6 hours    MEDICATIONS  (PRN):  dextrose 40% Gel 15 Gram(s) Oral once PRN Blood Glucose LESS THAN 70 milliGRAM(s)/deciliter  glucagon  Injectable 1 milliGRAM(s) IntraMuscular once PRN Glucose LESS THAN 70 milligrams/deciliter    Pertinent Labs:  @ 09:13: Na 143, BUN 11, Cr 0.83, <H>, K+ 3.9, Phos 2.6, Mg 1.9, Alk Phos --, ALT/SGPT --, AST/SGOT --, HbA1c --    Finger Sticks:  POCT Blood Glucose.: 175 mg/dL ( @ 12:31)  POCT Blood Glucose.: 150 mg/dL ( @ 07:44)  POCT Blood Glucose.: 175 mg/dL ( @ 23:44)  POCT Blood Glucose.: 176 mg/dL ( @ 21:27)  POCT Blood Glucose.: 209 mg/dL ( @ 17:38)      Skin per nursing documentation: no pressure ulcers  Edema: 1+ generalized.    Estimated Needs:   [x] no change since previous assessment  [ ] recalculated:     Previous Nutrition Diagnosis: Severe Malnutrition  Nutrition Diagnosis is: ongoing    New Nutrition Diagnosis: N/A     Interventions:     Recommend  1) Defer nutritional route to SLP and Pt/family wishes. If Pt started on an oral diet will need Consistent Carbohydrate diet with 2 Glucerna shakes daily. If enteral nutrition to be initiated can titrate Pt up to previous enteral order of Glucerna 1.2 to goal rate of 70 ml/hr x 24 hrs. At goal, feed provides 2016 kcal, 101 g protein.    Monitoring and Evaluation:     Continue to monitor Nutritional intake, Tolerance to diet prescription, weights, labs, skin integrity    RD remains available upon request and will follow up per protocol    Da Carter, ELISE, CDN, CDE. Pager: [phone] DISPLAY PLAN FREE TEXT

## 2023-03-24 NOTE — PROGRESS NOTE ADULT - PROBLEM SELECTOR PLAN 10
Apply prescription medication to the eye as directed.  May use refresh or artificial tears OTC as directed.  Tylenol or ibuprofen OTC as directed for pain, Benadryl for itching  Follow up with an ophthalmologist if symptoms worsen or persist as instructed.   Go to ER for visual disturbances, worsening foreign body sensation or for concerns as instructed   DVT ppx: SQH  Diet: NPO for now, multiple failure of NG tube trial due to patient's discomfort and self-discontinuation. Will need restraints to stop, yet will wait for neurology eval for reversible etiologies of dysphagia and S/S re-eval tomorrow (Friday).   GOC: per discussion with wife, pt wishes are to be FULL CODE. Pt's wife's primary concern at the moment is nutrition.   Spoke with patient wife today, and she has a specific place in her mind where she wants to send the patient to if he goes to long term nursing care. Discussed  the dysphagia problem with her and the institution, and the institution can take pleasure feeding patients since they have palliative service linked to them. Decided to get an official barium swallow study given the information.  Patient's wife understands the barium swallow study result and wishes to speak to GI regarding surgical tube placement for nutrition for more information.

## 2023-03-28 NOTE — PROGRESS NOTE ADULT - SUBJECTIVE AND OBJECTIVE BOX
27-Mar-2023 14:33 [facility]  Hector Marquez PA-C  [address] [place] [address]  [phone]      INTERVAL HPI/OVERNIGHT EVENTS:    no new events  remains npo     MEDICATIONS  (STANDING):  ALBUTerol/ipratropium for Nebulization 3 milliLiter(s) Nebulizer every 6 hours  dextrose 5% + sodium chloride 0.45%. 1000 milliLiter(s) (75 mL/Hr) IV Continuous <Continuous>  dextrose 5%. 1000 milliLiter(s) (50 mL/Hr) IV Continuous <Continuous>  dextrose 50% Injectable 12.5 Gram(s) IV Push once  dextrose 50% Injectable 25 Gram(s) IV Push once  dextrose 50% Injectable 25 Gram(s) IV Push once  heparin  Injectable 5000 Unit(s) SubCutaneous every 8 hours  insulin glargine Injectable (LANTUS) 13 Unit(s) SubCutaneous at bedtime  insulin lispro (HumaLOG) corrective regimen sliding scale   SubCutaneous every 6 hours  levETIRAcetam  IVPB 500 milliGRAM(s) IV Intermittent every 12 hours  metoprolol tartrate Injectable 5 milliGRAM(s) IV Push every 6 hours    MEDICATIONS  (PRN):  dextrose 40% Gel 15 Gram(s) Oral once PRN Blood Glucose LESS THAN 70 milliGRAM(s)/deciliter  glucagon  Injectable 1 milliGRAM(s) IntraMuscular once PRN Glucose LESS THAN 70 milligrams/deciliter      Allergies    Aleve (Unknown)    Intolerances        ROS:   unable to obtain       PHYSICAL EXAM:   Vital Signs:  Vital Signs Last 24 Hrs  T(C): 36.5 (05 Nov 2018 05:32), Max: 36.5 (04 Nov 2018 14:33)  T(F): 97.7 (05 Nov 2018 05:32), Max: 97.7 (04 Nov 2018 14:33)  HR: 83 (05 Nov 2018 06:49) (74 - 91)  BP: 134/76 (05 Nov 2018 06:49) (131/82 - 178/79)  BP(mean): --  RR: 18 (05 Nov 2018 05:32) (18 - 18)  SpO2: 98% (05 Nov 2018 05:32) (95% - 98%)  Daily     Daily     nad  confused  frail  non toxic  soft, nt  no edema        LABS:                        11.7   6.6   )-----------( 212      ( 05 Nov 2018 07:18 )             34.1     11-05    137  |  100  |  8   ----------------------------<  198<H>  3.4<L>   |  29  |  0.63    Ca    8.5      05 Nov 2018 07:18  Phos  2.9     11-05  Mg     1.5     11-05            RADIOLOGY & ADDITIONAL TESTS:

## 2024-05-20 NOTE — PROGRESS NOTE ADULT - PROBLEM SELECTOR PROBLEM 3
Spoke to stress  and they stated BP was within limits for testing and to send pt to finish registering for her test after D/C from ED.   Dysphagia, oropharyngeal